# Patient Record
Sex: FEMALE | Race: WHITE | NOT HISPANIC OR LATINO | Employment: FULL TIME | URBAN - METROPOLITAN AREA
[De-identification: names, ages, dates, MRNs, and addresses within clinical notes are randomized per-mention and may not be internally consistent; named-entity substitution may affect disease eponyms.]

---

## 2018-12-23 ENCOUNTER — HOSPITAL ENCOUNTER (EMERGENCY)
Facility: HOSPITAL | Age: 28
Discharge: HOME | End: 2018-12-23
Attending: EMERGENCY MEDICINE | Admitting: EMERGENCY MEDICINE
Payer: COMMERCIAL

## 2018-12-23 VITALS
OXYGEN SATURATION: 97 % | SYSTOLIC BLOOD PRESSURE: 120 MMHG | HEART RATE: 88 BPM | DIASTOLIC BLOOD PRESSURE: 68 MMHG | RESPIRATION RATE: 14 BRPM

## 2018-12-23 DIAGNOSIS — R73.9 HYPERGLYCEMIA: Primary | ICD-10-CM

## 2018-12-23 DIAGNOSIS — E10.9 TYPE 1 DIABETES (HCC): ICD-10-CM

## 2018-12-23 DIAGNOSIS — R11.10 VOMITING: ICD-10-CM

## 2018-12-23 LAB
ALBUMIN SERPL BCP-MCNC: 4.5 G/DL (ref 3.5–5)
ALP SERPL-CCNC: 76 U/L (ref 46–116)
ALT SERPL W P-5'-P-CCNC: 18 U/L (ref 12–78)
ANION GAP SERPL CALCULATED.3IONS-SCNC: 15 MMOL/L (ref 4–13)
AST SERPL W P-5'-P-CCNC: 12 U/L (ref 5–45)
BACTERIA UR QL AUTO: ABNORMAL /HPF
BASOPHILS # BLD AUTO: 0.01 THOUSANDS/ΜL (ref 0–0.1)
BASOPHILS NFR BLD AUTO: 0 % (ref 0–1)
BILIRUB SERPL-MCNC: 1.22 MG/DL (ref 0.2–1)
BILIRUB UR QL STRIP: ABNORMAL
BUN SERPL-MCNC: 16 MG/DL (ref 5–25)
CALCIUM SERPL-MCNC: 9.3 MG/DL (ref 8.3–10.1)
CHLORIDE SERPL-SCNC: 97 MMOL/L (ref 100–108)
CLARITY UR: ABNORMAL
CO2 SERPL-SCNC: 22 MMOL/L (ref 21–32)
COLOR UR: YELLOW
CREAT SERPL-MCNC: 1.1 MG/DL (ref 0.6–1.3)
EOSINOPHIL # BLD AUTO: 0.01 THOUSAND/ΜL (ref 0–0.61)
EOSINOPHIL NFR BLD AUTO: 0 % (ref 0–6)
ERYTHROCYTE [DISTWIDTH] IN BLOOD BY AUTOMATED COUNT: 12.1 % (ref 11.6–15.1)
EXT PREG TEST URINE: NEGATIVE
GFR SERPL CREATININE-BSD FRML MDRD: 69 ML/MIN/1.73SQ M
GLUCOSE SERPL-MCNC: 153 MG/DL (ref 65–140)
GLUCOSE SERPL-MCNC: 199 MG/DL (ref 65–140)
GLUCOSE SERPL-MCNC: 223 MG/DL (ref 65–140)
GLUCOSE SERPL-MCNC: 230 MG/DL (ref 65–140)
GLUCOSE UR STRIP-MCNC: NEGATIVE MG/DL
HCT VFR BLD AUTO: 44.1 % (ref 34.8–46.1)
HGB BLD-MCNC: 15 G/DL (ref 11.5–15.4)
HGB UR QL STRIP.AUTO: NEGATIVE
IMM GRANULOCYTES # BLD AUTO: 0.03 THOUSAND/UL (ref 0–0.2)
IMM GRANULOCYTES NFR BLD AUTO: 0 % (ref 0–2)
KETONES UR STRIP-MCNC: ABNORMAL MG/DL
LEUKOCYTE ESTERASE UR QL STRIP: NEGATIVE
LYMPHOCYTES # BLD AUTO: 0.77 THOUSANDS/ΜL (ref 0.6–4.47)
LYMPHOCYTES NFR BLD AUTO: 9 % (ref 14–44)
MCH RBC QN AUTO: 31.3 PG (ref 26.8–34.3)
MCHC RBC AUTO-ENTMCNC: 34 G/DL (ref 31.4–37.4)
MCV RBC AUTO: 92 FL (ref 82–98)
MONOCYTES # BLD AUTO: 0.5 THOUSAND/ΜL (ref 0.17–1.22)
MONOCYTES NFR BLD AUTO: 6 % (ref 4–12)
MUCOUS THREADS UR QL AUTO: ABNORMAL
NEUTROPHILS # BLD AUTO: 6.89 THOUSANDS/ΜL (ref 1.85–7.62)
NEUTS SEG NFR BLD AUTO: 85 % (ref 43–75)
NITRITE UR QL STRIP: NEGATIVE
NON-SQ EPI CELLS URNS QL MICRO: ABNORMAL /HPF
NRBC BLD AUTO-RTO: 0 /100 WBCS
PH UR STRIP.AUTO: 5.5 [PH] (ref 4.5–8)
PLATELET # BLD AUTO: 173 THOUSANDS/UL (ref 149–390)
PMV BLD AUTO: 12.2 FL (ref 8.9–12.7)
POTASSIUM SERPL-SCNC: 3.5 MMOL/L (ref 3.5–5.3)
PROT SERPL-MCNC: 7.7 G/DL (ref 6.4–8.2)
PROT UR STRIP-MCNC: >=300 MG/DL
RBC # BLD AUTO: 4.79 MILLION/UL (ref 3.81–5.12)
RBC #/AREA URNS AUTO: ABNORMAL /HPF
SODIUM SERPL-SCNC: 134 MMOL/L (ref 136–145)
SP GR UR STRIP.AUTO: >=1.03 (ref 1–1.03)
UROBILINOGEN UR QL STRIP.AUTO: 0.2 E.U./DL
WBC # BLD AUTO: 8.21 THOUSAND/UL (ref 4.31–10.16)
WBC #/AREA URNS AUTO: ABNORMAL /HPF

## 2018-12-23 PROCEDURE — 85025 COMPLETE CBC W/AUTO DIFF WBC: CPT | Performed by: EMERGENCY MEDICINE

## 2018-12-23 PROCEDURE — 96374 THER/PROPH/DIAG INJ IV PUSH: CPT

## 2018-12-23 PROCEDURE — 36415 COLL VENOUS BLD VENIPUNCTURE: CPT

## 2018-12-23 PROCEDURE — 80053 COMPREHEN METABOLIC PANEL: CPT | Performed by: EMERGENCY MEDICINE

## 2018-12-23 PROCEDURE — 81001 URINALYSIS AUTO W/SCOPE: CPT

## 2018-12-23 PROCEDURE — 96361 HYDRATE IV INFUSION ADD-ON: CPT

## 2018-12-23 PROCEDURE — 81025 URINE PREGNANCY TEST: CPT | Performed by: EMERGENCY MEDICINE

## 2018-12-23 PROCEDURE — 82948 REAGENT STRIP/BLOOD GLUCOSE: CPT

## 2018-12-23 PROCEDURE — 99285 EMERGENCY DEPT VISIT HI MDM: CPT

## 2018-12-23 RX ORDER — ONDANSETRON 4 MG/1
4 TABLET, ORALLY DISINTEGRATING ORAL EVERY 6 HOURS PRN
Qty: 20 TABLET | Refills: 0 | Status: SHIPPED | OUTPATIENT
Start: 2018-12-23 | End: 2018-12-28

## 2018-12-23 RX ORDER — ONDANSETRON 2 MG/ML
4 INJECTION INTRAMUSCULAR; INTRAVENOUS ONCE
Status: COMPLETED | OUTPATIENT
Start: 2018-12-23 | End: 2018-12-23

## 2018-12-23 RX ADMIN — ONDANSETRON 4 MG: 2 INJECTION INTRAMUSCULAR; INTRAVENOUS at 13:35

## 2018-12-23 RX ADMIN — SODIUM CHLORIDE 2000 ML: 0.9 INJECTION, SOLUTION INTRAVENOUS at 15:25

## 2018-12-23 NOTE — DISCHARGE INSTRUCTIONS
Type 1 Diabetes in Adults   WHAT YOU NEED TO KNOW:   Type 1 diabetes is a disease that affects how your body makes insulin and uses glucose (sugar)  Normally, when the blood sugar level increases, the pancreas makes more insulin  Insulin helps move sugar out of the blood so it can be used for energy  Type 1 diabetes develops because the immune system destroys cells in the pancreas that make insulin  The pancreas cannot make enough insulin, so the blood sugar level continues to rise  A family history of type 1 diabetes may increase your risk for diabetes  DISCHARGE INSTRUCTIONS:   Call 911 if:   · You have chest pain or shortness of breath  Return to the emergency department if:   · You have a low blood sugar level and it does not improve with treatment  · Your blood sugar level is above 240 mg/dL and does not come down after you take a dose of insulin  · You have ketones  · You have a fever  · You have nausea or are vomiting and cannot keep any food or liquid down  · You have blurred or double vision  · Your breath has a fruity, sweet smell, or your breathing is shallow  · You have symptoms of a low blood sugar level, such as trouble thinking, sweating, or a pounding heartbeat  Contact your healthcare provider if:   · Your blood sugar levels are higher than your target goals  · You often have low blood sugar levels  · Your skin is red, dry, warm, or swollen  · You have a wound that does not heal      · You have trouble coping with your illness, or you feel anxious or depressed  · You have questions or concerns about your condition or care  Medicines:   · You will need insulin each day  Insulin can be injected or given through an insulin pump  Ask your healthcare provider which method is best for you  You or a family member will be taught how to give insulin injections if this is the best method for you  Your family member can give you the injections if you are not able  Take your insulin as directed  Too much insulin may cause your blood sugar level to go too low  You will be taught how to adjust each insulin dose you take with meals  Always check your blood sugar level before the meal  The dose will be based on your blood sugar level, carbohydrates in the meal, and activity after the meal      · Glucose tablets  may be given to increase your blood sugar if it has dropped too low from treatment  · Take your medicine as directed  Contact your healthcare provider if you think your medicine is not helping or if you have side effects  Tell him or her if you are allergic to any medicine  Keep a list of the medicines, vitamins, and herbs you take  Include the amounts, and when and why you take them  Bring the list or the pill bottles to follow-up visits  Carry your medicine list with you in case of an emergency  Check your blood sugar level as directed: You will be taught how to use a glucose monitor  You will need to check your blood sugar level at least 3 times each day  Ask your healthcare provider when and how often to check during the day  If you check your blood sugar level before a meal , it should be between 80 and 130 mg/dL  If you check your blood sugar level 1 to 2 hours after a meal , it should be less than 180 mg/dL  Ask your healthcare provider if these are good goals for you  You may need to check for ketones in your urine or blood if your level is higher than directed  Write down your results, and show them to your healthcare provider  Your provider may make changes to your medicine, food, or exercise schedules  If your blood sugar level is too low: Your blood sugar level is too low if it goes below 70 mg/dL  If the level is too low, eat or drink 15 grams of fast-acting carbohydrate  These are found naturally in fruits  Fast-acting carbohydrates will raise your blood sugar level quickly   Examples of 15 grams of fast-acting carbohydrate are 4 ounces (½ cup) of fruit juice or 4 ounces of regular soda  Other examples are 2 tablespoons of raisins or 3 to 4 glucose tablets  Check your blood sugar level 15 minutes later  If the level is still low (less than 100 mg/dL), eat another 15 grams of carbohydrate  When the level returns to 100 mg/dL, eat a snack or meal that contains carbohydrates  This will help prevent another drop in blood sugar  Always carefully follow your healthcare provider's instructions on how to treat low blood sugar levels  Wear medical alert identification:  Wear medical alert jewelry or carry a card that says you have diabetes  Ask your healthcare provider where to get these items  Check your feet each day for sores:  Wear shoes and socks that fit correctly  Ask your healthcare provider for more information about foot care  Maintain a healthy weight:  Ask your healthcare provider how much you should weigh  A healthy weight can help you control your diabetes  Ask your provider to help you create a weight loss plan if you are overweight  Together you can set manageable weight loss goals  Follow your meal plan:  A dietitian will help you make a meal plan to keep your blood sugar level steady  Do not skip meals  Your blood sugar level may drop too low if you have taken diabetes medicine and do not eat  · Keep track of carbohydrates (sugar and starchy foods)  Your blood sugar level can get too high if you eat too many carbohydrates  Your dietitian will help you plan meals and snacks that have the right amount of carbohydrates  · Eat low-fat foods , such as skinless chicken and low-fat milk  · Eat less sodium (salt)  Limit high-sodium foods, such as soy sauce, potato chips, and soup  Do not add salt to food you cook  Limit your use of table salt  · Eat high-fiber foods , such as vegetables, whole-grain breads, and beans  · Limit alcohol    Alcohol affects your blood sugar level and can make it harder to manage your diabetes  Limit alcohol to 1 drink a day if you are a woman  Limit alcohol to 2 drinks a day if you are a man  A drink of alcohol is 12 ounces of beer, 5 ounces of wine, or 1½ ounces of liquor  Exercise as directed:  Exercise can help keep your blood sugar level steady, decrease your risk of heart disease, and help you lose weight  Stretch before and after you exercise  Exercise for at least 150 minutes every week  Spread this amount of exercise over at least 3 days a week  Do not skip exercise more than 2 days in a row  Include muscle strengthening activities 2 to 3 days each week  Older adults should include balance training 2 to 3 times each week  Activities that help increase balance include yoga and rashard chi  Work with your healthcare provider to create an exercise plan  · Check your blood sugar level before and after exercise  Healthcare providers may tell you to change the amount of insulin you take or food you eat  If your blood sugar level is high, check your blood or urine for ketones before you exercise  Do not exercise if your blood sugar level is high and you have ketones  · If your blood sugar level is less than 100 mg/dL, have a carbohydrate snack before you exercise  Examples are 4 to 6 crackers, ½ banana, 8 ounces (1 cup) of milk, or 4 ounces (½ cup) of juice  Drink water or liquids that do not contain sugar before, during, and after exercise  Ask your dietitian or healthcare provider which liquids you should drink when you exercise  · Do not sit for longer than 30 minutes  If you cannot walk around, at least stand up  This will help you stay active and keep your blood circulating  Do not smoke:  Nicotine and other chemicals in cigarettes and cigars can cause lung damage and make it more difficult to manage your diabetes  Ask your healthcare provider for information if you currently smoke and need help to quit   Do not use e-cigarettes or smokeless tobacco in place of cigarettes or to help you quit  They still contain nicotine  Check your blood pressure as directed:  Ask your healthcare provider what your blood pressure should be  Most adults with diabetes and high blood pressure should have a systolic blood pressure (first number) less than 140  Your diastolic blood pressure (second number) should be less than 90  Ask about vaccines: You have a higher risk for serious illness if you get the flu, pneumonia, or hepatitis  Ask your healthcare provider if you should get a flu, pneumonia, or hepatitis B vaccine, and when to get the vaccine  Follow up with your healthcare provider as directed: You may need to return to have your A1c checked every 3 months  Your healthcare provider will tell you the A1c level that is right for you  Most people should have an A1c less than 7%  You will need to return at least 1 time each year to have your feet checked  You will need an eye exam 1 time each year to check for retinopathy  You will also need urine tests every year to check for kidney problems  Write down your questions so you remember to ask them during your visits  © 2017 [facility] Information is for End User's use only and may not be sold, redistributed or otherwise used for commercial purposes  All illustrations and images included in CareNotes® are the copyrighted property of [employer] M [employer]  or Surinder Sanders  The above information is an  only  It is not intended as medical advice for individual conditions or treatments  Talk to your doctor, nurse or pharmacist before following any medical regimen to see if it is safe and effective for you

## 2018-12-23 NOTE — ED PROVIDER NOTES
History  Chief Complaint   Patient presents with    Hyperglycemia - Symptomatic     states last night low blood sugar "came up as low" drank juice and BGL was 44, then started with vomiting, today  continues with vomiting  71-year-old type 1 diabetic female presents for evaluation of hyperglycemia  The patient states that she has had a GI bug for the past 2 days and last evening she noted that she had a blood sugar around 40 and drank some juice and then had some continued vomiting and diarrhea  The patient then had elevated blood sugars with the highest around 470 with associated vomiting  The patient was attempting keep fluids down but was unable to do so  The patient did not take any insulin because she was not able to keep things down  She did note that her blood sugar started spontaneously decreasing just prior to arrival with blood sugars in the low 200s while she was in the waiting room  The patient had grossly positive urine ketone sticks at home  The patient denies history of DKA and states that her diabetes is well controlled with a hemoglobin A1c in the low 6s            None       Past Medical History:   Diagnosis Date    DM (diabetes mellitus) ([facility] [address] )        Past Surgical History:   Procedure Laterality Date    NO PAST SURGERIES         History reviewed  No pertinent family history  I have reviewed and agree with the history as documented  Social History   Substance Use Topics    Smoking status: Never Smoker    Smokeless tobacco: Never Used    Alcohol use Yes      Comment: occ  Review of Systems   Constitutional: Negative for chills and fever  Gastrointestinal: Positive for nausea  Negative for diarrhea and vomiting  Endocrine: Positive for polydipsia and polyuria  Genitourinary: Negative for menstrual problem, vaginal bleeding and vaginal discharge  All other systems reviewed and are negative        Physical Exam  Physical Exam   Constitutional: She is oriented to person, place, and time  She appears well-developed and well-nourished  No distress  HENT:   Head: Normocephalic and atraumatic  Right Ear: External ear normal    Left Ear: External ear normal    Eyes: Pupils are equal, round, and reactive to light  Conjunctivae and EOM are normal  No scleral icterus  Neck: Normal range of motion  Cardiovascular: Normal rate, regular rhythm and normal heart sounds  Pulmonary/Chest: Effort normal and breath sounds normal  No respiratory distress  Abdominal: Soft  Bowel sounds are normal  There is no tenderness  There is no rebound and no guarding  Musculoskeletal: Normal range of motion  She exhibits no edema  Neurological: She is alert and oriented to person, place, and time  Skin: Skin is warm and dry  No rash noted  Psychiatric: She has a normal mood and affect  Nursing note and vitals reviewed        Vital Signs  ED Triage Vitals [12/23/18 1318]   Temp Pulse Respirations Blood Pressure SpO2   -- (!) 128 (!) 24 130/78 100 %      Temp src Heart Rate Source Patient Position - Orthostatic VS BP Location FiO2 (%)   -- -- Lying Right arm --      Pain Score       No Pain           Vitals:    12/23/18 1338 12/23/18 1442 12/23/18 1602 12/23/18 1734   BP:  121/70 103/62 120/68   Pulse: (!) 110 94 77 88   Patient Position - Orthostatic VS:  Sitting Sitting Lying       Visual Acuity      ED Medications  Medications   ondansetron (ZOFRAN) injection 4 mg (4 mg Intravenous Given 12/23/18 1335)   sodium chloride 0 9 % bolus 2,000 mL (0 mL Intravenous Stopped 12/23/18 1659)       Diagnostic Studies  Results Reviewed     Procedure Component Value Units Date/Time    Fingerstick Glucose (POCT) [[id number]]  (Abnormal) Collected:  12/23/18 1705    Lab Status:  Final result Updated:  12/23/18 1708     POC Glucose 153 (H) mg/dl     Fingerstick Glucose (POCT) [[id number]]  (Abnormal) Collected:  12/23/18 1607    Lab Status:  Final result Updated:  12/23/18 1608     POC Glucose 199 (H) mg/dl     Urine Microscopic [[id number]]  (Abnormal) Collected:  12/23/18 1350    Lab Status:  Final result Specimen:  Urine from Urine, Clean Catch Updated:  12/23/18 1409     RBC, UA 0-1 (A) /hpf      WBC, UA 0-1 (A) /hpf      Epithelial Cells Occasional /hpf      Bacteria, UA Occasional /hpf      MUCUS THREADS Occasional (A)    Narrative:       Less than 2 CC of Urine; results may be affected  Comprehensive metabolic panel [[id number]]  (Abnormal) Collected:  12/23/18 1327    Lab Status:  Final result Specimen:  Blood from Arm, Left Updated:  12/23/18 1351     Sodium 134 (L) mmol/L      Potassium 3 5 mmol/L      Chloride 97 (L) mmol/L      CO2 22 mmol/L      ANION GAP 15 (H) mmol/L      BUN 16 mg/dL      Creatinine 1 10 mg/dL      Glucose 230 (H) mg/dL      Calcium 9 3 mg/dL      AST 12 U/L      ALT 18 U/L      Alkaline Phosphatase 76 U/L      Total Protein 7 7 g/dL      Albumin 4 5 g/dL      Total Bilirubin 1 22 (H) mg/dL      eGFR 69 ml/min/1 73sq m     Narrative:         National Kidney Disease Education Program recommendations are as follows:  GFR calculation is accurate only with a steady state creatinine  Chronic Kidney disease less than 60 ml/min/1 73 sq  meters  Kidney failure less than 15 ml/min/1 73 sq  meters      POCT pregnancy, urine [[id number]]  (Normal) Resulted:  12/23/18 1339    Lab Status:  Final result Specimen:  Urine Updated:  12/23/18 1339     EXT PREG TEST UR (Ref: Negative) negative    POCT urinalysis dipstick [[id number]]  (Abnormal) Resulted:  12/23/18 1335    Lab Status:  Final result Specimen:  Urine from Urine, Other Updated:  12/23/18 1336    ED Urine Macroscopic [[id number]]  (Abnormal) Collected:  12/23/18 1350    Lab Status:  Final result Specimen:  Urine Updated:  12/23/18 1334     Color, UA Yellow     Clarity, UA Cloudy     pH, UA 5 5     Leukocytes, UA Negative     Nitrite, UA Negative     Protein, UA >=300 (A) mg/dl      Glucose, UA Negative mg/dl      Ketones, UA 40 (2+) (A) mg/dl      Urobilinogen, UA 0 2 E U /dl      Bilirubin, UA Interference- unable to analyze (A)     Blood, UA Negative     Specific Gravity, UA >=1 030    Narrative:       CLINITEK RESULT    CBC and differential [[id number]]  (Abnormal) Collected:  12/23/18 1327    Lab Status:  Final result Specimen:  Blood from Arm, Left Updated:  12/23/18 1333     WBC 8 21 Thousand/uL      RBC 4 79 Million/uL      Hemoglobin 15 0 g/dL      Hematocrit 44 1 %      MCV 92 fL      MCH 31 3 pg      MCHC 34 0 g/dL      RDW 12 1 %      MPV 12 2 fL      Platelets 550 Thousands/uL      nRBC 0 /100 WBCs      Neutrophils Relative 85 (H) %      Immat GRANS % 0 %      Lymphocytes Relative 9 (L) %      Monocytes Relative 6 %      Eosinophils Relative 0 %      Basophils Relative 0 %      Neutrophils Absolute 6 89 Thousands/µL      Immature Grans Absolute 0 03 Thousand/uL      Lymphocytes Absolute 0 77 Thousands/µL      Monocytes Absolute 0 50 Thousand/µL      Eosinophils Absolute 0 01 Thousand/µL      Basophils Absolute 0 01 Thousands/µL     Fingerstick Glucose (POCT) [[id number]]  (Abnormal) Collected:  12/23/18 1329    Lab Status:  Final result Updated:  12/23/18 1330     POC Glucose 223 (H) mg/dl                  No orders to display              Procedures  Procedures       Phone Contacts  ED Phone Contact    ED Course  ED Course as of Dec 23 2320   Sun Dec 23, 2018   1611 Patient is stable upon re-evaluation, 2nd L infusing  POC Glucose: (!) 199                               MDM  Number of Diagnoses or Management Options  Hyperglycemia: new and requires workup  Type 1 diabetes ([facility] [address] ): new and requires workup  Vomiting: new and requires workup  Diagnosis management comments: The plan is for IV hydration and laboratories to assess for complications of diabetes such as DKA  The patient's blood sugar abnormalities are likely secondary to a viral GI bug  The patient's labs and urine were reviewed    The plan is for 2 L of IV hydration and recheck of blood sugars  Patient clinically improved after 2 L of IV fluids  Her blood sugar is decreased  S strict return precautions were discussed and the patient verbalized understanding of the need to return to the emergency department for any concerning symptoms are rising blood sugars  Amount and/or Complexity of Data Reviewed  Clinical lab tests: ordered and reviewed  Review and summarize past medical records: yes      CritCare Time    Disposition  Final diagnoses:   Hyperglycemia   Type 1 diabetes ([facility] )   Vomiting     Time reflects when diagnosis was documented in both MDM as applicable and the Disposition within this note     Time User Action Codes Description Comment    12/23/2018  5:13 PM Melissa Brizuela Add [R73 9] Hyperglycemia     12/23/2018  5:13 PM [place] Mola [E11 9] Diabetes ([facility] [address] )     12/23/2018  5:13 PM Melissa Brizuela Remove [E11 9] [facility] ([facility] )     12/23/2018  5:13 PM Davied Leda B Add [E10 9] Type 1 diabetes ([facility] )     12/23/2018  5:17 PM Davied Leda B Add [R11 10] Vomiting       ED Disposition     ED Disposition Condition Comment    Discharge  [facility] discharge to home/self care      Condition at discharge: Stable        Follow-up Information     Follow up With Specialties Details Why Contact Info Additional Information    Michelle Richardson MD Internal Medicine Schedule an appointment as soon as possible for a visit For further evaluation [address] [phone]       [address] [place] [address] Emergency Department Emergency Medicine Go to For further evaluation, If symptoms worsen [address] [facility] ED, [address] [place] [address], [address]          Discharge Medication List as of 12/23/2018  5:17 PM      START taking these medications    Details   ondansetron (ZOFRAN-ODT) 4 mg disintegrating tablet Take 1 tablet (4 mg total) by mouth every 6 (six) hours as needed for nausea or vomiting, Starting Sun 12/23/2018, Normal           No discharge procedures on file      ED Provider  Electronically Signed by           Jillian Gray DO  12/23/18 1534

## 2018-12-26 RX ORDER — NORETHINDRONE ACETATE AND ETHINYL ESTRADIOL 1; .02 MG/1; MG/1
1 TABLET ORAL DAILY
COMMUNITY
Start: 2013-07-17 | End: 2018-12-28

## 2018-12-28 ENCOUNTER — ANNUAL EXAM (OUTPATIENT)
Dept: OBGYN CLINIC | Facility: CLINIC | Age: 28
End: 2018-12-28
Payer: COMMERCIAL

## 2018-12-28 VITALS
WEIGHT: 137 LBS | BODY MASS INDEX: 22.82 KG/M2 | DIASTOLIC BLOOD PRESSURE: 70 MMHG | HEIGHT: 65 IN | SYSTOLIC BLOOD PRESSURE: 140 MMHG

## 2018-12-28 DIAGNOSIS — Z12.4 ENCOUNTER FOR PAP SMEAR OF CERVIX WITH HPV DNA COTESTING: Primary | ICD-10-CM

## 2018-12-28 PROCEDURE — 99385 PREV VISIT NEW AGE 18-39: CPT | Performed by: OBSTETRICS & GYNECOLOGY

## 2018-12-28 PROCEDURE — G0145 SCR C/V CYTO,THINLAYER,RESCR: HCPCS | Performed by: OBSTETRICS & GYNECOLOGY

## 2018-12-28 NOTE — PATIENT INSTRUCTIONS
Pap Smear   GENERAL INFORMATION:   What is a Pap smear? A Pap smear, or Pap test, is a procedure to check your cervix for abnormal cells  The cervix is the narrow opening at the bottom of your uterus  The cervix meets the top part of the vagina  How do I prepare for a Pap smear? The best time to schedule the test is right after your period stops  Do not have a Pap smear during your monthly period  Do not have intercourse or put anything in your vagina for 24 hours before your test    What will happen during a Pap smear? · You will lie on your back and place your feet on footrests called stirrups  Your caregiver will gently insert a device called a speculum into your vagina  The speculum is used to spread the walls of your vagina so he can see your cervix  He will use a thin brush or cotton swab to collect cells from the inside of your cervix  · Your caregiver will also collect cells from the surface of your cervix with a plastic or wooden tool called a spatula  He may also gently scrape the upper part of your vagina for a sample  The samples are placed in a container with liquid or on a glass slide  They are sent to a lab and examined for abnormal cells  How often do I need a Pap smear? Pap smears are usually done every 1 to 3 years  You may need a Pap smear more often if you have any of the following:  · Positive test result for the human papillomavirus (HPV)    · Cervical intraepithelial neoplasm or cervical cancer    · HIV    · A weak immune system    · Exposure to diethylstilbestrol (EDUARDO) medicine when your mother was pregnant with you  CARE AGREEMENT:   You have the right to help plan your care  Learn about your health condition and how it may be treated  Discuss treatment options with your caregivers to decide what care you want to receive  You always have the right to refuse treatment  The above information is an  only   It is not intended as medical advice for individual conditions or treatments  Talk to your doctor, nurse or pharmacist before following any medical regimen to see if it is safe and effective for you  © 2014 [address] is for End User's use only and may not be sold, redistributed or otherwise used for commercial purposes  All illustrations and images included in CareNotes® are the copyrighted property of A D A M , Inc  or Surinder Sanders

## 2018-12-28 NOTE — PROGRESS NOTES
A/P    1  Annual exam    Last PAP - 2017   Next due - done today secondary to abnormal pap in the past and pt request   Scheduling of pap discussed in detail - if normal will not repeat x 3 years        28 y o ,Patient's last menstrual period was 12/04/2018 (within days)  C/O no complaints for annual exam and pap       Past medical / social / surgical / family history reviewed and updated   Medication and allergies discussed in detail and updated     ? ? Mother had ovarian cancer unsure of when or how was treated   She is a patient of ours will reach out to her to see if I can access her chart and determine if Adam Vincent needs screening for BRCA       Review of Systems - History obtained from chart review and the patient  General ROS: negative  Psychological ROS: negative  Ophthalmic ROS: negative  ENT ROS: negative  Allergy and Immunology ROS: negative  Hematological and Lymphatic ROS: negative  Endocrine ROS: negative  Breast ROS: negative for breast lumps  Respiratory ROS: no cough, shortness of breath, or wheezing  Cardiovascular ROS: no chest pain or dyspnea on exertion  Gastrointestinal ROS: no abdominal pain, change in bowel habits, or black or bloody stools  Genito-Urinary ROS: no dysuria, trouble voiding, or hematuria  Musculoskeletal ROS: negative  Neurological ROS: no TIA or stroke symptoms  Dermatological ROS: negative    Physical Exam   Constitutional: She is oriented to person, place, and time  She appears well-developed  Neck: Normal range of motion  No thyromegaly present  Cardiovascular: Normal rate  Pulmonary/Chest: Effort normal  No respiratory distress  Right breast exhibits no inverted nipple, no mass and no tenderness  Left breast exhibits no inverted nipple, no mass and no tenderness  Breasts are symmetrical    Abdominal: Soft  She exhibits no distension  There is no tenderness  Genitourinary: Vagina normal and uterus normal  Cervix exhibits no motion tenderness and no discharge   Right adnexum displays no mass, no tenderness and no fullness  Left adnexum displays no mass, no tenderness and no fullness  No vaginal discharge found  Lymphadenopathy:     She has no cervical adenopathy  Neurological: She is alert and oriented to person, place, and time  Psychiatric: She has a normal mood and affect  Her behavior is normal  Judgment and thought content normal    Vitals reviewed

## 2019-01-04 LAB
LAB AP GYN PRIMARY INTERPRETATION: NORMAL
Lab: NORMAL

## 2019-01-07 ENCOUNTER — TELEPHONE (OUTPATIENT)
Dept: OBGYN CLINIC | Facility: CLINIC | Age: 29
End: 2019-01-07

## 2019-02-05 ENCOUNTER — OFFICE VISIT (OUTPATIENT)
Dept: OBGYN CLINIC | Facility: CLINIC | Age: 29
End: 2019-02-05
Payer: COMMERCIAL

## 2019-02-05 VITALS — DIASTOLIC BLOOD PRESSURE: 86 MMHG | BODY MASS INDEX: 21.97 KG/M2 | SYSTOLIC BLOOD PRESSURE: 152 MMHG | WEIGHT: 132 LBS

## 2019-02-05 DIAGNOSIS — N91.2 AMENORRHEA: ICD-10-CM

## 2019-02-05 DIAGNOSIS — O24.019 TYPE 1 DIABETES MELLITUS IN PREGNANCY, UNSPECIFIED TRIMESTER: Primary | ICD-10-CM

## 2019-02-05 PROBLEM — R11.10 VOMITING: Status: RESOLVED | Noted: 2018-12-23 | Resolved: 2019-02-05

## 2019-02-05 PROBLEM — E10.9 TYPE 1 DIABETES MELLITUS WITHOUT COMPLICATION (HCC): Status: ACTIVE | Noted: 2019-02-05

## 2019-02-05 LAB — SL AMB POCT URINE HCG: POSITIVE

## 2019-02-05 PROCEDURE — 99213 OFFICE O/P EST LOW 20 MIN: CPT | Performed by: OBSTETRICS & GYNECOLOGY

## 2019-02-05 PROCEDURE — 81025 URINE PREGNANCY TEST: CPT | Performed by: OBSTETRICS & GYNECOLOGY

## 2019-02-05 NOTE — PATIENT INSTRUCTIONS
Amenorrhea   AMBULATORY CARE:   Amenorrhea  is the absence of menstruation (your monthly period)  Primary amenorrhea means your period did not start by age 12  This is usually because of a lack of reproductive organs, such as a uterus  Breasts and other signs of puberty that usually start to develop by age 15 do not develop  Secondary amenorrhea means you stopped having regular periods for at least 3 months or irregular periods for 6 months  Amenorrhea may be a sign of a serious medical problem that needs to be treated  Common symptoms include the following:   · Hair growth on your face or over your breastbone, or bald patches    · Acne    · Pelvic pain    · Headaches    · Vision changes    · Bruises or patches of dark skin  Contact your healthcare provider for any of the following:   · You notice changes in your menstrual cycle, such as periods that start and stop a few times  · Your female child is 15, has not started menstruating, and is not developing signs of puberty  · Your female child is 12 and has developed signs of puberty, but she has not started menstruating  · You have questions or concerns about your condition or care  Treatment for amenorrhea  may include birth control pills to restart and regulate your periods  You may need medicines to treat medical conditions such as a thyroid or pituitary disorder, or PCOS  Surgery may fix a problem that is preventing blood from flowing through your vagina, or to remove a tumor  Prevent or manage amenorrhea:   · Maintain a healthy weight  Low body weight, overweight, or obesity can all affect your period  Your healthcare provider can help you create a plan to reach and maintain a healthy weight  · Eat healthy foods  Healthy foods include fruits, vegetables, whole-grain breads, low-fat dairy products, beans, lean meats, and fish  You may need to have more calcium and vitamin D if your amenorrhea is caused by low estrogen levels   Low estrogen can affect bone strength  Calcium and vitamin D work together to help build bone  Your healthcare provider or a dietitian can help you create a meal plan that has the right number of calories and nutrients for you  · Exercise as directed  Exercise can help you build or maintain bone  Exercise can also help you lose weight if you are overweight  Ask your healthcare provider how much to exercise and which exercises are best for you  Do not exercise more than your healthcare provider recommends  Too much exercise can cause your period to stop  · Keep a record of your monthly periods  Record the dates your period started and stopped  Also record any pain or other problems during your period  · Manage stress  Try new ways to relax, such as deep breathing  Ask your healthcare provider for more information on stress management  Follow up with your healthcare provider as directed:  Write down your questions so you remember to ask them during your visits  © 2017 [employer] Information is for End User's use only and may not be sold, redistributed or otherwise used for commercial purposes  All illustrations and images included in CareNotes® are the copyrighted property of A D A M , Inc  or Surinder Sadners  The above information is an  only  It is not intended as medical advice for individual conditions or treatments  Talk to your doctor, nurse or pharmacist before following any medical regimen to see if it is safe and effective for you

## 2019-02-05 NOTE — PROGRESS NOTES
LMP 1/2/2019  EDC by LMP is 10/9/2019 4 w 6 days    Pregnancy complicated by   1  Pre - gestational DM - referral given to her    Advised  - the target sugar ranges   Advised some of the testing that will be needed  Need to determine her last opthalmology, podiatry apt and get that set up for her         Pregnancy Counseling     1  Testing   Advised of the labs that we drawl and why and when they should be done     Genetic testing    CF / SMA - info given for codes to call insurance company for coverage      Nuchal / blood - pt explained timing of the test and why we do it      US - dating and anatomy scan     2  Life style    Exercise    Cont with the plan that done now if nothing recommend exercise 3 x week for  20 min    Weight gain - 25-30 pounds total gain    No smoking or drinking and no drugs       3   Eating    Slip given for the restriction and recommendation of diet during pregnancy

## 2019-02-07 ENCOUNTER — TELEPHONE (OUTPATIENT)
Dept: PERINATAL CARE | Facility: CLINIC | Age: 29
End: 2019-02-07

## 2019-02-09 ENCOUNTER — APPOINTMENT (OUTPATIENT)
Dept: LAB | Facility: HOSPITAL | Age: 29
End: 2019-02-09
Attending: OBSTETRICS & GYNECOLOGY
Payer: COMMERCIAL

## 2019-02-09 DIAGNOSIS — O24.019 TYPE 1 DIABETES MELLITUS IN PREGNANCY, UNSPECIFIED TRIMESTER: ICD-10-CM

## 2019-02-09 DIAGNOSIS — N91.2 AMENORRHEA: ICD-10-CM

## 2019-02-09 LAB
ABO GROUP BLD: NORMAL
BASOPHILS # BLD AUTO: 0.03 THOUSANDS/ΜL (ref 0–0.1)
BASOPHILS NFR BLD AUTO: 1 % (ref 0–1)
BILIRUB UR QL STRIP: NEGATIVE
BLD GP AB SCN SERPL QL: NEGATIVE
CLARITY UR: CLEAR
COLOR UR: YELLOW
EOSINOPHIL # BLD AUTO: 0.04 THOUSAND/ΜL (ref 0–0.61)
EOSINOPHIL NFR BLD AUTO: 1 % (ref 0–6)
ERYTHROCYTE [DISTWIDTH] IN BLOOD BY AUTOMATED COUNT: 11.8 % (ref 11.6–15.1)
EST. AVERAGE GLUCOSE BLD GHB EST-MCNC: 128 MG/DL
GLUCOSE UR STRIP-MCNC: NEGATIVE MG/DL
HBA1C MFR BLD: 6.1 % (ref 4.2–6.3)
HBV SURFACE AG SER QL: NORMAL
HCT VFR BLD AUTO: 39.6 % (ref 34.8–46.1)
HGB BLD-MCNC: 13.7 G/DL (ref 11.5–15.4)
HGB UR QL STRIP.AUTO: NEGATIVE
IMM GRANULOCYTES # BLD AUTO: 0.02 THOUSAND/UL (ref 0–0.2)
IMM GRANULOCYTES NFR BLD AUTO: 0 % (ref 0–2)
KETONES UR STRIP-MCNC: ABNORMAL MG/DL
LEUKOCYTE ESTERASE UR QL STRIP: NEGATIVE
LYMPHOCYTES # BLD AUTO: 1.72 THOUSANDS/ΜL (ref 0.6–4.47)
LYMPHOCYTES NFR BLD AUTO: 27 % (ref 14–44)
MCH RBC QN AUTO: 32.2 PG (ref 26.8–34.3)
MCHC RBC AUTO-ENTMCNC: 34.6 G/DL (ref 31.4–37.4)
MCV RBC AUTO: 93 FL (ref 82–98)
MONOCYTES # BLD AUTO: 0.49 THOUSAND/ΜL (ref 0.17–1.22)
MONOCYTES NFR BLD AUTO: 8 % (ref 4–12)
NEUTROPHILS # BLD AUTO: 4.17 THOUSANDS/ΜL (ref 1.85–7.62)
NEUTS SEG NFR BLD AUTO: 63 % (ref 43–75)
NITRITE UR QL STRIP: NEGATIVE
NRBC BLD AUTO-RTO: 0 /100 WBCS
PH UR STRIP.AUTO: 6 [PH] (ref 4.5–8)
PLATELET # BLD AUTO: 160 THOUSANDS/UL (ref 149–390)
PMV BLD AUTO: 12.2 FL (ref 8.9–12.7)
PROT UR STRIP-MCNC: NEGATIVE MG/DL
RBC # BLD AUTO: 4.25 MILLION/UL (ref 3.81–5.12)
RH BLD: POSITIVE
RUBV IGG SERPL IA-ACNC: >175 IU/ML
SP GR UR STRIP.AUTO: 1.02 (ref 1–1.03)
SPECIMEN EXPIRATION DATE: NORMAL
UROBILINOGEN UR QL STRIP.AUTO: 0.2 E.U./DL
WBC # BLD AUTO: 6.47 THOUSAND/UL (ref 4.31–10.16)

## 2019-02-09 PROCEDURE — 81003 URINALYSIS AUTO W/O SCOPE: CPT

## 2019-02-09 PROCEDURE — 80081 OBSTETRIC PANEL INC HIV TSTG: CPT

## 2019-02-09 PROCEDURE — 87086 URINE CULTURE/COLONY COUNT: CPT

## 2019-02-09 PROCEDURE — 36415 COLL VENOUS BLD VENIPUNCTURE: CPT

## 2019-02-09 PROCEDURE — 83036 HEMOGLOBIN GLYCOSYLATED A1C: CPT

## 2019-02-10 LAB
BACTERIA UR CULT: NORMAL
RPR SER QL: NORMAL

## 2019-02-11 LAB — HIV 1+2 AB+HIV1 P24 AG SERPL QL IA: NORMAL

## 2019-02-11 NOTE — TELEPHONE ENCOUNTER
Thank you  I will put one in when I do her dating ultrasound so that she is scheduled for the appropriate time for NT scanning

## 2019-02-18 ENCOUNTER — ULTRASOUND (OUTPATIENT)
Dept: OBGYN CLINIC | Facility: CLINIC | Age: 29
End: 2019-02-18
Payer: COMMERCIAL

## 2019-02-18 DIAGNOSIS — Z36.9 ANTENATAL SCREENING ENCOUNTER: Primary | ICD-10-CM

## 2019-02-18 DIAGNOSIS — O24.011 TYPE 1 DIABETES MELLITUS IN PREGNANCY, FIRST TRIMESTER: ICD-10-CM

## 2019-02-18 PROCEDURE — 76817 TRANSVAGINAL US OBSTETRIC: CPT | Performed by: OBSTETRICS & GYNECOLOGY

## 2019-02-18 NOTE — PROGRESS NOTES
Ultrasound performed for dating  MFM packet given  Repeat ultrasound has been scheduled in a few weeks

## 2019-02-19 ENCOUNTER — OFFICE VISIT (OUTPATIENT)
Dept: PERINATAL CARE | Facility: CLINIC | Age: 29
End: 2019-02-19
Payer: COMMERCIAL

## 2019-02-19 VITALS
SYSTOLIC BLOOD PRESSURE: 122 MMHG | DIASTOLIC BLOOD PRESSURE: 79 MMHG | HEIGHT: 65 IN | WEIGHT: 133.6 LBS | BODY MASS INDEX: 22.26 KG/M2 | HEART RATE: 80 BPM

## 2019-02-19 VITALS
WEIGHT: 133 LBS | DIASTOLIC BLOOD PRESSURE: 79 MMHG | BODY MASS INDEX: 22.13 KG/M2 | SYSTOLIC BLOOD PRESSURE: 122 MMHG | HEART RATE: 80 BPM

## 2019-02-19 DIAGNOSIS — O24.011 PRE-EXISTING TYPE 1 DIABETES MELLITUS DURING PREGNANCY IN FIRST TRIMESTER: Primary | ICD-10-CM

## 2019-02-19 DIAGNOSIS — E11.649 HYPOGLYCEMIA ASSOCIATED WITH DIABETES (HCC): ICD-10-CM

## 2019-02-19 DIAGNOSIS — Z3A.01 LESS THAN 8 WEEKS GESTATION OF PREGNANCY: ICD-10-CM

## 2019-02-19 DIAGNOSIS — E55.9 VITAMIN D DEFICIENCY: ICD-10-CM

## 2019-02-19 PROBLEM — O24.311 PRE-EXISTING DIABETES MELLITUS AFFECTING PREGNANCY IN FIRST TRIMESTER, ANTEPARTUM: Status: ACTIVE | Noted: 2019-02-19

## 2019-02-19 PROBLEM — E10.9 TYPE 1 DIABETES MELLITUS WITHOUT COMPLICATION (HCC): Status: RESOLVED | Noted: 2019-02-05 | Resolved: 2019-02-19

## 2019-02-19 PROCEDURE — G0108 DIAB MANAGE TRN  PER INDIV: HCPCS | Performed by: DIETITIAN, REGISTERED

## 2019-02-19 PROCEDURE — 99215 OFFICE O/P EST HI 40 MIN: CPT | Performed by: NURSE PRACTITIONER

## 2019-02-19 RX ORDER — FLASH GLUCOSE SENSOR
1 KIT MISCELLANEOUS SEE ADMIN INSTRUCTIONS
Refills: 11 | COMMUNITY
Start: 2019-02-09 | End: 2019-03-04 | Stop reason: ALTCHOICE

## 2019-02-19 NOTE — PROGRESS NOTES
DATE: 19   RE: Liliana Lynch   : 1990  JOHN: Estimated Date of Delivery: 10/8/2019  EGA:  7 0/7 wks    Dear Dr Nicole Wakefield: Thank you for referring your patient to the Diabetes and Pregnancy Program at [address] [place] [address]  The patient was seen today for medical nutrition therapy for the treatment of Type 1 diabetes during pregnancy  The following was reviewed with the patient:      Weight gain during in pregnancy  Based on the patients height of   inches, pre-pregnancy weight of 130 pounds (BMI 21 6), we would recommend a total weight gain of 25-35 pounds for the pregnancy  o The patients current weight is 60 3 kg (133 lb) pounds, and her weight gain to date is 3 pounds   Basic review of macronutrients   Meal pattern should consist of three small meals and three snacks daily  Patient currently eats 3 meals & 1 snack  Agreed to eat 3 meals & 3 snacks now   Carbohydrate gram amounts per meal  Striving for 30-45 gm at breakfast & 45 gm at lunch & dinner   Instructions on how to read a food label   Appropriate serving size of foods   Incorporating protein at each meal and snack in the importance of protein in relationship to blood glucose control   Individualized meal plan: 1800 calorie for 1st trimester & 2100 calorie for 2nd/3rd trimesters gestational diabetes diet  Diet recall indicates patient has nausea only occasionally  Total intake now <1800 calories as not eating snacks   Use of food diary to maintain a meal plan   Report blood glucose levels to [address] weekly or as directed  o Phone: [phone]  If no response in 24 hours, call [phone]   o Fax: [phone]  o Email: [email or web address]   Follow up: 1 month    Thank you for the opportunity to participate in the care of this patient  I can be reached at [phone] should you have any questions    Time spent with patient 4:15-5:20 PM; time spent face to face counseling greater than 50% of the appointment      Sincerely,     Yimi Shipley  Diabetes Educator  Diabetes [facility]regnancy Program

## 2019-02-19 NOTE — PATIENT INSTRUCTIONS
1  Start GDM diet 3 meals and 3 snacks including recommended combination of carb, protein and fat per meal and snack  No more than 8 to 10 hours of fasting overnight  2  Self monitor blood glucose before meals, 1 or 2 hours after start of each meal, bedtime, at 3 am and with symptoms of hypoglycemia  3  Referral for Dexcom personal CGM secondary to reports of discrepancies with Abhishek CGM  4  Keep glucose log and report at minimum once a week or as recommended via blood  [email or web address] or thru [address]  5  Glucose goals fasting 90 or less, 1 hour post meal 135 or less and 2 hours post meal 120 or less, overnight  and pre-meal 110 or less  6  Levemir 11 units at 9 or 10 pm daily  7  Recommend Novolog 5 units before breakfast, lunch and dinner, try to inject 15 minutes before meals if possible  8  Always have glucose available for hypoglycemia, use 15 by 15 rule, refer to patient education sheet  Has glucagon at home and urine ketone strips  9  Patient education sheet provided on pre-eclampsia prevention and baby aspirin  Please discuss with your OB and MFM  10  Schedule an ultrasound with Maternal Fetal Medicine  11  Check TSH, free T4, urine protein/creatininine ration and CMP  12  A1c during pregnancy goal close to 6% with minimal episodes of hypoglycemia  A1c assessed every 4 to 8 weeks during pregnancy  13  After Level II ultrasound fetal growth ultrasounds every 4 weeks or as recommended by MFM or your OB  14  Between 22 to 24 weeks fetal echo will be recommended  15  Tight glucose control during pregnancy very important and risk factors reviewed as outlined in your Diabetes and Pregnancy booklet  16  Insulin requirements during pregnancy and basal/bolus concept reviewed  17  Follow-up in 1 to 2 weeks  25  Follow-up with dietitian as recommended  19  Please schedule eye exam     20  Due to Vitamin D deficiency, add OTC D3 2000 iu daily to prenatal vitamin

## 2019-02-19 NOTE — PROGRESS NOTES
Assessment/Plan:    No problem-specific Assessment & Plan notes found for this encounter  Diagnoses and all orders for this visit:    Pre-existing type 1 diabetes mellitus during pregnancy in first trimester  -     Ambulatory referral to Diabetic Education  -     TSH, 3rd generation; Future  -     T4, free; Future  -     Comprehensive metabolic panel; Future  -     Protein / creatinine ratio, urine    Less than 8 weeks gestation of pregnancy  -     TSH, 3rd generation; Future  -     T4, free; Future  -     Comprehensive metabolic panel; Future  -     Protein / creatinine ratio, urine    Hypoglycemia associated with diabetes (HCC)  -     Protein / creatinine ratio, urine    Vitamin D deficiency  -     Protein / creatinine ratio, urine    Other orders  -     insulin detemir (LEVEMIR FLEXTOUCH) 100 Units/mL injection pen; Inject 11 Units under the skin daily at bedtime  -     Continuous Blood Gluc Sensor (foc.usYLE BIA 14 DAY SENSOR) [facility]; Inject 1 Device under the skin see administration instructions      1  Start GDM diet 3 meals and 3 snacks including recommended combination of carb, protein and fat per meal and snack  No more than 8 to 10 hours of fasting overnight  2  Self monitor blood glucose before meals, 1 or 2 hours after start of each meal, bedtime, at 3 am and with symptoms of hypoglycemia  3  Referral for Dexcom personal CGM secondary to reports of discrepancies with [employer] CGM  4  Keep glucose log and report at minimum once a week or as recommended via blood  [email or web address] or thru [address]  5  Glucose goals fasting 90 or less, 1 hour post meal 135 or less and 2 hours post meal 120 or less, overnight  and pre-meal 110 or less  6  Levemir 11 units at 9 or 10 pm daily  7  Recommend Novolog 5 units before breakfast, lunch and dinner, try to inject 15 minutes before meals if possible     8  Always have glucose available for hypoglycemia, use 15 by 15 rule, refer to patient education sheet  Has glucagon at home and urine ketone strips  9  Patient education sheet provided on pre-eclampsia prevention and baby aspirin  Please discuss with your OB and MFM  10  Schedule an ultrasound with Maternal Fetal Medicine  11  Check TSH, free T4, urine protein/creatininine ration and CMP  12  A1c during pregnancy goal close to 6% with minimal episodes of hypoglycemia  A1c assessed every 4 to 8 weeks during pregnancy  13  After Level II ultrasound fetal growth ultrasounds every 4 weeks or as recommended by MFM or your OB  14  Between 22 to 24 weeks fetal echo will be recommended  15  Tight glucose control during pregnancy very important and risk factors reviewed as outlined in your Diabetes and Pregnancy booklet  16  Insulin requirements during pregnancy and basal/bolus concept reviewed  17  Follow-up in 1 to 2 weeks  25  Follow-up with dietitian as recommended  19  Please schedule eye exam    20  Due to Vitamin D deficiency, add OTC D3 2000 iu daily to prenatal vitamin  Time spent with patient 60 minutes  Subjective:      Patient ID: Jimena Browne is a 34 y o  female  , JOHN 10/9/19, currently 6 6/7 weeks gestation  Referred for diabetes management during pregnancy, pre-existing T1DM, diagnosed at age 18, currently on Levemir and Novolog   present during visit  Reports prior to pregnancy being on Levemir 17 units at 9 or 10 pm, recently decreasing to 0 units due to reports of glucose readings of 30 to 40, last night injected Levemir 8 units with am readings of 118  Current Novolog ranges from 7 to 16 units before meals and has reported as high as 20 units before meals with pizza  Erica Dolan has a Abhishek CGM in place but reports Shelle Kanner giving higher numbers or inconsistent readings since pregnancy  Reports A1c never higher than 6 5%  [facility]- reports cousin is a T1DM       The following portions of the patient's history were reviewed and updated as appropriate: allergies, current medications, past family history, past medical history, past social history, past surgical history and problem list     Review of Systems   Constitutional: Negative for fatigue and fever  Eyes: Negative for visual disturbance  Respiratory: Negative for cough and shortness of breath  Cardiovascular: Positive for palpitations  Negative for chest pain and leg swelling  Gastrointestinal: Positive for nausea  Negative for constipation and vomiting  Endocrine: Negative for polydipsia, polyphagia and polyuria  Genitourinary: Negative for difficulty urinating and vaginal bleeding  Skin: Negative for rash  Allergic/Immunologic: Negative for environmental allergies and food allergies  Neurological: Negative for dizziness and headaches  Psychiatric/Behavioral: Negative for sleep disturbance  Objective:      Component Value Date/Time    HGBA1C 6 1 02/09/2019 0930       /79 (BP Location: Right arm, Patient Position: Sitting, Cuff Size: Standard)   Pulse 80   Ht 5' 5" (1 651 m)   Wt 60 6 kg (133 lb 9 6 oz)   LMP 01/02/2019   BMI 22 23 kg/m²      Physical Exam   Constitutional: She is oriented to person, place, and time  Vital signs are normal  She appears well-developed and well-nourished  She is cooperative  HENT:   Head: Normocephalic  Eyes: Conjunctivae and lids are normal    Neck: Normal range of motion  No thyromegaly present  Cardiovascular: Normal rate, regular rhythm, S1 normal and S2 normal    Pulmonary/Chest: Effort normal and breath sounds normal    Neurological: She is oriented to person, place, and time  Skin: Skin is warm and dry  Psychiatric: She has a normal mood and affect   Her speech is normal  Judgment and thought content normal  Cognition and memory are normal

## 2019-02-23 ENCOUNTER — APPOINTMENT (OUTPATIENT)
Dept: LAB | Facility: HOSPITAL | Age: 29
End: 2019-02-23
Payer: COMMERCIAL

## 2019-02-23 DIAGNOSIS — Z3A.01 LESS THAN 8 WEEKS GESTATION OF PREGNANCY: ICD-10-CM

## 2019-02-23 DIAGNOSIS — O24.011 PRE-EXISTING TYPE 1 DIABETES MELLITUS DURING PREGNANCY IN FIRST TRIMESTER: ICD-10-CM

## 2019-02-23 LAB
ALBUMIN SERPL BCP-MCNC: 3.7 G/DL (ref 3.5–5)
ALP SERPL-CCNC: 44 U/L (ref 46–116)
ALT SERPL W P-5'-P-CCNC: 20 U/L (ref 12–78)
ANION GAP SERPL CALCULATED.3IONS-SCNC: 11 MMOL/L (ref 4–13)
AST SERPL W P-5'-P-CCNC: 13 U/L (ref 5–45)
BILIRUB SERPL-MCNC: 0.44 MG/DL (ref 0.2–1)
BUN SERPL-MCNC: 10 MG/DL (ref 5–25)
CALCIUM SERPL-MCNC: 8.9 MG/DL (ref 8.3–10.1)
CHLORIDE SERPL-SCNC: 104 MMOL/L (ref 100–108)
CO2 SERPL-SCNC: 24 MMOL/L (ref 21–32)
CREAT SERPL-MCNC: 0.69 MG/DL (ref 0.6–1.3)
CREAT UR-MCNC: 134 MG/DL
GFR SERPL CREATININE-BSD FRML MDRD: 121 ML/MIN/1.73SQ M
GLUCOSE P FAST SERPL-MCNC: 71 MG/DL (ref 65–99)
POTASSIUM SERPL-SCNC: 3.8 MMOL/L (ref 3.5–5.3)
PROT SERPL-MCNC: 7.2 G/DL (ref 6.4–8.2)
PROT UR-MCNC: 12 MG/DL
PROT/CREAT UR: 0.09 MG/G{CREAT} (ref 0–0.1)
SODIUM SERPL-SCNC: 139 MMOL/L (ref 136–145)
T4 FREE SERPL-MCNC: 1.01 NG/DL (ref 0.76–1.46)
TSH SERPL DL<=0.05 MIU/L-ACNC: 1.83 UIU/ML (ref 0.36–3.74)

## 2019-02-23 PROCEDURE — 84443 ASSAY THYROID STIM HORMONE: CPT

## 2019-02-23 PROCEDURE — 36415 COLL VENOUS BLD VENIPUNCTURE: CPT

## 2019-02-23 PROCEDURE — 82570 ASSAY OF URINE CREATININE: CPT | Performed by: NURSE PRACTITIONER

## 2019-02-23 PROCEDURE — 80053 COMPREHEN METABOLIC PANEL: CPT

## 2019-02-23 PROCEDURE — 84439 ASSAY OF FREE THYROXINE: CPT

## 2019-02-23 PROCEDURE — 84156 ASSAY OF PROTEIN URINE: CPT | Performed by: NURSE PRACTITIONER

## 2019-02-25 ENCOUNTER — TELEPHONE (OUTPATIENT)
Dept: OBGYN CLINIC | Facility: CLINIC | Age: 29
End: 2019-02-25

## 2019-02-25 ENCOUNTER — DOCUMENTATION (OUTPATIENT)
Dept: PERINATAL CARE | Facility: CLINIC | Age: 29
End: 2019-02-25

## 2019-02-25 NOTE — PROGRESS NOTES
DATE: 19   RE: Rashmi Holden   : 1990  JOHN: 10/8/2019  EGA:  7w6d  OB/GYN: [facility]       Blood glucose log from patient e-mail  Current regimen:  Levemir- 11 units at bedtime  Novolog- 5 units with meals, injecting 10-15 minutes prior to food intake  1800 calorie diabetes and pregnancy diet with 3 meals/snacks including protein increasing to 2100 calories as pregnancy progresses  Self monitoring blood glucose monitoring     Plan:  Continue current regimen  Continue diet eating carbohydrates servings closely, patient may be under eating carbs especially at dinner resulting in low BG  Also advised to use high protein boost shakes only and count as 2 carbohydrate and 2 protein servings for each bottle or have 1/2 bottle for snacks    19 HbA1c- 6 1% re-order labs week of 3-24-19  2-23-19 CMP, T4 and TSH all WNL during pregnancy    Date due to report next:  Thursday, 19    Molly Freeman, RN  Diabetes Educator   Diabetes [facility] Pregnancy Program

## 2019-03-04 ENCOUNTER — OFFICE VISIT (OUTPATIENT)
Dept: PERINATAL CARE | Facility: CLINIC | Age: 29
End: 2019-03-04
Payer: COMMERCIAL

## 2019-03-04 ENCOUNTER — ULTRASOUND (OUTPATIENT)
Dept: PERINATAL CARE | Facility: CLINIC | Age: 29
End: 2019-03-04
Payer: COMMERCIAL

## 2019-03-04 ENCOUNTER — DOCUMENTATION (OUTPATIENT)
Dept: PERINATAL CARE | Facility: CLINIC | Age: 29
End: 2019-03-04

## 2019-03-04 VITALS
HEIGHT: 65 IN | DIASTOLIC BLOOD PRESSURE: 73 MMHG | BODY MASS INDEX: 22.09 KG/M2 | HEART RATE: 82 BPM | SYSTOLIC BLOOD PRESSURE: 118 MMHG | WEIGHT: 132.6 LBS

## 2019-03-04 VITALS
BODY MASS INDEX: 21.99 KG/M2 | WEIGHT: 132 LBS | HEIGHT: 65 IN | HEART RATE: 82 BPM | DIASTOLIC BLOOD PRESSURE: 73 MMHG | SYSTOLIC BLOOD PRESSURE: 118 MMHG

## 2019-03-04 DIAGNOSIS — Z3A.08 8 WEEKS GESTATION OF PREGNANCY: ICD-10-CM

## 2019-03-04 DIAGNOSIS — E11.649 HYPOGLYCEMIA ASSOCIATED WITH DIABETES (HCC): ICD-10-CM

## 2019-03-04 DIAGNOSIS — O24.311 PRE-EXISTING DIABETES MELLITUS AFFECTING PREGNANCY IN FIRST TRIMESTER, ANTEPARTUM: Primary | ICD-10-CM

## 2019-03-04 PROBLEM — Z3A.09 9 WEEKS GESTATION OF PREGNANCY: Status: ACTIVE | Noted: 2019-02-19

## 2019-03-04 PROCEDURE — 76801 OB US < 14 WKS SINGLE FETUS: CPT | Performed by: OBSTETRICS & GYNECOLOGY

## 2019-03-04 PROCEDURE — 99243 OFF/OP CNSLTJ NEW/EST LOW 30: CPT | Performed by: NURSE PRACTITIONER

## 2019-03-04 NOTE — PROGRESS NOTES
DATE: 19   RE: Rashmi Holden   : 1990  JOHN: 10/8/2019  EGA:  8w6d  OB/GYN: [facility]     19:   Fastin  Breakfast: 80   2 hours after: 109  Lunch: 62  2 hours after: 79  Dinner: 92  2 hours after: 65    19:   Fastin  Breakfast: 74   2 hours after: 94  Lunch: 62  2 hours after: 50  Dinner: 72  2 hours after: 109    19:   Fastin  Breakfast: 94   2 hours after: 91  Lunch: 86  2 hours after: 70  Dinner: 83  2 hours after: 68    3/1/19:   Fastin  Breakfast: 84   2 hours after: 89      Blood glucose log from patient e-mail  Current regimen:  Levemir 13 units at 9 pm daily  Novolog- 5 units before breakfast, lunch and dinner, injecting 10-15 minutes prior to meals  1800 calorie diabetes and pregnancy diet with 3 meals/snacks including protein increasing to 2100 calories as pregnancy progresses  Uses high protein boost with 1 carb serving as a snack replacement  Self monitoring blood glucose monitoring fasting, before meals, 2 hours post meal and with hypoglycemia with Freestyle Lite meter  Plan:  Due to FBS<60, decrease Levemir from 13 to 12 units at 9 pm daily  Due to 2 hours PP in 60 to 70's range post lunch and dinner, Novolog continue 5 units before breakfast, decrease from 5 to 4 units before lunch and before dinner  Informed patient glucose readings below 60 is not advisable and can be detrimental  T1DM should not run on lower side all the time for safety reasons  Continue diet as recommended 3 meals and 3 snacks including combination of carbohydrate, protein and fat per meal/snack  Continue self monitoring blood glucose except switch from 2 hours post meal to 1 hour post meal testing per guidelines for T1DM  19 HbA1c- 6 1% re-order labs week of 3-24-19, 19 CMP, T4 and TSH all WNL during pregnancy  Encouraged to make a follow-up appointment  Date due to report next:  Monday, 3/4/19 or during appointment       STU Hernández  Diabetes Educator   Diabetes and Pregnancy Program

## 2019-03-04 NOTE — PATIENT INSTRUCTIONS
1  Continue 1800 calorie GDM diet with 3 meals and 3 snacks including recommended combination of carb, protein and fat per meal/snack  No more than 8 to 10 hours of fasting overnight  2  Starting second trimester increase to 2100 calorie diet per dietitian  3  Continue Levemir 12 units at 930 pm daily  4  Continue Novolog 5 units before breakfast, 4 units before lunch and 4 units before dinner  5  Always have glucose available to treat hypoglycemia, use 15 by 15 rule  Has glucagon at home  6  Continue prenatal vitamin, follow-up with your OB and MFM regarding when to start baby aspirin  7  Keep ultrasound as scheduled with  center then as recommended  8  After Level II ultrasound, fetal growth ultrasound every 4 weeks or per recommendations by your OB and MFM  9  Fetal echo will be recommended  10  Keep glucose log and report as recommended, at minimum once a week  11  Glucose goals fasting 90 or less, pre-meal 110 or less, 2 hours post start of meal 120 or less, overnight   12  Sign up for MyChart  13  Schedule follow-up with dietitian in 4 weeks  14  A1c 6 1%, aim for 6% or less with minimal hypoglycemia  Repeat A1c 3/22/19 then every 6 weeks during pregnancy  15  Follow-up in 1 month

## 2019-03-04 NOTE — PROGRESS NOTES
Assessment/Plan:    No problem-specific Assessment & Plan notes found for this encounter  Diagnoses and all orders for this visit:    Pre-existing diabetes mellitus affecting pregnancy in first trimester, antepartum    8 weeks gestation of pregnancy    Hypoglycemia associated with diabetes ([address] )      1  Continue 1800 calorie GDM diet with 3 meals and 3 snacks including recommended combination of carb, protein and fat per meal/snack  No more than 8 to 10 hours of fasting overnight  2  Starting second trimester increase to 2100 calorie diet per dietitian  3  Continue Levemir 12 units at 930 pm daily  4  Continue Novolog 5 units before breakfast, 4 units before lunch and 4 units before dinner  5  Always have glucose available to treat hypoglycemia, use 15 by 15 rule  Has glucagon at home  6  Continue prenatal vitamin, follow-up with your OB and MFM regarding when to start baby aspirin  7  Keep ultrasound as scheduled with  center then as recommended  8  After Level II ultrasound, fetal growth ultrasound every 4 weeks or per recommendations by your OB and MFM  9  Fetal echo will be recommended  10  Keep glucose log and report as recommended, at minimum once a week  11  Glucose goals fasting 90 or less, pre-meal 110 or less, 2 hours post start of meal 120 or less, overnight   12  Sign up for MyChart  13  Schedule follow-up with dietitian in 4 weeks  14  A1c 6 1%, aim for 6% or less with minimal hypoglycemia  Repeat A1c 3/22/19 then every 6 weeks during pregnancy  15  Follow-up in 1 month  Subjective:      Patient ID: Lopez Odonnell is a 34 y o  female  , JOHN 10/9/19, 8 5/7 weeks  Here for follow-up pre-existing T1DM, currently on Levemir 12 units at 930 pm daily and Humalog 5-4-4-0 before meals  Self monitoring blood glucose fasting, pre-meals and 1 or 2 hours post meals  Hypoglycemia has improved with decrease in insulin dose   Aisha Daniel is following diet 3 meals and 3 snacks including protein  Reports increase in urination, denies increase in thirst or hunger  Has upcoming OB appointment  The following portions of the patient's history were reviewed and updated as appropriate: allergies, current medications, past family history, past medical history, past social history, past surgical history and problem list     Review of Systems   Constitutional: Negative for fatigue and fever  HENT: Negative for congestion, sore throat and trouble swallowing  Eyes: Negative for visual disturbance  Respiratory: Negative for chest tightness and shortness of breath  Cardiovascular: Negative  Gastrointestinal: Negative for abdominal pain, constipation, nausea and vomiting  Endocrine: Positive for polyuria  Negative for cold intolerance, heat intolerance, polydipsia and polyphagia  Genitourinary: Negative for difficulty urinating  One episode around 2 weeks ago of vaginal bleeding, no clots or cramping  Skin: Negative for rash  Neurological: Negative for dizziness and headaches  Psychiatric/Behavioral: Negative for sleep disturbance  Objective:        Component Value Date/Time    HGBA1C 6 1 02/09/2019 0930       Date FBS Pre-  breakfast Post-  breakfast Pre-  lunch Post-  lunch Pre-  dinner Post-  dinner Comments   3/1      77 80     3/2 70 90 70 72 105 94 101    3/3 79  140 67 101 1H 85 129 1H Breakfast less protein   3/4 61 72 131 1H                                /73 (BP Location: Right arm, Patient Position: Sitting, Cuff Size: Standard)   Pulse 82   Ht 5' 5" (1 651 m)   Wt 60 1 kg (132 lb 9 6 oz)   LMP 01/02/2019   Breastfeeding? No   BMI 22 07 kg/m²      Physical Exam   Constitutional: She is oriented to person, place, and time  Vital signs are normal  She appears well-developed and well-nourished  She is cooperative  HENT:   Head: Normocephalic  Eyes: Conjunctivae and lids are normal    Neck: Normal range of motion  Cardiovascular: Normal rate, regular rhythm, S1 normal and S2 normal    Pulmonary/Chest: Effort normal and breath sounds normal    Neurological: She is alert and oriented to person, place, and time  Psychiatric: She has a normal mood and affect   Her speech is normal and behavior is normal  Judgment and thought content normal  Cognition and memory are normal

## 2019-03-11 ENCOUNTER — INITIAL PRENATAL (OUTPATIENT)
Dept: OBGYN CLINIC | Facility: CLINIC | Age: 29
End: 2019-03-11

## 2019-03-11 VITALS
DIASTOLIC BLOOD PRESSURE: 78 MMHG | HEIGHT: 65 IN | SYSTOLIC BLOOD PRESSURE: 118 MMHG | BODY MASS INDEX: 22.16 KG/M2 | WEIGHT: 133 LBS

## 2019-03-11 DIAGNOSIS — O24.311 PRE-EXISTING DIABETES MELLITUS AFFECTING PREGNANCY IN FIRST TRIMESTER, ANTEPARTUM: ICD-10-CM

## 2019-03-11 DIAGNOSIS — Z34.01 ENCOUNTER FOR SUPERVISION OF NORMAL FIRST PREGNANCY IN FIRST TRIMESTER: Primary | ICD-10-CM

## 2019-03-11 DIAGNOSIS — Z3A.09 9 WEEKS GESTATION OF PREGNANCY: Primary | ICD-10-CM

## 2019-03-11 DIAGNOSIS — Z3A.08 8 WEEKS GESTATION OF PREGNANCY: ICD-10-CM

## 2019-03-11 DIAGNOSIS — E11.649 HYPOGLYCEMIA ASSOCIATED WITH DIABETES (HCC): ICD-10-CM

## 2019-03-11 PROCEDURE — 87491 CHLMYD TRACH DNA AMP PROBE: CPT | Performed by: OBSTETRICS & GYNECOLOGY

## 2019-03-11 PROCEDURE — 87591 N.GONORRHOEAE DNA AMP PROB: CPT | Performed by: OBSTETRICS & GYNECOLOGY

## 2019-03-11 PROCEDURE — PNV: Performed by: OBSTETRICS & GYNECOLOGY

## 2019-03-11 PROCEDURE — OBC

## 2019-03-11 RX ORDER — BLOOD-GLUCOSE METER
KIT MISCELLANEOUS
Refills: 3 | COMMUNITY
Start: 2019-03-04 | End: 2019-05-02 | Stop reason: SDUPTHER

## 2019-03-11 NOTE — PATIENT INSTRUCTIONS
Pregnancy   WHAT YOU NEED TO KNOW:   What do I need to know about pregnancy? A normal pregnancy lasts about 40 weeks  The first trimester lasts from your last period through the 12th week of pregnancy  The second trimester lasts from the 13th week of your pregnancy through the 23rd week  The third trimester lasts from your 24th week of pregnancy until your baby is born  If you know the date of your last period, your healthcare provider can estimate your due date  You may give birth to your baby any time from 37 weeks to 2 weeks after your due date  What is prenatal care? Prenatal care is a series of visits with your healthcare provider throughout your pregnancy  Prenatal care can help prevent problems during pregnancy and childbirth  At each prenatal visit, your healthcare provider will weigh you and check your blood pressure  Your healthcare provider will also check your baby's heartbeat and growth  You may also need the following at some visits:  · A pelvic exam  allows your healthcare provider to see your cervix (the bottom part of your uterus)  Your healthcare provider uses a speculum to gently open your vagina  He will check the size and shape of your uterus  · Blood tests  may be done to check for gestational diabetes and anemia (low iron level)  You may need other blood tests, such as blood type, Rh factor, or tests to check for birth defects  · A fetal ultrasound  shows pictures of your baby inside your uterus  It shows your baby's development  The movement and position of your baby can also be seen  Your healthcare provider may be able to tell you what your baby's gender is during the ultrasound  What can I do to have a healthy pregnancy? · Eat a variety of healthy foods  Healthy foods include fruits, vegetables, whole-grain breads, low-fat dairy foods, beans, lean meats, and fish  Drink liquids as directed  Ask how much liquid to drink each day and which liquids are best for you   Limit caffeine to less than 200 milligrams each day  Limit your intake of fish to 2 servings each week  Choose fish low in mercury such as canned light tuna, shrimp, crab, salmon, cod, or tilapia  Do not  eat fish high in mercury such as swordfish, tilefish, anna mackerel, and shark  · Take prenatal vitamins as directed  Your need for certain vitamins and minerals, such as folic acid, increases during pregnancy  Prenatal vitamins provide some of the extra vitamins and minerals you need  Prenatal vitamins may also help to decrease the risk of certain birth defects  · Ask how much weight you should gain during your pregnancy  Too much or too little weight gain can be unhealthy for you and your baby  · Talk to your healthcare provider about exercise  Moderate exercise can help you stay fit  Your healthcare provider will help you plan an exercise program that is safe for you during pregnancy  · Do not smoke  If you smoke, it is never too late to quit  Smoking increases your risk of a miscarriage and other health problems during your pregnancy  Smoking can cause your baby to be born too early or weigh less at birth  Ask your healthcare provider for information if you need help quitting  · Do not drink alcohol  Alcohol passes from your body to your baby through the placenta  It can affect your baby's brain development and cause fetal alcohol syndrome (FAS)  FAS is a group of conditions that causes mental, behavior, and growth problems  · Talk to your healthcare provider before you take any medicines  Many medicines may harm your baby if you take them when you are pregnant  Do not take any medicines, vitamins, herbs, or supplements without first talking to your healthcare provider  Never use illegal or street drugs (such as marijuana or cocaine) while you are pregnant  What body changes may happen during my pregnancy?    · Breast changes  you will experience include tenderness and tingling during the early part of your pregnancy  Your breasts will become larger  You may need to use a support bra  You may see a thin, yellow fluid, called colostrum, leak from your nipples during the second trimester  Colostrum is a liquid that changes to milk about 3 days after you give birth  · Skin changes and stretch marks  may occur during your pregnancy  You may have red marks, called stretch marks, on your skin  Stretch marks will usually fade after pregnancy  Use lotion if your skin is dry and itchy  The skin on your face, around your nipples, and below your belly button may darken  Most of the time, your skin will return to its normal color after your baby is born  · Morning sickness  is nausea and vomiting that can happen at any time of day  Avoid fatty and spicy foods  Eat small meals throughout the day instead of large meals  [place] may help to decrease nausea  Ask your healthcare provider about other ways of decreasing nausea and vomiting  · Heartburn  may be caused by changes in your hormones during pregnancy  Your growing uterus may also push your stomach upward and force stomach acid to back up into your esophagus  Eat 4 or 5 small meals each day instead of large meals  Avoid spicy foods  Avoid eating right before bedtime  · Constipation  may develop during your pregnancy  To treat constipation, eat foods high in fiber such as fiber cereals, beans, fruits, vegetables, whole-grain breads, and prune juice  Get regular exercise and drink plenty of water  Your healthcare provider may also suggest a fiber supplement to soften your bowel movements  Talk to your healthcare provider before you use any medicines to decrease constipation  · Hemorrhoids  are enlarged veins in the rectal area  They may cause pain, itching, and bright red bleeding from your rectum  To decrease your risk of hemorrhoids, prevent constipation and do not strain to have a bowel movement   If you have hemorrhoids, soak in a tub of warm water to ease discomfort  Ask your healthcare provider how you can treat hemorrhoids  · Leg cramps and swelling  may be caused by low calcium levels or the added weight of pregnancy  Raise your legs above the level of your heart to decrease swelling  During a leg cramp, stretch or massage the muscle that has the cramp  Heat may help decrease pain and muscle spasms  Apply heat on your muscle for 20 to 30 minutes every 2 hours for as many days as directed  · Back pain  may occur as your baby grows  Do not stand for long periods of time or lift heavy items  Use good posture while you stand, squat, or bend  Wear low-heeled shoes with good support  Rest may also help to relieve back pain  Ask your healthcare provider about exercises you can do to strengthen your back muscles  What are some safety tips during pregnancy? · Avoid hot tubs and saunas  Do not use a hot tub or sauna while you are pregnant, especially during your first trimester  Hot tubs and saunas may raise your baby's temperature and increase the risk of birth defects  · Avoid toxoplasmosis  This is an infection caused by eating raw meat or being around infected cat feces  It can cause birth defects, miscarriages, and other problems  Wash your hands after you touch raw meat  Make sure any meat is well-cooked before you eat it  Avoid raw eggs and unpasteurized milk  Use gloves or ask someone else to clean your cat's litter box while you are pregnant  · Ask your healthcare provider about travel  The most comfortable time to travel is during the second trimester  Ask your healthcare provider if you can travel after 36 weeks  You may not be able to travel in an airplane after 36 weeks  He may also recommend that you avoid long road trips  When should I seek immediate care? · You develop a severe headache that does not go away  · You have new or increased vision changes, such as blurred or spotted vision      · You have new or increased swelling in your face or hands  · You have pain or cramping in your abdomen or low back  · You have vaginal bleeding  When should I contact my healthcare provider? · You have abdominal cramps, pressure, or tightening  · You have a change in vaginal discharge  · You cannot keep food or drinks down, and you are losing weight  · You have chills or a fever  · You have vaginal itching, burning, or pain  · You have yellow, green, white, or foul-smelling vaginal discharge  · You have pain or burning when you urinate, less urine than usual, or pink or bloody urine  · You have questions or concerns about your condition or care  CARE AGREEMENT:   You have the right to help plan your care  Learn about your health condition and how it may be treated  Discuss treatment options with your caregivers to decide what care you want to receive  You always have the right to refuse treatment  The above information is an  only  It is not intended as medical advice for individual conditions or treatments  Talk to your doctor, nurse or pharmacist before following any medical regimen to see if it is safe and effective for you  © 2017 [employer]  Information is for End User's use only and may not be sold, redistributed or otherwise used for commercial purposes  All illustrations and images included in CareNotes® are the copyrighted property of [employer]  or [employer]

## 2019-03-11 NOTE — PROGRESS NOTES
[facility] OB INTAKE INTERVIEW  Maria Luz Muniz  1990  * Pt presents for OB intake  * Accompanied by:    *  *Hx of  delivery prior to 36 weeks 6 days no  *Last Menstrual Period: Pt's LMP was Patient's last menstrual period was 2019  *Ultrasound date:3/4/19   8weeks 5days  *Estimated date of delivery: 10/9/19   * confirmed by US    *Signs/Symptoms of Pregnancy   *NONE   *constipation NO   *headaches NO   *cramping/spotting NO   *PICA cravings NO  *Diabetes- if you answer yes, please order 1 hour GTT, 50g   *hx of GDM NO   *BMI >35 NO   *first degree relative with type 2 diabetes NO   *hx of PCOS NO   *current metformin use NO   *prior hx of LGA/macrosomia NO   *AMA with other risk factors NO  *Hypertension- if you answer yes, please order preeclampsia labs including 24 hour urine protein   *Hx of chronic HTN NO   *hx of gestational HTN NO   *hx of preeclampsia, eclampsia, or HELLP syndrome NO  *Infection Screening-    *does the pt have a hx of MRSA? NO   *if yes- please follow MRSA protocol and obtain a nasal swab for MRSA culture   *history of herpes? NO  *Immunizations:   *influenza vaccine given today NO   *discussed Tdap vaccine    *Interview education   *Handouts given:    *Baby and Me phone gasper guide    *Birth Certificate Worksheets    *Authorization for Release for Photographers    *Birth Plan Guide    *Birthing Room Rules and Regulations    *Childbirth and Parenting Classes    *Pregnancy Warning Signs    *Baby and Me support center    *[facility]     *discussed genetic testing- pt interested YES     *appointment at [facility] made YES    *Prenatal lab work scripts     *Medications and Pregnancy    *Lab Locations    *[facility] Enrollment Information ALREADY HAS    *[facility] List    *I have these concerns about this prenatal patient: NONE  *Details that I feel the provider should be aware of: DM TYPE 1  PN1 visit scheduled   The patient was oriented to our practice and all questions were answered      Interviewed by:     Anette Loredo RN

## 2019-03-11 NOTE — PROGRESS NOTES
Patient is doing well  Has no gyn complaints  Occasionally feel some mild cramping but very mild  Had previous vaginal spotting and 3 4 weeks ago none now    Pre gestational diabetic on insulin  Managed by  Center  Current regiment Levemir 12 units at nighttime and NovoLog 5 units before breakfast 4 units at lunch and 4 units at dinner    Patient has appointment for a nuchal translucency  Patient is aware of additional testing that will be needed such is fetal echo    She is agreeable to check in with  Center office for diabetic management    GC chlamydia cultures done today    Follow-up in 4 weeks for care

## 2019-03-12 ENCOUNTER — DOCUMENTATION (OUTPATIENT)
Dept: PERINATAL CARE | Facility: CLINIC | Age: 29
End: 2019-03-12

## 2019-03-13 LAB
C TRACH DNA SPEC QL NAA+PROBE: NEGATIVE
N GONORRHOEA DNA SPEC QL NAA+PROBE: NEGATIVE

## 2019-03-28 ENCOUNTER — DOCUMENTATION (OUTPATIENT)
Dept: PERINATAL CARE | Facility: CLINIC | Age: 29
End: 2019-03-28

## 2019-03-28 NOTE — PROGRESS NOTES
DATE: 19   RE: Lopez Odonnell   : 1990  JOHN: 10/8/2019  EGA:  12w1d  OB/GYN: Sriram Lr  89  non-secure    3/12/19:  Breakfast: 89  2 hrs after: 107  Lunch: 77  2 hrs  after: [address]: 118  1 hr  after: 92    3/12/19:  Breakfast: 76  2 hrs after: 86  Lunch: 72  2 hrs  after: 77  Dinner: 92  2 hr  after: 100    3/14/19:  Fasting/Breakfast: 89  2 hrs after: 98  Lunch: 87  2 hrs  after: 80  Dinner: 82  2 hr  after: 70    3/15/19:  Breakfast: 62  2 hrs after: 113  Lunch: 88  2 hrs  after: [address]: 155  1 hr  after: 119  2 hr  after: 79    3/16/19:  Breakfast: 98  2 hrs after: 109  Lunch: 100  2 hrs  after: 64  Dinner: 155  2 hr  after: 61    3/17/19:  Fasting/Breakfast: 88  2 hrs after: 108  Lunch: 78  2 hrs  after:  [address]: 79  2 hr  after: 85    3/18/19: Stomach bug this day - blood sugars on the lower side because I couldn't keep much down  Fasting/Breakfast: 78  2 hrs after: 70  Lunch: 71  2 hrs  after: 58  Dinner: 61  2 hr  after: 101    3/19/19:  Breakfast: 97  2 hrs after: 74  Lunch: 58  2 hrs  after: 80  Dinner: 90  2 hr  after: 71    3/20/19:  Fasting/Breakfast: 80  2 hrs after: 115  Lunch: 39 - MISSED SNACK - was not low for long  2 hrs  after: 80  Dinner: 100  2 hr  after: 94    3/21/19:  Fasting/Breakfast: 67  2 hrs after: 84  Lunch: 61  2 hrs  after: 67  Dinner: 67  2 hr  after: 107    3/22/19:  Fasting/Breakfast: 88  2 hrs after: 84  Lunch: 98  2 hrs  after: [address]: 102  2 hr  after: 79    3/23/19:  Fasting/Breakfast: 71  2 hrs after: 91  Lunch: 59  2 hrs  after: 80  Dinner: 114  2 hr  after: 78    3/24/19:  Fasting/Breakfast: 69  2 hrs after: 87  Lunch: 114  2 hrs  after: 70  Dinner: 108  2 hr  after: 89    3/25/19:  Fasting/Breakfast: 89  2 hrs after: 75  Lunch: 76  2 hrs  after: 80  Dinner: 79  2 hr  after: 53 - got up to 95 within 15 minutes      Blood glucose log from patient e-mail      Current regimen:  Levemir- 12 units at bedtime  Novolog- 10 minutes before eating   Breakfast- 5 units   Lunch- 4 units  Dinner- 4 units   1800 calorie diabetes and pregnancy diet with 3 meals/snacks including protein increasing to 2100 calories as pregnancy progresses  Self monitoring blood glucose fasting and 2 hours after start of meals with [employer] L[employer] meter    Plan:  Continue Levemir 12 units at 9 or 10 PM daily  Due to post meal glucose readings <60 and current GA possible decrease in insulin requirements, decrease Novolog from 4 to 3 units before lunch and dinner  Increase calorie intake and continue GDM diet with 3 meals and 3 snacks including protein, carbohydrates and fat per meal/snack  2-9-19 HbA1c- 6 1% repeat A1c    2-23-19 CMP, T4 and TSH all WNL during pregnancy  3-4-19 U/S showed normal fetal growth, next ultrasound scheduled for 4/4/19  Date due to report next:  Monday, 4/1/19 or sooner       STU Weeks, CDE  Diabetes Educator   Diabetes and Pregnancy Program

## 2019-03-30 ENCOUNTER — APPOINTMENT (OUTPATIENT)
Dept: LAB | Facility: HOSPITAL | Age: 29
End: 2019-03-30
Payer: COMMERCIAL

## 2019-03-30 DIAGNOSIS — E11.649 HYPOGLYCEMIA ASSOCIATED WITH DIABETES (HCC): ICD-10-CM

## 2019-03-30 DIAGNOSIS — Z3A.08 8 WEEKS GESTATION OF PREGNANCY: ICD-10-CM

## 2019-03-30 DIAGNOSIS — O24.311 PRE-EXISTING DIABETES MELLITUS AFFECTING PREGNANCY IN FIRST TRIMESTER, ANTEPARTUM: ICD-10-CM

## 2019-03-30 LAB
ALBUMIN SERPL BCP-MCNC: 3.6 G/DL (ref 3.5–5)
ALP SERPL-CCNC: 45 U/L (ref 46–116)
ALT SERPL W P-5'-P-CCNC: 21 U/L (ref 12–78)
ANION GAP SERPL CALCULATED.3IONS-SCNC: 8 MMOL/L (ref 4–13)
AST SERPL W P-5'-P-CCNC: 14 U/L (ref 5–45)
BILIRUB SERPL-MCNC: 0.38 MG/DL (ref 0.2–1)
BUN SERPL-MCNC: 11 MG/DL (ref 5–25)
CALCIUM SERPL-MCNC: 9.2 MG/DL (ref 8.3–10.1)
CHLORIDE SERPL-SCNC: 104 MMOL/L (ref 100–108)
CO2 SERPL-SCNC: 27 MMOL/L (ref 21–32)
CREAT SERPL-MCNC: 0.72 MG/DL (ref 0.6–1.3)
EST. AVERAGE GLUCOSE BLD GHB EST-MCNC: 100 MG/DL
GFR SERPL CREATININE-BSD FRML MDRD: 116 ML/MIN/1.73SQ M
GLUCOSE P FAST SERPL-MCNC: 76 MG/DL (ref 65–99)
HBA1C MFR BLD: 5.1 % (ref 4.2–6.3)
POTASSIUM SERPL-SCNC: 3.8 MMOL/L (ref 3.5–5.3)
PROT SERPL-MCNC: 7.3 G/DL (ref 6.4–8.2)
SODIUM SERPL-SCNC: 139 MMOL/L (ref 136–145)

## 2019-03-30 PROCEDURE — 83036 HEMOGLOBIN GLYCOSYLATED A1C: CPT

## 2019-03-30 PROCEDURE — 36415 COLL VENOUS BLD VENIPUNCTURE: CPT

## 2019-03-30 PROCEDURE — 80053 COMPREHEN METABOLIC PANEL: CPT

## 2019-04-04 ENCOUNTER — ROUTINE PRENATAL (OUTPATIENT)
Dept: PERINATAL CARE | Facility: CLINIC | Age: 29
End: 2019-04-04
Payer: COMMERCIAL

## 2019-04-04 VITALS
BODY MASS INDEX: 22.82 KG/M2 | DIASTOLIC BLOOD PRESSURE: 79 MMHG | WEIGHT: 137 LBS | SYSTOLIC BLOOD PRESSURE: 116 MMHG | HEART RATE: 75 BPM | HEIGHT: 65 IN

## 2019-04-04 DIAGNOSIS — O24.011 TYPE 1 DIABETES MELLITUS DURING PREGNANCY IN FIRST TRIMESTER: Primary | ICD-10-CM

## 2019-04-04 DIAGNOSIS — Z36.82 ENCOUNTER FOR ANTENATAL SCREENING FOR NUCHAL TRANSLUCENCY: ICD-10-CM

## 2019-04-04 DIAGNOSIS — Z3A.13 13 WEEKS GESTATION OF PREGNANCY: ICD-10-CM

## 2019-04-04 PROCEDURE — 76813 OB US NUCHAL MEAS 1 GEST: CPT | Performed by: OBSTETRICS & GYNECOLOGY

## 2019-04-04 PROCEDURE — 99212 OFFICE O/P EST SF 10 MIN: CPT | Performed by: OBSTETRICS & GYNECOLOGY

## 2019-04-05 ENCOUNTER — DOCUMENTATION (OUTPATIENT)
Dept: PERINATAL CARE | Facility: CLINIC | Age: 29
End: 2019-04-05

## 2019-04-05 DIAGNOSIS — O24.311 PRE-EXISTING DIABETES MELLITUS AFFECTING PREGNANCY IN FIRST TRIMESTER, ANTEPARTUM: Primary | ICD-10-CM

## 2019-04-05 DIAGNOSIS — E11.649 HYPOGLYCEMIA ASSOCIATED WITH DIABETES (HCC): ICD-10-CM

## 2019-04-05 RX ORDER — BLOOD-GLUCOSE SENSOR
EACH MISCELLANEOUS
Qty: 1 EACH | Refills: 12 | Status: SHIPPED | OUTPATIENT
Start: 2019-04-05 | End: 2019-04-09

## 2019-04-05 RX ORDER — BLOOD-GLUCOSE,RECEIVER,CONT
EACH MISCELLANEOUS
Qty: 1 DEVICE | Refills: 0 | Status: SHIPPED | OUTPATIENT
Start: 2019-04-05 | End: 2019-04-09

## 2019-04-05 RX ORDER — BLOOD-GLUCOSE TRANSMITTER
EACH MISCELLANEOUS
Qty: 1 EACH | Refills: 3 | Status: SHIPPED | OUTPATIENT
Start: 2019-04-05 | End: 2019-04-09

## 2019-04-09 ENCOUNTER — ROUTINE PRENATAL (OUTPATIENT)
Dept: OBGYN CLINIC | Facility: CLINIC | Age: 29
End: 2019-04-09

## 2019-04-09 ENCOUNTER — TELEPHONE (OUTPATIENT)
Dept: PERINATAL CARE | Facility: CLINIC | Age: 29
End: 2019-04-09

## 2019-04-09 VITALS
SYSTOLIC BLOOD PRESSURE: 112 MMHG | HEART RATE: 75 BPM | DIASTOLIC BLOOD PRESSURE: 66 MMHG | WEIGHT: 136.6 LBS | BODY MASS INDEX: 22.73 KG/M2

## 2019-04-09 DIAGNOSIS — Z3A.13 13 WEEKS GESTATION OF PREGNANCY: Primary | ICD-10-CM

## 2019-04-09 PROCEDURE — PNV: Performed by: OBSTETRICS & GYNECOLOGY

## 2019-04-12 DIAGNOSIS — O24.112 PRE-EXISTING TYPE 2 DIABETES MELLITUS DURING PREGNANCY IN SECOND TRIMESTER: Primary | ICD-10-CM

## 2019-04-12 DIAGNOSIS — Z3A.14 14 WEEKS GESTATION OF PREGNANCY: ICD-10-CM

## 2019-04-12 RX ORDER — BLOOD-GLUCOSE SENSOR
EACH MISCELLANEOUS
Qty: 1 EACH | Refills: 12 | Status: SHIPPED | OUTPATIENT
Start: 2019-04-12 | End: 2019-05-02 | Stop reason: CLARIF

## 2019-04-12 RX ORDER — BLOOD-GLUCOSE,RECEIVER,CONT
EACH MISCELLANEOUS
Qty: 1 DEVICE | Refills: 0 | Status: SHIPPED | OUTPATIENT
Start: 2019-04-12 | End: 2019-05-02 | Stop reason: CLARIF

## 2019-04-12 RX ORDER — BLOOD-GLUCOSE TRANSMITTER
EACH MISCELLANEOUS
Qty: 1 EACH | Refills: 3 | Status: SHIPPED | OUTPATIENT
Start: 2019-04-12 | End: 2019-05-02 | Stop reason: CLARIF

## 2019-04-16 ENCOUNTER — TELEPHONE (OUTPATIENT)
Dept: OBGYN CLINIC | Facility: CLINIC | Age: 29
End: 2019-04-16

## 2019-04-16 ENCOUNTER — ROUTINE PRENATAL (OUTPATIENT)
Dept: OBGYN CLINIC | Facility: CLINIC | Age: 29
End: 2019-04-16

## 2019-04-16 ENCOUNTER — HOSPITAL ENCOUNTER (EMERGENCY)
Facility: HOSPITAL | Age: 29
Discharge: HOME | End: 2019-04-16
Attending: EMERGENCY MEDICINE | Admitting: EMERGENCY MEDICINE
Payer: COMMERCIAL

## 2019-04-16 VITALS
DIASTOLIC BLOOD PRESSURE: 72 MMHG | OXYGEN SATURATION: 98 % | SYSTOLIC BLOOD PRESSURE: 149 MMHG | HEIGHT: 65 IN | HEART RATE: 86 BPM | WEIGHT: 138.67 LBS | RESPIRATION RATE: 14 BRPM | BODY MASS INDEX: 23.1 KG/M2 | TEMPERATURE: 98.3 F

## 2019-04-16 VITALS
BODY MASS INDEX: 22.82 KG/M2 | SYSTOLIC BLOOD PRESSURE: 122 MMHG | HEART RATE: 96 BPM | HEIGHT: 65 IN | WEIGHT: 137 LBS | DIASTOLIC BLOOD PRESSURE: 82 MMHG
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
## 2019-04-16 DIAGNOSIS — Z3A.08 8 WEEKS GESTATION OF PREGNANCY: ICD-10-CM

## 2019-04-16 DIAGNOSIS — S30.1XXA CONTUSION OF ABDOMINAL WALL, INITIAL ENCOUNTER: ICD-10-CM

## 2019-04-16 DIAGNOSIS — E11.649 HYPOGLYCEMIA ASSOCIATED WITH DIABETES (HCC): ICD-10-CM

## 2019-04-16 DIAGNOSIS — O24.311 PRE-EXISTING DIABETES MELLITUS AFFECTING PREGNANCY IN FIRST TRIMESTER, ANTEPARTUM: ICD-10-CM

## 2019-04-16 PROCEDURE — 99282 EMERGENCY DEPT VISIT SF MDM: CPT | Performed by: EMERGENCY MEDICINE

## 2019-04-16 PROCEDURE — 99284 EMERGENCY DEPT VISIT MOD MDM: CPT

## 2019-04-16 PROCEDURE — PNV: Performed by: OBSTETRICS & GYNECOLOGY

## 2019-04-16 RX ORDER — ASPIRIN 81 MG/1
162 TABLET ORAL DAILY
COMMUNITY
End: 2019-10-17

## 2019-04-23 ENCOUNTER — DOCUMENTATION (OUTPATIENT)
Dept: PERINATAL CARE | Facility: CLINIC | Age: 29
End: 2019-04-23

## 2019-04-27 ENCOUNTER — TRANSCRIBE ORDERS (OUTPATIENT)
Dept: ADMINISTRATIVE | Facility: HOSPITAL | Age: 29
End: 2019-04-27

## 2019-04-27 ENCOUNTER — APPOINTMENT (OUTPATIENT)
Dept: LAB | Facility: HOSPITAL | Age: 29
End: 2019-04-27
Attending: OBSTETRICS & GYNECOLOGY
Payer: COMMERCIAL

## 2019-04-27 DIAGNOSIS — Z33.1 PREGNANT STATE, INCIDENTAL: ICD-10-CM

## 2019-04-27 DIAGNOSIS — Z36.9 UNSPECIFIED ANTENATAL SCREENING: ICD-10-CM

## 2019-04-27 DIAGNOSIS — Z33.1 PREGNANT STATE, INCIDENTAL: Primary | ICD-10-CM

## 2019-04-27 PROCEDURE — 36415 COLL VENOUS BLD VENIPUNCTURE: CPT

## 2019-04-28 LAB — SCAN RESULT: NORMAL

## 2019-05-02 ENCOUNTER — ULTRASOUND (OUTPATIENT)
Dept: PERINATAL CARE | Facility: CLINIC | Age: 29
End: 2019-05-02
Payer: COMMERCIAL

## 2019-05-02 VITALS
DIASTOLIC BLOOD PRESSURE: 71 MMHG | HEIGHT: 65 IN | SYSTOLIC BLOOD PRESSURE: 119 MMHG | BODY MASS INDEX: 23.16 KG/M2 | HEART RATE: 88 BPM | WEIGHT: 139 LBS

## 2019-05-02 DIAGNOSIS — O24.012 PRE-EXISTING TYPE 1 DIABETES MELLITUS DURING PREGNANCY IN SECOND TRIMESTER: Primary | ICD-10-CM

## 2019-05-02 DIAGNOSIS — Z3A.17 17 WEEKS GESTATION OF PREGNANCY: ICD-10-CM

## 2019-05-02 DIAGNOSIS — E11.649 HYPOGLYCEMIA ASSOCIATED WITH DIABETES (HCC): ICD-10-CM

## 2019-05-02 DIAGNOSIS — O24.012 PREGNANCY COMPLICATED BY PRE-EXISTING TYPE 1 DIABETES, SECOND TRIMESTER: Primary | ICD-10-CM

## 2019-05-02 PROCEDURE — 76805 OB US >/= 14 WKS SNGL FETUS: CPT | Performed by: OBSTETRICS & GYNECOLOGY

## 2019-05-02 PROCEDURE — 99212 OFFICE O/P EST SF 10 MIN: CPT | Performed by: OBSTETRICS & GYNECOLOGY

## 2019-05-02 RX ORDER — BLOOD-GLUCOSE METER
KIT MISCELLANEOUS
Qty: 200 EACH | Refills: 3 | Status: SHIPPED | OUTPATIENT
Start: 2019-05-02

## 2019-05-05 ENCOUNTER — DOCUMENTATION (OUTPATIENT)
Dept: PERINATAL CARE | Facility: CLINIC | Age: 29
End: 2019-05-05

## 2019-05-07 ENCOUNTER — ROUTINE PRENATAL (OUTPATIENT)
Dept: OBGYN CLINIC | Facility: CLINIC | Age: 29
End: 2019-05-07

## 2019-05-07 ENCOUNTER — TELEPHONE (OUTPATIENT)
Dept: PERINATAL CARE | Facility: CLINIC | Age: 29
End: 2019-05-07

## 2019-05-07 VITALS
HEART RATE: 78 BPM | DIASTOLIC BLOOD PRESSURE: 64 MMHG | BODY MASS INDEX: 23.5 KG/M2 | SYSTOLIC BLOOD PRESSURE: 100 MMHG | WEIGHT: 141.2 LBS

## 2019-05-07 DIAGNOSIS — O24.012 PREGNANCY COMPLICATED BY PRE-EXISTING TYPE 1 DIABETES IN SECOND TRIMESTER: ICD-10-CM

## 2019-05-07 DIAGNOSIS — Z3A.17 17 WEEKS GESTATION OF PREGNANCY: Primary | ICD-10-CM

## 2019-05-07 PROCEDURE — PNV: Performed by: OBSTETRICS & GYNECOLOGY

## 2019-05-07 RX ORDER — LANCETS 30 GAUGE
EACH MISCELLANEOUS
COMMUNITY

## 2019-05-16 ENCOUNTER — TELEPHONE (OUTPATIENT)
Dept: PERINATAL CARE | Facility: CLINIC | Age: 29
End: 2019-05-16

## 2019-05-23 ENCOUNTER — ROUTINE PRENATAL (OUTPATIENT)
Dept: PERINATAL CARE | Facility: CLINIC | Age: 29
End: 2019-05-23
Payer: COMMERCIAL

## 2019-05-23 VITALS
HEART RATE: 72 BPM | SYSTOLIC BLOOD PRESSURE: 111 MMHG | WEIGHT: 146 LBS | HEIGHT: 65 IN | DIASTOLIC BLOOD PRESSURE: 72 MMHG | BODY MASS INDEX: 24.32 KG/M2

## 2019-05-23 DIAGNOSIS — E11.649 HYPOGLYCEMIA ASSOCIATED WITH DIABETES (HCC): ICD-10-CM

## 2019-05-23 DIAGNOSIS — Z36.86 ENCOUNTER FOR ANTENATAL SCREENING FOR CERVICAL LENGTH: Primary | ICD-10-CM

## 2019-05-23 DIAGNOSIS — O24.012 PRE-EXISTING TYPE 1 DIABETES MELLITUS DURING PREGNANCY IN SECOND TRIMESTER: ICD-10-CM

## 2019-05-23 DIAGNOSIS — Z3A.20 20 WEEKS GESTATION OF PREGNANCY: ICD-10-CM

## 2019-05-23 PROCEDURE — 76811 OB US DETAILED SNGL FETUS: CPT | Performed by: OBSTETRICS & GYNECOLOGY

## 2019-05-23 PROCEDURE — 76817 TRANSVAGINAL US OBSTETRIC: CPT | Performed by: OBSTETRICS & GYNECOLOGY

## 2019-05-23 PROCEDURE — 99212 OFFICE O/P EST SF 10 MIN: CPT | Performed by: OBSTETRICS & GYNECOLOGY

## 2019-05-28 ENCOUNTER — DOCUMENTATION (OUTPATIENT)
Dept: PERINATAL CARE | Facility: CLINIC | Age: 29
End: 2019-05-28

## 2019-05-28 DIAGNOSIS — O24.012 PRE-EXISTING TYPE 1 DIABETES MELLITUS DURING PREGNANCY IN SECOND TRIMESTER: Primary | ICD-10-CM

## 2019-05-30 ENCOUNTER — TELEPHONE (OUTPATIENT)
Dept: PERINATAL CARE | Facility: CLINIC | Age: 29
End: 2019-05-30

## 2019-06-01 ENCOUNTER — APPOINTMENT (OUTPATIENT)
Dept: LAB | Facility: HOSPITAL | Age: 29
End: 2019-06-01
Payer: COMMERCIAL

## 2019-06-01 DIAGNOSIS — O24.012 PRE-EXISTING TYPE 1 DIABETES MELLITUS DURING PREGNANCY IN SECOND TRIMESTER: ICD-10-CM

## 2019-06-01 LAB
ALBUMIN SERPL BCP-MCNC: 3.1 G/DL (ref 3.5–5)
ALP SERPL-CCNC: 53 U/L (ref 46–116)
ALT SERPL W P-5'-P-CCNC: 22 U/L (ref 12–78)
ANION GAP SERPL CALCULATED.3IONS-SCNC: 9 MMOL/L (ref 4–13)
AST SERPL W P-5'-P-CCNC: 15 U/L (ref 5–45)
BILIRUB SERPL-MCNC: 0.27 MG/DL (ref 0.2–1)
BUN SERPL-MCNC: 12 MG/DL (ref 5–25)
CALCIUM SERPL-MCNC: 9.2 MG/DL (ref 8.3–10.1)
CHLORIDE SERPL-SCNC: 106 MMOL/L (ref 100–108)
CO2 SERPL-SCNC: 25 MMOL/L (ref 21–32)
CREAT SERPL-MCNC: 0.75 MG/DL (ref 0.6–1.3)
EST. AVERAGE GLUCOSE BLD GHB EST-MCNC: 85 MG/DL
GFR SERPL CREATININE-BSD FRML MDRD: 110 ML/MIN/1.73SQ M
GLUCOSE P FAST SERPL-MCNC: 78 MG/DL (ref 65–99)
HBA1C MFR BLD: 4.6 % (ref 4.2–6.3)
POTASSIUM SERPL-SCNC: 3.9 MMOL/L (ref 3.5–5.3)
PROT SERPL-MCNC: 7.1 G/DL (ref 6.4–8.2)
SODIUM SERPL-SCNC: 140 MMOL/L (ref 136–145)

## 2019-06-01 PROCEDURE — 83036 HEMOGLOBIN GLYCOSYLATED A1C: CPT

## 2019-06-01 PROCEDURE — 80053 COMPREHEN METABOLIC PANEL: CPT

## 2019-06-01 PROCEDURE — 36415 COLL VENOUS BLD VENIPUNCTURE: CPT

## 2019-06-04 ENCOUNTER — ROUTINE PRENATAL (OUTPATIENT)
Dept: OBGYN CLINIC | Facility: CLINIC | Age: 29
End: 2019-06-04

## 2019-06-04 VITALS
BODY MASS INDEX: 24.32 KG/M2 | HEIGHT: 65 IN | DIASTOLIC BLOOD PRESSURE: 70 MMHG | SYSTOLIC BLOOD PRESSURE: 110 MMHG | WEIGHT: 146 LBS

## 2019-06-04 DIAGNOSIS — Z3A.21 21 WEEKS GESTATION OF PREGNANCY: Primary | ICD-10-CM

## 2019-06-04 DIAGNOSIS — O24.012 TYPE 1 DIABETES MELLITUS AFFECTING PREGNANCY IN SECOND TRIMESTER, ANTEPARTUM: ICD-10-CM

## 2019-06-04 PROCEDURE — PNV: Performed by: OBSTETRICS & GYNECOLOGY

## 2019-06-05 ENCOUNTER — DOCUMENTATION (OUTPATIENT)
Dept: PERINATAL CARE | Facility: CLINIC | Age: 29
End: 2019-06-05

## 2019-06-17 ENCOUNTER — OFFICE VISIT (OUTPATIENT)
Dept: PERINATAL CARE | Facility: CLINIC | Age: 29
End: 2019-06-17
Payer: COMMERCIAL

## 2019-06-17 ENCOUNTER — ROUTINE PRENATAL (OUTPATIENT)
Dept: PERINATAL CARE | Facility: CLINIC | Age: 29
End: 2019-06-17
Payer: COMMERCIAL

## 2019-06-17 VITALS
WEIGHT: 150 LBS | BODY MASS INDEX: 24.99 KG/M2 | SYSTOLIC BLOOD PRESSURE: 106 MMHG | DIASTOLIC BLOOD PRESSURE: 72 MMHG | HEIGHT: 65 IN | HEART RATE: 88 BPM

## 2019-06-17 VITALS
HEIGHT: 65 IN | HEART RATE: 88 BPM | DIASTOLIC BLOOD PRESSURE: 72 MMHG | SYSTOLIC BLOOD PRESSURE: 106 MMHG | WEIGHT: 150 LBS | BODY MASS INDEX: 24.99 KG/M2

## 2019-06-17 DIAGNOSIS — O24.012 PRE-EXISTING TYPE 1 DIABETES MELLITUS DURING PREGNANCY IN SECOND TRIMESTER: ICD-10-CM

## 2019-06-17 DIAGNOSIS — E11.649 HYPOGLYCEMIA ASSOCIATED WITH DIABETES (HCC): ICD-10-CM

## 2019-06-17 DIAGNOSIS — Z3A.23 23 WEEKS GESTATION OF PREGNANCY: ICD-10-CM

## 2019-06-17 DIAGNOSIS — Z3A.20 20 WEEKS GESTATION OF PREGNANCY: ICD-10-CM

## 2019-06-17 DIAGNOSIS — O24.012 PRE-EXISTING TYPE 1 DIABETES MELLITUS DURING PREGNANCY IN SECOND TRIMESTER: Primary | ICD-10-CM

## 2019-06-17 PROCEDURE — 93325 DOPPLER ECHO COLOR FLOW MAPG: CPT | Performed by: OBSTETRICS & GYNECOLOGY

## 2019-06-17 PROCEDURE — G0108 DIAB MANAGE TRN  PER INDIV: HCPCS | Performed by: DIETITIAN, REGISTERED

## 2019-06-17 PROCEDURE — 76825 ECHO EXAM OF FETAL HEART: CPT | Performed by: OBSTETRICS & GYNECOLOGY

## 2019-06-17 PROCEDURE — 99213 OFFICE O/P EST LOW 20 MIN: CPT | Performed by: NURSE PRACTITIONER

## 2019-06-17 PROCEDURE — 76827 ECHO EXAM OF FETAL HEART: CPT | Performed by: OBSTETRICS & GYNECOLOGY

## 2019-06-27 ENCOUNTER — DOCUMENTATION (OUTPATIENT)
Dept: PERINATAL CARE | Facility: CLINIC | Age: 29
End: 2019-06-27

## 2019-07-01 ENCOUNTER — APPOINTMENT (OUTPATIENT)
Dept: LAB | Facility: HOSPITAL | Age: 29
End: 2019-07-01
Payer: COMMERCIAL

## 2019-07-01 ENCOUNTER — TRANSCRIBE ORDERS (OUTPATIENT)
Dept: ADMINISTRATIVE | Facility: HOSPITAL | Age: 29
End: 2019-07-01

## 2019-07-01 DIAGNOSIS — E10.9 TYPE 1 DIABETES MELLITUS WITHOUT COMPLICATION (HCC): Primary | ICD-10-CM

## 2019-07-01 DIAGNOSIS — E11.649 HYPOGLYCEMIA ASSOCIATED WITH DIABETES (HCC): ICD-10-CM

## 2019-07-01 DIAGNOSIS — Z3A.20 20 WEEKS GESTATION OF PREGNANCY: ICD-10-CM

## 2019-07-01 DIAGNOSIS — O24.012 PRE-EXISTING TYPE 1 DIABETES MELLITUS DURING PREGNANCY IN SECOND TRIMESTER: ICD-10-CM

## 2019-07-01 DIAGNOSIS — E10.9 TYPE 1 DIABETES MELLITUS WITHOUT COMPLICATION (HCC): ICD-10-CM

## 2019-07-01 LAB
CHOLEST SERPL-MCNC: 220 MG/DL (ref 50–200)
CREAT UR-MCNC: 181 MG/DL
EST. AVERAGE GLUCOSE BLD GHB EST-MCNC: 103 MG/DL
HBA1C MFR BLD: 5.2 % (ref 4.2–6.3)
HDLC SERPL-MCNC: 86 MG/DL (ref 40–60)
LDLC SERPL CALC-MCNC: 121 MG/DL (ref 0–100)
MICROALBUMIN UR-MCNC: 10 MG/L (ref 0–20)
MICROALBUMIN/CREAT 24H UR: 6 MG/G CREATININE (ref 0–30)
NONHDLC SERPL-MCNC: 134 MG/DL
TRIGL SERPL-MCNC: 64 MG/DL
TSH SERPL DL<=0.05 MIU/L-ACNC: 3.18 UIU/ML (ref 0.36–3.74)

## 2019-07-01 PROCEDURE — 82570 ASSAY OF URINE CREATININE: CPT | Performed by: INTERNAL MEDICINE

## 2019-07-01 PROCEDURE — 83036 HEMOGLOBIN GLYCOSYLATED A1C: CPT

## 2019-07-01 PROCEDURE — 80061 LIPID PANEL: CPT

## 2019-07-01 PROCEDURE — 82043 UR ALBUMIN QUANTITATIVE: CPT | Performed by: INTERNAL MEDICINE

## 2019-07-01 PROCEDURE — 36415 COLL VENOUS BLD VENIPUNCTURE: CPT

## 2019-07-01 PROCEDURE — 84443 ASSAY THYROID STIM HORMONE: CPT

## 2019-07-02 ENCOUNTER — ROUTINE PRENATAL (OUTPATIENT)
Dept: OBGYN CLINIC | Facility: CLINIC | Age: 29
End: 2019-07-02

## 2019-07-02 VITALS
DIASTOLIC BLOOD PRESSURE: 66 MMHG | BODY MASS INDEX: 25.49 KG/M2 | WEIGHT: 153 LBS | HEIGHT: 65 IN | SYSTOLIC BLOOD PRESSURE: 112 MMHG

## 2019-07-02 DIAGNOSIS — Z3A.25 25 WEEKS GESTATION OF PREGNANCY: ICD-10-CM

## 2019-07-02 DIAGNOSIS — E11.649 HYPOGLYCEMIA ASSOCIATED WITH DIABETES (HCC): ICD-10-CM

## 2019-07-02 DIAGNOSIS — O24.012 PRE-EXISTING TYPE 1 DIABETES MELLITUS DURING PREGNANCY IN SECOND TRIMESTER: ICD-10-CM

## 2019-07-02 PROCEDURE — PNV: Performed by: OBSTETRICS & GYNECOLOGY

## 2019-07-02 NOTE — PROGRESS NOTES
Patient is doing well  Good fetal movement noted  Fetal movement and  labor precautions were reviewed  1  25 6 weeks- patient doing well  Follow-up in 3 weeks time for prenatal with CBC and RPR blood drawn  Tdap at that time    2  Diabetes-patient states her diabetes control is better than it has ever been  She continues to follow-up with [facility] gestational diabetes program, fasting glucose less than 90 and 2 hour post prandials are less than 120  Recent fetal echocardiogram is normal   She does have follow-up [facility] ultrasound next week  She was counseled about twice weekly NST and weekly DOMENICA starting after 32 weeks    We also discussed delivery between 39-40 weeks

## 2019-07-02 NOTE — PATIENT INSTRUCTIONS
Pregnancy at 32 to 30 [address]:   What changes are happening in my body? You may notice new symptoms such as shortness of breath, heartburn, or swelling of your ankles and feet  You may also have trouble sleeping or contractions  How do I care for myself at this stage of my pregnancy? · Eat a variety of healthy foods  Healthy foods include fruits, vegetables, whole-grain breads, low-fat dairy foods, beans, lean meats, and fish  Drink liquids as directed  Ask how much liquid to drink each day and which liquids are best for you  Limit caffeine to less than 200 milligrams each day  Limit your intake of fish to 2 servings each week  Choose fish low in mercury such as canned light tuna, shrimp, salmon, cod, or tilapia  Do not  eat fish high in mercury such as swordfish, tilefish, anna mackerel, and shark  · Manage heartburn  by eating 4 or 5 small meals each day instead of large meals  Avoid spicy food  · Manage swelling  by lying down and putting your feet up  · Take prenatal vitamins as directed  Your need for certain vitamins and minerals, such as folic acid, increases during pregnancy  Prenatal vitamins provide some of the extra vitamins and minerals you need  Prenatal vitamins may also help to decrease the risk of certain birth defects  · Talk to your healthcare provider about exercise  Moderate exercise can help you stay fit  Your healthcare provider will help you plan an exercise program that is safe for you during pregnancy  · Do not smoke  If you smoke, it is never too late to quit  Smoking increases your risk of a miscarriage and other health problems during your pregnancy  Smoking can cause your baby to be born too early or weigh less at birth  Ask your healthcare provider for information if you need help quitting  · Do not drink alcohol  Alcohol passes from your body to your baby through the placenta   It can affect your baby's brain development and cause fetal alcohol syndrome (FAS)  FAS is a group of conditions that causes mental, behavior, and growth problems  · Talk to your healthcare provider before you take any medicines  Many medicines may harm your baby if you take them when you are pregnant  Do not take any medicines, vitamins, herbs, or supplements without first talking to your healthcare provider  Never use illegal or street drugs (such as marijuana or cocaine) while you are pregnant  What are some safety tips during pregnancy? · Avoid hot tubs and saunas  Do not use a hot tub or sauna while you are pregnant, especially during your first trimester  Hot tubs and saunas may raise your baby's temperature and increase the risk of birth defects  · Avoid toxoplasmosis  This is an infection caused by eating raw meat or being around infected cat feces  It can cause birth defects, miscarriages, and other problems  Wash your hands after you touch raw meat  Make sure any meat is well-cooked before you eat it  Avoid raw eggs and unpasteurized milk  Use gloves or ask someone else to clean your cat's litter box while you are pregnant  What changes are happening with my baby? By 30 weeks, your baby may weigh more than 3 pounds  Your baby may be about 11 inches long from the top of the head to the rump (baby's bottom)  Your baby's eyes open and close now  Your baby's kicks and movements are more forceful at this time  What do I need to know about prenatal care? Your healthcare provider will check your blood pressure and weight  You may also need the following:  · Blood tests  may be done to check for anemia or blood type  · A urine test  may also be done to check for sugar and protein  These can be signs of gestational diabetes or infection  Protein in your urine may also be a sign of preeclampsia  Preeclampsia is a condition that can develop during week 20 or later of your pregnancy   It causes high blood pressure, and it can cause problems with your kidneys and other organs  · A Tdap vaccine and flu vaccine  may be recommended by your healthcare provider  · A gestational diabetes screen  will be done using an oral glucose tolerance test (OGTT)  An OGTT starts with a blood sugar level check after you have not eaten for 8 hours  You are then given a glucose drink  Your blood sugar level is checked after 1 hour, 2 hours, and sometimes 3 hours  Healthcare providers look at how much your blood sugar level increases from the first check  · Fundal height  is a measurement of your uterus to check your baby's growth  This number is usually the same as the number of weeks that you have been pregnant  Your healthcare provider may also check your baby's position  · Your baby's heart rate  will be checked  When should I seek immediate care? · You develop a severe headache that does not go away  · You have new or increased vision changes, such as blurred or spotted vision  · You have new or increased swelling in your face or hands  · You have vaginal spotting or bleeding  · Your water broke or you feel warm water gushing or trickling from your vagina  When should I contact my healthcare provider? · You have more than 5 contractions in 1 hour  · You notice any changes in your baby's movements  · You have abdominal cramps, pressure, or tightening  · You have a change in vaginal discharge  · You have chills or a fever  · You have vaginal itching, burning, or pain  · You have yellow, green, white, or foul-smelling vaginal discharge  · You have pain or burning when you urinate, less urine than usual, or pink or bloody urine  · You have questions or concerns about your condition or care  CARE AGREEMENT:   You have the right to help plan your care  Learn about your health condition and how it may be treated  Discuss treatment options with your caregivers to decide what care you want to receive   You always have the right to refuse treatment  The above information is an  only  It is not intended as medical advice for individual conditions or treatments  Talk to your doctor, nurse or pharmacist before following any medical regimen to see if it is safe and effective for you  © 2017 [employer] Information is for End User's use only and may not be sold, redistributed or otherwise used for commercial purposes  All illustrations and images included in CareNotes® are the copyrighted property of A [employer]  or Surinder Sanders  Pregnancy at 23 to 26 [facility] [address]:   What changes are happening in my body? You are now close to or at the beginning of the third trimester  The third trimester starts at 24 weeks and ends with delivery  As your baby gets larger, you may develop certain symptoms  These may include pain in your back or down the sides of your abdomen  You may also have stretch marks on your abdomen, breasts, thighs, or buttocks  You may also have constipation  How do I care for myself at this stage of my pregnancy? · Eat a variety of healthy foods  Healthy foods include fruits, vegetables, whole-grain breads, low-fat dairy foods, beans, lean meats, and fish  Drink liquids as directed  Ask how much liquid to drink each day and which liquids are best for you  Limit caffeine to less than 200 milligrams each day  Limit your intake of fish to 2 servings each week  Choose fish low in mercury such as canned light tuna, shrimp, salmon, cod, or tilapia  Do not  eat fish high in mercury such as swordfish, tilefish, anna mackerel, and shark  · Manage back pain  Do not stand for long periods of time or lift heavy items  Use good posture while you stand, squat, or bend  Wear low-heeled shoes with good support  Rest may also help to relieve back pain  Ask your healthcare provider about exercises you can do to strengthen your back muscles  · Take prenatal vitamins as directed  Your need for certain vitamins and minerals, such as folic acid, increases during pregnancy  Prenatal vitamins provide some of the extra vitamins and minerals you need  Prenatal vitamins may also help to decrease the risk of certain birth defects  · Talk to your healthcare provider about exercise  Moderate exercise can help you stay fit  Your healthcare provider will help you plan an exercise program that is safe for you during pregnancy  · Do not smoke  If you smoke, it is never too late to quit  Smoking increases your risk of a miscarriage and other health problems during your pregnancy  Smoking can cause your baby to be born too early or weigh less at birth  Ask your healthcare provider for information if you need help quitting  · Do not drink alcohol  Alcohol passes from your body to your baby through the placenta  It can affect your baby's brain development and cause fetal alcohol syndrome (FAS)  FAS is a group of conditions that causes mental, behavior, and growth problems  · Talk to your healthcare provider before you take any medicines  Many medicines may harm your baby if you take them when you are pregnant  Do not take any medicines, vitamins, herbs, or supplements without first talking to your healthcare provider  Never use illegal or street drugs (such as marijuana or cocaine) while you are pregnant  What are some safety tips during pregnancy? · Avoid hot tubs and saunas  Do not use a hot tub or sauna while you are pregnant, especially during your first trimester  Hot tubs and saunas may raise your baby's temperature and increase the risk of birth defects  · Avoid toxoplasmosis  This is an infection caused by eating raw meat or being around infected cat feces  It can cause birth defects, miscarriages, and other problems  Wash your hands after you touch raw meat  Make sure any meat is well-cooked before you eat it  Avoid raw eggs and unpasteurized milk   Use gloves or ask someone else to clean your cat's litter box while you are pregnant  What changes are happening with my baby? By 26 weeks, your baby will weigh about 2 pounds  Your baby will be about 10 inches long from the top of the head to the rump (baby's bottom)  Your baby's movements are much stronger now  Your baby's eyes are almost completely formed and can partially open  Your baby also sleeps and wakes up  What do I need to know about prenatal care? Your healthcare provider will check your blood pressure and weight  You may also need the following:  · A urine test  may also be done to check for sugar and protein  These can be signs of gestational diabetes or infection  Protein in your urine may also be a sign of preeclampsia  Preeclampsia is a condition that can develop during week 20 or later of your pregnancy  It causes high blood pressure, and it can cause problems with your kidneys and other organs  · Fundal height  is a measurement of your uterus to check your baby's growth  This number is usually the same as the number of weeks that you have been pregnant  · Your baby's heart rate  will be checked  When should I seek immediate care? · You develop a severe headache that does not go away  · You have new or increased vision changes, such as blurred or spotted vision  · You have new or increased swelling in your face or hands  · You have vaginal spotting or bleeding  · Your water broke or you feel warm water gushing or trickling from your vagina  When should I contact my healthcare provider? · You have abdominal cramps, pressure, or tightening  · You have a change in vaginal discharge  · You have light bleeding  · You have chills or a fever  · You have vaginal itching, burning, or pain  · You have yellow, green, white, or foul-smelling vaginal discharge  · You have pain or burning when you urinate, less urine than usual, or pink or bloody urine      · You have questions or concerns about your condition or care  CARE AGREEMENT:   You have the right to help plan your care  Learn about your health condition and how it may be treated  Discuss treatment options with your caregivers to decide what care you want to receive  You always have the right to refuse treatment  The above information is an  only  It is not intended as medical advice for individual conditions or treatments  Talk to your doctor, nurse or pharmacist before following any medical regimen to see if it is safe and effective for you  © 2017 [employer] Information is for End User's use only and may not be sold, redistributed or otherwise used for commercial purposes  All illustrations and images included in CareNotes® are the copyrighted property of [employer]  or Surinder Sanders

## 2019-07-05 ENCOUNTER — DOCUMENTATION (OUTPATIENT)
Dept: PERINATAL CARE | Facility: CLINIC | Age: 29
End: 2019-07-05

## 2019-07-05 NOTE — PROGRESS NOTES
DATE: 19   RE: Yanick Lopez   : 1990  JOHN: 10/8/2019  EGA:  Erika Urias  [facility]: [facility]   Pre-existing T1DM                "Alisson Cornea   non-secure  1990    Above copied from patient e-mail    Current regimen:  Levemir- 18 units at 9 PM daily  Novolog- 10 to 12 units before meals and recently has been correcting or giving a bolus for snack  2100 calorie GDM diet with 3 meals/snacks including protein  Self monitoring blood glucose fasting, before meals and 2 hours after start of meals with Free Style Lite meter    Plan:  Continue Levemir 18 units at 9 PM daily  Continue Novolog 10 to 12 units before each meal  Please avoid correcting glucose after a meal  If possible switch to SMBG 1 hour post start of meal    Try to follow diet as recommended with consistency, eating every 2 to 3 5 hours during the day and no more than 8 to 10 hours of fasting overnight  Higher protein may require a higher bolus dose before meal, try to follow rule 15 grams of carbohydrates with half of grams of protein as a starting point for balancing CHO/PRO  Insulin requirements will increase week by week  Stay active if no restriction from your OB, walking up to 30 minutes a day  Always have available 15 grams rapid action carbohydrate to treat low BG    19 Hg A1C 5 2%, repeat after 19 U/S documents normal fetal growth  19 fetal echo completed  Keep next ultrasound as scheduled  Date due to report next:  Wednesday, 7/10/19 or sooner if needed       STU Kelly, CDE  Diabetes Educator   [facility]

## 2019-07-08 ENCOUNTER — ULTRASOUND (OUTPATIENT)
Dept: PERINATAL CARE | Facility: CLINIC | Age: 29
End: 2019-07-08
Payer: COMMERCIAL

## 2019-07-08 ENCOUNTER — OFFICE VISIT (OUTPATIENT)
Dept: PERINATAL CARE | Facility: CLINIC | Age: 29
End: 2019-07-08
Payer: COMMERCIAL

## 2019-07-08 VITALS
BODY MASS INDEX: 25.76 KG/M2 | HEIGHT: 65 IN | WEIGHT: 154.6 LBS | DIASTOLIC BLOOD PRESSURE: 65 MMHG | HEART RATE: 76 BPM | SYSTOLIC BLOOD PRESSURE: 97 MMHG

## 2019-07-08 VITALS
WEIGHT: 154.6 LBS | BODY MASS INDEX: 25.76 KG/M2 | SYSTOLIC BLOOD PRESSURE: 97 MMHG | HEART RATE: 76 BPM | HEIGHT: 65 IN | DIASTOLIC BLOOD PRESSURE: 65 MMHG

## 2019-07-08 DIAGNOSIS — O24.012 PRE-EXISTING TYPE 1 DIABETES MELLITUS DURING PREGNANCY IN SECOND TRIMESTER: Primary | ICD-10-CM

## 2019-07-08 DIAGNOSIS — E11.649 HYPOGLYCEMIA ASSOCIATED WITH DIABETES (HCC): ICD-10-CM

## 2019-07-08 DIAGNOSIS — Z3A.26 26 WEEKS GESTATION OF PREGNANCY: ICD-10-CM

## 2019-07-08 DIAGNOSIS — O24.012 PRE-EXISTING TYPE 1 DIABETES MELLITUS DURING PREGNANCY IN SECOND TRIMESTER: ICD-10-CM

## 2019-07-08 PROCEDURE — 76816 OB US FOLLOW-UP PER FETUS: CPT | Performed by: OBSTETRICS & GYNECOLOGY

## 2019-07-08 PROCEDURE — 99213 OFFICE O/P EST LOW 20 MIN: CPT | Performed by: NURSE PRACTITIONER

## 2019-07-08 NOTE — PATIENT INSTRUCTIONS
1  Increase Levemir from 18 to 20 units at 10 PM daily  2  Continue Novolog 10 to 12 units before meals  3  Continue GDM diet with 3 meals and 3 snacks including recommended combination of carb, protein and fat per meal/snack  4  No more than 8 to 10 hours fasting overnight  5  Always have glucose available for hypoglycemia, use 15 by 15 rule  6  Check glucagon at home is up to date and not   7  Stay active if no restriction from your OB, up to 30 minutes a day  8  Follow-up with your OBGYN and endocrinologist regarding TSH level  9  Continue prenatal vitamin and baby aspirin  10  Fetal growth ultrasound every 4 weeks  11  Starting at 32 weeks gestation, NST twice a week and DOMENICA weekly  12  Continue follow-up with MFM and OBGYN as recommended  13  Last A1c 5 2% better than goal, repeat A1c after 19    14  Continue self monitoring blood glucose before meals, 1 hour after start of meal and with hypoglycemia  Add 3 AM glucose check with increasing Levemir  15  Report at minimum once for insulin titration  16  Follow-up in 4 weeks

## 2019-07-08 NOTE — PROGRESS NOTES
Assessment/Plan:    No problem-specific Assessment & Plan notes found for this encounter  Diagnoses and all orders for this visit:    Pre-existing type 1 diabetes mellitus during pregnancy in second trimester    Hypoglycemia associated with diabetes ([address] )    26 weeks gestation of pregnancy      1  Increase Levemir from 18 to 20 units at 10 PM daily  2  Continue Novolog 10 to 12 units before meals  3  Continue GDM diet with 3 meals and 3 snacks including recommended combination of carb, protein and fat per meal/snack  4  No more than 8 to 10 hours fasting overnight  5  Always have glucose available for hypoglycemia, use 15 by 15 rule  6  Check glucagon at home is up to date and not   7  Stay active if no restriction from your OB, up to 30 minutes a day  8  Follow-up with your OBGYN and endocrinologist regarding TSH level  9  Continue prenatal vitamin and baby aspirin  10  Fetal growth ultrasound every 4 weeks  11  Starting at 32 weeks gestation, NST twice a week and DOMENICA weekly  12  Continue follow-up with MFM and OBGYN as recommended  13  Last A1c 5 2% better than goal, repeat A1c after 19    14  Continue self monitoring blood glucose before meals, 1 hour after start of meal and with hypoglycemia  Add 3 AM glucose check with increasing Levemir  15  Report at minimum once for insulin titration  16  Follow-up in 4 weeks  Reviewed insulin requirements and importance of reporting at minimum once a week  Subjective:      Patient ID: Mellisa Rosado is a 34 y o  female  , JOHN 10/9/19, currently 26 5/7 weeks gestation  Here for follow-up pre-existing T1DM, on Levemir 18 units at bedtime and Novolog 10 to 12 units before meals  Recent lowest glucose reading of 50  Glucose readings within normal except for occasional above goal  Reports testing 2 hours vs 1 hours because easier for her schedule   Eating 3 meals and 3 snacks as recommended mid-afternoon may be causing an issue with pre-dinner glucose, used only 10 but using 12 units will improve readings  Present with  and recently bought a house  Had fetal growth ultrasound today and reports normal        The following portions of the patient's history were reviewed and updated as appropriate: allergies, current medications, past family history, past medical history, past social history, past surgical history and problem list     No Known Allergies  Current Outpatient Medications on File Prior to Visit   Medication Sig Dispense Refill    aspirin (ECOTRIN LOW STRENGTH) 81 mg EC tablet Take 162 mg by mouth daily       Blood Glucose Monitoring Suppl (ACURA BLOOD GLUCOSE METER) w/Device KIT by Does not apply route      FREESTYLE LITE test strip Test up to 8 times a day and as instructed 200 each 3    glucagon (GLUCAGON EMERGENCY) 1 MG injection Inject as directed      insulin aspart (NOVOLOG FLEXPEN) 100 Units/mL injection pen Uses 5 units before breakfast, 4 units before lunch and 4 units before dinner  (Patient taking differently: Uses 10 to 12 units before meals  ) 15 mL 0    insulin detemir (LEVEMIR FLEXTOUCH) 100 Units/mL injection pen Inject SC 12 units at 9 or 10 PM daily  To be titrated  (Patient taking differently: Inject SC 18 units at 9 or 10 PM daily  To be titrated ) 15 mL 0    Insulin Pen Needle (BD PEN NEEDLE DANYELL U/F) 32G X 4 MM MISC Use 4 a day and as directed  100 each 3    Lancets MISC by Does not apply route      Prenatal Vit-Fe Fumarate-FA (PRENATAL PO) Take by mouth      acetone, urine, test strip use as needed for BG >250 or for fever, vomiting, or diarrheal illness       No current facility-administered medications on file prior to visit  Review of Systems   Constitutional: Negative for fatigue and fever  HENT: Negative for trouble swallowing  Eyes: Negative for visual disturbance  Recent eye exam    Respiratory: Negative for cough and shortness of breath      Cardiovascular: Negative  Gastrointestinal: Negative for constipation, nausea and vomiting  Endocrine: Negative for polydipsia, polyphagia and polyuria  Genitourinary: Negative for difficulty urinating and vaginal bleeding  Musculoskeletal: Positive for back pain  Skin: Negative for rash  Neurological: Negative for dizziness and headaches  Psychiatric/Behavioral: Negative for sleep disturbance  Objective:          Component Value Date/Time    HGBA1C 5 2 07/01/2019 0924    HGBA1C 4 6 06/01/2019 0844      BP 97/65 (BP Location: Right arm, Patient Position: Sitting, Cuff Size: Standard)   Pulse 76   Ht 5' 5" (1 651 m)   Wt 70 1 kg (154 lb 9 6 oz)   LMP 01/02/2019   BMI 25 73 kg/m²          Physical Exam   Constitutional: She is oriented to person, place, and time  She appears well-developed and well-nourished  HENT:   Head: Normocephalic  Eyes: Conjunctivae are normal    Neck: Normal range of motion  No thyromegaly present  Cardiovascular: Normal rate, regular rhythm, S1 normal and S2 normal    Pulmonary/Chest: Effort normal and breath sounds normal    Musculoskeletal: Normal range of motion  Neurological: She is alert and oriented to person, place, and time  Skin: Skin is warm and dry  Psychiatric: She has a normal mood and affect   Her speech is normal and behavior is normal  Thought content normal        Time in:11:20 AM   Time out:11:50 AM

## 2019-07-08 NOTE — PROGRESS NOTES
Ms Isabel Rosado is a 33 yo  with Type I DM who presents for evaluation of fetal growth  Is asymptomatic  1  Live fetus with size = dates  EFW= 1081 grams= 2lb 6 oz = 62%  2  No fetal anomalies observed  Plan:  Follow-up ultrasound in 4 weeks to monitor growth and development  Thank you for referring your patient to our offices  The limitations of ultrasound to detect all anomalies was reviewed and how it is not  a test to rule out aneuploidy  If you have any further questions do not hesitate to contact us as [phone]      Suhas Murray MD

## 2019-07-19 ENCOUNTER — DOCUMENTATION (OUTPATIENT)
Dept: PERINATAL CARE | Facility: OTHER | Age: 29
End: 2019-07-19

## 2019-07-19 NOTE — PROGRESS NOTES
DATE: 19   RE: Jerrica Early   : 1990  JOHN: 10/8/2019  EGA:  Joellen White  [facility]: [facility]   Pre-existing T1DM            "Juventino Vizcarra   non-secure  1990    Above copied from patient e-mail    Current regimen:  Levemir- 18 units at 9 PM daily  Novolog- 12 units before meals  Patient was recently advised to avoid correcting glucose after a meal   2100 calorie GDM diet with 3 meals/snacks including protein  Self monitoring blood glucose fasting, before meals and 1 hours after start of meals with Free Style Lite meter    Plan:  Continue Levemir 18 units at 9 PM daily  Continue Novolog 12 units before each meal  Please avoid correcting glucose after a meal  If possible switch to SMBG 1 hour post start of meal    Try to follow diet as recommended with consistency, eating every 2 to 3 5 hours during the day and no more than 8 to 10 hours of fasting overnight  Higher protein may require a higher bolus dose before meal, try to follow rule 15 grams of carbohydrates with half of grams of protein as a starting point for balancing CHO/PRO  Insulin requirements will increase week by week  Stay active if no restriction from your OB, walking up to 30 minutes a day  Always have available 15 grams rapid action carbohydrate to treat low BG    19 Hg A1C 5 2%, repeat after 19 US: AC 65%, EFW 62%, fetal growth and DOMENICA appeared normal      Date due to report next:  Tuesday, 19; requested patient report blood glucose values at least weekly        Gilbert Knutson RD,LDN,CDE  Diabetes Educator   [facility]abetes [facility] Pregnancy [facility]

## 2019-07-22 ENCOUNTER — ROUTINE PRENATAL (OUTPATIENT)
Dept: OBGYN CLINIC | Facility: CLINIC | Age: 29
End: 2019-07-22
Payer: COMMERCIAL

## 2019-07-22 VITALS
WEIGHT: 157.6 LBS | BODY MASS INDEX: 26.26 KG/M2 | SYSTOLIC BLOOD PRESSURE: 108 MMHG | HEIGHT: 65 IN | DIASTOLIC BLOOD PRESSURE: 78 MMHG | HEART RATE: 79 BPM

## 2019-07-22 DIAGNOSIS — E11.649 HYPOGLYCEMIA ASSOCIATED WITH DIABETES (HCC): ICD-10-CM

## 2019-07-22 DIAGNOSIS — Z3A.28 28 WEEKS GESTATION OF PREGNANCY: Primary | ICD-10-CM

## 2019-07-22 DIAGNOSIS — O24.012 PRE-EXISTING TYPE 1 DIABETES MELLITUS DURING PREGNANCY IN SECOND TRIMESTER: ICD-10-CM

## 2019-07-22 LAB
ERYTHROCYTE [DISTWIDTH] IN BLOOD BY AUTOMATED COUNT: 12.5 % (ref 11.6–15.1)
HCT VFR BLD AUTO: 35 % (ref 34.8–46.1)
HGB BLD-MCNC: 11.9 G/DL (ref 11.5–15.4)
MCH RBC QN AUTO: 32.7 PG (ref 26.8–34.3)
MCHC RBC AUTO-ENTMCNC: 34 G/DL (ref 31.4–37.4)
MCV RBC AUTO: 96 FL (ref 82–98)
PLATELET # BLD AUTO: 149 THOUSANDS/UL (ref 149–390)
PMV BLD AUTO: 12.9 FL (ref 8.9–12.7)
RBC # BLD AUTO: 3.64 MILLION/UL (ref 3.81–5.12)
TSH SERPL DL<=0.05 MIU/L-ACNC: 1.93 UIU/ML (ref 0.36–3.74)
WBC # BLD AUTO: 10.35 THOUSAND/UL (ref 4.31–10.16)

## 2019-07-22 PROCEDURE — PNV: Performed by: OBSTETRICS & GYNECOLOGY

## 2019-07-22 PROCEDURE — 86592 SYPHILIS TEST NON-TREP QUAL: CPT | Performed by: OBSTETRICS & GYNECOLOGY

## 2019-07-22 PROCEDURE — 84443 ASSAY THYROID STIM HORMONE: CPT | Performed by: OBSTETRICS & GYNECOLOGY

## 2019-07-22 PROCEDURE — 90715 TDAP VACCINE 7 YRS/> IM: CPT | Performed by: OBSTETRICS & GYNECOLOGY

## 2019-07-22 PROCEDURE — 90471 IMMUNIZATION ADMIN: CPT | Performed by: OBSTETRICS & GYNECOLOGY

## 2019-07-22 PROCEDURE — 36415 COLL VENOUS BLD VENIPUNCTURE: CPT | Performed by: OBSTETRICS & GYNECOLOGY

## 2019-07-22 PROCEDURE — 85027 COMPLETE CBC AUTOMATED: CPT | Performed by: OBSTETRICS & GYNECOLOGY

## 2019-07-22 NOTE — PROGRESS NOTES
Patient is doing well  Good fetal movement noted  Fetal movement and  labor precautions were reviewed  1  28 5 weeks-Tdap given today  CBC and RPR were drawn  She will follow-up in 2-3 weeks for prenatal visit or as needed  2  Diabetes-good control noted  She will continue to follow-up with [facility] gestational diabetes program   She continues with recommendations as per their last note  She continues with aspirin  Twice weekly testing with weekly DOMENICA recommended after 32 weeks  3  Thyroid-most recent TSH from  was slightly elevated for pregnancy at  3 18  Repeat TSH was done today  She will follow up with Endocrinology as recommended by them

## 2019-07-22 NOTE — PATIENT INSTRUCTIONS
Pregnancy at 32 to 30 [address]:   What changes are happening in my body? You may notice new symptoms such as shortness of breath, heartburn, or swelling of your ankles and feet  You may also have trouble sleeping or contractions  How do I care for myself at this stage of my pregnancy? · Eat a variety of healthy foods  Healthy foods include fruits, vegetables, whole-grain breads, low-fat dairy foods, beans, lean meats, and fish  Drink liquids as directed  Ask how much liquid to drink each day and which liquids are best for you  Limit caffeine to less than 200 milligrams each day  Limit your intake of fish to 2 servings each week  Choose fish low in mercury such as canned light tuna, shrimp, salmon, cod, or tilapia  Do not  eat fish high in mercury such as swordfish, tilefish, anna mackerel, and shark  · Manage heartburn  by eating 4 or 5 small meals each day instead of large meals  Avoid spicy food  · Manage swelling  by lying down and putting your feet up  · Take prenatal vitamins as directed  Your need for certain vitamins and minerals, such as folic acid, increases during pregnancy  Prenatal vitamins provide some of the extra vitamins and minerals you need  Prenatal vitamins may also help to decrease the risk of certain birth defects  · Talk to your healthcare provider about exercise  Moderate exercise can help you stay fit  Your healthcare provider will help you plan an exercise program that is safe for you during pregnancy  · Do not smoke  If you smoke, it is never too late to quit  Smoking increases your risk of a miscarriage and other health problems during your pregnancy  Smoking can cause your baby to be born too early or weigh less at birth  Ask your healthcare provider for information if you need help quitting  · Do not drink alcohol  Alcohol passes from your body to your baby through the placenta   It can affect your baby's brain development and cause fetal alcohol syndrome (FAS)  FAS is a group of conditions that causes mental, behavior, and growth problems  · Talk to your healthcare provider before you take any medicines  Many medicines may harm your baby if you take them when you are pregnant  Do not take any medicines, vitamins, herbs, or supplements without first talking to your healthcare provider  Never use illegal or street drugs (such as marijuana or cocaine) while you are pregnant  What are some safety tips during pregnancy? · Avoid hot tubs and saunas  Do not use a hot tub or sauna while you are pregnant, especially during your first trimester  Hot tubs and saunas may raise your baby's temperature and increase the risk of birth defects  · Avoid toxoplasmosis  This is an infection caused by eating raw meat or being around infected cat feces  It can cause birth defects, miscarriages, and other problems  Wash your hands after you touch raw meat  Make sure any meat is well-cooked before you eat it  Avoid raw eggs and unpasteurized milk  Use gloves or ask someone else to clean your cat's litter box while you are pregnant  What changes are happening with my baby? By 30 weeks, your baby may weigh more than 3 pounds  Your baby may be about 11 inches long from the top of the head to the rump (baby's bottom)  Your baby's eyes open and close now  Your baby's kicks and movements are more forceful at this time  What do I need to know about prenatal care? Your healthcare provider will check your blood pressure and weight  You may also need the following:  · Blood tests  may be done to check for anemia or blood type  · A urine test  may also be done to check for sugar and protein  These can be signs of gestational diabetes or infection  Protein in your urine may also be a sign of preeclampsia  Preeclampsia is a condition that can develop during week 20 or later of your pregnancy   It causes high blood pressure, and it can cause problems with your kidneys and other organs  · A Tdap vaccine and flu vaccine  may be recommended by your healthcare provider  · A gestational diabetes screen  will be done using an oral glucose tolerance test (OGTT)  An OGTT starts with a blood sugar level check after you have not eaten for 8 hours  You are then given a glucose drink  Your blood sugar level is checked after 1 hour, 2 hours, and sometimes 3 hours  Healthcare providers look at how much your blood sugar level increases from the first check  · Fundal height  is a measurement of your uterus to check your baby's growth  This number is usually the same as the number of weeks that you have been pregnant  Your healthcare provider may also check your baby's position  · Your baby's heart rate  will be checked  When should I seek immediate care? · You develop a severe headache that does not go away  · You have new or increased vision changes, such as blurred or spotted vision  · You have new or increased swelling in your face or hands  · You have vaginal spotting or bleeding  · Your water broke or you feel warm water gushing or trickling from your vagina  When should I contact my healthcare provider? · You have more than 5 contractions in 1 hour  · You notice any changes in your baby's movements  · You have abdominal cramps, pressure, or tightening  · You have a change in vaginal discharge  · You have chills or a fever  · You have vaginal itching, burning, or pain  · You have yellow, green, white, or foul-smelling vaginal discharge  · You have pain or burning when you urinate, less urine than usual, or pink or bloody urine  · You have questions or concerns about your condition or care  CARE AGREEMENT:   You have the right to help plan your care  Learn about your health condition and how it may be treated  Discuss treatment options with your caregivers to decide what care you want to receive   You always have the right to refuse treatment  The above information is an  only  It is not intended as medical advice for individual conditions or treatments  Talk to your doctor, nurse or pharmacist before following any medical regimen to see if it is safe and effective for you  © 2017 [employer] Information is for End User's use only and may not be sold, redistributed or otherwise used for commercial purposes  All illustrations and images included in CareNotes® are the copyrighted property of A D A M , Inc  or Surinder Sanders

## 2019-07-23 LAB — RPR SER QL: NORMAL

## 2019-08-05 PROBLEM — O24.013 PRE-EXISTING TYPE 1 DIABETES MELLITUS DURING PREGNANCY IN THIRD TRIMESTER: Status: ACTIVE | Noted: 2019-02-19

## 2019-08-05 NOTE — PATIENT INSTRUCTIONS
Thank you for choosing [id number] for your  care today  If you have any questions about your ultrasound or care, please do not hesitate to contact us or your primary obstetrician  Continue taking your aspirin 162mg daily for prevention of preeclampsia  If you have asthma and notice an increase in symptom frequency or severity, consider stopping this medication

## 2019-08-06 ENCOUNTER — OFFICE VISIT (OUTPATIENT)
Dept: PERINATAL CARE | Facility: CLINIC | Age: 29
End: 2019-08-06
Payer: COMMERCIAL

## 2019-08-06 ENCOUNTER — ULTRASOUND (OUTPATIENT)
Dept: PERINATAL CARE | Facility: CLINIC | Age: 29
End: 2019-08-06
Payer: COMMERCIAL

## 2019-08-06 ENCOUNTER — DOCUMENTATION (OUTPATIENT)
Dept: PERINATAL CARE | Facility: CLINIC | Age: 29
End: 2019-08-06

## 2019-08-06 VITALS
DIASTOLIC BLOOD PRESSURE: 76 MMHG | SYSTOLIC BLOOD PRESSURE: 113 MMHG | WEIGHT: 161.2 LBS | BODY MASS INDEX: 26.86 KG/M2 | HEIGHT: 65 IN | HEART RATE: 84 BPM

## 2019-08-06 DIAGNOSIS — O24.012 PRE-EXISTING TYPE 1 DIABETES MELLITUS DURING PREGNANCY IN SECOND TRIMESTER: ICD-10-CM

## 2019-08-06 DIAGNOSIS — Z3A.30 30 WEEKS GESTATION OF PREGNANCY: ICD-10-CM

## 2019-08-06 DIAGNOSIS — E11.649 HYPOGLYCEMIA ASSOCIATED WITH DIABETES (HCC): ICD-10-CM

## 2019-08-06 DIAGNOSIS — O24.013 PRE-EXISTING TYPE 1 DIABETES MELLITUS DURING PREGNANCY IN THIRD TRIMESTER: Primary | ICD-10-CM

## 2019-08-06 DIAGNOSIS — Z36.89 ENCOUNTER FOR ULTRASOUND TO CHECK FETAL GROWTH: ICD-10-CM

## 2019-08-06 PROCEDURE — 76816 OB US FOLLOW-UP PER FETUS: CPT | Performed by: OBSTETRICS & GYNECOLOGY

## 2019-08-06 PROCEDURE — 99212 OFFICE O/P EST SF 10 MIN: CPT | Performed by: OBSTETRICS & GYNECOLOGY

## 2019-08-06 PROCEDURE — G0108 DIAB MANAGE TRN  PER INDIV: HCPCS | Performed by: DIETITIAN, REGISTERED

## 2019-08-06 NOTE — PROGRESS NOTES
DATE: 19   RE: Doug Avitia   : 1990  JOHN: 10/8/2019  EGA:  30w6d  OB/GYN: [facility]   Pre-existing T1DM              No BG reported for 2 5 weeks  All BG after meals are 2 hours after eating  Current regimen:  Levemir- 20 units at 9 PM daily  Novolog- Continues to adjust insulin according to the number of CHO gram she is eating  At breakfast: if eats 30 gm CHO, takes 12 units               If eats 45 gm CHO, takes 14 units   At lunch & dinner has increased to 14-15 gm   Patient was previously advised to avoid correcting glucose after a meal   2100 calorie GDM diet with 3 meals/snacks including protein  Self monitoring blood glucose fasting, before meals and 2 hours after start of meals with Free Style Lite meter  Reports that it is easier for her to take her after meal BG at 2 hours rather than 1 hour  Has noticed that after 1 hour, her BG is very elevated  Plan:  Levemir--Increase from 20 to 22 units at 9 PM daily  Novolog:   Continue above breakfast doses depending on gm CHO eaten   At lunch-- increase from 15 to 16 units   At dinner--continue 15 units     Encouraged patient to begin taking more 1 hour post start of meal BG tests  Continue the diet; diet recall today indicated she is following the diet closely  Stay active if no restriction from your OB, walking up to 30 minutes a day  Always have available 15 grams rapid action carbohydrate to treat low BG    19 Hg A1C 5 2%, repeat after 19 US: fetal growth and DOMENICA appeared normal      Date due to report next:  Friday, 19; requested patient report blood glucose values at least weekly        Kya Senior RD,LDN,CDE  Diabetes Educator   [facility]

## 2019-08-06 NOTE — PROGRESS NOTES
I have reviewed the note by our Diabetic educator and agree with the assessment and plan        Meka Beckett MD 08/06/19

## 2019-08-06 NOTE — PROGRESS NOTES
[address] [facility]: Ms Dwight Alston was seen today at 30w6d for fetal growth assessment ultrasound  See ultrasound report under "OB Procedures" tab  Please don't hesitate to contact our office with any concerns or questions    James Robbins MD

## 2019-08-06 NOTE — PROGRESS NOTES
DATE: 19   RE: Whit Osuna   : 1990  JOHN: Estimated Date of Delivery: 10/9/19  EGA:  [id number]    Dear Dr Cipriano Selby: Thank you for referring your patient to the [facility] Program at [address]  The patient was seen today for medical nutrition therapy re-evaluation for the treatment of Type 1 diabetes during pregnancy  The following was reviewed with the patient:      Weight gain during in pregnancy  Based on the patients height of 65  inches, pre-pregnancy weight of 130 pounds (BMI 21 6), we would recommend a total weight gain of 25-35 pounds for the pregnancy  o The patients current weight is 161 1  pounds, and her weight gain to date is 30 1 pounds   Basic review of macronutrients   Meal pattern should consist of three small meals and three snacks daily which patient is doing   Carbohydrate gram amounts per meal  Eats 30 or 45 gm at breakfast & 60 gm at lunch & dinner   Appropriate serving size of foods  Limits meat to 3-4 oz at lunch & dinner  Limits pasta to 1 cup   Incorporating protein at each meal and snack in the importance of protein in relationship to blood glucose control   Individualized meal plan:  [facility] gestational diabetes diet  Diet recall indicates patient is following the meal plan closely in the proper portions   Report blood glucose levels to [address] weekly or as directed  o Phone: [phone]  If no response in 24 hours, call [phone]   o Fax: [phone]  o Email: [email or web address]   Follow up: As Needed    Thank you for the opportunity to participate in the care of this patient  I can be reached at [phone] should you have any questions  Time spent with patient 10:10-10:45 AM; time spent face to face counseling greater than 50% of the appointment      Sincerely,     Kirsten Sanders  Diabetes Educator  [facility]

## 2019-08-12 ENCOUNTER — ROUTINE PRENATAL (OUTPATIENT)
Dept: OBGYN CLINIC | Facility: CLINIC | Age: 29
End: 2019-08-12

## 2019-08-12 VITALS
BODY MASS INDEX: 27.09 KG/M2 | HEIGHT: 65 IN | DIASTOLIC BLOOD PRESSURE: 70 MMHG | SYSTOLIC BLOOD PRESSURE: 110 MMHG | WEIGHT: 162.6 LBS

## 2019-08-12 DIAGNOSIS — Z34.03 ENCOUNTER FOR SUPERVISION OF NORMAL FIRST PREGNANCY IN THIRD TRIMESTER: Primary | ICD-10-CM

## 2019-08-12 DIAGNOSIS — O24.013 PRE-EXISTING TYPE 1 DIABETES MELLITUS DURING PREGNANCY IN THIRD TRIMESTER: ICD-10-CM

## 2019-08-12 PROCEDURE — PNV: Performed by: OBSTETRICS & GYNECOLOGY

## 2019-08-13 ENCOUNTER — ULTRASOUND (OUTPATIENT)
Dept: PERINATAL CARE | Facility: CLINIC | Age: 29
End: 2019-08-13
Payer: COMMERCIAL

## 2019-08-13 VITALS
SYSTOLIC BLOOD PRESSURE: 106 MMHG | BODY MASS INDEX: 27.16 KG/M2 | HEART RATE: 81 BPM | WEIGHT: 163 LBS | DIASTOLIC BLOOD PRESSURE: 69 MMHG | HEIGHT: 65 IN

## 2019-08-13 DIAGNOSIS — O24.013 PRE-EXISTING TYPE 1 DIABETES MELLITUS DURING PREGNANCY IN THIRD TRIMESTER: ICD-10-CM

## 2019-08-13 DIAGNOSIS — E11.649 HYPOGLYCEMIA ASSOCIATED WITH DIABETES (HCC): ICD-10-CM

## 2019-08-13 DIAGNOSIS — Z3A.31 31 WEEKS GESTATION OF PREGNANCY: ICD-10-CM

## 2019-08-13 PROCEDURE — 76815 OB US LIMITED FETUS(S): CPT | Performed by: OBSTETRICS & GYNECOLOGY

## 2019-08-13 PROCEDURE — 59025 FETAL NON-STRESS TEST: CPT | Performed by: OBSTETRICS & GYNECOLOGY

## 2019-08-13 NOTE — LETTER
NST sleeve cover sheet    Patient name: Cynthia Lopez      D3O6  : 1990  MRN: [id number]    JOHN: Estimated Date of Delivery: 10/9/19    Obstetrician:                                                  Juan Jose Jordan ob           Reason(s) for testing:                                                                         A2 GDM                      Testing frequency:    __x_ 2x/wk  ___ 1x/wk  ___ Dopplers  ___ BPP?       Last growth scan: __________________________________________

## 2019-08-13 NOTE — PROGRESS NOTES
IUP  Insulin dependent DM  Starting NST next week   Will do one in labor and delivery for work restrictions    Today feeling well no complaints  Follow up in 1 week   Cont dm monitoring through the  center

## 2019-08-16 ENCOUNTER — ROUTINE PRENATAL (OUTPATIENT)
Dept: PERINATAL CARE | Facility: CLINIC | Age: 29
End: 2019-08-16
Payer: COMMERCIAL

## 2019-08-16 VITALS
WEIGHT: 163.6 LBS | SYSTOLIC BLOOD PRESSURE: 107 MMHG | HEIGHT: 65 IN | BODY MASS INDEX: 27.26 KG/M2 | DIASTOLIC BLOOD PRESSURE: 73 MMHG | HEART RATE: 86 BPM

## 2019-08-16 DIAGNOSIS — E11.649 HYPOGLYCEMIA ASSOCIATED WITH DIABETES (HCC): ICD-10-CM

## 2019-08-16 DIAGNOSIS — Z3A.32 32 WEEKS GESTATION OF PREGNANCY: ICD-10-CM

## 2019-08-16 DIAGNOSIS — O24.013 PRE-EXISTING TYPE 1 DIABETES MELLITUS DURING PREGNANCY IN THIRD TRIMESTER: ICD-10-CM

## 2019-08-16 PROCEDURE — 59025 FETAL NON-STRESS TEST: CPT | Performed by: OBSTETRICS & GYNECOLOGY

## 2019-08-16 NOTE — PATIENT INSTRUCTIONS

## 2019-08-19 ENCOUNTER — ULTRASOUND (OUTPATIENT)
Dept: PERINATAL CARE | Facility: CLINIC | Age: 29
End: 2019-08-19
Payer: COMMERCIAL

## 2019-08-19 VITALS
HEART RATE: 98 BPM | BODY MASS INDEX: 27.19 KG/M2 | SYSTOLIC BLOOD PRESSURE: 122 MMHG | WEIGHT: 163.4 LBS | DIASTOLIC BLOOD PRESSURE: 75 MMHG

## 2019-08-19 DIAGNOSIS — Z3A.32 32 WEEKS GESTATION OF PREGNANCY: ICD-10-CM

## 2019-08-19 DIAGNOSIS — O24.013 PRE-EXISTING TYPE 1 DIABETES MELLITUS DURING PREGNANCY IN THIRD TRIMESTER: Primary | ICD-10-CM

## 2019-08-19 PROCEDURE — 76818 FETAL BIOPHYS PROFILE W/NST: CPT | Performed by: OBSTETRICS & GYNECOLOGY

## 2019-08-19 NOTE — PATIENT INSTRUCTIONS

## 2019-08-19 NOTE — PROGRESS NOTES
[address]: Ms Jorge Luis Hayes was seen today at 32w5d for NST, DOMENICA and BPP  See ultrasound report under "OB Procedures" tab  Please don't hesitate to contact our office with any concerns or questions    Ayaka Louis MD

## 2019-08-20 ENCOUNTER — DOCUMENTATION (OUTPATIENT)
Dept: PERINATAL CARE | Facility: CLINIC | Age: 29
End: 2019-08-20

## 2019-08-20 DIAGNOSIS — O24.013 PRE-EXISTING TYPE 1 DIABETES MELLITUS DURING PREGNANCY IN THIRD TRIMESTER: Primary | ICD-10-CM

## 2019-08-20 NOTE — PROGRESS NOTES
DATE: 19   RE: Jaxon Tee   : 1990  JOHN: 10/8/2019  EGA:  32w6d  OB/GYN: [facility]   Pre-existing T1DM        Blood glucose log from patient e-mail, last report was 19  Current regimen:  Levemir- 22 units at 9 PM daily  Novolog- Continues to adjust insulin according to the number of CHO gram she is eating  At breakfast: if eats 30 gm CHO, takes 12 units               If eats 45 gm CHO, takes 14 units   At lunch- 16 units   At dinner- 15 units  Patient was previously advised to avoid correcting glucose after a meal   2100 calorie GDM diet with 3 meals/snacks including a balance of carbohydrates, protein and fat  Self monitoring blood glucose fasting, before meals and 2 hours after start of meals with Free Style Lite meter  Reports that it is easier for her to take her after meal BG at 2 hours rather than 1 hour  Has noticed that after 1 hour, her BG is very elevated  Plan:  Levemir--Increase to 26 units at 9 PM daily   Novolog:  Breakfast- increase to 13 units with 30 grams carbohydrate         increase to 15 units with 45 grams carbohydrate  Continue lunch and dinner doses  19 HbA1c A1C 5 2%, reordered labs today, emailed to patient with request to complete this week or next as able    19 US: fetal growth and DOMENICA appeared normal      Date due to report next:  Tuesday, 19; sooner if elevations persist, encouraged patient to report blood glucose values at least weekly     Nina Morocho RN BS CDE  Diabetes Educator   [facility]

## 2019-08-23 ENCOUNTER — ROUTINE PRENATAL (OUTPATIENT)
Dept: PERINATAL CARE | Facility: CLINIC | Age: 29
End: 2019-08-23
Payer: COMMERCIAL

## 2019-08-23 VITALS
DIASTOLIC BLOOD PRESSURE: 72 MMHG | WEIGHT: 164 LBS | HEART RATE: 99 BPM | BODY MASS INDEX: 27.32 KG/M2 | HEIGHT: 65 IN | SYSTOLIC BLOOD PRESSURE: 112 MMHG

## 2019-08-23 DIAGNOSIS — Z3A.33 33 WEEKS GESTATION OF PREGNANCY: ICD-10-CM

## 2019-08-23 DIAGNOSIS — O24.013 PRE-EXISTING TYPE 1 DIABETES MELLITUS DURING PREGNANCY IN THIRD TRIMESTER: Primary | ICD-10-CM

## 2019-08-23 DIAGNOSIS — E11.649 HYPOGLYCEMIA ASSOCIATED WITH DIABETES (HCC): ICD-10-CM

## 2019-08-23 PROCEDURE — 59025 FETAL NON-STRESS TEST: CPT | Performed by: OBSTETRICS & GYNECOLOGY

## 2019-08-24 ENCOUNTER — OFFICE VISIT (OUTPATIENT)
Dept: URGENT CARE | Age: 29
End: 2019-08-24
Payer: COMMERCIAL

## 2019-08-24 VITALS
TEMPERATURE: 98.5 F | SYSTOLIC BLOOD PRESSURE: 122 MMHG | RESPIRATION RATE: 18 BRPM | OXYGEN SATURATION: 99 % | DIASTOLIC BLOOD PRESSURE: 70 MMHG | HEART RATE: 77 BPM | BODY MASS INDEX: 27.12 KG/M2 | WEIGHT: 163 LBS

## 2019-08-24 DIAGNOSIS — H65.92 LEFT NON-SUPPURATIVE OTITIS MEDIA: Primary | ICD-10-CM

## 2019-08-24 PROCEDURE — 99213 OFFICE O/P EST LOW 20 MIN: CPT | Performed by: FAMILY MEDICINE

## 2019-08-24 RX ORDER — AZITHROMYCIN 250 MG/1
TABLET, FILM COATED ORAL
Qty: 6 TABLET | Refills: 0 | Status: SHIPPED | OUTPATIENT
Start: 2019-08-24 | End: 2019-08-29

## 2019-08-24 NOTE — PROGRESS NOTES
[facility]        NAME: Yandel Conrad is a 34 y o  female  : 1990    MRN: [id number]  DATE: 2019  TIME: 10:47 AM    Assessment and Plan   Left non-suppurative otitis media [H65 92]  1  Left non-suppurative otitis media  azithromycin (ZITHROMAX) 250 mg tablet         Patient Instructions       Follow up with PCP in 3-5 days  Proceed to  ER if symptoms worsen  Chief Complaint     Chief Complaint   Patient presents with    Earache     left ear 2 days; no fevers         History of Present Illness       Earache (left ear 2 days; no fevers)    Earache    There is pain in the left ear  This is a new problem  The current episode started in the past 7 days  The problem has been gradually worsening  There has been no fever  The pain is moderate  Associated symptoms include a sore throat  Review of Systems   Review of Systems   Constitutional: Positive for chills  HENT: Positive for ear pain and sore throat  Respiratory: Negative  Cardiovascular: Negative  Current Medications       Current Outpatient Medications:     aspirin (ECOTRIN LOW STRENGTH) 81 mg EC tablet, Take 162 mg by mouth daily , Disp: , Rfl:     insulin aspart (NOVOLOG FLEXPEN) 100 Units/mL injection pen, Uses 5 units before breakfast, 4 units before lunch and 4 units before dinner  (Patient taking differently: Uses 10 to 12 units before meals ), Disp: 15 mL, Rfl: 0    insulin detemir (LEVEMIR FLEXTOUCH) 100 Units/mL injection pen, Inject SC 12 units at 9 or 10 PM daily  To be titrated  (Patient taking differently: Inject SC 18 units at 9 or 10 PM daily   To be titrated ), Disp: 15 mL, Rfl: 0    acetone, urine, test strip, use as needed for BG >250 or for fever, vomiting, or diarrheal illness, Disp: , Rfl:     azithromycin (ZITHROMAX) 250 mg tablet, Take 2 tablets today then 1 tablet daily x 4 days, Disp: 6 tablet, Rfl: 0    Blood Glucose Monitoring Suppl (ACURA BLOOD GLUCOSE METER) w/Device KIT, by Does not apply route, Disp: , Rfl:     FREESTYLE LITE test strip, Test up to 8 times a day and as instructed, Disp: 200 each, Rfl: 3    glucagon (GLUCAGON EMERGENCY) 1 MG injection, Inject as directed, Disp: , Rfl:     Insulin Pen Needle (BD PEN NEEDLE DANYELL U/F) 32G X 4 MM MISC, Use 4 a day and as directed , Disp: 100 each, Rfl: 3    Lancets MISC, by Does not apply route, Disp: , Rfl:     Prenatal Vit-Fe Fumarate-FA (PRENATAL PO), Take by mouth, Disp: , Rfl:     Current Allergies     Allergies as of 08/24/2019    (No Known Allergies)            The following portions of the patient's history were reviewed and updated as appropriate: allergies, current medications, past family history, past medical history, past social history, past surgical history and problem list      Past Medical History:   Diagnosis Date    Abnormal Pap smear of cervix     DM (diabetes mellitus) ([facility] [address] )     type 1    HPV (human papilloma virus) infection     Varicella     vaccinated as child       Past Surgical History:   Procedure Laterality Date    INNER EAR SURGERY Left 2002       Family History   Problem Relation Age of Onset    No Known Problems Mother     No Known Problems Father          Medications have been verified  Objective   /70   Pulse 77   Temp 98 5 °F (36 9 °C)   Resp 18   Wt 73 9 kg (163 lb)   LMP 01/02/2019   SpO2 99%   BMI 27 12 kg/m²        Physical Exam     Physical Exam   Constitutional: She is oriented to person, place, and time  She appears well-developed and well-nourished  HENT:   Right Ear: External ear normal    Left Ear: External ear normal  Tympanic membrane is erythematous and bulging  A middle ear effusion is present  Nose: Nose normal    Mouth/Throat: Oropharynx is clear and moist  No oropharyngeal exudate  Eyes: Conjunctivae are normal    Neck: Normal range of motion  Neck supple  Cardiovascular: Normal rate, regular rhythm and normal heart sounds     No murmur heard   Pulmonary/Chest: Effort normal and breath sounds normal  No respiratory distress  She has no wheezes  She has no rales  She exhibits no tenderness  Abdominal: Soft  Musculoskeletal: Normal range of motion  Lymphadenopathy:     She has no cervical adenopathy  Neurological: She is alert and oriented to person, place, and time  Skin: Skin is warm  No rash noted  No erythema

## 2019-08-24 NOTE — PATIENT INSTRUCTIONS
Follow up with PCP in 3-5 days  Proceed to  ER if symptoms worsen  Otitis Media   AMBULATORY CARE:   Otitis media  is an ear infection  Common symptoms include the following:   · Fever or a headache    · Ear pain    · Trouble hearing    · Ear feels plugged or full or you have ringing or buzzing in your ear    · Dizziness or you lose your balance    · Nausea or vomiting  Seek immediate care for the following symptoms:   · Seizure    · Fever and a stiff neck  Treatment for otitis media  may include any of the following:  · NSAIDs , such as ibuprofen, help decrease swelling, pain, and fever  This medicine is available with or without a doctor's order  NSAIDs can cause stomach bleeding or kidney problems in certain people  If you take blood thinner medicine, always ask your healthcare provider if NSAIDs are safe for you  Always read the medicine label and follow directions  · Ear drops  to help treat your ear pain  · Antibiotics  to help kill the germs that caused your ear infection  Care for otitis media:   · Use heat  Place a warm, moist washcloth on your ear to decrease pain  Apply for 15 to 20 minutes, 3 to 4 times a day    · Use ice  Ice helps decrease swelling and pain  Use an ice pack or put crushed ice in a plastic bag  Cover the ice pack with a towel and place it on your ear for 15 to 20 minutes, 3 to 4 times a day for 2 days  Prevent otitis media:   · Wash your hands often  This will help prevent the spread of germs  Encourage everyone in your house to wash their hands with soap and water after they use the bathroom  Everyone should also wash their hands after they change a child's diaper and before they prepare or eat food  · Stay away from people who are ill  Germs are easily and quickly spread through contact  Follow up with your healthcare provider as directed:  Write down your questions so you remember to ask them during your visits     © 2017 [employer] Information is for End User's use only and may not be sold, redistributed or otherwise used for commercial purposes  All illustrations and images included in CareNotes® are the copyrighted property of A D A M , Inc  or Surinder Sanders  The above information is an  only  It is not intended as medical advice for individual conditions or treatments  Talk to your doctor, nurse or pharmacist before following any medical regimen to see if it is safe and effective for you

## 2019-08-26 ENCOUNTER — HOSPITAL ENCOUNTER (OUTPATIENT)
Dept: LABOR AND DELIVERY | Facility: HOSPITAL | Age: 29
Discharge: HOME | End: 2019-08-26
Attending: OBSTETRICS & GYNECOLOGY | Admitting: OBSTETRICS & GYNECOLOGY
Payer: COMMERCIAL

## 2019-08-26 VITALS — TEMPERATURE: 98.3 F | HEART RATE: 84 BPM | DIASTOLIC BLOOD PRESSURE: 68 MMHG | SYSTOLIC BLOOD PRESSURE: 115 MMHG

## 2019-08-26 PROCEDURE — NC001 PR NO CHARGE: Performed by: FAMILY MEDICINE

## 2019-08-26 PROCEDURE — G0463 HOSPITAL OUTPT CLINIC VISIT: HCPCS

## 2019-08-26 PROCEDURE — 99212 OFFICE O/P EST SF 10 MIN: CPT

## 2019-08-26 NOTE — PROGRESS NOTES
L&D Triage Note - OB/GYN  Doug Adore 34 y o  female MRN: [id number]  Unit/Bed#: L&D 328-01 Encounter: [id number]    Patient is seen by [facility]  _________________________________________________________  ASSESSMENT:  _________________________________________________________  Nathanbrandon Adore is a 34 y o   at 33w5d who here is today for a routine NST  Patient has a pre-existing diabetes type 1 on Novolog ( 5 units before breakfast, 4 units before lunch and 4 units before dinner) and Levemir  ( 18 units )  Feeling well with no symptoms  _________________________________________________________  PLAN:  _________________________________________________________  1) NST  -Continue routine prenatal care  -[facility]charge from [facility] triage with  labor precautions    - Reviewed rupture of membranes, false vs true labor, decreased fetal movement, and vaginal bleeding   - Pt to call provider with any concerns and follow up at her next scheduled prenatal appointment 2019     _________________________________________________________  SUBJECTIVE:  _________________________________________________________  Earla Adore 34 y o  Judith Dumont at 33w5d with an Estimated Date of Delivery: 10/9/19   She is here for her routine NST  Patient has a pre-existing diabetes type 1 on Novolog and Levemir  NSTs appointments are every Monday and Thursday  Patient reports adequate fetal movments, denies leakage of fluid or blood  Patient reports feeling well with no symptoms      Her current obstetrical history is significant for pre-existing diabetes type 1    Her past obstetrical history is significant for diabetes type 1    Contractions: No  Leakage of fluid: No  Vaginal Bleeding: No  Fetal movement: present  _________________________________________________________  OBJECTIVE:  _________________________________________________________  Vitals:    19 1814   BP: 115/68   Pulse: 84   Temp: 98 3 °F (36 8 °C)       ROS:  Constitutional: Negative  Respiratory: Negative  Cardiovascular: Negative    Gastrointestinal: Negative    General Physical Exam:  General: in no apparent distress and well developed and well nourished  Cardiovascular: RRR  Lungs: non-labored breathing  Abdomen: no tenderness  Lower extremeties: nontender      Fetal monitoring:  FHT:  120 bpm/ Moderate 6 - 25 bpm /  10 x 10 accelerations present, No decelerations  [place]: no contractions                  8/26/2019 6:41 PM

## 2019-08-28 NOTE — PROGRESS NOTES
The patient had a nonstress test in the office  I have reviewed the nonstress test and agree with the documentation  no

## 2019-08-30 ENCOUNTER — ROUTINE PRENATAL (OUTPATIENT)
Dept: OBGYN CLINIC | Facility: CLINIC | Age: 29
End: 2019-08-30
Payer: COMMERCIAL

## 2019-08-30 DIAGNOSIS — O24.013 PRE-EXISTING TYPE 1 DIABETES MELLITUS DURING PREGNANCY IN THIRD TRIMESTER: Primary | ICD-10-CM

## 2019-08-30 DIAGNOSIS — Z3A.32 32 WEEKS GESTATION OF PREGNANCY: ICD-10-CM

## 2019-08-30 DIAGNOSIS — E11.649 HYPOGLYCEMIA ASSOCIATED WITH DIABETES (HCC): ICD-10-CM

## 2019-08-30 PROCEDURE — PNV: Performed by: OBSTETRICS & GYNECOLOGY

## 2019-08-30 PROCEDURE — 59025 FETAL NON-STRESS TEST: CPT | Performed by: OBSTETRICS & GYNECOLOGY

## 2019-08-30 NOTE — PROGRESS NOTES
34 2/7 weeks   NST reactive no deceleration   There is 15*15 acceleration and some mild contraction pt not feeling them     Will come on Monday for repeat nst and Thursday to the  center      Precautions given and in 2 weeks for GBS

## 2019-08-31 ENCOUNTER — APPOINTMENT (OUTPATIENT)
Dept: LAB | Facility: HOSPITAL | Age: 29
End: 2019-08-31
Attending: OBSTETRICS & GYNECOLOGY
Payer: COMMERCIAL

## 2019-08-31 DIAGNOSIS — O24.013 PRE-EXISTING TYPE 1 DIABETES MELLITUS DURING PREGNANCY IN THIRD TRIMESTER: ICD-10-CM

## 2019-08-31 LAB
ALBUMIN SERPL BCP-MCNC: 2.6 G/DL (ref 3.5–5)
ALP SERPL-CCNC: 114 U/L (ref 46–116)
ALT SERPL W P-5'-P-CCNC: 28 U/L (ref 12–78)
ANION GAP SERPL CALCULATED.3IONS-SCNC: 10 MMOL/L (ref 4–13)
AST SERPL W P-5'-P-CCNC: 23 U/L (ref 5–45)
BILIRUB SERPL-MCNC: 0.15 MG/DL (ref 0.2–1)
BUN SERPL-MCNC: 8 MG/DL (ref 5–25)
CALCIUM SERPL-MCNC: 8.9 MG/DL (ref 8.3–10.1)
CHLORIDE SERPL-SCNC: 105 MMOL/L (ref 100–108)
CO2 SERPL-SCNC: 24 MMOL/L (ref 21–32)
CREAT SERPL-MCNC: 0.63 MG/DL (ref 0.6–1.3)
EST. AVERAGE GLUCOSE BLD GHB EST-MCNC: 97 MG/DL
GFR SERPL CREATININE-BSD FRML MDRD: 123 ML/MIN/1.73SQ M
GLUCOSE P FAST SERPL-MCNC: 84 MG/DL (ref 65–99)
HBA1C MFR BLD: 5 % (ref 4.2–6.3)
POTASSIUM SERPL-SCNC: 3.8 MMOL/L (ref 3.5–5.3)
PROT SERPL-MCNC: 6.3 G/DL (ref 6.4–8.2)
SODIUM SERPL-SCNC: 139 MMOL/L (ref 136–145)

## 2019-08-31 PROCEDURE — 80053 COMPREHEN METABOLIC PANEL: CPT

## 2019-08-31 PROCEDURE — 36415 COLL VENOUS BLD VENIPUNCTURE: CPT | Performed by: NURSE PRACTITIONER

## 2019-08-31 PROCEDURE — 83036 HEMOGLOBIN GLYCOSYLATED A1C: CPT | Performed by: NURSE PRACTITIONER

## 2019-09-02 ENCOUNTER — HOSPITAL ENCOUNTER (OUTPATIENT)
Facility: HOSPITAL | Age: 29
Discharge: HOME | End: 2019-09-02
Attending: OBSTETRICS & GYNECOLOGY | Admitting: OBSTETRICS & GYNECOLOGY
Payer: COMMERCIAL

## 2019-09-02 VITALS
DIASTOLIC BLOOD PRESSURE: 72 MMHG | SYSTOLIC BLOOD PRESSURE: 120 MMHG | RESPIRATION RATE: 16 BRPM | HEART RATE: 83 BPM | TEMPERATURE: 98.4 F

## 2019-09-02 PROCEDURE — G0463 HOSPITAL OUTPT CLINIC VISIT: HCPCS

## 2019-09-02 PROCEDURE — 99212 OFFICE O/P EST SF 10 MIN: CPT

## 2019-09-02 NOTE — PROGRESS NOTES
Adela Ly is a 28y/o  at 34w5d here for scheduled NST for pre-gestational diabetes  She denies contractions, vaginal bleeding and leakage of fluid  Fetus is moving appropriately      FHT: 130s bpm, reactive, moderate variability; two variable decelerations noted with quick return to baseline; accelerations present  [place]: irregular contractions on toco, patient denies feeling any contractions    SVE: 0/0/-4      Vitals:    19 1621   BP: 120/72   Pulse: 83   Resp: 16   Temp: 98 4 °F (36 9 °C)         Mitzie Essex, MD  OB/GYN PGY-2  2019  5:53 PM

## 2019-09-03 ENCOUNTER — TELEPHONE (OUTPATIENT)
Dept: OBGYN CLINIC | Facility: CLINIC | Age: 29
End: 2019-09-03

## 2019-09-03 ENCOUNTER — DOCUMENTATION (OUTPATIENT)
Dept: PERINATAL CARE | Facility: CLINIC | Age: 29
End: 2019-09-03

## 2019-09-03 NOTE — PROGRESS NOTES
DATE: 19   RE: Aniyah Wahl   : 1990  JOHN: 10/8/2019  EGA:  34w6d  OB/GYN: [facility]   Pre-existing T1DM          Blood glucose log from patient e-mail, last report was 19  Current regimen:  Levemir- 26 units at 9 PM daily  Novolog- Continues to adjust insulin according to the number of CHO gram she is eating   At breakfast: if eats 30 gm CHO, takes 13 units               If eats 45 gm CHO, takes 15 units   At lunch- 16 units   At dinner- 15 units  Patient was previously advised to avoid correcting glucose after a meal   2100 calorie GDM diet with 3 meals/snacks including a balance of carbohydrates, protein and fat  Self monitoring blood glucose fasting, before meals and 2 hours after start of meals with Free Style Lite meter  Reports that it is easier for her to take her after meal BG at 2 hours rather than 1 hour  Has noticed that after 1 hour, her BG is very elevated  Plan:  Continue current regimen     19 HbA1c A1C 5% and CMP WNL during pregnancy shared with patient   19 US: fetal growth and DOMENICA appeared normal      Date due to report next:  Monday, 18 again encouraged patient to report blood glucose values at least weekly     Nina Morocho RN BS CDE  Diabetes Educator   [facility]

## 2019-09-03 NOTE — TELEPHONE ENCOUNTER
Patient called, 35 weeks OB, went to [facility] yesterday for NST and pelvic exam   Now c/o occasional contx and mild cramping, also mucus discharge with slight blood tinge  FM WNL  Advised WNL after pelvic check  Advised rest, elevate legs, increase fluids, call back if ROM or regular contx

## 2019-09-04 NOTE — PATIENT INSTRUCTIONS
Thank you for choosing [id number] for your  care today  If you have any questions about your ultrasound or care, please do not hesitate to contact us or your primary obstetrician

## 2019-09-05 ENCOUNTER — ULTRASOUND (OUTPATIENT)
Dept: PERINATAL CARE | Facility: CLINIC | Age: 29
End: 2019-09-05
Payer: COMMERCIAL

## 2019-09-05 VITALS
HEART RATE: 93 BPM | HEIGHT: 65 IN | BODY MASS INDEX: 27.99 KG/M2 | WEIGHT: 168 LBS | SYSTOLIC BLOOD PRESSURE: 109 MMHG | DIASTOLIC BLOOD PRESSURE: 71 MMHG

## 2019-09-05 DIAGNOSIS — O24.013 PRE-EXISTING TYPE 1 DIABETES MELLITUS DURING PREGNANCY IN THIRD TRIMESTER: Primary | ICD-10-CM

## 2019-09-05 DIAGNOSIS — Z36.89 ENCOUNTER FOR ULTRASOUND TO CHECK FETAL GROWTH: ICD-10-CM

## 2019-09-05 PROCEDURE — 76816 OB US FOLLOW-UP PER FETUS: CPT | Performed by: OBSTETRICS & GYNECOLOGY

## 2019-09-05 PROCEDURE — 99212 OFFICE O/P EST SF 10 MIN: CPT | Performed by: OBSTETRICS & GYNECOLOGY

## 2019-09-05 NOTE — PROGRESS NOTES
[address]: Ms Isabel Rosado was seen today at 35w1d for fetal growth assessment ultrasound  See ultrasound report under "OB Procedures" tab  Please don't hesitate to contact our office with any concerns or questions    Chiquita Aldrich MD

## 2019-09-09 ENCOUNTER — HOSPITAL ENCOUNTER (OUTPATIENT)
Dept: LABOR AND DELIVERY | Facility: HOSPITAL | Age: 29
Discharge: HOME | End: 2019-09-09
Attending: OBSTETRICS & GYNECOLOGY | Admitting: OBSTETRICS & GYNECOLOGY
Payer: COMMERCIAL

## 2019-09-09 PROCEDURE — 99211 OFF/OP EST MAY X REQ PHY/QHP: CPT

## 2019-09-09 PROCEDURE — G0463 HOSPITAL OUTPT CLINIC VISIT: HCPCS

## 2019-09-09 NOTE — PROGRESS NOTES
Mike Garcia is a 29yo  at 35w5d here for her twice weekly NST for pre-gestational diabetes  She denies contractions, vaginal bleeding or LOF  Reports good fetal movement      FHT: 130bpm, moderate variability, +accelerations, no decelerations  [place]: irregular contractions      Pierre Boast, MD  OB/GYN PGY-2  9/10/2019  7:09 AM

## 2019-09-12 ENCOUNTER — ULTRASOUND (OUTPATIENT)
Dept: PERINATAL CARE | Facility: CLINIC | Age: 29
End: 2019-09-12
Payer: COMMERCIAL

## 2019-09-12 VITALS
DIASTOLIC BLOOD PRESSURE: 70 MMHG | WEIGHT: 171.4 LBS | SYSTOLIC BLOOD PRESSURE: 110 MMHG | HEIGHT: 65 IN | BODY MASS INDEX: 28.56 KG/M2 | HEART RATE: 89 BPM

## 2019-09-12 DIAGNOSIS — Z3A.32 32 WEEKS GESTATION OF PREGNANCY: ICD-10-CM

## 2019-09-12 DIAGNOSIS — E11.649 HYPOGLYCEMIA ASSOCIATED WITH DIABETES (HCC): ICD-10-CM

## 2019-09-12 DIAGNOSIS — O24.013 PRE-EXISTING TYPE 1 DIABETES MELLITUS DURING PREGNANCY IN THIRD TRIMESTER: ICD-10-CM

## 2019-09-12 PROCEDURE — 76815 OB US LIMITED FETUS(S): CPT | Performed by: OBSTETRICS & GYNECOLOGY

## 2019-09-12 PROCEDURE — 59025 FETAL NON-STRESS TEST: CPT | Performed by: OBSTETRICS & GYNECOLOGY

## 2019-09-13 NOTE — PROGRESS NOTES
[address] [facility]: Ms Dianne Painter was seen at 36w1d for NST (found under the pregnancy episode) which I reviewed the RN assessment and agree, and DOMENICA (see ultrasound report under OB procedures tab)  Please don't hesitate to contact our office with any concerns or questions    Dianelys Linn MD

## 2019-09-16 ENCOUNTER — HOSPITAL ENCOUNTER (OUTPATIENT)
Facility: HOSPITAL | Age: 29
Discharge: HOME | End: 2019-09-16
Attending: OBSTETRICS & GYNECOLOGY | Admitting: OBSTETRICS & GYNECOLOGY
Payer: COMMERCIAL

## 2019-09-16 ENCOUNTER — TELEPHONE (OUTPATIENT)
Dept: OBGYN CLINIC | Facility: CLINIC | Age: 29
End: 2019-09-16

## 2019-09-16 PROCEDURE — G0463 HOSPITAL OUTPT CLINIC VISIT: HCPCS

## 2019-09-16 PROCEDURE — 99211 OFF/OP EST MAY X REQ PHY/QHP: CPT

## 2019-09-16 NOTE — PROGRESS NOTES
27yo  @ 36w5d presenting for twice weekly NST for pre-gestational diabetes  She denies contractions, vaginal bleeding or LOF   ++fetal movement    FHT: 130 baseline, moderate variability, accelerations present, no decelerations  [place]: irritability      Addison Beck MD  OB/GYN PGY-2  2019  6:00 PM

## 2019-09-16 NOTE — TELEPHONE ENCOUNTER
LM for patient to schedule appointment for today if possible  She has her MFM appointments scheduled and it does look like she has been in and out of the hospital and seen last week on labor floor by Dr Laina Leslie, but she is due for weekly appointments as she is almost 37 wks and has not been seen in our office since 8/30

## 2019-09-17 ENCOUNTER — DOCUMENTATION (OUTPATIENT)
Dept: PERINATAL CARE | Facility: CLINIC | Age: 29
End: 2019-09-17

## 2019-09-17 NOTE — PROGRESS NOTES
DATE: 19   RE: Cynthia Lopez   : 1990  JOHN: 10/8/2019  EGA:  36w6d  OB[facility]GYN: [facility]   Pre-existing T1DM            Blood glucose log from patient e-mail for -19  Current regimen:  Levemir- 26 units at 9 PM daily  Novolog- Continues to adjust insulin according to the number of CHO gram she is eating   At breakfast: if eats 30 gm CHO, takes 13 units               If eats 45 gm CHO, takes 15 units   At lunch- 16 units   At dinner- 15 units  Patient was previously advised to avoid correcting glucose after a meal   2100 calorie GDM diet with 3 meals/snacks including a balance of carbohydrates, protein and fat  Self monitoring blood glucose fasting, before meals and 2 hours after start of meals with Free Style Lite meter  Reports that it is easier for her to take her after meal BG at 2 hours rather than 1 hour  Has noticed that after 1 hour, her BG is very elevated  Plan:  Continue current dose Levemir  Continue Novolog breakfast & lunch doses above  Increase Novolog at dinner from 15 to 16 units    831-19 HbA1c A1C 5%   19 US: fetal growth and DOMENICA appeared normal      Date due to report next:  Friday, 18, again encouraged patient to report blood glucose values at least weekly     Carlos ANDERS CDE  Diabetes Educator   [facility]regnancy [facility]

## 2019-09-19 ENCOUNTER — ULTRASOUND (OUTPATIENT)
Dept: PERINATAL CARE | Facility: CLINIC | Age: 29
End: 2019-09-19
Payer: COMMERCIAL

## 2019-09-19 VITALS
DIASTOLIC BLOOD PRESSURE: 71 MMHG | HEART RATE: 80 BPM | HEIGHT: 65 IN | WEIGHT: 171.6 LBS | BODY MASS INDEX: 28.59 KG/M2 | SYSTOLIC BLOOD PRESSURE: 108 MMHG

## 2019-09-19 DIAGNOSIS — O24.013 PRE-EXISTING TYPE 1 DIABETES MELLITUS DURING PREGNANCY IN THIRD TRIMESTER: ICD-10-CM

## 2019-09-19 DIAGNOSIS — E11.649 HYPOGLYCEMIA ASSOCIATED WITH DIABETES (HCC): ICD-10-CM

## 2019-09-19 DIAGNOSIS — Z3A.37 37 WEEKS GESTATION OF PREGNANCY: ICD-10-CM

## 2019-09-19 PROCEDURE — NC001 PR NO CHARGE: Performed by: OBSTETRICS & GYNECOLOGY

## 2019-09-19 PROCEDURE — 59025 FETAL NON-STRESS TEST: CPT | Performed by: OBSTETRICS & GYNECOLOGY

## 2019-09-19 PROCEDURE — 99211 OFF/OP EST MAY X REQ PHY/QHP: CPT | Performed by: OBSTETRICS & GYNECOLOGY

## 2019-09-19 PROCEDURE — 76815 OB US LIMITED FETUS(S): CPT | Performed by: OBSTETRICS & GYNECOLOGY

## 2019-09-19 NOTE — PROGRESS NOTES
[address] [facility] [address] [facility] [address]: Ms Ken Ahn was seen today at 42w4d for NST (found under the pregnancy episode) which I reviewed the RN assessment and agree, and DOMNEICA (see ultrasound report under OB procedures tab)  See ultrasound report under "OB Procedures" tab    Please don't hesitate to contact our office with any concerns or questions   -Siri Vargas MD

## 2019-09-19 NOTE — PATIENT INSTRUCTIONS

## 2019-09-23 ENCOUNTER — HOSPITAL ENCOUNTER (OUTPATIENT)
Facility: HOSPITAL | Age: 29
Discharge: HOME | End: 2019-09-23
Attending: OBSTETRICS & GYNECOLOGY | Admitting: OBSTETRICS & GYNECOLOGY
Payer: COMMERCIAL

## 2019-09-23 VITALS
DIASTOLIC BLOOD PRESSURE: 83 MMHG | HEART RATE: 82 BPM | SYSTOLIC BLOOD PRESSURE: 137 MMHG | RESPIRATION RATE: 16 BRPM | TEMPERATURE: 98.7 F

## 2019-09-23 PROBLEM — Z3A.37 37 WEEKS GESTATION OF PREGNANCY: Status: ACTIVE | Noted: 2019-02-19

## 2019-09-23 PROCEDURE — G0463 HOSPITAL OUTPT CLINIC VISIT: HCPCS

## 2019-09-23 PROCEDURE — 87184 SC STD DISK METHOD PER PLATE: CPT | Performed by: OBSTETRICS & GYNECOLOGY

## 2019-09-23 PROCEDURE — 87653 STREP B DNA AMP PROBE: CPT | Performed by: OBSTETRICS & GYNECOLOGY

## 2019-09-23 PROCEDURE — NC001 PR NO CHARGE: Performed by: OBSTETRICS & GYNECOLOGY

## 2019-09-23 PROCEDURE — 99213 OFFICE O/P EST LOW 20 MIN: CPT

## 2019-09-23 NOTE — DISCHARGE INSTRUCTIONS
Early Labor Signs   WHAT YOU SHOULD KNOW:   Early labor signs are changes in your body that allow your baby to pass through your birth canal   AFTER YOU LEAVE:   Signs and symptoms of early labor:   · Lightening  occurs when your baby drops inside your pelvis  You may feel increased pressure in your pelvis  This may happen a few weeks to a few hours before your labor begins  · Contractions  are cramps and tightening that occur in your uterus to help move the baby through your birth canal  Contractions occur regularly and more often each time  Each one lasts about 30 to 70 seconds, and gets stronger and more painful until you deliver your baby  Contractions do not go away with movement  They start in your lower back and move to the front in your abdomen  · Effacement  occurs when your cervix softens and thins, so it can easily open for the baby  Your primary healthcare provider [facility] or obstetrician will examine your cervix for effacement  · Dilation  is widening of your cervix, also for the baby's passage  Your PHP or obstetrician will examine your cervix for dilation  Your cervix will be fully opened and ready for delivery when it is dilated to 10 centimeters  · Increased discharge  from your vagina may occur  It may be pink, clear, or slightly bloody  This discharge may also be called bloody show  Bloody show is a mucus plug that forms and blocks your cervix during pregnancy  · Rupture of membranes  is a sudden release of clear fluid from your vagina  It is also known as when your water breaks  Your PHP or obstetrician may need to break your water if it does not break on its own  False labor: You may have false labor signs, which are also called Yimi Thurman contractions  False labor is common and may happen several weeks or days before your actual labor  The contractions are not regular, and do not get closer together  The pain is usually mild, does not worsen, and is felt only in front  Yimi Thurman contractions may happen later in the day, and stop after you change position, walk, or rest   Contact your PHP or obstetrician if:   · You have pain in your lower back or abdomen  · You have bloody mucus or show  · You have questions or concerns about your condition or care  Seek care immediately or call 911 if:   · You have regular, painful contractions that are less than 5 minutes apart, and last 30 to 70 seconds each  · You have heavy vaginal bleeding  · You have a constant trickle or sudden gush of clear fluid from your vagina  · You notice a sudden decrease in your baby's movement  © 2014 [address] is for End User's use only and may not be sold, redistributed or otherwise used for commercial purposes  All illustrations and images included in CareNotes® are the copyrighted property of [employer]  or Surinder Sanders  The above information is an  only  It is not intended as medical advice for individual conditions or treatments  Talk to your doctor, nurse or pharmacist before following any medical regimen to see if it is safe and effective for you

## 2019-09-23 NOTE — PROGRESS NOTES
L&D Triage Note - OB/GYN  Pippa Gu 34 y o  female MRN: [id number]  Unit/Bed#: L&D 329-02 Encounter: [id number]      Assessment:  34 y o   at 37w5d with reactive NST    Plan:  1   fetal testing  - AFS due to Type 1 DM  - Reactive NST  - Not feeling contractions    2  IUP at 37 weeks  - GBS collected  - Instructed to discuss 39 week induction with her outpatient office    Discussed with Dr Aureliano Guevara  Patient for disharge home      ______________________________________________________________________      Chief Compliant: none    TIME:   Subjective:  34 y o   at 37w5d who presents for NST for  fetal surveillance due to T1DM  She denies contractions, vaginal bleeding, or loss of fluid  She reports good fetal movement  She states that she would like to have her GBS status collected today  She would also like to schedule a 39 week induction       Objective:  Vitals:    19 1805   BP: 137/83   Pulse: 82   Resp: 16   Temp: 98 7 °F (37 1 °C)     FHT:  130 / Moderate 6 - 25 bpm / accelerations, no decelerations  [place]: q2 minutes    Serena Peralta MD 2019 7:24 PM

## 2019-09-25 ENCOUNTER — TELEPHONE (OUTPATIENT)
Dept: LABOR AND DELIVERY | Facility: HOSPITAL | Age: 29
End: 2019-09-25

## 2019-09-26 ENCOUNTER — ULTRASOUND (OUTPATIENT)
Dept: PERINATAL CARE | Facility: CLINIC | Age: 29
End: 2019-09-26
Payer: COMMERCIAL

## 2019-09-26 VITALS
BODY MASS INDEX: 29.09 KG/M2 | WEIGHT: 174.6 LBS | DIASTOLIC BLOOD PRESSURE: 75 MMHG | SYSTOLIC BLOOD PRESSURE: 124 MMHG | HEIGHT: 65 IN | HEART RATE: 76 BPM

## 2019-09-26 DIAGNOSIS — E11.649 HYPOGLYCEMIA ASSOCIATED WITH DIABETES (HCC): ICD-10-CM

## 2019-09-26 DIAGNOSIS — Z3A.38 38 WEEKS GESTATION OF PREGNANCY: ICD-10-CM

## 2019-09-26 DIAGNOSIS — O40.3XX0 POLYHYDRAMNIOS IN THIRD TRIMESTER COMPLICATION, SINGLE OR UNSPECIFIED FETUS: Primary | ICD-10-CM

## 2019-09-26 DIAGNOSIS — O24.013 PRE-EXISTING TYPE 1 DIABETES MELLITUS DURING PREGNANCY IN THIRD TRIMESTER: ICD-10-CM

## 2019-09-26 PROCEDURE — NC001 PR NO CHARGE: Performed by: OBSTETRICS & GYNECOLOGY

## 2019-09-26 PROCEDURE — 76818 FETAL BIOPHYS PROFILE W/NST: CPT | Performed by: OBSTETRICS & GYNECOLOGY

## 2019-09-26 NOTE — PROGRESS NOTES
Daily fetal kick count reviewed and emphasized  Patient verbalized understanding of all and was receptive      Campos Doss RN

## 2019-09-27 ENCOUNTER — ANESTHESIA (INPATIENT)
Dept: LABOR AND DELIVERY | Facility: HOSPITAL | Age: 29
End: 2019-09-27
Payer: COMMERCIAL

## 2019-09-27 ENCOUNTER — HOSPITAL ENCOUNTER (INPATIENT)
Facility: HOSPITAL | Age: 29
LOS: 3 days | Discharge: HOME | End: 2019-09-30
Attending: OBSTETRICS & GYNECOLOGY | Admitting: OBSTETRICS & GYNECOLOGY
Payer: COMMERCIAL

## 2019-09-27 ENCOUNTER — ANESTHESIA EVENT (INPATIENT)
Dept: LABOR AND DELIVERY | Facility: HOSPITAL | Age: 29
End: 2019-09-27
Payer: COMMERCIAL

## 2019-09-27 DIAGNOSIS — Z3A.38 38 WEEKS GESTATION OF PREGNANCY: Primary | ICD-10-CM

## 2019-09-27 DIAGNOSIS — O24.013 PRE-EXISTING TYPE 1 DIABETES MELLITUS DURING PREGNANCY IN THIRD TRIMESTER: ICD-10-CM

## 2019-09-27 LAB
ABO GROUP BLD: NORMAL
BLD GP AB SCN SERPL QL: NEGATIVE
ERYTHROCYTE [DISTWIDTH] IN BLOOD BY AUTOMATED COUNT: 13 % (ref 11.6–15.1)
GLUCOSE SERPL-MCNC: 61 MG/DL (ref 65–140)
GLUCOSE SERPL-MCNC: 62 MG/DL (ref 65–140)
GLUCOSE SERPL-MCNC: 67 MG/DL (ref 65–140)
GLUCOSE SERPL-MCNC: 71 MG/DL (ref 65–140)
GLUCOSE SERPL-MCNC: 71 MG/DL (ref 65–140)
GLUCOSE SERPL-MCNC: 75 MG/DL (ref 65–140)
GLUCOSE SERPL-MCNC: 79 MG/DL (ref 65–140)
GLUCOSE SERPL-MCNC: 89 MG/DL (ref 65–140)
GLUCOSE SERPL-MCNC: 89 MG/DL (ref 65–140)
HCT VFR BLD AUTO: 34.6 % (ref 34.8–46.1)
HGB BLD-MCNC: 11.9 G/DL (ref 11.5–15.4)
MCH RBC QN AUTO: 33.1 PG (ref 26.8–34.3)
MCHC RBC AUTO-ENTMCNC: 34.4 G/DL (ref 31.4–37.4)
MCV RBC AUTO: 96 FL (ref 82–98)
PLATELET # BLD AUTO: 173 THOUSANDS/UL (ref 149–390)
PMV BLD AUTO: 12.3 FL (ref 8.9–12.7)
RBC # BLD AUTO: 3.59 MILLION/UL (ref 3.81–5.12)
RH BLD: POSITIVE
SPECIMEN EXPIRATION DATE: NORMAL
WBC # BLD AUTO: 11.96 THOUSAND/UL (ref 4.31–10.16)

## 2019-09-27 PROCEDURE — 99212 OFFICE O/P EST SF 10 MIN: CPT

## 2019-09-27 PROCEDURE — 82948 REAGENT STRIP/BLOOD GLUCOSE: CPT

## 2019-09-27 PROCEDURE — 85027 COMPLETE CBC AUTOMATED: CPT | Performed by: OBSTETRICS & GYNECOLOGY

## 2019-09-27 PROCEDURE — G0463 HOSPITAL OUTPT CLINIC VISIT: HCPCS

## 2019-09-27 PROCEDURE — 86901 BLOOD TYPING SEROLOGIC RH(D): CPT | Performed by: OBSTETRICS & GYNECOLOGY

## 2019-09-27 PROCEDURE — 86592 SYPHILIS TEST NON-TREP QUAL: CPT | Performed by: OBSTETRICS & GYNECOLOGY

## 2019-09-27 PROCEDURE — 86850 RBC ANTIBODY SCREEN: CPT | Performed by: OBSTETRICS & GYNECOLOGY

## 2019-09-27 PROCEDURE — NC001 PR NO CHARGE: Performed by: OBSTETRICS & GYNECOLOGY

## 2019-09-27 PROCEDURE — 86900 BLOOD TYPING SEROLOGIC ABO: CPT | Performed by: OBSTETRICS & GYNECOLOGY

## 2019-09-27 RX ORDER — ROPIVACAINE HYDROCHLORIDE 2 MG/ML
INJECTION, SOLUTION EPIDURAL; INFILTRATION; PERINEURAL
Status: COMPLETED
Start: 2019-09-27 | End: 2019-09-27

## 2019-09-27 RX ORDER — ONDANSETRON 2 MG/ML
4 INJECTION INTRAMUSCULAR; INTRAVENOUS EVERY 6 HOURS PRN
Status: DISCONTINUED | OUTPATIENT
Start: 2019-09-27 | End: 2019-09-28

## 2019-09-27 RX ORDER — OXYTOCIN/RINGER'S LACTATE 30/500 ML
1-30 PLASTIC BAG, INJECTION (ML) INTRAVENOUS
Status: DISCONTINUED | OUTPATIENT
Start: 2019-09-27 | End: 2019-09-28

## 2019-09-27 RX ORDER — ROPIVACAINE HYDROCHLORIDE 2 MG/ML
INJECTION, SOLUTION EPIDURAL; INFILTRATION; PERINEURAL AS NEEDED
Status: DISCONTINUED | OUTPATIENT
Start: 2019-09-27 | End: 2019-09-28 | Stop reason: SURG

## 2019-09-27 RX ORDER — BUTORPHANOL TARTRATE 1 MG/ML
1 INJECTION, SOLUTION INTRAMUSCULAR; INTRAVENOUS
Status: DISCONTINUED | OUTPATIENT
Start: 2019-09-27 | End: 2019-09-30 | Stop reason: HOSPADM

## 2019-09-27 RX ORDER — SODIUM CHLORIDE 9 MG/ML
125 INJECTION, SOLUTION INTRAVENOUS CONTINUOUS
Status: DISCONTINUED | OUTPATIENT
Start: 2019-09-27 | End: 2019-09-28

## 2019-09-27 RX ORDER — LIDOCAINE HYDROCHLORIDE AND EPINEPHRINE 15; 5 MG/ML; UG/ML
INJECTION, SOLUTION EPIDURAL
Status: COMPLETED | OUTPATIENT
Start: 2019-09-27 | End: 2019-09-27

## 2019-09-27 RX ADMIN — INSULIN DETEMIR 26 UNITS: 100 INJECTION, SOLUTION SUBCUTANEOUS at 22:58

## 2019-09-27 RX ADMIN — BUTORPHANOL TARTRATE 1 MG: 1 INJECTION, SOLUTION INTRAMUSCULAR; INTRAVENOUS at 16:15

## 2019-09-27 RX ADMIN — LIDOCAINE HYDROCHLORIDE AND EPINEPHRINE 3 ML: 15; 5 INJECTION, SOLUTION EPIDURAL at 19:37

## 2019-09-27 RX ADMIN — SODIUM CHLORIDE 2.5 MILLION UNITS: 9 INJECTION, SOLUTION INTRAVENOUS at 16:26

## 2019-09-27 RX ADMIN — Medication 2 MILLI-UNITS/MIN: at 08:32

## 2019-09-27 RX ADMIN — ROPIVACAINE HYDROCHLORIDE 5 ML: 2 INJECTION, SOLUTION EPIDURAL; INFILTRATION at 19:40

## 2019-09-27 RX ADMIN — SODIUM CHLORIDE 2.5 MILLION UNITS: 9 INJECTION, SOLUTION INTRAVENOUS at 20:35

## 2019-09-27 RX ADMIN — ROPIVACAINE HYDROCHLORIDE 5 ML: 2 INJECTION, SOLUTION EPIDURAL; INFILTRATION at 19:37

## 2019-09-27 RX ADMIN — SODIUM CHLORIDE 125 ML/HR: 0.9 INJECTION, SOLUTION INTRAVENOUS at 08:34

## 2019-09-27 RX ADMIN — ROPIVACAINE HYDROCHLORIDE: 2 INJECTION, SOLUTION EPIDURAL; INFILTRATION at 19:45

## 2019-09-27 RX ADMIN — SODIUM CHLORIDE 2.5 MILLION UNITS: 9 INJECTION, SOLUTION INTRAVENOUS at 13:23

## 2019-09-27 RX ADMIN — SODIUM CHLORIDE 5 MILLION UNITS: 0.9 INJECTION, SOLUTION INTRAVENOUS at 08:33

## 2019-09-27 NOTE — ANESTHESIA PROCEDURE NOTES
Epidural Block    Patient location during procedure: OB  Start time: 9/27/2019 7:37 PM  Reason for block: at surgeon's request and primary anesthetic  Staffing  Anesthesiologist: Mimi Lyons DO  Performed: anesthesiologist   Preanesthetic Checklist  Completed: patient identified, surgical consent, pre-op evaluation, timeout performed, IV checked, risks and benefits discussed and monitors and equipment checked  Epidural  Patient position: sitting  Prep: Betadine  Patient monitoring: frequent blood pressure checks  Approach: midline  Injection technique: MANUEL saline  Needle  Needle type: Tuohy   Needle gauge: 18 G  Catheter type: side hole  Catheter size: 20 G  Catheter at skin depth: 9 cm  Test dose: negativelidocaine 1 5% with epinephrine 1:200,000 test dose, 3 mLnegative aspiration for CSF  patient tolerated the procedure well with no immediate complications

## 2019-09-27 NOTE — PLAN OF CARE
Problem: Knowledge Deficit  Goal: Verbalizes understanding of labor plan  Description  Assess patient/family/caregiver's baseline knowledge level and ability to understand information  Provide education via patient/family/caregiver's preferred learning method at appropriate level of understanding  1  Provide teaching at level of understanding  2  Provide teaching via preferred learning method(s)  Outcome: Progressing     Problem: Labor & Delivery  Goal: Manages discomfort  Description  Assess and monitor for signs and symptoms of discomfort  Assess patient's pain level regularly and per hospital policy  Administer medications as ordered  Support use of nonpharmacological methods to help control pain such as distraction, imagery, relaxation, and application of heat and cold  Collaborate with interdisciplinary team and patient to determine appropriate pain management plan  1  Include patient in decisions related to comfort  2  Offer non-pharmacological pain management interventions  3  Report ineffective pain management to physician  Outcome: Progressing  Goal: Patient vital signs are stable  Description  1  Assess vital signs - vaginal delivery    Outcome: Progressing     Problem: ANTEPARTUM  Goal: Maintain pregnancy as long as maternal and/or fetal condition is stable  Description  INTERVENTIONS:  - Maternal surveillance  - Fetal surveillance  - Monitor uterine activity  - Medications as ordered  - Bedrest  Outcome: Progressing     Problem: BIRTH - VAGINAL/ SECTION  Goal: Fetal and maternal status remain reassuring during the birth process  Description  INTERVENTIONS:  - Monitor vital signs  - Monitor fetal heart rate  - Monitor uterine activity  - Monitor labor progression (vaginal delivery)  - DVT prophylaxis  - Antibiotic prophylaxis  Outcome: Progressing  Goal: Emotionally satisfying birthing experience for mother/fetus  Description  Interventions:  - Assess, plan, implement and evaluate the nursing care given to the patient in labor  - Advocate the philosophy that each childbirth experience is a unique experience and support the family's chosen level of involvement and control during the labor process   - Actively participate in both the patient's and family's teaching of the birth process  - Consider cultural, Church and age-specific factors and plan care for the patient in labor  Outcome: Progressing

## 2019-09-27 NOTE — OB LABOR/OXYTOCIN SAFETY PROGRESS
Oxytocin Safety Progress Check Note - Jenae Mchugh 34 y o  female MRN: [id number]    Unit/Bed#: L&D 326-01 Encounter: [id number]    Dose (netta-units/min) Oxytocin: 10 netta-units/min  Contraction Frequency (minutes): 2-3  Contraction Quality: Moderate  Tachysystole: No   Dilation: 1        Effacement (%): 80  Station: -2  Baseline Rate: 130 bpm     FHR Category: Category I  Oxytocin Safety Progress Check: Safety check completed          Notes/comments: On cervical exam the patients internal os is able to be stretched but external os is still 1cm  She is now starting to feel her contractions  FHT not concerning for fetal acidemia        Allan Shultz MD 9/27/2019 12:55 PM

## 2019-09-27 NOTE — OB LABOR/OXYTOCIN SAFETY PROGRESS
Oxytocin Safety Progress Check Note - Palmira James 34 y o  female MRN: [id number]    Unit/Bed#: L&D 326-01 Encounter: [id number]    Dose (netta-units/min) Oxytocin: 16 netta-units/min  Contraction Frequency (minutes): 1 5-2  Contraction Quality: Moderate  Tachysystole: No   Dilation: 3        Effacement (%): 80  Station: -1  Baseline Rate: 125 bpm     FHR Category: Category I  Oxytocin Safety Progress Check: Safety check completed          Notes/comments:     Patient is doing well  She has made cervical changes  She remains comfortable  Cervical exam is now 3/80/-1  There was clear pooling after the exam  Will continue to titrate pitocin     FHT: Cat 1: 115/moderate/accelerations, no decelerations  [place]: q2 minutes  Plan: Continue with expectant management      Christelle Rascon MD 9/27/2019 4:27 PM

## 2019-09-27 NOTE — H&P
[address] 34 y o  female MRN: [id number]  Unit/Bed#: L&D 326-01 Encounter: [id number]    Assessment: 34 y o  Hayley John at 38w2d admitted for spontaneous rupture of membranes  T1DM, Polyhydraminos  SVE: /-2  FHT: 130bpm  Clinical EFW: 7 5lbs ; Vertex confirmed by U/s  GBS status: Positive       Plan:   · Admit  · CBC, RPR, Type & Screen  · Analgesia at maternal request  · Augmentation with Pitocin, possible calvillo balloon catheter placement  · Antibiotics (Penicillin) for GBS prophylaxis  · Glucose checks and Insulin drip ordered   · IV fluids (NS)  · Clear liquid diet    Dr Aileen Yadav aware      Chief Complaint: leaking fluid    HPI: Ben Pope is a 34 y o  Hayley John with an JOHN of 10/9/2019, by Last Menstrual Period at 38w2d who is being admitted for spontaneous rupture of membranes  She complains of uterine contractions, occurring every 5 minutes but doesn't feel them much, has moderate LOF, and reports no VB  She states she has felt good FM  Patient Active Problem List   Diagnosis    Pre-existing type 1 diabetes mellitus during pregnancy in third trimester    38 weeks gestation of pregnancy    Vitamin D deficiency    Hypoglycemia associated with diabetes ([facility] [address] )    Polyhydramnios in third trimester       Baby complications/comments: none    Review of Systems   Constitutional: Negative for chills and fever  Respiratory: Negative for cough, shortness of breath and wheezing  Cardiovascular: Negative for chest pain  Gastrointestinal: Negative for abdominal pain, nausea and vomiting  Genitourinary: Negative for dysuria, hematuria, urgency, vaginal bleeding and vaginal discharge  Neurological: Negative for dizziness, weakness, light-headedness and headaches         OB History    Para Term  AB Living   2 0     1     SAB TAB Ectopic Multiple Live Births     1            # Outcome Date GA Lbr Star/2nd Weight Sex Delivery Anes PTL Lv   2 Current            1 TAB Obstetric Comments   : about 1       Past Medical History:   Diagnosis Date    Abnormal Pap smear of cervix     DM (diabetes mellitus) ([facility] [address] )     type 1    HPV (human papilloma virus) infection     Varicella     vaccinated as child       Past Surgical History:   Procedure Laterality Date    INNER EAR SURGERY Left 2002       Social History     Tobacco Use    Smoking status: Never Smoker    Smokeless tobacco: Never Used   Substance Use Topics    Alcohol use: Not Currently     Comment: occ  No Known Allergies    Medications Prior to Admission   Medication    acetone, urine, test strip    aspirin (ECOTRIN LOW STRENGTH) 81 mg EC tablet    Blood Glucose Monitoring Suppl (ACURA BLOOD GLUCOSE METER) w/Device KIT    FREESTYLE LITE test strip    glucagon (GLUCAGON EMERGENCY) 1 MG injection    insulin aspart (NOVOLOG FLEXPEN) 100 Units/mL injection pen    insulin detemir (LEVEMIR FLEXTOUCH) 100 Units/mL injection pen    Insulin Pen Needle (BD PEN NEEDLE DANYELL U/F) 32G X 4 MM MISC    Lancets MISC    Prenatal Vit-Fe Fumarate-FA (PRENATAL PO)       Objective:  Temp:  [97 8 °F (36 6 °C)] 97 8 °F (36 6 °C)  HR:  [76-93] 93  Resp:  [18] 18  BP: (124-127)/(72-75) 127/72  Body mass index is 28 96 kg/m²  Physical Exam:  Physical Exam   Constitutional: She is oriented to person, place, and time  She appears well-developed and well-nourished  Cardiovascular: Normal rate, regular rhythm and normal heart sounds  Exam reveals no gallop and no friction rub  No murmur heard  Pulmonary/Chest: Effort normal  No respiratory distress  She has no wheezes  Abdominal: Soft  There is no tenderness  Musculoskeletal: She exhibits no tenderness  Neurological: She is alert and oriented to person, place, and time  Vitals reviewed         SVE:  Dilation: 1  Effacement (%): 60  Station: -2    FHT:  Baseline Rate: 135 bpm  FHR Category: Category I    TOCO:   Contraction Frequency (minutes): 4-5  Contraction Duration (seconds): 60  Contraction Quality: Mild    Lab Results   Component Value Date    WBC 11 96 (H) 09/27/2019    HGB 11 9 09/27/2019    HCT 34 6 (L) 09/27/2019     09/27/2019     Lab Results   Component Value Date    K 3 8 08/31/2019     08/31/2019    CO2 24 08/31/2019    BUN 8 08/31/2019    CREATININE 0 63 08/31/2019    AST 23 08/31/2019    ALT 28 08/31/2019     Prenatal Labs: Reviewed      Blood type: O Positive  Antibody: negative  GBS: positive  HIV:negative  Rubella: Immune  VDRL/RPR: Non reactive  HBsAg: Negative  Chlamydia: Negative  Gonorrhea: Negative  Diabetes 1 hour screen: T1DM  Platelets: 896  Hgb: 11 9  >2 Midnights  INPATIENT     Signature/Title:  Pearl Blackmon MD  Date: 9/27/2019  Time: 8:25 AM

## 2019-09-27 NOTE — ANESTHESIA PREPROCEDURE EVALUATION
Review of Systems/Medical History  Patient summary reviewed  Chart reviewed      Cardiovascular   Pulmonary  Negative pulmonary ROS        GI/Hepatic  Negative GI/hepatic ROS          Negative  ROS        Endo/Other  Diabetes type 1 ,      GYN  Currently pregnant ,          Hematology  Negative hematology ROS      Musculoskeletal  Negative musculoskeletal ROS        Neurology  Negative neurology ROS      Psychology   Negative psychology ROS              Physical Exam    Airway    Mallampati score: II  TM Distance: >3 FB  Neck ROM: full     Dental   No notable dental hx     Cardiovascular  Cardiovascular exam normal    Pulmonary  Pulmonary exam normal     Other Findings        Anesthesia Plan  ASA Score- 2     Anesthesia Type- epidural with ASA Monitors  Additional Monitors:   Airway Plan:         Plan Factors-    Induction-     Postoperative Plan-     Informed Consent- Anesthetic plan and risks discussed with patient

## 2019-09-27 NOTE — OB LABOR/OXYTOCIN SAFETY PROGRESS
Oxytocin Safety Progress Check Note - Marshal Scheuermann 34 y o  female MRN: [id number]    Unit/Bed#: L&D 326-01 Encounter: [id number]    Dose (netta-units/min) Oxytocin: 4 netta-units/min  Contraction Frequency (minutes): 2-3  Contraction Quality: Mild  Tachysystole: No   Dilation: 1        Effacement (%): 70  Station: -2  Baseline Rate: 130 bpm     FHR Category: Category I  Oxytocin Safety Progress Check: Safety check completed          Notes/comments: Patient is comfortable  The cervix is thinning  FHT not concerning for fetal acidemia  Will continue to monitor        Shane Whitlock MD 9/27/2019 10:29 AM

## 2019-09-28 LAB
BASE EXCESS BLDCOA CALC-SCNC: -4.2 MMOL/L (ref 3–11)
BASE EXCESS BLDCOV CALC-SCNC: -3 MMOL/L (ref 1–9)
GLUCOSE SERPL-MCNC: 101 MG/DL (ref 65–140)
GLUCOSE SERPL-MCNC: 125 MG/DL (ref 65–140)
GLUCOSE SERPL-MCNC: 129 MG/DL (ref 65–140)
GLUCOSE SERPL-MCNC: 132 MG/DL (ref 65–140)
GLUCOSE SERPL-MCNC: 145 MG/DL (ref 65–140)
GLUCOSE SERPL-MCNC: 44 MG/DL (ref 65–140)
GLUCOSE SERPL-MCNC: 51 MG/DL (ref 65–140)
GLUCOSE SERPL-MCNC: 66 MG/DL (ref 65–140)
GLUCOSE SERPL-MCNC: 70 MG/DL (ref 65–140)
GLUCOSE SERPL-MCNC: 75 MG/DL (ref 65–140)
GLUCOSE SERPL-MCNC: 87 MG/DL (ref 65–140)
HCO3 BLDCOA-SCNC: 22.8 MMOL/L (ref 17.3–27.3)
HCO3 BLDCOV-SCNC: 24.6 MMOL/L (ref 12.2–28.6)
O2 CT VFR BLDCOA CALC: 4.1 ML/DL
OXYHGB MFR BLDCOA: 20.7 %
OXYHGB MFR BLDCOV: 17.4 %
PCO2 BLDCOA: 49 MM[HG] (ref 30–60)
PCO2 BLDCOV: 54.7 MM HG (ref 27–43)
PH BLDCOA: 7.29 [PH] (ref 7.23–7.43)
PH BLDCOV: 7.27 [PH] (ref 7.19–7.49)
PO2 BLDCOA: 12.7 MM HG (ref 5–25)
PO2 BLDCOV: 12.2 MM HG (ref 15–45)
SAO2 % BLDCOV: 3.5 ML/DL

## 2019-09-28 PROCEDURE — NC001 PR NO CHARGE: Performed by: OBSTETRICS & GYNECOLOGY

## 2019-09-28 PROCEDURE — 59510 CESAREAN DELIVERY: CPT | Performed by: OBSTETRICS & GYNECOLOGY

## 2019-09-28 PROCEDURE — 99024 POSTOP FOLLOW-UP VISIT: CPT | Performed by: OBSTETRICS & GYNECOLOGY

## 2019-09-28 PROCEDURE — 82805 BLOOD GASES W/O2 SATURATION: CPT | Performed by: OBSTETRICS & GYNECOLOGY

## 2019-09-28 PROCEDURE — 82948 REAGENT STRIP/BLOOD GLUCOSE: CPT

## 2019-09-28 PROCEDURE — 85025 COMPLETE CBC W/AUTO DIFF WBC: CPT | Performed by: OBSTETRICS & GYNECOLOGY

## 2019-09-28 RX ORDER — FENTANYL CITRATE 50 UG/ML
INJECTION, SOLUTION INTRAMUSCULAR; INTRAVENOUS
Status: COMPLETED
Start: 2019-09-28 | End: 2019-09-28

## 2019-09-28 RX ORDER — DIPHENHYDRAMINE HYDROCHLORIDE 50 MG/ML
25 INJECTION INTRAMUSCULAR; INTRAVENOUS EVERY 6 HOURS PRN
Status: ACTIVE | OUTPATIENT
Start: 2019-09-28 | End: 2019-09-29

## 2019-09-28 RX ORDER — PHENYLEPHRINE HCL IN 0.9% NACL 1 MG/10 ML
SYRINGE (ML) INTRAVENOUS
Status: DISPENSED
Start: 2019-09-28 | End: 2019-09-28

## 2019-09-28 RX ORDER — FENTANYL CITRATE/PF 50 MCG/ML
50 SYRINGE (ML) INJECTION
Status: DISCONTINUED | OUTPATIENT
Start: 2019-09-28 | End: 2019-09-28

## 2019-09-28 RX ORDER — HYDROMORPHONE HCL/PF 1 MG/ML
0.5 SYRINGE (ML) INJECTION
Status: ACTIVE | OUTPATIENT
Start: 2019-09-28 | End: 2019-09-29

## 2019-09-28 RX ORDER — DIPHENHYDRAMINE HYDROCHLORIDE 50 MG/ML
25 INJECTION INTRAMUSCULAR; INTRAVENOUS EVERY 6 HOURS PRN
Status: DISCONTINUED | OUTPATIENT
Start: 2019-09-28 | End: 2019-09-28 | Stop reason: SDUPTHER

## 2019-09-28 RX ORDER — CEFAZOLIN SODIUM 2 G/50ML
2000 SOLUTION INTRAVENOUS ONCE
Status: COMPLETED | OUTPATIENT
Start: 2019-09-28 | End: 2019-09-28

## 2019-09-28 RX ORDER — OXYCODONE HYDROCHLORIDE AND ACETAMINOPHEN 5; 325 MG/1; MG/1
1 TABLET ORAL EVERY 4 HOURS PRN
Status: DISCONTINUED | OUTPATIENT
Start: 2019-09-29 | End: 2019-09-30 | Stop reason: HOSPADM

## 2019-09-28 RX ORDER — OXYTOCIN/RINGER'S LACTATE 30/500 ML
PLASTIC BAG, INJECTION (ML) INTRAVENOUS CONTINUOUS PRN
Status: DISCONTINUED | OUTPATIENT
Start: 2019-09-28 | End: 2019-09-28 | Stop reason: SURG

## 2019-09-28 RX ORDER — KETOROLAC TROMETHAMINE 30 MG/ML
30 INJECTION, SOLUTION INTRAMUSCULAR; INTRAVENOUS EVERY 6 HOURS PRN
Status: DISPENSED | OUTPATIENT
Start: 2019-09-28 | End: 2019-09-29

## 2019-09-28 RX ORDER — NALBUPHINE HCL 10 MG/ML
5 AMPUL (ML) INJECTION
Status: ACTIVE | OUTPATIENT
Start: 2019-09-28 | End: 2019-09-29

## 2019-09-28 RX ORDER — DEXAMETHASONE SODIUM PHOSPHATE 4 MG/ML
8 INJECTION, SOLUTION INTRA-ARTICULAR; INTRALESIONAL; INTRAMUSCULAR; INTRAVENOUS; SOFT TISSUE ONCE AS NEEDED
Status: ACTIVE | OUTPATIENT
Start: 2019-09-28 | End: 2019-09-29

## 2019-09-28 RX ORDER — MORPHINE SULFATE 0.5 MG/ML
INJECTION, SOLUTION EPIDURAL; INTRATHECAL; INTRAVENOUS
Status: COMPLETED
Start: 2019-09-28 | End: 2019-09-28

## 2019-09-28 RX ORDER — ONDANSETRON 2 MG/ML
INJECTION INTRAMUSCULAR; INTRAVENOUS AS NEEDED
Status: DISCONTINUED | OUTPATIENT
Start: 2019-09-28 | End: 2019-09-28 | Stop reason: SURG

## 2019-09-28 RX ORDER — DOCUSATE SODIUM 100 MG/1
100 CAPSULE, LIQUID FILLED ORAL 2 TIMES DAILY
Status: DISCONTINUED | OUTPATIENT
Start: 2019-09-28 | End: 2019-09-30 | Stop reason: HOSPADM

## 2019-09-28 RX ORDER — OXYCODONE HYDROCHLORIDE AND ACETAMINOPHEN 5; 325 MG/1; MG/1
1 TABLET ORAL EVERY 4 HOURS PRN
Status: ACTIVE | OUTPATIENT
Start: 2019-09-28 | End: 2019-09-29

## 2019-09-28 RX ORDER — LIDOCAINE HYDROCHLORIDE AND EPINEPHRINE 20; 5 MG/ML; UG/ML
INJECTION, SOLUTION EPIDURAL; INFILTRATION; INTRACAUDAL; PERINEURAL AS NEEDED
Status: DISCONTINUED | OUTPATIENT
Start: 2019-09-28 | End: 2019-09-28 | Stop reason: SURG

## 2019-09-28 RX ORDER — ACETAMINOPHEN 325 MG/1
650 TABLET ORAL EVERY 6 HOURS PRN
Status: DISCONTINUED | OUTPATIENT
Start: 2019-09-28 | End: 2019-09-30 | Stop reason: HOSPADM

## 2019-09-28 RX ORDER — OXYCODONE HYDROCHLORIDE AND ACETAMINOPHEN 5; 325 MG/1; MG/1
2 TABLET ORAL EVERY 4 HOURS PRN
Status: DISCONTINUED | OUTPATIENT
Start: 2019-09-29 | End: 2019-09-30 | Stop reason: HOSPADM

## 2019-09-28 RX ORDER — NALOXONE HYDROCHLORIDE 0.4 MG/ML
0.1 INJECTION, SOLUTION INTRAMUSCULAR; INTRAVENOUS; SUBCUTANEOUS
Status: ACTIVE | OUTPATIENT
Start: 2019-09-28 | End: 2019-09-29

## 2019-09-28 RX ORDER — OXYTOCIN/RINGER'S LACTATE 30/500 ML
62.5 PLASTIC BAG, INJECTION (ML) INTRAVENOUS ONCE
Status: COMPLETED | OUTPATIENT
Start: 2019-09-28 | End: 2019-09-28

## 2019-09-28 RX ORDER — IBUPROFEN 600 MG/1
600 TABLET ORAL EVERY 6 HOURS PRN
Status: DISCONTINUED | OUTPATIENT
Start: 2019-09-28 | End: 2019-09-30 | Stop reason: HOSPADM

## 2019-09-28 RX ORDER — SODIUM CHLORIDE 9 MG/ML
INJECTION, SOLUTION INTRAVENOUS CONTINUOUS PRN
Status: DISCONTINUED | OUTPATIENT
Start: 2019-09-28 | End: 2019-09-28 | Stop reason: SURG

## 2019-09-28 RX ORDER — ONDANSETRON 2 MG/ML
4 INJECTION INTRAMUSCULAR; INTRAVENOUS ONCE AS NEEDED
Status: DISCONTINUED | OUTPATIENT
Start: 2019-09-28 | End: 2019-09-28

## 2019-09-28 RX ORDER — METOCLOPRAMIDE HYDROCHLORIDE 5 MG/ML
5 INJECTION INTRAMUSCULAR; INTRAVENOUS EVERY 6 HOURS PRN
Status: ACTIVE | OUTPATIENT
Start: 2019-09-28 | End: 2019-09-29

## 2019-09-28 RX ORDER — MORPHINE SULFATE 0.5 MG/ML
INJECTION, SOLUTION EPIDURAL; INTRATHECAL; INTRAVENOUS AS NEEDED
Status: DISCONTINUED | OUTPATIENT
Start: 2019-09-28 | End: 2019-09-28 | Stop reason: SURG

## 2019-09-28 RX ORDER — SODIUM CHLORIDE 9 MG/ML
125 INJECTION, SOLUTION INTRAVENOUS CONTINUOUS
Status: DISCONTINUED | OUTPATIENT
Start: 2019-09-28 | End: 2019-09-30 | Stop reason: HOSPADM

## 2019-09-28 RX ORDER — FENTANYL CITRATE 50 UG/ML
INJECTION, SOLUTION INTRAMUSCULAR; INTRAVENOUS AS NEEDED
Status: DISCONTINUED | OUTPATIENT
Start: 2019-09-28 | End: 2019-09-28 | Stop reason: SURG

## 2019-09-28 RX ORDER — SIMETHICONE 80 MG
80 TABLET,CHEWABLE ORAL 4 TIMES DAILY PRN
Status: DISCONTINUED | OUTPATIENT
Start: 2019-09-28 | End: 2019-09-30 | Stop reason: HOSPADM

## 2019-09-28 RX ORDER — DEXTROSE AND SODIUM CHLORIDE 5; .45 G/100ML; G/100ML
INJECTION, SOLUTION INTRAVENOUS CONTINUOUS PRN
Status: DISCONTINUED | OUTPATIENT
Start: 2019-09-28 | End: 2019-09-28 | Stop reason: SURG

## 2019-09-28 RX ORDER — CALCIUM CARBONATE 200(500)MG
1000 TABLET,CHEWABLE ORAL DAILY PRN
Status: DISCONTINUED | OUTPATIENT
Start: 2019-09-28 | End: 2019-09-30 | Stop reason: HOSPADM

## 2019-09-28 RX ORDER — ONDANSETRON 2 MG/ML
4 INJECTION INTRAMUSCULAR; INTRAVENOUS EVERY 4 HOURS PRN
Status: ACTIVE | OUTPATIENT
Start: 2019-09-28 | End: 2019-09-29

## 2019-09-28 RX ORDER — ONDANSETRON 2 MG/ML
4 INJECTION INTRAMUSCULAR; INTRAVENOUS EVERY 8 HOURS PRN
Status: DISCONTINUED | OUTPATIENT
Start: 2019-09-28 | End: 2019-09-30 | Stop reason: HOSPADM

## 2019-09-28 RX ADMIN — LIDOCAINE HYDROCHLORIDE AND EPINEPHRINE 5 ML: 20; 5 INJECTION, SOLUTION EPIDURAL; INFILTRATION; INTRACAUDAL; PERINEURAL at 03:32

## 2019-09-28 RX ADMIN — FENTANYL CITRATE 100 MCG: 50 INJECTION, SOLUTION INTRAMUSCULAR; INTRAVENOUS at 04:00

## 2019-09-28 RX ADMIN — SODIUM CHLORIDE: 0.9 INJECTION, SOLUTION INTRAVENOUS at 03:36

## 2019-09-28 RX ADMIN — MORPHINE SULFATE 2 MG: 0.5 INJECTION, SOLUTION EPIDURAL; INTRATHECAL; INTRAVENOUS at 03:57

## 2019-09-28 RX ADMIN — LIDOCAINE HYDROCHLORIDE AND EPINEPHRINE 5 ML: 20; 5 INJECTION, SOLUTION EPIDURAL; INFILTRATION; INTRACAUDAL; PERINEURAL at 03:36

## 2019-09-28 RX ADMIN — Medication: at 04:25

## 2019-09-28 RX ADMIN — Medication 500 MILLI-UNITS/MIN: at 03:53

## 2019-09-28 RX ADMIN — PHENYLEPHRINE HYDROCHLORIDE 200 MCG: 10 INJECTION INTRAVENOUS at 04:05

## 2019-09-28 RX ADMIN — CEFAZOLIN SODIUM 2000 MG: 2 SOLUTION INTRAVENOUS at 03:40

## 2019-09-28 RX ADMIN — ONDANSETRON 4 MG: 2 INJECTION INTRAMUSCULAR; INTRAVENOUS at 03:39

## 2019-09-28 RX ADMIN — KETOROLAC TROMETHAMINE 30 MG: 30 INJECTION, SOLUTION INTRAMUSCULAR at 20:00

## 2019-09-28 RX ADMIN — MORPHINE SULFATE 3 MG: 0.5 INJECTION, SOLUTION EPIDURAL; INTRATHECAL; INTRAVENOUS at 03:56

## 2019-09-28 RX ADMIN — AZITHROMYCIN MONOHYDRATE 500 MG: 500 INJECTION, POWDER, LYOPHILIZED, FOR SOLUTION INTRAVENOUS at 03:00

## 2019-09-28 RX ADMIN — Medication 62.5 MILLI-UNITS/MIN: at 08:22

## 2019-09-28 RX ADMIN — INSULIN LISPRO 4 UNITS: 100 INJECTION, SOLUTION INTRAVENOUS; SUBCUTANEOUS at 12:45

## 2019-09-28 RX ADMIN — DEXTROSE AND SODIUM CHLORIDE: 5; .45 INJECTION, SOLUTION INTRAVENOUS at 03:36

## 2019-09-28 RX ADMIN — INSULIN LISPRO 4 UNITS: 100 INJECTION, SOLUTION INTRAVENOUS; SUBCUTANEOUS at 09:55

## 2019-09-28 RX ADMIN — LIDOCAINE HYDROCHLORIDE AND EPINEPHRINE 5 ML: 20; 5 INJECTION, SOLUTION EPIDURAL; INFILTRATION; INTRACAUDAL; PERINEURAL at 03:30

## 2019-09-28 RX ADMIN — DOCUSATE SODIUM 100 MG: 100 CAPSULE, LIQUID FILLED ORAL at 08:17

## 2019-09-28 RX ADMIN — KETOROLAC TROMETHAMINE 30 MG: 30 INJECTION, SOLUTION INTRAMUSCULAR at 06:29

## 2019-09-28 RX ADMIN — INSULIN DETEMIR 10 UNITS: 100 INJECTION, SOLUTION SUBCUTANEOUS at 22:05

## 2019-09-28 RX ADMIN — SODIUM CHLORIDE 2.5 MILLION UNITS: 9 INJECTION, SOLUTION INTRAVENOUS at 01:05

## 2019-09-28 RX ADMIN — LIDOCAINE HYDROCHLORIDE AND EPINEPHRINE 5 ML: 20; 5 INJECTION, SOLUTION EPIDURAL; INFILTRATION; INTRACAUDAL; PERINEURAL at 03:34

## 2019-09-28 RX ADMIN — KETOROLAC TROMETHAMINE 30 MG: 30 INJECTION, SOLUTION INTRAMUSCULAR at 13:34

## 2019-09-28 RX ADMIN — DOCUSATE SODIUM 100 MG: 100 CAPSULE, LIQUID FILLED ORAL at 17:16

## 2019-09-28 RX ADMIN — SODIUM CHLORIDE 125 ML/HR: 0.9 INJECTION, SOLUTION INTRAVENOUS at 01:52

## 2019-09-28 NOTE — OB LABOR/OXYTOCIN SAFETY PROGRESS
Oxytocin Safety Progress Check Note - Oumar Gonzalez 34 y o  female MRN: [id number]    Unit/Bed#: L&D 326-01 Encounter: [id number]    Dose (netta-units/min) Oxytocin: 4 netta-units/min  Contraction Frequency (minutes): 3-4  Contraction Quality: Moderate  Tachysystole: No   Dilation: 4-5        Effacement (%): 90  Station: -1  Baseline Rate: 130 bpm     FHR Category: Category I  Oxytocin Safety Progress Check: Safety check completed          Notes/comments:   Patient is comfortable with epidural, fetal heart tone 130 category 2 patient is having late decelerations after contractions  [place] 2- 3 minutes  Pitocin dose dropped 8-4  Patient has had spontaneous rupture of membrane 24 hours ago, she is GBS positive she is on penicillin  She is afebrile but on exam vagina was warmer than earlier checks    Discussed with Dr Kelli Lau MD 8/29/6337 1:96 AM

## 2019-09-28 NOTE — NURSING NOTE
Blood sugar tested on pt, noted to be 44, repeated and noted to be 51  Apple juice provided  And patient eating dinner now  Doctor notifed, meal time insulin not given  To recheck in 30 minutes and 2 hour post prandial blood sugar

## 2019-09-28 NOTE — PLAN OF CARE
Problem: POSTPARTUM  Goal: Experiences normal postpartum course  Description  INTERVENTIONS:  - Monitor maternal vital signs  - Assess uterine involution and lochia  Outcome: Progressing  Goal: Appropriate maternal -  bonding  Description  INTERVENTIONS:  - Identify family support  - Assess for appropriate maternal/infant bonding   -Encourage maternal/infant bonding opportunities  - Referral to  or  as needed  Outcome: Progressing  Goal: Establishment of infant feeding pattern  Description  INTERVENTIONS:  - Assess breast/bottle feeding  - Refer to lactation as needed  Outcome: Progressing  Goal: Incision(s), wounds(s) or drain site(s) healing without S/S of infection  Description  INTERVENTIONS  - Assess and document risk factors for skin impairment   - Assess and document dressing, incision, wound bed, drain sites and surrounding tissue  - Consider nutrition services referral as needed  - Oral mucous membranes remain intact  - Provide patient/ family education  Outcome: Progressing     Problem: Knowledge Deficit  Goal: Verbalizes understanding of labor plan  Description  Assess patient/family/caregiver's baseline knowledge level and ability to understand information  Provide education via patient/family/caregiver's preferred learning method at appropriate level of understanding  1  Provide teaching at level of understanding  2  Provide teaching via preferred learning method(s)  Outcome: Completed     Problem: Labor & Delivery  Goal: Manages discomfort  Description  Assess and monitor for signs and symptoms of discomfort  Assess patient's pain level regularly and per hospital policy  Administer medications as ordered  Support use of nonpharmacological methods to help control pain such as distraction, imagery, relaxation, and application of heat and cold  Collaborate with interdisciplinary team and patient to determine appropriate pain management plan      1  Include patient in decisions related to comfort  2  Offer non-pharmacological pain management interventions  3  Report ineffective pain management to physician  Outcome: Completed  Goal: Patient vital signs are stable  Description  1  Assess vital signs - vaginal delivery    Outcome: Completed     Problem: ANTEPARTUM  Goal: Maintain pregnancy as long as maternal and/or fetal condition is stable  Description  INTERVENTIONS:  - Maternal surveillance  - Fetal surveillance  - Monitor uterine activity  - Medications as ordered  - Bedrest  Outcome: Completed     Problem: BIRTH - VAGINAL/ SECTION  Goal: Fetal and maternal status remain reassuring during the birth process  Description  INTERVENTIONS:  - Monitor vital signs  - Monitor fetal heart rate  - Monitor uterine activity  - Monitor labor progression (vaginal delivery)  - DVT prophylaxis  - Antibiotic prophylaxis  Outcome: Completed  Goal: Emotionally satisfying birthing experience for mother/fetus  Description  Interventions:  - Assess, plan, implement and evaluate the nursing care given to the patient in labor  - Advocate the philosophy that each childbirth experience is a unique experience and support the family's chosen level of involvement and control during the labor process   - Actively participate in both the patient's and family's teaching of the birth process  - Consider cultural, Gnosticist and age-specific factors and plan care for the patient in labor  Outcome: Completed

## 2019-09-28 NOTE — OB LABOR/OXYTOCIN SAFETY PROGRESS
Oxytocin Safety Progress Check Note - Janel Ann 34 y o  female MRN: [id number]    Unit/Bed#: L&D 326-01 Encounter: [id number]    Dose (netta-units/min) Oxytocin: 16 netta-units/min  Contraction Frequency (minutes): 3-4  Contraction Quality: Moderate  Tachysystole: No   Dilation: 3        Effacement (%): 80  Station: -1  Baseline Rate: 130 bpm     FHR Category: Category I  Oxytocin Safety Progress Check: Safety check completed          Notes/comments:   Patient is comfortable after epidural   Patient had  late decelerations with a contraction, with fetal scalp stimulation acceleration appreciated    Fetal heart tone 130 category 2 toco 2-3 minutes  Will half the pitocin, and follow up    Sandra Lawson MD 4/89/8878 1:28 PM

## 2019-09-28 NOTE — PROGRESS NOTES
Pt is having cat 2 tracing every time the pitocin is on   She is not making much cervical change and can not get adequate contraction bc of the intolerance to pitocin   After discussion and plan the decision is for c section  Risk explained to patient and she is agreeable to the decision for c section

## 2019-09-29 LAB
BASOPHILS # BLD AUTO: 0.03 THOUSANDS/ΜL (ref 0–0.1)
BASOPHILS NFR BLD AUTO: 0 % (ref 0–1)
EOSINOPHIL # BLD AUTO: 0.07 THOUSAND/ΜL (ref 0–0.61)
EOSINOPHIL NFR BLD AUTO: 1 % (ref 0–6)
ERYTHROCYTE [DISTWIDTH] IN BLOOD BY AUTOMATED COUNT: 13.2 % (ref 11.6–15.1)
GLUCOSE SERPL-MCNC: 106 MG/DL (ref 65–140)
GLUCOSE SERPL-MCNC: 108 MG/DL (ref 65–140)
GLUCOSE SERPL-MCNC: 146 MG/DL (ref 65–140)
GLUCOSE SERPL-MCNC: 174 MG/DL (ref 65–140)
GLUCOSE SERPL-MCNC: 232 MG/DL (ref 65–140)
GLUCOSE SERPL-MCNC: 56 MG/DL (ref 65–140)
GLUCOSE SERPL-MCNC: 61 MG/DL (ref 65–140)
GLUCOSE SERPL-MCNC: 81 MG/DL (ref 65–140)
GP B STREP DNA SPEC QL NAA+PROBE: ABNORMAL
GP B STREP DNA SPEC QL NAA+PROBE: ABNORMAL
HCT VFR BLD AUTO: 25 % (ref 34.8–46.1)
HGB BLD-MCNC: 8.4 G/DL (ref 11.5–15.4)
IMM GRANULOCYTES # BLD AUTO: 0.13 THOUSAND/UL (ref 0–0.2)
IMM GRANULOCYTES NFR BLD AUTO: 1 % (ref 0–2)
LYMPHOCYTES # BLD AUTO: 1.78 THOUSANDS/ΜL (ref 0.6–4.47)
LYMPHOCYTES NFR BLD AUTO: 12 % (ref 14–44)
MCH RBC QN AUTO: 33.2 PG (ref 26.8–34.3)
MCHC RBC AUTO-ENTMCNC: 33.6 G/DL (ref 31.4–37.4)
MCV RBC AUTO: 99 FL (ref 82–98)
MONOCYTES # BLD AUTO: 1 THOUSAND/ΜL (ref 0.17–1.22)
MONOCYTES NFR BLD AUTO: 7 % (ref 4–12)
NEUTROPHILS # BLD AUTO: 12.16 THOUSANDS/ΜL (ref 1.85–7.62)
NEUTS SEG NFR BLD AUTO: 80 % (ref 43–75)
NRBC BLD AUTO-RTO: 0 /100 WBCS
PLATELET # BLD AUTO: 140 THOUSANDS/UL (ref 149–390)
RBC # BLD AUTO: 2.53 MILLION/UL (ref 3.81–5.12)
RPR SER QL: NORMAL
WBC # BLD AUTO: 15.17 THOUSAND/UL (ref 4.31–10.16)

## 2019-09-29 PROCEDURE — 99024 POSTOP FOLLOW-UP VISIT: CPT | Performed by: OBSTETRICS & GYNECOLOGY

## 2019-09-29 PROCEDURE — 82948 REAGENT STRIP/BLOOD GLUCOSE: CPT

## 2019-09-29 RX ADMIN — IBUPROFEN 600 MG: 600 TABLET, FILM COATED ORAL at 10:36

## 2019-09-29 RX ADMIN — DOCUSATE SODIUM 100 MG: 100 CAPSULE, LIQUID FILLED ORAL at 08:02

## 2019-09-29 RX ADMIN — INSULIN LISPRO 4 UNITS: 100 INJECTION, SOLUTION INTRAVENOUS; SUBCUTANEOUS at 14:25

## 2019-09-29 RX ADMIN — ACETAMINOPHEN 650 MG: 325 TABLET ORAL at 20:32

## 2019-09-29 RX ADMIN — IBUPROFEN 600 MG: 600 TABLET, FILM COATED ORAL at 23:01

## 2019-09-29 RX ADMIN — IBUPROFEN 600 MG: 600 TABLET, FILM COATED ORAL at 16:31

## 2019-09-29 RX ADMIN — KETOROLAC TROMETHAMINE 30 MG: 30 INJECTION, SOLUTION INTRAMUSCULAR at 02:20

## 2019-09-29 RX ADMIN — INSULIN LISPRO 1 UNITS: 100 INJECTION, SOLUTION INTRAVENOUS; SUBCUTANEOUS at 20:45

## 2019-09-29 RX ADMIN — ACETAMINOPHEN 650 MG: 325 TABLET ORAL at 14:25

## 2019-09-29 RX ADMIN — DOCUSATE SODIUM 100 MG: 100 CAPSULE, LIQUID FILLED ORAL at 18:09

## 2019-09-29 RX ADMIN — INSULIN LISPRO 2 UNITS: 100 INJECTION, SOLUTION INTRAVENOUS; SUBCUTANEOUS at 10:36

## 2019-09-29 RX ADMIN — INSULIN DETEMIR 10 UNITS: 100 INJECTION, SOLUTION SUBCUTANEOUS at 22:02

## 2019-09-29 RX ADMIN — OXYCODONE HYDROCHLORIDE AND ACETAMINOPHEN 1 TABLET: 5; 325 TABLET ORAL at 06:58

## 2019-09-29 NOTE — OP NOTE
OPERATIVE REPORT  PATIENT NAME: Cirilo Cordova    :  1990  MRN: [id number]  Pt Location: [facility] L&D OR ROOM 01    SURGERY DATE: 2019    Surgeon(s) and Role:     * Gigi Mei MD - Primary     * Alea Wood MD - Assisting    Preop Diagnosis:  * persistent category 2 tracing *  * fetal intolerance to labor *    Procedure(s) (LRB):   SECTION () (N/A)    Specimen(s):  * No specimens in log *    Estimated Blood Loss:   800ml    Drains:  [REMOVED] Urethral Catheter Double-lumen;Non-latex 16 Fr  (Removed)   Amt returned on insertion(mL) 200 mL 2019  8:01 PM   Site Assessment Clean;Skin intact 2019  3:00 PM   Collection Container Standard drainage bag 2019  3:00 PM   Securement Method Securing device (Describe) 2019  3:00 PM   Output (mL) 175 mL 2019  1:16 AM   Number of days: 1       Anesthesia Type:   * epidural anesthesia *    Operative Indications:  * persistent category 2 tracing *  Fetal intolerance to labor    Operative Findings:  Vulva, vagina within normal limits no lesion and mass appreciated  Uterus term size within normal limits, bilateral tubes and ovaries within normal limits no mass and lesion appreciated  Complications:   None    Procedure and Technique:  Operative Findings:  1  Viable male  at 36 with APGARs of 9 and 9 at 1 and 5 minutes  Fetus weighted 7lb 4oz  2  Normal intact placenta with centrally inserted 3VC expressed at 0357  3  Normal uterus, bilateral tubes and ovaries  4  Blood gases:   Arterial pH: 7 285   Arterial base excess: -4 2   Venous pH: 7 271   Venous base excess: -3 0    The patient was taken to the operating room  Epidural anesthesia was adequately established and cefazolin 2 g IV and azithromycin 500 mg IV were given for preoperative prophylaxis  The patient was then placed in the dorsal supine position with a left tilt of the hips   The patient was then prepped with betadine for vaginal prep and chloraprep for abdominal prep and draped in the usual sterile fashion for a Pfannenstiel skin incision  A time out was performed to confirm correct patient and correct procedure  An incision was made in the skin with a surgical scalpel and sharp dissection was carried out over subsequent layers of tissue including the fascia, followed by the Bovie electrocautery for hemostasis  The fascia was incised at the midline and the fascial incision was extended bilaterally using the Bovie electrocautery  The superior edge of the fascial incision was grasped with Kocher clamps, tented up and the underlying rectus muscles were dissected off bluntly and sharply using the Bovie electrocautery The rectus muscles were then divided at midline and the peritoneum was identified, tented up at its upper margin taking care to avoid the bladder, and then entered  The peritoneal incision was extended superiorly and inferiorly  The bladder blade was inserted and the vesicouterine peritoneum was identified, grasped with forceps and cut laterally to both sides using the Metzenbaum scissors  A bladder blade was reinserted and a transverse incision was made in the lower uterine segment using a new surgical blade  The uterine incision was extended cephalad and caudal using blunt dissection  The amniotic sac was entered and the amniotic fluid was noted to be clear   The surgeon's hand was placed into the uterine cavity  The fetal head was identified and elevated through the uterine incision with the assistance of fundal pressure  With gentle traction, the shoulder was delivered, followed by the rest of the fetal body  There was no nuchal cord noted  On delivery the cord was doubly clamped and cut after delayed cord clamping  The infant was then passed off the table to the awaiting  staff  The  was noted to cry spontaneously and moved all extremities   Venous and arterial blood gas, cord blood, and portion of cord was obtained for analysis and routine blood testing  The placenta delivery was then sent to storage  Placenta was noted to be intact with a centrally inserted three-vessel cord  Oxytocin was administered by IV infusion to enhance uterine contraction  The uterus was exteriorized and cleared of all clots and remaining products of conception  The uterine incision was re approximated using a 0 Vicryl in a running locked fashion  A second vertical imbricating stitch with 0 Vicryl was applied  The uterine incision was examined and noted to be hemostatic  The posterior cul-de-sac was cleared of all clots and products of conception  The uterus was replaced into the abdomen and the pericolic gutters were cleared of all clots  The uterine incision was once again reexamined and noted to be hemostatic  The fascia was re approximated using 0 Vicryl in a running nonlocked fashion  The subcutaneous tissue was irrigated and cleared of all clots and debris  Good hemostasis was noted with Bovie electrocautery  The skin incision was closed using undyed Monocryl with 4 0  Good hemostasis was noted  Patient tolerated the procedure well  All needle, sponge, and instrument counts were noted to be correct x 2 at the end of the procedure  Patient was transferred to the recovery room in stable condition  Dr Edmar Espitia was present for the procedure       I was present for the entire procedure    Patient Disposition:  PACU     SIGNATURE: Ayo Villanueva MD  DATE: September 29, 2019  TIME: 7:08 AM

## 2019-09-29 NOTE — DISCHARGE SUMMARY
Discharge Summary - OB/GYN   Gina Sandra 34 y o  female MRN: [id number]  Unit/Bed#: L&D 310-01 Encounter: [id number]      Admission Date: 2019     Discharge Date: 19    Admitting Diagnosis:   Patient Active Problem List   Diagnosis    Pre-existing type 1 diabetes mellitus during pregnancy in third trimester    38 weeks gestation of pregnancy    Vitamin D deficiency    Hypoglycemia associated with diabetes ([address] )    Polyhydramnios in third trimester       Discharge Diagnosis:   Same, delivered      Procedures: primary  section, low transverse incision    Attending: July Santa MD    Hospital Course:     Gina Sandra is a 34 y o  Norman Muscat at 38w4d wks who was initially admitted for spontaneous rupture of membranes and pregnancy complicated by type 1 diabetes mellitus and polyhydramnios  She delivered a viable male  on 2019 at [phone]  Weight 7lbs 4oz via primary  section, low transverse incision  Apgars were 9 (1 min) and 9 (5 min)   was transferred to  nursery  Patient tolerated the procedure well and was transferred to recovery in stable condition  Her post-operative course was uncomplicated  Preoperative hemaglobin was 11 8, postoperative was 8 7  Her postoperative pain was well controlled with oral analgesics  On day of discharge, she was ambulating and able to reasonably perform all ADLs  She was voiding and had appropriate bowel function  Pain was well controlled  She was discharged home on post-operative day #2 without complications  Patient was instructed to follow up with her OB as an outpatient and was given appropriate warnings to call provider if she develops signs of infection or uncontrolled pain  Complications: none apparent    Condition at discharge: good     Discharge instructions/Information to patient and family:   See after visit summary for information provided to patient and family        Provisions for Follow-Up Care:  See after visit summary for information related to follow-up care and any pertinent home health orders  Disposition: Home    Planned Readmission: No    Discharge Medications: For a complete list of the patient's medications, please refer to her med rec      Myke Ramirez MD  09/30/19

## 2019-09-29 NOTE — PROGRESS NOTES
Progress Note - OB/GYN   Germaine Momin 34 y o  female MRN: [id number]  Unit/Bed#: L&D 310-01 Encounter: [id number]    Assessment:  POD#1 s/p Primary low transverse  section, stable    Plan:  1  Postop  Hemoglobin 11 9->8 9  Past voiding trial    2  Type 1 diabetes  - Humolog , Levemir 10 units  - pre and postprandial 2 hour fingerstick glucose monitoring  - sliding scale insulin also ordered  - patient is strictly following up her glucose level, according to today's level she may need endocrine consult    3  Disposition  - Anticipate discharge tomorrow       Subjective/Objective   Chief Complaint:   POD#1 s/p Primary low transverse  section    Subjective:     Pain:  Incisional  Tolerating PO: yes  Voiding: yes  Flatus: yes  BM: no  Ambulating: yes  Breastfeeding: Breastfeeding  Chest pain: no  Shortness of breath: no  Leg pain: no  Lochia:  Minimal    Objective:     Vitals:  Vitals:    19 1551 19 1900 19 2341 19 0700   BP: 105/74 109/72 92/58 111/76   BP Location: Right arm Right arm Right arm    Pulse: 83 84 76 80   Resp: 18 18 18 18   Temp: 98 2 °F (36 8 °C) 97 9 °F (36 6 °C) 97 8 °F (36 6 °C) 97 8 °F (36 6 °C)   TempSrc: Oral Oral Oral    SpO2: 98% 97% 97%    Weight:       Height:           Physical Exam:     Physical Exam   Constitutional: She is oriented to person, place, and time  She appears well-developed and well-nourished  No distress  Cardiovascular: Normal rate and regular rhythm  Pulmonary/Chest: Effort normal    Abdominal: Soft  Musculoskeletal: Normal range of motion  Neurological: She is alert and oriented to person, place, and time  Skin: Skin is warm  She is not diaphoretic  Psychiatric: She has a normal mood and affect  Her behavior is normal      Uterine fundus firm and non-tender, -2 cm below the umbilicus       Lab, Imaging and other studies: I have personally reviewed pertinent reports        Lab Results   Component Value Date    WBC 15 17 (H) 09/28/2019    HGB 8 4 (L) 09/28/2019    HCT 25 0 (L) 09/28/2019    MCV 99 (H) 09/28/2019     (L) 09/28/2019               Clotilde Kennedy MD  26/44/28

## 2019-09-30 VITALS
HEIGHT: 65 IN | DIASTOLIC BLOOD PRESSURE: 73 MMHG | BODY MASS INDEX: 28.99 KG/M2 | TEMPERATURE: 98.8 F | OXYGEN SATURATION: 98 % | SYSTOLIC BLOOD PRESSURE: 120 MMHG | RESPIRATION RATE: 17 BRPM | WEIGHT: 174 LBS | HEART RATE: 72 BPM

## 2019-09-30 LAB
GLUCOSE SERPL-MCNC: 57 MG/DL (ref 65–140)
GLUCOSE SERPL-MCNC: 80 MG/DL (ref 65–140)

## 2019-09-30 PROCEDURE — 82948 REAGENT STRIP/BLOOD GLUCOSE: CPT

## 2019-09-30 PROCEDURE — 99024 POSTOP FOLLOW-UP VISIT: CPT | Performed by: OBSTETRICS & GYNECOLOGY

## 2019-09-30 RX ORDER — IBUPROFEN 200 MG
600 TABLET ORAL EVERY 6 HOURS PRN
Start: 2019-09-30 | End: 2019-10-17

## 2019-09-30 RX ORDER — ACETAMINOPHEN 325 MG/1
650 TABLET ORAL EVERY 6 HOURS PRN
Qty: 30 TABLET | Refills: 0
Start: 2019-09-30 | End: 2019-10-17

## 2019-09-30 RX ORDER — OXYCODONE HYDROCHLORIDE AND ACETAMINOPHEN 5; 325 MG/1; MG/1
1 TABLET ORAL EVERY 4 HOURS PRN
Qty: 12 TABLET | Refills: 0 | Status: SHIPPED | OUTPATIENT
Start: 2019-09-30 | End: 2019-10-10

## 2019-09-30 RX ADMIN — DOCUSATE SODIUM 100 MG: 100 CAPSULE, LIQUID FILLED ORAL at 08:37

## 2019-09-30 RX ADMIN — OXYCODONE HYDROCHLORIDE AND ACETAMINOPHEN 1 TABLET: 5; 325 TABLET ORAL at 09:19

## 2019-09-30 RX ADMIN — INSULIN LISPRO 4 UNITS: 100 INJECTION, SOLUTION INTRAVENOUS; SUBCUTANEOUS at 08:33

## 2019-09-30 RX ADMIN — IBUPROFEN 600 MG: 600 TABLET, FILM COATED ORAL at 04:32

## 2019-09-30 RX ADMIN — ACETAMINOPHEN 650 MG: 325 TABLET ORAL at 04:16

## 2019-09-30 NOTE — PROGRESS NOTES
Progress Note - OB/GYN   Lesly Higgins 34 y o  female MRN: [id number]  Unit/Bed#: L&D 310-01 Encounter: [id number]    Assessment:  Post partum Day #2 s/p 1LTCS, stable, baby in nursery    Plan:  1) T1DM   -Levemir 10units qhs   -Lispo 4units with B/L/D; SSI for 2hrs after meals   -Some elevated POCT checks, however patient manages her own insulin at home based on her carb intake with each meal, we are unable to do that here  2) Continue routine post partum care   Encourage ambulation   Encourage breastfeeding       Subjective/Objective   Chief Complaint:     Post delivery  Patient is doing well  Lochia WNL  Pain well controlled  Subjective:     Pain: yes, cramping, improved with meds  Tolerating PO: yes  Voiding: yes  Flatus: yes  BM: yes  Ambulating: yes  Breastfeeding:  yes  Chest pain: no  Shortness of breath: no  Leg pain: no  Lochia: minimal    Objective:     Vitals: /80 (BP Location: Right arm)   Pulse 85   Temp 98 3 °F (36 8 °C) (Oral)   Resp 18   Ht 5' 5" (1 651 m)   Wt 78 9 kg (174 lb)   LMP 01/02/2019   SpO2 98%   Breastfeeding? Yes   BMI 28 96 kg/m²     No intake or output data in the 24 hours ending 09/30/19 0612    Lab Results   Component Value Date    WBC 15 17 (H) 09/28/2019    HGB 8 4 (L) 09/28/2019    HCT 25 0 (L) 09/28/2019    MCV 99 (H) 09/28/2019     (L) 09/28/2019       Physical Exam:     Gen: AAOx3, NAD  CV: RRR  Lungs: CTA b/l  Abd: Soft, non-tender, non-distended, no rebound or guarding  Uterine fundus firm and non-tender, 1 cm below the umbilicus     Incision:C/D/I  Ext: Non tender    Bijan Case MD  9/30/2019  6:12 AM

## 2019-09-30 NOTE — PLAN OF CARE
Problem: POSTPARTUM  Goal: Experiences normal postpartum course  Description  INTERVENTIONS:  - Monitor maternal vital signs  - Assess uterine involution and lochia  Outcome: Adequate for Discharge  Goal: Appropriate maternal -  bonding  Description  INTERVENTIONS:  - Identify family support  - Assess for appropriate maternal/infant bonding   -Encourage maternal/infant bonding opportunities  - Referral to  or  as needed  Outcome: Adequate for Discharge  Goal: Establishment of infant feeding pattern  Description  INTERVENTIONS:  - Assess breast/bottle feeding  - Refer to lactation as needed  Outcome: Adequate for Discharge  Goal: Incision(s), wounds(s) or drain site(s) healing without S/S of infection  Description  INTERVENTIONS  - Assess and document risk factors for skin impairment   - Assess and document dressing, incision, wound bed, drain sites and surrounding tissue  - Consider nutrition services referral as needed  - Oral mucous membranes remain intact  - Provide patient/ family education  Outcome: Adequate for Discharge

## 2019-09-30 NOTE — LACTATION NOTE
This note was copied from a baby's chart  CONSULT - LACTATION  Baby Boy Mamadou Herman) Darcy Townsend 2 days male MRN: [id number]    [address] [facility] NURSERY Room / Bed: L&D 310(N)/L&D 310(N) Encounter: [id number]    Maternal Information     MOTHER:  Enrrique Luevano  Maternal Age: 34 y o    OB History: #: 1, Date: None, Sex: None, Weight: None, GA: None, Delivery: None, Apgar1: None, Apgar5: None, Living: None, Birth Comments: None    #: 2, Date: 19, Sex: Male, Weight: 3290 g (7 lb 4 1 oz), GA: 38w3d, Delivery: , Low Transverse, Apgar1: 9, Apgar5: 9, Living: Living, Birth Comments: None   Previouse breast reduction surgery? No    Lactation history:   Has patient previously breast fed: No   How long had patient previously breast fed:     Previous breast feeding complications:       Past Surgical History:   Procedure Laterality Date    INNER EAR SURGERY Left     [place]  DELIVERY ONLY N/A 2019    Procedure:  SECTION (); Surgeon: Jayashree Zamarripa MD;  Location: [facility];  Service: Obstetrics       Birth information:  YOB: 2017   Time of birth: 3:52 AM   Sex: male   Delivery type: , Low Transverse   Birth Weight: 3290 g (7 lb 4 1 oz)   Percent of Weight Change: -9%     Gestational Age: 36w4d   [unfilled]    Assessment     Breast and nipple assessment: mom denies need for assistance at this time    [place] Assessment: mom denies need for assistance at this time    Feeding assessment: feeding well as per mom  LATCH:  Latch: Grasps breast, tongue down, lips flanged, rhythmic sucking   Audible Swallowing: Spontaneous and intermittent (24 hours old)   Type of Nipple: Everted (After stimulation)   Comfort (Breast/Nipple): Soft/non-tender   Hold (Positioning): Full assist, staff holds infant at breast   LATCH Score: 8          Feeding recommendations:  breast feed on demand     Met with mother and father   Provided mother with Ready, Set, Baby booklet  Discussed Skin to Skin contact an benefits to mom and baby  Talked about the delay of the first bath until baby has adjusted  Spoke about the benefits of rooming in  Feeding on cue and what that means for recognizing infant's hunger  Avoidance of pacifiers for the first month discussed  Talked about exclusive breastfeeding for the first 6 months  Positioning and latch reviewed as well as showing images of other feeding positions  Discussed the properties of a good latch in any position  Reviewed hand/manual expression  Discussed s/s that baby is getting enough milk and some s/s that breastfeeding dyad may need further help  Gave information on common concerns, what to expect the first few weeks after delivery, preparing for other caregivers, and how partners can help  Resources for support also provided  Encouraged parents to call for assistance, questions, and concerns about breastfeeding  Extension provided    Benjamin Lemos RN 9/30/2019 8:06 AM

## 2019-10-01 ENCOUNTER — HOSPITAL ENCOUNTER (EMERGENCY)
Facility: HOSPITAL | Age: 29
Discharge: HOME | End: 2019-10-01
Attending: EMERGENCY MEDICINE
Payer: COMMERCIAL

## 2019-10-01 ENCOUNTER — APPOINTMENT (EMERGENCY)
Dept: CT IMAGING | Facility: HOSPITAL | Age: 29
End: 2019-10-01
Payer: COMMERCIAL

## 2019-10-01 ENCOUNTER — TELEPHONE (OUTPATIENT)
Dept: OBGYN CLINIC | Facility: CLINIC | Age: 29
End: 2019-10-01

## 2019-10-01 VITALS
SYSTOLIC BLOOD PRESSURE: 119 MMHG | HEART RATE: 74 BPM | RESPIRATION RATE: 18 BRPM | OXYGEN SATURATION: 99 % | DIASTOLIC BLOOD PRESSURE: 74 MMHG | TEMPERATURE: 98.6 F | BODY MASS INDEX: 27 KG/M2 | WEIGHT: 162.26 LBS

## 2019-10-01 DIAGNOSIS — R06.02 SOB (SHORTNESS OF BREATH): Primary | ICD-10-CM

## 2019-10-01 DIAGNOSIS — R06.00 DYSPNEA: Primary | ICD-10-CM

## 2019-10-01 DIAGNOSIS — I31.3 PERICARDIAL EFFUSION: ICD-10-CM

## 2019-10-01 DIAGNOSIS — J90 PLEURAL EFFUSION: ICD-10-CM

## 2019-10-01 LAB
ALBUMIN SERPL BCP-MCNC: 2.4 G/DL (ref 3.5–5)
ALP SERPL-CCNC: 105 U/L (ref 46–116)
ALT SERPL W P-5'-P-CCNC: 26 U/L (ref 12–78)
ANION GAP SERPL CALCULATED.3IONS-SCNC: 5 MMOL/L (ref 4–13)
AST SERPL W P-5'-P-CCNC: 26 U/L (ref 5–45)
BASOPHILS # BLD AUTO: 0.03 THOUSANDS/ΜL (ref 0–0.1)
BASOPHILS NFR BLD AUTO: 0 % (ref 0–1)
BILIRUB DIRECT SERPL-MCNC: 0.06 MG/DL (ref 0–0.2)
BILIRUB SERPL-MCNC: 0.13 MG/DL (ref 0.2–1)
BUN SERPL-MCNC: 15 MG/DL (ref 5–25)
CALCIUM SERPL-MCNC: 9 MG/DL (ref 8.3–10.1)
CHLORIDE SERPL-SCNC: 106 MMOL/L (ref 100–108)
CO2 SERPL-SCNC: 30 MMOL/L (ref 21–32)
CREAT SERPL-MCNC: 0.72 MG/DL (ref 0.6–1.3)
DEPRECATED D DIMER PPP: 2755 NG/ML (FEU)
EOSINOPHIL # BLD AUTO: 0.19 THOUSAND/ΜL (ref 0–0.61)
EOSINOPHIL NFR BLD AUTO: 3 % (ref 0–6)
ERYTHROCYTE [DISTWIDTH] IN BLOOD BY AUTOMATED COUNT: 12.8 % (ref 11.6–15.1)
GFR SERPL CREATININE-BSD FRML MDRD: 115 ML/MIN/1.73SQ M
GLUCOSE SERPL-MCNC: 78 MG/DL (ref 65–140)
HCT VFR BLD AUTO: 27.5 % (ref 34.8–46.1)
HGB BLD-MCNC: 9.1 G/DL (ref 11.5–15.4)
IMM GRANULOCYTES # BLD AUTO: 0.05 THOUSAND/UL (ref 0–0.2)
IMM GRANULOCYTES NFR BLD AUTO: 1 % (ref 0–2)
LYMPHOCYTES # BLD AUTO: 1.63 THOUSANDS/ΜL (ref 0.6–4.47)
LYMPHOCYTES NFR BLD AUTO: 24 % (ref 14–44)
MAGNESIUM SERPL-MCNC: 1.6 MG/DL (ref 1.6–2.6)
MCH RBC QN AUTO: 32.3 PG (ref 26.8–34.3)
MCHC RBC AUTO-ENTMCNC: 33.1 G/DL (ref 31.4–37.4)
MCV RBC AUTO: 98 FL (ref 82–98)
MONOCYTES # BLD AUTO: 0.45 THOUSAND/ΜL (ref 0.17–1.22)
MONOCYTES NFR BLD AUTO: 7 % (ref 4–12)
NEUTROPHILS # BLD AUTO: 4.6 THOUSANDS/ΜL (ref 1.85–7.62)
NEUTS SEG NFR BLD AUTO: 65 % (ref 43–75)
NRBC BLD AUTO-RTO: 0 /100 WBCS
NT-PROBNP SERPL-MCNC: 535 PG/ML
PLATELET # BLD AUTO: 207 THOUSANDS/UL (ref 149–390)
PMV BLD AUTO: 11.7 FL (ref 8.9–12.7)
POTASSIUM SERPL-SCNC: 3.6 MMOL/L (ref 3.5–5.3)
PROT SERPL-MCNC: 6.2 G/DL (ref 6.4–8.2)
RBC # BLD AUTO: 2.82 MILLION/UL (ref 3.81–5.12)
SODIUM SERPL-SCNC: 141 MMOL/L (ref 136–145)
TROPONIN I SERPL-MCNC: <0.02 NG/ML
TSH SERPL DL<=0.05 MIU/L-ACNC: 2.68 UIU/ML (ref 0.36–3.74)
WBC # BLD AUTO: 6.95 THOUSAND/UL (ref 4.31–10.16)

## 2019-10-01 PROCEDURE — 71275 CT ANGIOGRAPHY CHEST: CPT

## 2019-10-01 PROCEDURE — 99285 EMERGENCY DEPT VISIT HI MDM: CPT | Performed by: EMERGENCY MEDICINE

## 2019-10-01 PROCEDURE — 83735 ASSAY OF MAGNESIUM: CPT | Performed by: EMERGENCY MEDICINE

## 2019-10-01 PROCEDURE — 85025 COMPLETE CBC W/AUTO DIFF WBC: CPT | Performed by: EMERGENCY MEDICINE

## 2019-10-01 PROCEDURE — 36415 COLL VENOUS BLD VENIPUNCTURE: CPT | Performed by: EMERGENCY MEDICINE

## 2019-10-01 PROCEDURE — 99285 EMERGENCY DEPT VISIT HI MDM: CPT

## 2019-10-01 PROCEDURE — 80048 BASIC METABOLIC PNL TOTAL CA: CPT | Performed by: EMERGENCY MEDICINE

## 2019-10-01 PROCEDURE — 80076 HEPATIC FUNCTION PANEL: CPT | Performed by: EMERGENCY MEDICINE

## 2019-10-01 PROCEDURE — 84484 ASSAY OF TROPONIN QUANT: CPT | Performed by: EMERGENCY MEDICINE

## 2019-10-01 PROCEDURE — 85379 FIBRIN DEGRADATION QUANT: CPT | Performed by: EMERGENCY MEDICINE

## 2019-10-01 PROCEDURE — 84443 ASSAY THYROID STIM HORMONE: CPT | Performed by: EMERGENCY MEDICINE

## 2019-10-01 PROCEDURE — 83880 ASSAY OF NATRIURETIC PEPTIDE: CPT | Performed by: EMERGENCY MEDICINE

## 2019-10-01 RX ADMIN — IOHEXOL 85 ML: 350 INJECTION, SOLUTION INTRAVENOUS at 14:21

## 2019-10-01 NOTE — DISCHARGE INSTRUCTIONS
Use the incentive spirometer regularly to help with expansion of your lungs  Return to the ER if your shortness of breath continues to worsen or if you develop chest pain  The number for the cardiologist is included in these discharge instructions, call to be seen in the office for further evaluation and management

## 2019-10-01 NOTE — ED PROVIDER NOTES
History  Chief Complaint   Patient presents with    Shortness of Breath     Pt c/o sob since this morning "when I get up I feel short of breath" Had a  4 days ago, denies any complications during pregnancy     35 YO female, S/P , post-op day #3, presents with shortness of breath that began this morning  Pt states she woke with this sensation that she can't get a complete breath  States this is generally mild but she called her OB and they instructed her to come to the ED  States some mild tightness in the chest without carisa pain  She denies similar in the past  Pt has had some swelling in the B/L legs without discomfort  She has no Hx of DVT or PE  Pt denies CP/F/C/N/V/D/C, no dysuria, burning on urination or blood in urine  History provided by:  Patient   used: No    Shortness of Breath   Severity:  Mild  Onset quality:  Unable to specify  Timing:  Constant  Progression:  Unchanged  Chronicity:  New  Relieved by:  Nothing  Worsened by:  Nothing  Ineffective treatments:  None tried  Associated symptoms: no abdominal pain, no chest pain, no cough, no fever, no rash and no vomiting    Risk factors: recent surgery        Prior to Admission Medications   Prescriptions Last Dose Informant Patient Reported? Taking? Blood Glucose Monitoring Suppl (ACURA BLOOD GLUCOSE METER) w/Device KIT  Self Yes No   Sig: by Does not apply route   FREESTYLE LITE test strip  Self No No   Sig: Test up to 8 times a day and as instructed   Insulin Pen Needle (BD PEN NEEDLE DANYELL U/F) 32G X 4 MM MISC  Self No No   Sig: Use 4 a day and as directed     Lancets MISC  Self Yes No   Sig: by Does not apply route   Prenatal Vit-Fe Fumarate-FA (PRENATAL PO)  Self Yes Yes   Sig: Take by mouth   acetaminophen (TYLENOL) 325 mg tablet   No Yes   Sig: Take 2 tablets (650 mg total) by mouth every 6 (six) hours as needed for headaches   acetone, urine, test strip  Self Yes No   Sig: use as needed for BG >250 or for fever, vomiting, or diarrheal illness   aspirin (ECOTRIN LOW STRENGTH) 81 mg EC tablet  Self Yes Yes   Sig: Take 162 mg by mouth daily    glucagon (GLUCAGON EMERGENCY) 1 MG injection  Self Yes No   Sig: Inject as directed   ibuprofen (MOTRIN) 200 mg tablet   No Yes   Sig: Take 3 tablets (600 mg total) by mouth every 6 (six) hours as needed for mild pain   insulin aspart (NOVOLOG FLEXPEN) 100 Units/mL injection pen  Self No Yes   Sig: Uses 5 units before breakfast, 4 units before lunch and 4 units before dinner  Patient taking differently: Uses 10 to 12 units before meals  insulin detemir (LEVEMIR FLEXTOUCH) 100 Units/mL injection pen  Self No Yes   Sig: Inject SC 12 units at 9 or 10 PM daily  To be titrated  Patient taking differently: Inject SC 18 units at 9 or 10 PM daily  To be titrated  oxyCODONE-acetaminophen (PERCOCET) 5-325 mg per tablet 10/1/2019 at Unknown time  No Yes   Sig: Take 1 tablet by mouth every 4 (four) hours as needed for severe pain for up to 10 daysMax Daily Amount: 6 tablets      Facility-Administered Medications: None       Past Medical History:   Diagnosis Date    Abnormal Pap smear of cervix     DM (diabetes mellitus) ([facility] [address] )     type 1    HPV (human papilloma virus) infection     Varicella     vaccinated as child       Past Surgical History:   Procedure Laterality Date    INNER EAR SURGERY Left     CA  DELIVERY ONLY N/A 2019    Procedure:  SECTION (); Surgeon: Thania Plasencia MD;  Location: [facility];  Service: Obstetrics       Family History   Problem Relation Age of Onset    No Known Problems Mother     No Known Problems Father      I have reviewed and agree with the history as documented  Social History     Tobacco Use    Smoking status: Never Smoker    Smokeless tobacco: Never Used   Substance Use Topics    Alcohol use: Not Currently     Comment: occ      Drug use: No        Review of Systems   Constitutional: Negative for chills, fatigue and fever  HENT: Negative for dental problem  Eyes: Negative for visual disturbance  Respiratory: Positive for shortness of breath  Negative for cough  Cardiovascular: Negative for chest pain  Gastrointestinal: Negative for abdominal pain, diarrhea and vomiting  Genitourinary: Negative for dysuria and frequency  Musculoskeletal: Negative for arthralgias  Skin: Negative for rash  Neurological: Negative for dizziness, weakness and light-headedness  Psychiatric/Behavioral: Negative for agitation, behavioral problems and confusion  All other systems reviewed and are negative  Physical Exam  Physical Exam   Constitutional: She is oriented to person, place, and time  She appears well-developed and well-nourished  HENT:   Head: Normocephalic and atraumatic  Eyes: Pupils are equal, round, and reactive to light  EOM are normal    Neck: Normal range of motion  Cardiovascular: Normal rate, regular rhythm and normal heart sounds  Pulmonary/Chest: Effort normal and breath sounds normal  She has no wheezes  Abdominal: Soft  Musculoskeletal: Normal range of motion  Trace edema in the B/L LE's, non-tender  Neurological: She is alert and oriented to person, place, and time  Skin: Skin is warm and dry  Psychiatric: She has a normal mood and affect  Her behavior is normal  Thought content normal    Nursing note and vitals reviewed        Vital Signs  ED Triage Vitals [10/01/19 1223]   Temperature Pulse Respirations Blood Pressure SpO2   98 6 °F (37 °C) 80 18 132/77 100 %      Temp Source Heart Rate Source Patient Position - Orthostatic VS BP Location FiO2 (%)   Oral Monitor Sitting Right arm --      Pain Score       No Pain           Vitals:    10/01/19 1223 10/01/19 1425 10/01/19 1516   BP: 132/77 141/78 119/74   Pulse: 80 77 74   Patient Position - Orthostatic VS: Sitting Lying Lying         Visual Acuity      ED Medications  Medications   iohexol (OMNIPAQUE) 350 MG/ML injection (MULTI-DOSE) 85 mL (85 mL Intravenous Given 10/1/19 1421)       Diagnostic Studies  Results Reviewed     Procedure Component Value Units Date/Time    B-type natriuretic peptide [[id number]]  (Abnormal) Collected:  10/01/19 1245    Lab Status:  Final result Specimen:  Blood from Arm, Left Updated:  10/01/19 1342     NT-proBNP 535 pg/mL     D-Dimer [[id number]]  (Abnormal) Collected:  10/01/19 1245    Lab Status:  Final result Specimen:  Blood from Arm, Left Updated:  10/01/19 1327     D-Dimer, Quant 2,755 ng/ml (FEU)     Hepatic function panel [[id number]]  (Abnormal) Collected:  10/01/19 1245    Lab Status:  Final result Specimen:  Blood from Arm, Left Updated:  10/01/19 1321     Total Bilirubin 0 13 mg/dL      Bilirubin, Direct 0 06 mg/dL      Alkaline Phosphatase 105 U/L      AST 26 U/L      ALT 26 U/L      Total Protein 6 2 g/dL      Albumin 2 4 g/dL     TSH [[id number]]  (Normal) Collected:  10/01/19 1245    Lab Status:  Final result Specimen:  Blood from Arm, Left Updated:  10/01/19 1321     TSH 3RD GENERATON 2 677 uIU/mL     Narrative:       Patients undergoing fluorescein dye angiography may retain small amounts of fluorescein in the body for 48-72 hours post procedure  Samples containing fluorescein can produce falsely depressed TSH values  If the patient had this procedure,a specimen should be resubmitted post fluorescein clearance        Magnesium [[id number]]  (Normal) Collected:  10/01/19 1245    Lab Status:  Final result Specimen:  Blood from Arm, Left Updated:  10/01/19 1321     Magnesium 1 6 mg/dL     Basic metabolic panel [[id number]] Collected:  10/01/19 1245    Lab Status:  Final result Specimen:  Blood from Arm, Left Updated:  10/01/19 1317     Sodium 141 mmol/L      Potassium 3 6 mmol/L      Chloride 106 mmol/L      CO2 30 mmol/L      ANION GAP 5 mmol/L      BUN 15 mg/dL      Creatinine 0 72 mg/dL      Glucose 78 mg/dL      Calcium 9 0 mg/dL      eGFR 115 ml/min/1 73sq m     Narrative: National Kidney Disease Foundation guidelines for Chronic Kidney Disease (CKD):     Stage 1 with normal or high GFR (GFR > 90 mL/min/1 73 square meters)    Stage 2 Mild CKD (GFR = 60-89 mL/min/1 73 square meters)    Stage 3A Moderate CKD (GFR = 45-59 mL/min/1 73 square meters)    Stage 3B Moderate CKD (GFR = 30-44 mL/min/1 73 square meters)    Stage 4 Severe CKD (GFR = 15-29 mL/min/1 73 square meters)    Stage 5 End Stage CKD (GFR <15 mL/min/1 73 square meters)  Note: GFR calculation is accurate only with a steady state creatinine    Troponin I [[id number]]  (Normal) Collected:  10/01/19 1245    Lab Status:  Final result Specimen:  Blood from Arm, Left Updated:  10/01/19 1308     Troponin I <0 02 ng/mL     CBC and differential [[id number]]  (Abnormal) Collected:  10/01/19 1245    Lab Status:  Final result Specimen:  Blood from Arm, Left Updated:  10/01/19 1252     WBC 6 95 Thousand/uL      RBC 2 82 Million/uL      Hemoglobin 9 1 g/dL      Hematocrit 27 5 %      MCV 98 fL      MCH 32 3 pg      MCHC 33 1 g/dL      RDW 12 8 %      MPV 11 7 fL      Platelets 676 Thousands/uL      nRBC 0 /100 WBCs      Neutrophils Relative 65 %      Immat GRANS % 1 %      Lymphocytes Relative 24 %      Monocytes Relative 7 %      Eosinophils Relative 3 %      Basophils Relative 0 %      Neutrophils Absolute 4 60 Thousands/µL      Immature Grans Absolute 0 05 Thousand/uL      Lymphocytes Absolute 1 63 Thousands/µL      Monocytes Absolute 0 45 Thousand/µL      Eosinophils Absolute 0 19 Thousand/µL      Basophils Absolute 0 03 Thousands/µL                  CTA ED chest PE study   Final Result by Claudean Holes, MD (10/01 1444)      There are small free-flowing bilateral pleural effusions and minimal pericardial effusion        There is no evidence of pulmonary emboli or pulmonary infiltrates                  Workstation performed: [id number]                    Procedures  Procedures       ED Course  ED Course as of Oct 02 1356   Tue Oct 01, 2019   1252 8 4 three days prior   Hemoglobin(!): 9 1   1454 Spoke with Dr Anna Rae, Cardiology  Explained findings of mild pleural effusions and trace pericardial effusion  Dr Anna Rae has looked over the CT, noting this does not show sign of RV strain, no concern for tamponade  Given pt's mild shortness of breath, no chest pain and normal vitals, feels pt is appropriate for discharge with cardiology follow up  Jeri Sorensen Criteria for PE      Most Recent Value   Derek' Criteria for PE   Clinical signs and symptoms of DVT  0 Filed at: 10/01/2019 1254   PE is primary diagnosis or equally likely  3 Filed at: 10/01/2019 1254   HR >100  --   Immobilization at least 3 days or Surgery in the previous 4 weeks  1 5 Filed at: 10/01/2019 1254   Previous, objectively diagnosed PE or DVT  0 Filed at: 10/01/2019 1254   Hemoptysis  0 Filed at: 10/01/2019 1254   Malignancy with treatment within 6 months or palliative  0 Filed at: 10/01/2019 1254   Cameron Criteria Total  4 5 Filed at: 10/01/2019 1254            MDM  Number of Diagnoses or Management Options  Dyspnea: new and requires workup  Pericardial effusion: new and requires workup  Pleural effusion: new and requires workup  Diagnosis management comments: 1  Shortness of breath - Pt with recent pregnancy,  3 days prior  Pt has moderate risk for PE though her oxygen saturation is appropriate  Will check d-dimer to rule out PE, base imaging study on d-dimer result  Will check ECG, troponin, BNP to assess potential but unlikely postpartum cardiomyopathy, CBC for anemia, metabolic panel for electrolyte abnormalities          Amount and/or Complexity of Data Reviewed  Clinical lab tests: ordered and reviewed  Tests in the radiology section of CPT®: ordered and reviewed  Obtain history from someone other than the patient: yes  Review and summarize past medical records: yes  Independent visualization of images, tracings, or specimens: yes    Patient Progress  Patient progress: improved      Disposition  Final diagnoses:   Dyspnea   Pleural effusion   Pericardial effusion     Time reflects when diagnosis was documented in both MDM as applicable and the Disposition within this note     Time User Action Codes Description Comment    10/1/2019  3:05 PM [place] Danny E Add [R06 00] Dyspnea     10/1/2019  3:05 PM [place] Danny E Add [J90] Pleural effusion     10/1/2019  3:07 PM [place] Danny E Add [I31 3] Pericardial effusion       ED Disposition     ED Disposition Condition Date/Time Comment    Discharge Stable Tue Oct 1, 2019  3:06 PM Davidtu Rizzoberg discharge to home/self care              Follow-up Information     Follow up With Specialties Details Why Contact Info    Ry Guadalupe DO Cardiology Schedule an appointment as soon as possible for a visit   [address] [facility] [id number] [place] [address]            Discharge Medication List as of 10/1/2019  3:08 PM      CONTINUE these medications which have NOT CHANGED    Details   acetaminophen (TYLENOL) 325 mg tablet Take 2 tablets (650 mg total) by mouth every 6 (six) hours as needed for headaches, Starting Mon 9/30/2019, No Print      acetone, urine, test strip use as needed for BG >250 or for fever, vomiting, or diarrheal illness, Historical Med      aspirin (ECOTRIN LOW STRENGTH) 81 mg EC tablet Take 162 mg by mouth daily , Historical Med      Blood Glucose Monitoring Suppl (ACURA BLOOD GLUCOSE METER) w/Device KIT by Does not apply route, Historical Med      FREESTYLE LITE test strip Test up to 8 times a day and as instructed, Normal      glucagon (GLUCAGON EMERGENCY) 1 MG injection Inject as directed, Starting Tue 5/20/2014, Historical Med      ibuprofen (MOTRIN) 200 mg tablet Take 3 tablets (600 mg total) by mouth every 6 (six) hours as needed for mild pain, Starting Mon 9/30/2019, No Print      insulin aspart (NOVOLOG FLEXPEN) 100 Units/mL injection pen Uses 5 units before breakfast, 4 units before lunch and 4 units before dinner , Normal      insulin detemir (LEVEMIR FLEXTOUCH) 100 Units/mL injection pen Inject SC 12 units at 9 or 10 PM daily  To be titrated , Normal      Insulin Pen Needle (BD PEN NEEDLE DANYELL U/F) 32G X 4 MM MISC Use 4 a day and as directed , Normal      Lancets MISC by Does not apply route, Historical Med      oxyCODONE-acetaminophen (PERCOCET) 5-325 mg per tablet Take 1 tablet by mouth every 4 (four) hours as needed for severe pain for up to 10 daysMax Daily Amount: 6 tablets, Starting Mon 9/30/2019, Until Thu 10/10/2019, Normal      Prenatal Vit-Fe Fumarate-FA (PRENATAL PO) Take by mouth, Historical Med           No discharge procedures on file      ED Provider  Electronically Signed by           Nolvia Alcala MD  10/02/19 6203

## 2019-10-01 NOTE — TELEPHONE ENCOUNTER
Post partum patient called, c/sec on 9/28/19  Now c/o tightness in her chest, especially with inspiration or with movement, no actual chest pain  States does not feel anxious  States cannot easily get a deep breath    Sent to [facility] ED

## 2019-10-01 NOTE — ED NOTES
Assumed care of patient at this time  Patient resting comfortably in bed with  and  at bedside  No distress noted or complaints  Updated on plan of care  Side rails x1 up, ID band on, and call bell within reach  Will continue to monitor        Fred Park RN  10/01/19 4362

## 2019-10-02 ENCOUNTER — TELEPHONE (OUTPATIENT)
Dept: LABOR AND DELIVERY | Facility: HOSPITAL | Age: 29
End: 2019-10-02

## 2019-10-02 ENCOUNTER — TELEPHONE (OUTPATIENT)
Dept: OBGYN CLINIC | Facility: CLINIC | Age: 29
End: 2019-10-02

## 2019-10-02 NOTE — TELEPHONE ENCOUNTER
Called pt about concerns about your breast    On side greater then other     Redness away   No knots     CT Angiogram is negative     Today feels better

## 2019-10-06 LAB — PLACENTA IN STORAGE: NORMAL

## 2019-10-17 ENCOUNTER — OFFICE VISIT (OUTPATIENT)
Dept: OBGYN CLINIC | Facility: CLINIC | Age: 29
End: 2019-10-17

## 2019-10-17 VITALS
BODY MASS INDEX: 24.22 KG/M2 | WEIGHT: 145.4 LBS | DIASTOLIC BLOOD PRESSURE: 84 MMHG | HEIGHT: 65 IN | SYSTOLIC BLOOD PRESSURE: 102 MMHG

## 2019-10-17 DIAGNOSIS — Z98.890 POSTOPERATIVE STATE: Primary | ICD-10-CM

## 2019-10-17 PROCEDURE — 99024 POSTOP FOLLOW-UP VISIT: CPT | Performed by: OBSTETRICS & GYNECOLOGY

## 2019-10-17 NOTE — PROGRESS NOTES
Post op C section check    Pt is doing well  No complaints'    Finger stick have been in the 70-90 fasting  She is still on the 4 units as left hospital with     exclusively breast feeding and baby is doing well     Wound is clean and dry     Discussed starting to exercise and walk no ab excersize till about 6-8 weeks  Spoke about the [facility] center so that she can get socialization     Follow up in 8 week

## 2019-11-08 ENCOUNTER — TELEPHONE (OUTPATIENT)
Dept: OBGYN CLINIC | Facility: CLINIC | Age: 29
End: 2019-11-08

## 2019-11-08 NOTE — TELEPHONE ENCOUNTER
Patient called, breastfeeding x 6 weeks  Now c/o burning and redness of both breasts x 2 days  No fever  Per Dr Jessica De La Rosa advised patient warm compresses, continue to nurse and pump, lanolin on nipples to prevent soreness  Advised can also call [facility]

## 2019-11-18 ENCOUNTER — POSTPARTUM VISIT (OUTPATIENT)
Dept: OBGYN CLINIC | Facility: CLINIC | Age: 29
End: 2019-11-18
Payer: COMMERCIAL

## 2019-11-18 VITALS — HEIGHT: 65 IN | WEIGHT: 151 LBS | BODY MASS INDEX: 25.16 KG/M2

## 2019-11-18 LAB — SL AMB POCT HGB: 11.3

## 2019-11-18 PROCEDURE — 36415 COLL VENOUS BLD VENIPUNCTURE: CPT | Performed by: OBSTETRICS & GYNECOLOGY

## 2019-11-18 PROCEDURE — 99213 OFFICE O/P EST LOW 20 MIN: CPT | Performed by: OBSTETRICS & GYNECOLOGY

## 2019-11-18 PROCEDURE — 85018 HEMOGLOBIN: CPT | Performed by: OBSTETRICS & GYNECOLOGY

## 2019-11-18 NOTE — PROGRESS NOTES
Post partum Visit      1   C section: 9/30/2019 Cat 2 tracing remote from delivery     2  Breast feeding - exclusive pumping for storage also     Breast pain or mastitis - a bit of pain no mastitis    [facility] for lactation     3  Post partum depression screening    Risk - 3   Support at home [facility] center   Feeling much better    4  Sex - yes condoms does not want anything now    Discussed ovulation and happening before the first cycle     especially if decrease breast feeding     5  Contraception / future fertility - will use condones for now    6    Return to work - Monday

## 2020-01-20 ENCOUNTER — APPOINTMENT (OUTPATIENT)
Dept: LAB | Facility: HOSPITAL | Age: 30
End: 2020-01-20
Payer: COMMERCIAL

## 2020-01-20 ENCOUNTER — TRANSCRIBE ORDERS (OUTPATIENT)
Dept: ADMINISTRATIVE | Facility: HOSPITAL | Age: 30
End: 2020-01-20

## 2020-01-20 DIAGNOSIS — O24.019 TYPE 1 DIABETES MELLITUS COMPLICATING PREGNANCY, ANTEPARTUM: Primary | ICD-10-CM

## 2020-01-20 DIAGNOSIS — O24.019 TYPE 1 DIABETES MELLITUS COMPLICATING PREGNANCY, ANTEPARTUM: ICD-10-CM

## 2020-01-20 DIAGNOSIS — R79.89 ABNORMAL THYROID SCREEN (BLOOD): ICD-10-CM

## 2020-01-20 LAB
EST. AVERAGE GLUCOSE BLD GHB EST-MCNC: 134 MG/DL
HBA1C MFR BLD: 6.3 % (ref 4.2–6.3)
TSH SERPL DL<=0.05 MIU/L-ACNC: 1.46 UIU/ML (ref 0.36–3.74)

## 2020-01-20 PROCEDURE — 84443 ASSAY THYROID STIM HORMONE: CPT

## 2020-01-20 PROCEDURE — 36415 COLL VENOUS BLD VENIPUNCTURE: CPT

## 2020-01-20 PROCEDURE — 83036 HEMOGLOBIN GLYCOSYLATED A1C: CPT

## 2020-04-01 ENCOUNTER — HOSPITAL ENCOUNTER (EMERGENCY)
Facility: HOSPITAL | Age: 30
Discharge: HOME | End: 2020-04-01
Attending: EMERGENCY MEDICINE | Admitting: EMERGENCY MEDICINE
Payer: COMMERCIAL

## 2020-04-01 VITALS
RESPIRATION RATE: 16 BRPM | HEART RATE: 82 BPM | WEIGHT: 176.59 LBS | SYSTOLIC BLOOD PRESSURE: 100 MMHG | OXYGEN SATURATION: 97 % | HEIGHT: 65 IN | DIASTOLIC BLOOD PRESSURE: 50 MMHG | TEMPERATURE: 98.7 F | BODY MASS INDEX: 29.42 KG/M2

## 2020-04-01 DIAGNOSIS — R11.2 NAUSEA AND VOMITING: ICD-10-CM

## 2020-04-01 DIAGNOSIS — E10.649 HYPOGLYCEMIA DUE TO TYPE 1 DIABETES MELLITUS (HCC): Primary | ICD-10-CM

## 2020-04-01 LAB
ALBUMIN SERPL BCP-MCNC: 3.7 G/DL (ref 3.5–5)
ALP SERPL-CCNC: 97 U/L (ref 46–116)
ALT SERPL W P-5'-P-CCNC: 28 U/L (ref 12–78)
ANION GAP SERPL CALCULATED.3IONS-SCNC: 9 MMOL/L (ref 4–13)
AST SERPL W P-5'-P-CCNC: 29 U/L (ref 5–45)
BASOPHILS # BLD AUTO: 0.04 THOUSANDS/ΜL (ref 0–0.1)
BASOPHILS NFR BLD AUTO: 1 % (ref 0–1)
BILIRUB SERPL-MCNC: 0.34 MG/DL (ref 0.2–1)
BILIRUB UR QL STRIP: NEGATIVE
BUN SERPL-MCNC: 18 MG/DL (ref 5–25)
CALCIUM SERPL-MCNC: 8.8 MG/DL (ref 8.3–10.1)
CHLORIDE SERPL-SCNC: 103 MMOL/L (ref 100–108)
CLARITY UR: CLEAR
CO2 SERPL-SCNC: 25 MMOL/L (ref 21–32)
COLOR UR: YELLOW
COLOR, POC: YELLOW
CREAT SERPL-MCNC: 0.83 MG/DL (ref 0.6–1.3)
EOSINOPHIL # BLD AUTO: 0.16 THOUSAND/ΜL (ref 0–0.61)
EOSINOPHIL NFR BLD AUTO: 3 % (ref 0–6)
ERYTHROCYTE [DISTWIDTH] IN BLOOD BY AUTOMATED COUNT: 12 % (ref 11.6–15.1)
GFR SERPL CREATININE-BSD FRML MDRD: 97 ML/MIN/1.73SQ M
GLUCOSE SERPL-MCNC: 129 MG/DL (ref 65–140)
GLUCOSE SERPL-MCNC: 152 MG/DL (ref 65–140)
GLUCOSE SERPL-MCNC: 158 MG/DL (ref 65–140)
GLUCOSE UR STRIP-MCNC: NEGATIVE MG/DL
HCT VFR BLD AUTO: 38.6 % (ref 34.8–46.1)
HGB BLD-MCNC: 12.9 G/DL (ref 11.5–15.4)
HGB UR QL STRIP.AUTO: NEGATIVE
IMM GRANULOCYTES # BLD AUTO: 0.01 THOUSAND/UL (ref 0–0.2)
IMM GRANULOCYTES NFR BLD AUTO: 0 % (ref 0–2)
KETONES UR STRIP-MCNC: NEGATIVE MG/DL
LEUKOCYTE ESTERASE UR QL STRIP: NEGATIVE
LYMPHOCYTES # BLD AUTO: 2.13 THOUSANDS/ΜL (ref 0.6–4.47)
LYMPHOCYTES NFR BLD AUTO: 33 % (ref 14–44)
MCH RBC QN AUTO: 31.5 PG (ref 26.8–34.3)
MCHC RBC AUTO-ENTMCNC: 33.4 G/DL (ref 31.4–37.4)
MCV RBC AUTO: 94 FL (ref 82–98)
MONOCYTES # BLD AUTO: 0.65 THOUSAND/ΜL (ref 0.17–1.22)
MONOCYTES NFR BLD AUTO: 10 % (ref 4–12)
NEUTROPHILS # BLD AUTO: 3.49 THOUSANDS/ΜL (ref 1.85–7.62)
NEUTS SEG NFR BLD AUTO: 53 % (ref 43–75)
NITRITE UR QL STRIP: NEGATIVE
NRBC BLD AUTO-RTO: 0 /100 WBCS
PH UR STRIP.AUTO: 6 [PH] (ref 4.5–8)
PLATELET # BLD AUTO: 210 THOUSANDS/UL (ref 149–390)
PMV BLD AUTO: 12.6 FL (ref 8.9–12.7)
POTASSIUM SERPL-SCNC: 4.3 MMOL/L (ref 3.5–5.3)
PROT SERPL-MCNC: 7.2 G/DL (ref 6.4–8.2)
PROT UR STRIP-MCNC: NEGATIVE MG/DL
RBC # BLD AUTO: 4.09 MILLION/UL (ref 3.81–5.12)
SODIUM SERPL-SCNC: 137 MMOL/L (ref 136–145)
SP GR UR STRIP.AUTO: 1.01 (ref 1–1.03)
UROBILINOGEN UR QL STRIP.AUTO: 0.2 E.U./DL
WBC # BLD AUTO: 6.48 THOUSAND/UL (ref 4.31–10.16)

## 2020-04-01 PROCEDURE — 80053 COMPREHEN METABOLIC PANEL: CPT | Performed by: EMERGENCY MEDICINE

## 2020-04-01 PROCEDURE — 82948 REAGENT STRIP/BLOOD GLUCOSE: CPT

## 2020-04-01 PROCEDURE — 81003 URINALYSIS AUTO W/O SCOPE: CPT

## 2020-04-01 PROCEDURE — 85025 COMPLETE CBC W/AUTO DIFF WBC: CPT | Performed by: EMERGENCY MEDICINE

## 2020-04-01 PROCEDURE — 99285 EMERGENCY DEPT VISIT HI MDM: CPT

## 2020-04-01 PROCEDURE — 99284 EMERGENCY DEPT VISIT MOD MDM: CPT | Performed by: EMERGENCY MEDICINE

## 2020-04-01 PROCEDURE — 36415 COLL VENOUS BLD VENIPUNCTURE: CPT | Performed by: EMERGENCY MEDICINE

## 2020-04-01 PROCEDURE — 96374 THER/PROPH/DIAG INJ IV PUSH: CPT

## 2020-04-01 RX ORDER — ONDANSETRON 2 MG/ML
4 INJECTION INTRAMUSCULAR; INTRAVENOUS ONCE
Status: COMPLETED | OUTPATIENT
Start: 2020-04-01 | End: 2020-04-01

## 2020-04-01 RX ADMIN — ONDANSETRON 4 MG: 2 INJECTION INTRAMUSCULAR; INTRAVENOUS at 22:01

## 2020-07-02 ENCOUNTER — APPOINTMENT (OUTPATIENT)
Dept: LAB | Facility: HOSPITAL | Age: 30
End: 2020-07-02
Payer: COMMERCIAL

## 2020-07-02 ENCOUNTER — TRANSCRIBE ORDERS (OUTPATIENT)
Dept: ADMINISTRATIVE | Facility: HOSPITAL | Age: 30
End: 2020-07-02

## 2020-07-02 DIAGNOSIS — I95.9 HYPOTENSION, UNSPECIFIED HYPOTENSION TYPE: Primary | ICD-10-CM

## 2020-07-02 DIAGNOSIS — E10.8 CONTROLLED DIABETES MELLITUS TYPE 1 WITH COMPLICATIONS (HCC): ICD-10-CM

## 2020-07-02 DIAGNOSIS — I95.9 HYPOTENSION, UNSPECIFIED HYPOTENSION TYPE: ICD-10-CM

## 2020-07-02 LAB
ALBUMIN SERPL BCP-MCNC: 3.7 G/DL (ref 3.5–5)
ALP SERPL-CCNC: 90 U/L (ref 46–116)
ALT SERPL W P-5'-P-CCNC: 18 U/L (ref 12–78)
ANION GAP SERPL CALCULATED.3IONS-SCNC: 8 MMOL/L (ref 4–13)
AST SERPL W P-5'-P-CCNC: 9 U/L (ref 5–45)
BILIRUB SERPL-MCNC: 0.29 MG/DL (ref 0.2–1)
BUN SERPL-MCNC: 15 MG/DL (ref 5–25)
CALCIUM SERPL-MCNC: 8.7 MG/DL (ref 8.3–10.1)
CHLORIDE SERPL-SCNC: 104 MMOL/L (ref 100–108)
CO2 SERPL-SCNC: 26 MMOL/L (ref 21–32)
CORTIS SERPL-MCNC: 17.1 UG/DL
CREAT SERPL-MCNC: 0.68 MG/DL (ref 0.6–1.3)
CREAT UR-MCNC: 80.8 MG/DL
ERYTHROCYTE [DISTWIDTH] IN BLOOD BY AUTOMATED COUNT: 12.3 % (ref 11.6–15.1)
EST. AVERAGE GLUCOSE BLD GHB EST-MCNC: 140 MG/DL
GFR SERPL CREATININE-BSD FRML MDRD: 120 ML/MIN/1.73SQ M
GLUCOSE P FAST SERPL-MCNC: 140 MG/DL (ref 65–99)
HBA1C MFR BLD: 6.5 %
HCT VFR BLD AUTO: 40.2 % (ref 34.8–46.1)
HGB BLD-MCNC: 13 G/DL (ref 11.5–15.4)
MCH RBC QN AUTO: 31.6 PG (ref 26.8–34.3)
MCHC RBC AUTO-ENTMCNC: 32.3 G/DL (ref 31.4–37.4)
MCV RBC AUTO: 98 FL (ref 82–98)
MICROALBUMIN UR-MCNC: 11.4 MG/L (ref 0–20)
MICROALBUMIN/CREAT 24H UR: 14 MG/G CREATININE (ref 0–30)
PLATELET # BLD AUTO: 165 THOUSANDS/UL (ref 149–390)
PMV BLD AUTO: 12.5 FL (ref 8.9–12.7)
POTASSIUM SERPL-SCNC: 4 MMOL/L (ref 3.5–5.3)
PROT SERPL-MCNC: 6.8 G/DL (ref 6.4–8.2)
RBC # BLD AUTO: 4.12 MILLION/UL (ref 3.81–5.12)
SODIUM SERPL-SCNC: 138 MMOL/L (ref 136–145)
TSH SERPL DL<=0.05 MIU/L-ACNC: 1.07 UIU/ML (ref 0.36–3.74)
WBC # BLD AUTO: 5.56 THOUSAND/UL (ref 4.31–10.16)

## 2020-07-02 PROCEDURE — 3061F NEG MICROALBUMINURIA REV: CPT | Performed by: FAMILY MEDICINE

## 2020-07-02 PROCEDURE — 82533 TOTAL CORTISOL: CPT

## 2020-07-02 PROCEDURE — 3044F HG A1C LEVEL LT 7.0%: CPT | Performed by: FAMILY MEDICINE

## 2020-07-02 PROCEDURE — 36415 COLL VENOUS BLD VENIPUNCTURE: CPT

## 2020-07-02 PROCEDURE — 82570 ASSAY OF URINE CREATININE: CPT | Performed by: INTERNAL MEDICINE

## 2020-07-02 PROCEDURE — 84443 ASSAY THYROID STIM HORMONE: CPT

## 2020-07-02 PROCEDURE — 85027 COMPLETE CBC AUTOMATED: CPT

## 2020-07-02 PROCEDURE — 80053 COMPREHEN METABOLIC PANEL: CPT

## 2020-07-02 PROCEDURE — 83036 HEMOGLOBIN GLYCOSYLATED A1C: CPT

## 2020-07-02 PROCEDURE — 82043 UR ALBUMIN QUANTITATIVE: CPT | Performed by: INTERNAL MEDICINE

## 2020-07-20 ENCOUNTER — APPOINTMENT (OUTPATIENT)
Dept: RADIOLOGY | Age: 30
End: 2020-07-20
Payer: COMMERCIAL

## 2020-07-20 ENCOUNTER — OFFICE VISIT (OUTPATIENT)
Dept: URGENT CARE | Age: 30
End: 2020-07-20
Payer: COMMERCIAL

## 2020-07-20 VITALS
WEIGHT: 141.6 LBS | BODY MASS INDEX: 23.59 KG/M2 | SYSTOLIC BLOOD PRESSURE: 128 MMHG | HEIGHT: 65 IN | RESPIRATION RATE: 20 BRPM | HEART RATE: 70 BPM | OXYGEN SATURATION: 97 % | DIASTOLIC BLOOD PRESSURE: 85 MMHG | TEMPERATURE: 98.4 F

## 2020-07-20 DIAGNOSIS — M25.562 ACUTE PAIN OF LEFT KNEE: ICD-10-CM

## 2020-07-20 DIAGNOSIS — M79.605 LEFT LEG PAIN: Primary | ICD-10-CM

## 2020-07-20 PROCEDURE — 99213 OFFICE O/P EST LOW 20 MIN: CPT | Performed by: PHYSICIAN ASSISTANT

## 2020-07-20 PROCEDURE — 73562 X-RAY EXAM OF KNEE 3: CPT

## 2020-07-20 NOTE — PATIENT INSTRUCTIONS
Continue to monitor symptoms  If new or worsening symptoms develop, go immediately to Er  Drink plenty of fluids  Follow up with Family Doctor this week  Musculoskeletal Pain   WHAT YOU NEED TO KNOW:   Muscle pain can be dull, achy, or sharp  You may have pain and tenderness to the touch as well  It can occur anywhere on your body and is often brought on by exercise  Muscle pain may occur from an injury, such as a sprain, tendonitis, or bone fracture  Muscle pain can also be the result of medical conditions, such as polymyositis, fibromyalgia, and connective tissue disorders  DISCHARGE INSTRUCTIONS:   Self care:   · Rest  as directed and avoid activity that causes pain  You may be able to return to normal activity when you can move without pain  Follow directions for rest and activity  You are at risk for injury for 3 weeks after your symptoms go away  · Ice  your painful muscle area to decrease pain and swelling  Use an ice pack, or put ice in a plastic bag and cover it with a towel  Always  put a cloth between the ice and your skin  Apply the ice as often as directed for the first 24 to 48 hours  · Compression  with a splint, brace, or elastic bandage helps decrease pain and swelling  This may be needed for muscle pain in arms or legs  A splint, brace, or bandage will also help protect the painful area when you move around  · Elevate  a painful arm or leg to reduce swelling and pain  Elevate your limb while you are sitting or lying down  Prop a painful leg on pillows to keep it above the level of your heart  Medicines:   · NSAIDs  help decrease swelling and pain or fever  This medicine is available with or without a doctor's order  NSAIDs can cause stomach bleeding or kidney problems in certain people  If you take blood thinner medicine, always ask your healthcare provider if NSAIDs are safe for you  Always read the medicine label and follow directions      · Acetaminophen  is used to decrease pain  It is available without a doctor's order  Ask your healthcare provider how much to take and when to take it  Follow directions  Acetaminophen can cause liver damage if not taken correctly  Do not take more than one medicine that contains acetaminophen unless directed  · Muscle relaxers  help relax your muscles to decrease pain and muscle spasms  · Steroids  may be given to decrease redness, pain, and swelling  · Take your medicine as directed  Contact your healthcare provider if you think your medicine is not helping or if you have side effects  Tell him if you are allergic to any medicine  Keep a list of the medicines, vitamins, and herbs you take  Include the amounts, and when and why you take them  Bring the list or the pill bottles to follow-up visits  Carry your medicine list with you in case of an emergency  Follow up with your healthcare provider as directed: You may need more tests to help healthcare providers find the cause of your muscle pain  You may need physical therapy to learn muscle strengthening exercises  Write down your questions so you remember to ask them during your visits  Contact your healthcare provider if:   · You have a fever  · You have trouble sleeping because of your pain  · Your painful area becomes more tender, red, and warm to the touch  · You have decreased movement of the painful area  · You have questions or concerns about your condition or care  Return to the emergency department if:   · You have increased severe pain when you move the painful muscle area  · You lose feeling in your painful muscle area  · You have new or worse swelling in the painful area  Your skin may feel tight  · You have increased muscle pain or pain that does not improve with treatment  © 2017 [employer] Information is for End User's use only and may not be sold, redistributed or otherwise used for commercial purposes   All illustrations and images included in CareNotes® are the copyrighted property of A [employer]  or Surinder Sanders  The above information is an  only  It is not intended as medical advice for individual conditions or treatments  Talk to your doctor, nurse or pharmacist before following any medical regimen to see if it is safe and effective for you

## 2020-07-20 NOTE — PROGRESS NOTES
[address]        NAME: Kurt Tellez is a 34 y o  female  : 1990    MRN: [id number]  DATE: 2020  TIME: 8:57 AM    Assessment and Plan   Left leg pain [M79 605]  1  Left leg pain  XR knee 3 vw left non injury     Reviewed CMP from last week, appears normal   Patient has no signs of DVT  X-ray provider read, no acute findings  Physical exam largely unremarkable  Patient educated to follow up with her family doctor outpatient for ultrasound of leg, patient educated on indications to go to ER and signs and symptoms of worsening condition  Patient states she understands and agrees to this plan  At this time, complaint appears to be musculoskeletal in nature  Patient Instructions       Continue to monitor symptoms  If new or worsening symptoms develop, go immediately to Er  Drink plenty of fluids  Follow up with Family Doctor this week  Chief Complaint     Chief Complaint   Patient presents with    Knee Pain     Pt reports one week hx of worsening left posterior knee pain which radiates up and down leg  Applied ice, heat, taking Tylenol and Motrin  Describes pain as tingling and spasms  Denies fall/injury or extreme physical exertion  History of Present Illness       Leg Pain    Incident onset: Waxing and waning pain in L leg for the past week  Atraumatic  The incident occurred at home  There was no injury mechanism  Pain location: Behind L knee and into L hamstring  The quality of the pain is described as aching  The pain is mild  Pertinent negatives include no inability to bear weight, loss of sensation, muscle weakness, numbness or tingling  Exacerbated by: Pain is at its worst at the end of the day when she is trying to relax  Treatments tried: Ice, ibuprofen  The treatment provided moderate relief  No leg swelling  No numbness or tingling  No weakness  Normal gait  No calf pain  No PMH DVT  Not using OCP    No recent hospitalizations or surgeries  Review of Systems   Review of Systems   Constitutional: Negative  Negative for chills, fatigue and fever  HENT: Negative  Eyes: Negative  Respiratory: Negative  Negative for chest tightness, shortness of breath and wheezing  Cardiovascular: Negative  Negative for chest pain and palpitations  Gastrointestinal: Negative for abdominal pain, constipation, diarrhea, nausea and vomiting  Endocrine: Negative  Genitourinary: Negative for dysuria, flank pain, frequency, pelvic pain, vaginal discharge and vaginal pain  Musculoskeletal: Negative for back pain, gait problem, neck pain and neck stiffness  Skin: Negative  Negative for pallor and rash  Allergic/Immunologic: Negative  Neurological: Negative  Negative for tingling, weakness and numbness  Hematological: Negative  Psychiatric/Behavioral: Negative  Current Medications       Current Outpatient Medications:     acetone, urine, test strip, use as needed for BG >250 or for fever, vomiting, or diarrheal illness, Disp: , Rfl:     Blood Glucose Monitoring Suppl (ACURA BLOOD GLUCOSE METER) w/Device KIT, by Does not apply route, Disp: , Rfl:     FREESTYLE LITE test strip, Test up to 8 times a day and as instructed, Disp: 200 each, Rfl: 3    glucagon (GLUCAGON EMERGENCY) 1 MG injection, Inject as directed, Disp: , Rfl:     insulin aspart (NOVOLOG FLEXPEN) 100 Units/mL injection pen, Uses 5 units before breakfast, 4 units before lunch and 4 units before dinner  (Patient taking differently: Uses 10 to 12 units before meals ), Disp: 15 mL, Rfl: 0    insulin detemir (LEVEMIR FLEXTOUCH) 100 Units/mL injection pen, Inject SC 12 units at 9 or 10 PM daily  To be titrated  (Patient taking differently: Inject SC 18 units at 9 or 10 PM daily   To be titrated ), Disp: 15 mL, Rfl: 0    Insulin Pen Needle (BD PEN NEEDLE DANYELL U/F) 32G X 4 MM MISC, Use 4 a day and as directed , Disp: 100 each, Rfl: 3    Lancets MISC, by Does not apply route, Disp: , Rfl:     Prenatal Vit-Fe Fumarate-FA (PRENATAL PO), Take by mouth, Disp: , Rfl:     Current Allergies     Allergies as of 2020    (No Known Allergies)            The following portions of the patient's history were reviewed and updated as appropriate: allergies, current medications, past family history, past medical history, past social history, past surgical history and problem list      Past Medical History:   Diagnosis Date    Abnormal Pap smear of cervix     DM (diabetes mellitus) ([facility] [address] )     type 1    HPV (human papilloma virus) infection     Varicella     vaccinated as child       Past Surgical History:   Procedure Laterality Date    INNER EAR SURGERY Left     [place]  DELIVERY ONLY N/A 2019    Procedure:  SECTION (); Surgeon: Brennan Monique MD;  [facility];  Service: Obstetrics       Family History   Problem Relation Age of Onset    No Known Problems Mother     No Known Problems Father          Medications have been verified  Objective   /85   Pulse 70   Temp 98 4 °F (36 9 °C)   Resp 20   Ht 5' 5" (1 651 m)   Wt 64 2 kg (141 lb 9 6 oz)   SpO2 97%   BMI 23 56 kg/m²        Physical Exam     Physical Exam   Constitutional: She appears well-developed and well-nourished  No distress  HENT:   Head: Normocephalic and atraumatic  Cardiovascular: Normal rate, regular rhythm, normal heart sounds and intact distal pulses  Pulmonary/Chest: Effort normal and breath sounds normal  No respiratory distress  She has no wheezes  She has no rales  Musculoskeletal:        Left knee: She exhibits normal range of motion, no swelling, no effusion, no ecchymosis, no erythema, normal alignment, no LCL laxity, normal patellar mobility, normal meniscus and no MCL laxity  No tenderness found  Left upper leg: She exhibits no tenderness, no bony tenderness, no swelling and no deformity          Left lower leg: She exhibits no tenderness (-)Xavier sign, no swelling, no edema and no deformity  Left foot: There is normal range of motion, no tenderness, no swelling and no deformity  Skin: Skin is warm  Capillary refill takes less than 2 seconds  No rash noted  She is not diaphoretic  No pallor  Nursing note and vitals reviewed

## 2020-07-26 ENCOUNTER — HOSPITAL ENCOUNTER (EMERGENCY)
Facility: HOSPITAL | Age: 30
Discharge: HOME | End: 2020-07-26
Attending: EMERGENCY MEDICINE | Admitting: EMERGENCY MEDICINE
Payer: COMMERCIAL

## 2020-07-26 VITALS
HEART RATE: 93 BPM | DIASTOLIC BLOOD PRESSURE: 67 MMHG | RESPIRATION RATE: 15 BRPM | SYSTOLIC BLOOD PRESSURE: 121 MMHG | OXYGEN SATURATION: 98 % | TEMPERATURE: 97.9 F

## 2020-07-26 DIAGNOSIS — E10.649 HYPOGLYCEMIA DUE TO TYPE 1 DIABETES MELLITUS (HCC): Primary | ICD-10-CM

## 2020-07-26 LAB
ALBUMIN SERPL BCP-MCNC: 3.8 G/DL (ref 3.5–5)
ALP SERPL-CCNC: 92 U/L (ref 46–116)
ALT SERPL W P-5'-P-CCNC: 27 U/L (ref 12–78)
ANION GAP SERPL CALCULATED.3IONS-SCNC: 8 MMOL/L (ref 4–13)
AST SERPL W P-5'-P-CCNC: 16 U/L (ref 5–45)
BACTERIA UR QL AUTO: ABNORMAL /HPF
BASOPHILS # BLD AUTO: 0.06 THOUSANDS/ΜL (ref 0–0.1)
BASOPHILS NFR BLD AUTO: 1 % (ref 0–1)
BILIRUB SERPL-MCNC: 0.21 MG/DL (ref 0.2–1)
BILIRUB UR QL STRIP: NEGATIVE
BUN SERPL-MCNC: 12 MG/DL (ref 5–25)
CALCIUM SERPL-MCNC: 8 MG/DL (ref 8.3–10.1)
CHLORIDE SERPL-SCNC: 102 MMOL/L (ref 100–108)
CLARITY UR: CLEAR
CO2 SERPL-SCNC: 27 MMOL/L (ref 21–32)
COLOR UR: YELLOW
CREAT SERPL-MCNC: 0.88 MG/DL (ref 0.6–1.3)
EOSINOPHIL # BLD AUTO: 0.07 THOUSAND/ΜL (ref 0–0.61)
EOSINOPHIL NFR BLD AUTO: 1 % (ref 0–6)
ERYTHROCYTE [DISTWIDTH] IN BLOOD BY AUTOMATED COUNT: 11.9 % (ref 11.6–15.1)
GFR SERPL CREATININE-BSD FRML MDRD: 90 ML/MIN/1.73SQ M
GLUCOSE SERPL-MCNC: 210 MG/DL (ref 65–140)
GLUCOSE SERPL-MCNC: 236 MG/DL (ref 65–140)
GLUCOSE SERPL-MCNC: 239 MG/DL (ref 65–140)
GLUCOSE UR STRIP-MCNC: ABNORMAL MG/DL
HCT VFR BLD AUTO: 38.3 % (ref 34.8–46.1)
HGB BLD-MCNC: 13 G/DL (ref 11.5–15.4)
HGB UR QL STRIP.AUTO: NEGATIVE
IMM GRANULOCYTES # BLD AUTO: 0.02 THOUSAND/UL (ref 0–0.2)
IMM GRANULOCYTES NFR BLD AUTO: 0 % (ref 0–2)
KETONES UR STRIP-MCNC: NEGATIVE MG/DL
LEUKOCYTE ESTERASE UR QL STRIP: ABNORMAL
LYMPHOCYTES # BLD AUTO: 1.41 THOUSANDS/ΜL (ref 0.6–4.47)
LYMPHOCYTES NFR BLD AUTO: 18 % (ref 14–44)
MCH RBC QN AUTO: 32.1 PG (ref 26.8–34.3)
MCHC RBC AUTO-ENTMCNC: 33.9 G/DL (ref 31.4–37.4)
MCV RBC AUTO: 95 FL (ref 82–98)
MONOCYTES # BLD AUTO: 0.62 THOUSAND/ΜL (ref 0.17–1.22)
MONOCYTES NFR BLD AUTO: 8 % (ref 4–12)
NEUTROPHILS # BLD AUTO: 5.69 THOUSANDS/ΜL (ref 1.85–7.62)
NEUTS SEG NFR BLD AUTO: 72 % (ref 43–75)
NITRITE UR QL STRIP: NEGATIVE
NON-SQ EPI CELLS URNS QL MICRO: ABNORMAL /HPF
NRBC BLD AUTO-RTO: 0 /100 WBCS
PH UR STRIP.AUTO: 7 [PH] (ref 4.5–8)
PLATELET # BLD AUTO: 169 THOUSANDS/UL (ref 149–390)
PMV BLD AUTO: 12.5 FL (ref 8.9–12.7)
POTASSIUM SERPL-SCNC: 3.8 MMOL/L (ref 3.5–5.3)
PROT SERPL-MCNC: 6.9 G/DL (ref 6.4–8.2)
PROT UR STRIP-MCNC: NEGATIVE MG/DL
RBC # BLD AUTO: 4.05 MILLION/UL (ref 3.81–5.12)
RBC #/AREA URNS AUTO: ABNORMAL /HPF
SODIUM SERPL-SCNC: 137 MMOL/L (ref 136–145)
SP GR UR STRIP.AUTO: 1.01 (ref 1–1.03)
UROBILINOGEN UR QL STRIP.AUTO: 0.2 E.U./DL
WBC # BLD AUTO: 7.87 THOUSAND/UL (ref 4.31–10.16)
WBC #/AREA URNS AUTO: ABNORMAL /HPF

## 2020-07-26 PROCEDURE — 82948 REAGENT STRIP/BLOOD GLUCOSE: CPT

## 2020-07-26 PROCEDURE — 80053 COMPREHEN METABOLIC PANEL: CPT | Performed by: PHYSICIAN ASSISTANT

## 2020-07-26 PROCEDURE — 85025 COMPLETE CBC W/AUTO DIFF WBC: CPT | Performed by: PHYSICIAN ASSISTANT

## 2020-07-26 PROCEDURE — 81001 URINALYSIS AUTO W/SCOPE: CPT

## 2020-07-26 PROCEDURE — 99285 EMERGENCY DEPT VISIT HI MDM: CPT

## 2020-07-26 PROCEDURE — 99284 EMERGENCY DEPT VISIT MOD MDM: CPT | Performed by: EMERGENCY MEDICINE

## 2020-07-26 PROCEDURE — 36415 COLL VENOUS BLD VENIPUNCTURE: CPT | Performed by: PHYSICIAN ASSISTANT

## 2020-07-26 PROCEDURE — 96374 THER/PROPH/DIAG INJ IV PUSH: CPT

## 2020-07-26 RX ORDER — ONDANSETRON 2 MG/ML
4 INJECTION INTRAMUSCULAR; INTRAVENOUS ONCE
Status: COMPLETED | OUTPATIENT
Start: 2020-07-26 | End: 2020-07-26

## 2020-07-26 RX ADMIN — ONDANSETRON 4 MG: 2 INJECTION INTRAMUSCULAR; INTRAVENOUS at 11:08

## 2020-07-26 NOTE — ED PROVIDER NOTES
History  Chief Complaint   Patient presents with    Hypoglycemia - Symptomatic     pt reports taking morning dose of insulin after eating breakfast, approx 1 hour after taking insulin pt reports feeling shaky  pt reports BS went from 170's to 90's  EMS reports upon arrival BS was 86, pt received approx 100mL of D10  EMS reports BS spiked to 320's, pt received 250 boluse of NSS  last EMS BS was 260  Patient is a 57-year-old female with history of type 1 diabetes (diagnosed at age 18), presents emergency department for evaluation of hypoglycemia  Patient states this morning she was eating breakfast, uses carb scale, blood sugar was 190, gave herself 5 units of insulin  Patient states 1 hour after taking insulin she started to feel shaky and lightheaded  Patient took her blood sugar which was around 115  Patient states she then drink orange juice with improvement in blood sugar to the 170s  Patient states she he waited and took her blood sugar again and it was in the 90s  Patient called EMS  EMS reports blood sugar 86  Patient was given 100 mL of D10 and 250 mL bolus of normal saline with increase in blood sugar to 320  Blood sugar on arrival 236  Patient states her shakiness and lightheadedness resolved, but still has nausea  Patient states she does have an appointment with endocrinology on Tuesday 7/28  Patient reports similar episode occurring in April where her blood sugars were low  Patient was told follow-up with Endocrinology, states she spoke with her endocrinologist over the phone regarding the event and patient's endocrinologist was unsure of cause of hypoglycemia  Patient denies fever, chills, vision changes, headache, chest pain, syncope, vomiting, diarrhea, abdominal pain  Prior to Admission Medications   Prescriptions Last Dose Informant Patient Reported? Taking?    Blood Glucose Monitoring Suppl (ACURA BLOOD GLUCOSE METER) w/Device KIT  Self Yes No   Sig: by Does not apply route FREESTYLE LITE test strip  Self No No   Sig: Test up to 8 times a day and as instructed   Insulin Pen Needle (BD PEN NEEDLE DANYELL U/F) 32G X 4 MM MISC  Self No No   Sig: Use 4 a day and as directed  Lancets MISC  Self Yes No   Sig: by Does not apply route   Prenatal Vit-Fe Fumarate-FA (PRENATAL PO)  Self Yes No   Sig: Take by mouth   acetone, urine, test strip  Self Yes No   Sig: use as needed for BG >250 or for fever, vomiting, or diarrheal illness   glucagon (GLUCAGON EMERGENCY) 1 MG injection  Self Yes No   Sig: Inject as directed   insulin aspart (NOVOLOG FLEXPEN) 100 Units/mL injection pen  Self No No   Sig: Uses 5 units before breakfast, 4 units before lunch and 4 units before dinner  Patient taking differently: Uses 10 to 12 units before meals  insulin detemir (LEVEMIR FLEXTOUCH) 100 Units/mL injection pen  Self No No   Sig: Inject SC 12 units at 9 or 10 PM daily  To be titrated  Patient taking differently: Inject SC 18 units at 9 or 10 PM daily  To be titrated  Facility-Administered Medications: None       Past Medical History:   Diagnosis Date    Abnormal Pap smear of cervix     DM (diabetes mellitus) ([facility] [address] )     type 1    HPV (human papilloma virus) infection     Varicella     vaccinated as child       Past Surgical History:   Procedure Laterality Date    INNER EAR SURGERY Left     [place]  DELIVERY ONLY N/A 2019    Procedure:  SECTION (); Surgeon: Libertad Campa MD;  Location: [facility];  Service: Obstetrics       Family History   Problem Relation Age of Onset    No Known Problems Mother     No Known Problems Father      I have reviewed and agree with the history as documented  E-Cigarette/Vaping    E-Cigarette Use Never User      E-Cigarette/Vaping Substances     Social History     Tobacco Use    Smoking status: Never Smoker    Smokeless tobacco: Never Used   Substance Use Topics    Alcohol use: Not Currently     Comment: occ   Drug use:  No Review of Systems   Constitutional: Negative for chills and fever  HENT: Negative for ear pain and sore throat  Eyes: Negative for redness  Respiratory: Negative for chest tightness and shortness of breath  Cardiovascular: Negative for chest pain, palpitations and leg swelling  Gastrointestinal: Positive for nausea  Negative for abdominal pain, diarrhea and vomiting  Genitourinary: Negative for dysuria and hematuria  Musculoskeletal: Negative for back pain and neck pain  Skin: Negative for rash  Neurological: Positive for light-headedness  Negative for seizures, syncope, weakness and headaches  Shakiness   Psychiatric/Behavioral: Negative for confusion  Physical Exam  Physical Exam   Constitutional: She is oriented to person, place, and time  She appears well-developed and well-nourished  Non-toxic appearance  She does not have a sickly appearance  She does not appear ill  No distress  HENT:   Head: Normocephalic and atraumatic  Nose: Nose normal    Mouth/Throat: Mucous membranes are normal    Eyes: Conjunctivae are normal    Neck: Normal range of motion  Neck supple  No JVD present  Cardiovascular: Normal rate, regular rhythm and intact distal pulses  Exam reveals no gallop and no friction rub  No murmur heard  Pulmonary/Chest: Effort normal and breath sounds normal  No tachypnea  No respiratory distress  She has no decreased breath sounds  She has no wheezes  She has no rhonchi  She has no rales  Abdominal: Soft  Normal appearance  There is no tenderness  There is no rigidity, no rebound, no guarding and no CVA tenderness  Musculoskeletal:        Right lower leg: Normal  She exhibits no tenderness, no bony tenderness, no swelling and no edema  Left lower leg: Normal  She exhibits no tenderness, no bony tenderness, no swelling and no edema  Neurological: She is alert and oriented to person, place, and time  She has normal strength  GCS eye subscore is 4  GCS verbal subscore is 5  GCS motor subscore is 6  GCS 15  AAOx4  No focal neurological deficits  CN II-XII intact  PERRL  EOMI  No pronator drift   strength 5/5 bilaterally  B/L UE strength 5/5 throughout  Finger to nose normal  Cerebellar function normal  Ambulates without difficulty  B/L LE strength 5/5 throughout  Gross sensation to b/l upper and lower extremities intact      Skin: Skin is warm, dry and intact  Capillary refill takes less than 2 seconds  No rash noted  She is not diaphoretic  Psychiatric: She has a normal mood and affect   Her speech is normal and behavior is normal        Vital Signs  ED Triage Vitals   Temperature Pulse Respirations Blood Pressure SpO2   07/26/20 1101 07/26/20 1100 07/26/20 1100 07/26/20 1100 07/26/20 1100   97 9 °F (36 6 °C) 104 17 131/83 98 %      Temp Source Heart Rate Source Patient Position - Orthostatic VS BP Location FiO2 (%)   07/26/20 1101 07/26/20 1100 07/26/20 1100 07/26/20 1100 --   Temporal Monitor Sitting Right arm       Pain Score       --                  Vitals:    07/26/20 1100 07/26/20 1306   BP: 131/83 121/67   Pulse: 104 93   Patient Position - Orthostatic VS: Sitting Lying         Visual Acuity      ED Medications  Medications   ondansetron (ZOFRAN) injection 4 mg (4 mg Intravenous Given 7/26/20 1108)       Diagnostic Studies  Results Reviewed     Procedure Component Value Units Date/Time    Urine Microscopic [[id number]]  (Abnormal) Collected:  07/26/20 1212    Lab Status:  Final result Specimen:  Urine, Other Updated:  07/26/20 1326     RBC, UA 0-1 /hpf      WBC, UA None Seen /hpf      Epithelial Cells Occasional /hpf      Bacteria, UA Occasional /hpf     Fingerstick Glucose (POCT) [[id number]]  (Abnormal) Collected:  07/26/20 1251    Lab Status:  Final result Updated:  07/26/20 1253     POC Glucose 210 mg/dl     POCT urinalysis dipstick [[id number]]  (Abnormal) Resulted:  07/26/20 1233    Lab Status:  Final result Specimen:  Urine Updated: 07/26/20 1234    Urine Macroscopic, POC [[id number]]  (Abnormal) Collected:  07/26/20 1212    Lab Status:  Final result Specimen:  Urine Updated:  07/26/20 1214     Color, UA Yellow     Clarity, UA Clear     pH, UA 7 0     Leukocytes, UA Elevated glucose may cause decreased leukocyte values   See urine microscopic for [facility] result/     Nitrite, UA Negative     Protein, UA Negative mg/dl      Glucose, UA >=1000 (1%) mg/dl      Ketones, UA Negative mg/dl      Urobilinogen, UA 0 2 E U /dl      Bilirubin, UA Negative     Blood, UA Negative     Specific Gravity, UA 1 010    Narrative:       CLINITEK RESULT    Comprehensive metabolic panel [[id number]]  (Abnormal) Collected:  07/26/20 1106    Lab Status:  Final result Specimen:  Blood from Arm, Left Updated:  07/26/20 1132     Sodium 137 mmol/L      Potassium 3 8 mmol/L      Chloride 102 mmol/L      CO2 27 mmol/L      ANION GAP 8 mmol/L      BUN 12 mg/dL      Creatinine 0 88 mg/dL      Glucose 239 mg/dL      Calcium 8 0 mg/dL      AST 16 U/L      ALT 27 U/L      Alkaline Phosphatase 92 U/L      Total Protein 6 9 g/dL      Albumin 3 8 g/dL      Total Bilirubin 0 21 mg/dL      eGFR 90 ml/min/1 73sq m     Narrative:       [place] guidelines for Chronic Kidney Disease (CKD):     Stage 1 with normal or high GFR (GFR > 90 mL/min/1 73 square meters)    Stage 2 Mild CKD (GFR = 60-89 mL/min/1 73 square meters)    Stage 3A Moderate CKD (GFR = 45-59 mL/min/1 73 square meters)    Stage 3B Moderate CKD (GFR = 30-44 mL/min/1 73 square meters)    Stage 4 Severe CKD (GFR = 15-29 mL/min/1 73 square meters)    Stage 5 End Stage CKD (GFR <15 mL/min/1 73 square meters)  Note: GFR calculation is accurate only with a steady state creatinine    CBC and differential [[id number]] Collected:  07/26/20 1106    Lab Status:  Final result Specimen:  Blood from Arm, Left Updated:  07/26/20 1113     WBC 7 87 Thousand/uL      RBC 4 05 Million/uL      Hemoglobin 13 0 g/dL Hematocrit 38 3 %      MCV 95 fL      MCH 32 1 pg      MCHC 33 9 g/dL      RDW 11 9 %      MPV 12 5 fL      Platelets 522 Thousands/uL      nRBC 0 /100 WBCs      Neutrophils Relative 72 %      Immat GRANS % 0 %      Lymphocytes Relative 18 %      Monocytes Relative 8 %      Eosinophils Relative 1 %      Basophils Relative 1 %      Neutrophils Absolute 5 69 Thousands/µL      Immature Grans Absolute 0 02 Thousand/uL      Lymphocytes Absolute 1 41 Thousands/µL      Monocytes Absolute 0 62 Thousand/µL      Eosinophils Absolute 0 07 Thousand/µL      Basophils Absolute 0 06 Thousands/µL     POCT pregnancy, urine [[id number]]     Lab Status:  No result     Fingerstick Glucose (POCT) [[id number]]  (Abnormal) Collected:  07/26/20 1058    Lab Status:  Final result Updated:  07/26/20 1059     POC Glucose 236 mg/dl                  No orders to display              Procedures  Procedures         ED Course  ED Course as of Jul 26 1442   Sun Jul 26, 2020   1058 POC Glucose(!): 236   1207 Patient feeling improved      1219 Glucose, UA(!): >=1000 (1%)   1311 POC Glucose(!): 210                                             MDM  Number of Diagnoses or Management Options  [facility]ogly[facility] to type [facility] ): established and improving  Diagnosis management comments: Patient is a 73-year-old female with history of type 1 diabetes (diagnosed at age 18), presents emergency department for evaluation of hypoglycemia  Patient states this morning she was eating breakfast, uses carb scale, blood sugar was 190, gave herself 5 units of insulin  Patient states 1 hour after taking insulin she started to feel shaky and lightheaded  Patient took her blood sugar which was around 115  Patient states she then drink orange juice with improvement in blood sugar to the 170s  Patient states she he waited and took her blood sugar again and it was in the 90s  Patient called EMS  EMS reports blood sugar 86   Patient was given 100 mL of D10 and 250 mL bolus of normal saline with increase in blood sugar to 320  Blood sugar on arrival 236  Patient states her shakiness and lightheadedness resolved, but still has nausea  Patient states she does have an appointment with endocrinology on Tuesday 7/28  Patient reports similar episode occurring in April where her blood sugars were low  Patient was told follow-up with Endocrinology, states she spoke with her endocrinologist over the phone regarding the event and patient's endocrinologist was unsure of cause of hypoglycemia  POCT Blood sugar on arrival 236  Zofran given with improvement in nausea  Lab work unremarkable  Observed patient for >2 hours in the ED and repeat glucose was 210  Pt remained asymptomatic  Patient has appointment with Endocrinology on 7/28, advised patient to discuss these two events she has had of hypoglycemia (April 2020 and July 2020) as insulin adjustments may need to be made as well as possibly obtaining pump vs  Freestyle monitoring system  Strict return precautions discussed with patient  Patient verbalizes understanding and agrees with plan  The management plan was discussed in detail with the patient at bedside and all questions were answered  Prior to discharge, I provided both verbal and written instructions  I discussed with the patient the signs and symptoms for which to return to the emergency department  All questions were answered and patient was comfortable with the plan of care and discharged to home  The patient agrees to return to the Emergency Department for concerns and/or progression of illness              Disposition  Final diagnoses:   Hypoglycemia due to type 1 diabetes mellitus ([facility] [address] )     Time reflects when diagnosis was documented in both MDM as applicable and the Disposition within this note     Time User Action Codes Description Comment    7/26/2020  1:28 PM Yolis Pollock Add [E16 2] Hypoglycemia     7/26/2020  1:28 PM Yolis Pollock Add [E10 649] Hypoglycemia due to type 1 diabetes mellitus ([facility] [address] )     7/26/2020  1:28 PM Maria Luz Jolly Modify [E10 649] Hypoglycemia due to type 1 diabetes mellitus ([facility] [address] )     7/26/2020  1:28 PM Maria Luz Jolly Remove [E16 2] Hypoglycemia       ED Disposition     ED Disposition Condition Date/Time Comment    Discharge Stable Sun Jul 26, 2020  1:28 PM Reford Given discharge to home/self care  Follow-up Information     Follow up With Specialties Details Why Pauline Barksdale MD Internal Medicine Go to  appointment on Tuesday 7/28 [address]  [address]  [phone]            Discharge Medication List as of 7/26/2020  1:29 PM      CONTINUE these medications which have NOT CHANGED    Details   acetone, urine, test strip use as needed for BG >250 or for fever, vomiting, or diarrheal illness, Historical Med      Blood Glucose Monitoring Suppl (ACURA BLOOD GLUCOSE METER) w/Device KIT by Does not apply route, Historical Med      FREESTYLE LITE test strip Test up to 8 times a day and as instructed, Normal      glucagon (GLUCAGON EMERGENCY) 1 MG injection Inject as directed, Starting Tue 5/20/2014, Historical Med      insulin aspart (NOVOLOG FLEXPEN) 100 Units/mL injection pen Uses 5 units before breakfast, 4 units before lunch and 4 units before dinner , Normal      insulin detemir (LEVEMIR FLEXTOUCH) 100 Units/mL injection pen Inject SC 12 units at 9 or 10 PM daily  To be titrated , Normal      Insulin Pen Needle (BD PEN NEEDLE DANYELL U/F) 32G X 4 MM MISC Use 4 a day and as directed , Normal      Lancets MISC by Does not apply route, Historical Med      Prenatal Vit-Fe Fumarate-FA (PRENATAL PO) Take by mouth, Historical Med           No discharge procedures on file      PDMP Review     None          ED Provider  Electronically Signed by           Noe Orlando PA-C  07/26/20 3242

## 2020-08-07 ENCOUNTER — HOSPITAL ENCOUNTER (EMERGENCY)
Facility: HOSPITAL | Age: 30
Discharge: HOME | End: 2020-08-07
Attending: EMERGENCY MEDICINE | Admitting: EMERGENCY MEDICINE
Payer: COMMERCIAL

## 2020-08-07 ENCOUNTER — APPOINTMENT (EMERGENCY)
Dept: RADIOLOGY | Facility: HOSPITAL | Age: 30
End: 2020-08-07
Payer: COMMERCIAL

## 2020-08-07 VITALS
SYSTOLIC BLOOD PRESSURE: 128 MMHG | OXYGEN SATURATION: 100 % | DIASTOLIC BLOOD PRESSURE: 71 MMHG | BODY MASS INDEX: 23.52 KG/M2 | TEMPERATURE: 98.7 F | HEART RATE: 98 BPM | WEIGHT: 141.31 LBS | RESPIRATION RATE: 16 BRPM

## 2020-08-07 DIAGNOSIS — R07.9 ACUTE NONSPECIFIC CHEST PAIN WITH LOW RISK OF CORONARY ARTERY DISEASE: ICD-10-CM

## 2020-08-07 DIAGNOSIS — F41.0 ANXIETY ATTACK: Primary | ICD-10-CM

## 2020-08-07 DIAGNOSIS — Z91.89 ACUTE NONSPECIFIC CHEST PAIN WITH LOW RISK OF CORONARY ARTERY DISEASE: ICD-10-CM

## 2020-08-07 LAB
ANION GAP SERPL CALCULATED.3IONS-SCNC: 8 MMOL/L (ref 4–13)
ATRIAL RATE: 85 BPM
BASOPHILS # BLD AUTO: 0.05 THOUSANDS/ΜL (ref 0–0.1)
BASOPHILS NFR BLD AUTO: 1 % (ref 0–1)
BILIRUB UR QL STRIP: NEGATIVE
BUN SERPL-MCNC: 16 MG/DL (ref 5–25)
CALCIUM SERPL-MCNC: 8.7 MG/DL (ref 8.3–10.1)
CHLORIDE SERPL-SCNC: 103 MMOL/L (ref 100–108)
CLARITY UR: CLEAR
CO2 SERPL-SCNC: 28 MMOL/L (ref 21–32)
COLOR UR: YELLOW
COLOR, POC: YELLOW
CREAT SERPL-MCNC: 0.81 MG/DL (ref 0.6–1.3)
EOSINOPHIL # BLD AUTO: 0.09 THOUSAND/ΜL (ref 0–0.61)
EOSINOPHIL NFR BLD AUTO: 1 % (ref 0–6)
ERYTHROCYTE [DISTWIDTH] IN BLOOD BY AUTOMATED COUNT: 11.9 % (ref 11.6–15.1)
EXT PREG TEST URINE: NEGATIVE
EXT. CONTROL ED NAV: NORMAL
GFR SERPL CREATININE-BSD FRML MDRD: 99 ML/MIN/1.73SQ M
GLUCOSE SERPL-MCNC: 207 MG/DL (ref 65–140)
GLUCOSE SERPL-MCNC: 210 MG/DL (ref 65–140)
GLUCOSE UR STRIP-MCNC: ABNORMAL MG/DL
HCT VFR BLD AUTO: 40.3 % (ref 34.8–46.1)
HGB BLD-MCNC: 13.6 G/DL (ref 11.5–15.4)
HGB UR QL STRIP.AUTO: NEGATIVE
IMM GRANULOCYTES # BLD AUTO: 0.04 THOUSAND/UL (ref 0–0.2)
IMM GRANULOCYTES NFR BLD AUTO: 0 % (ref 0–2)
KETONES UR STRIP-MCNC: NEGATIVE MG/DL
LEUKOCYTE ESTERASE UR QL STRIP: NEGATIVE
LYMPHOCYTES # BLD AUTO: 1.04 THOUSANDS/ΜL (ref 0.6–4.47)
LYMPHOCYTES NFR BLD AUTO: 10 % (ref 14–44)
MCH RBC QN AUTO: 31.8 PG (ref 26.8–34.3)
MCHC RBC AUTO-ENTMCNC: 33.7 G/DL (ref 31.4–37.4)
MCV RBC AUTO: 94 FL (ref 82–98)
MONOCYTES # BLD AUTO: 0.58 THOUSAND/ΜL (ref 0.17–1.22)
MONOCYTES NFR BLD AUTO: 6 % (ref 4–12)
NEUTROPHILS # BLD AUTO: 8.33 THOUSANDS/ΜL (ref 1.85–7.62)
NEUTS SEG NFR BLD AUTO: 82 % (ref 43–75)
NITRITE UR QL STRIP: NEGATIVE
NRBC BLD AUTO-RTO: 0 /100 WBCS
P AXIS: 79 DEGREES
PH UR STRIP.AUTO: 7.5 [PH] (ref 4.5–8)
PLATELET # BLD AUTO: 163 THOUSANDS/UL (ref 149–390)
PMV BLD AUTO: 12.2 FL (ref 8.9–12.7)
POTASSIUM SERPL-SCNC: 4 MMOL/L (ref 3.5–5.3)
PR INTERVAL: 142 MS
PROT UR STRIP-MCNC: NEGATIVE MG/DL
QRS AXIS: 85 DEGREES
QRSD INTERVAL: 88 MS
QT INTERVAL: 342 MS
QTC INTERVAL: 406 MS
RBC # BLD AUTO: 4.28 MILLION/UL (ref 3.81–5.12)
SODIUM SERPL-SCNC: 139 MMOL/L (ref 136–145)
SP GR UR STRIP.AUTO: 1.01 (ref 1–1.03)
T WAVE AXIS: 46 DEGREES
TROPONIN I SERPL-MCNC: <0.02 NG/ML
TSH SERPL DL<=0.05 MIU/L-ACNC: 1.05 UIU/ML (ref 0.36–3.74)
UROBILINOGEN UR QL STRIP.AUTO: 0.2 E.U./DL
VENTRICULAR RATE: 85 BPM
WBC # BLD AUTO: 10.13 THOUSAND/UL (ref 4.31–10.16)

## 2020-08-07 PROCEDURE — 96374 THER/PROPH/DIAG INJ IV PUSH: CPT

## 2020-08-07 PROCEDURE — 99284 EMERGENCY DEPT VISIT MOD MDM: CPT

## 2020-08-07 PROCEDURE — 80048 BASIC METABOLIC PNL TOTAL CA: CPT | Performed by: PHYSICIAN ASSISTANT

## 2020-08-07 PROCEDURE — 85025 COMPLETE CBC W/AUTO DIFF WBC: CPT | Performed by: PHYSICIAN ASSISTANT

## 2020-08-07 PROCEDURE — 81003 URINALYSIS AUTO W/O SCOPE: CPT

## 2020-08-07 PROCEDURE — 82948 REAGENT STRIP/BLOOD GLUCOSE: CPT

## 2020-08-07 PROCEDURE — 36415 COLL VENOUS BLD VENIPUNCTURE: CPT | Performed by: PHYSICIAN ASSISTANT

## 2020-08-07 PROCEDURE — 71045 X-RAY EXAM CHEST 1 VIEW: CPT

## 2020-08-07 PROCEDURE — 99285 EMERGENCY DEPT VISIT HI MDM: CPT | Performed by: EMERGENCY MEDICINE

## 2020-08-07 PROCEDURE — 93010 ELECTROCARDIOGRAM REPORT: CPT | Performed by: INTERNAL MEDICINE

## 2020-08-07 PROCEDURE — 93005 ELECTROCARDIOGRAM TRACING: CPT

## 2020-08-07 PROCEDURE — 81025 URINE PREGNANCY TEST: CPT | Performed by: PHYSICIAN ASSISTANT

## 2020-08-07 PROCEDURE — 84443 ASSAY THYROID STIM HORMONE: CPT | Performed by: PHYSICIAN ASSISTANT

## 2020-08-07 PROCEDURE — 84484 ASSAY OF TROPONIN QUANT: CPT | Performed by: PHYSICIAN ASSISTANT

## 2020-08-07 RX ORDER — LIDOCAINE HYDROCHLORIDE 20 MG/ML
15 SOLUTION OROPHARYNGEAL ONCE
Status: COMPLETED | OUTPATIENT
Start: 2020-08-07 | End: 2020-08-07

## 2020-08-07 RX ORDER — MAGNESIUM HYDROXIDE/ALUMINUM HYDROXICE/SIMETHICONE 120; 1200; 1200 MG/30ML; MG/30ML; MG/30ML
30 SUSPENSION ORAL ONCE
Status: COMPLETED | OUTPATIENT
Start: 2020-08-07 | End: 2020-08-07

## 2020-08-07 RX ADMIN — ALUMINUM HYDROXIDE, MAGNESIUM HYDROXIDE, AND SIMETHICONE 30 ML: 200; 200; 20 SUSPENSION ORAL at 13:32

## 2020-08-07 RX ADMIN — LIDOCAINE HYDROCHLORIDE 15 ML: 20 SOLUTION ORAL; TOPICAL at 13:32

## 2020-08-07 RX ADMIN — FAMOTIDINE 20 MG: 10 INJECTION, SOLUTION INTRAVENOUS at 13:32

## 2020-08-07 NOTE — ED PROVIDER NOTES
History  Chief Complaint   Patient presents with    Panic Attack     pt states she was having a panic attack, reports hot burning sensation in throat and chest  also reports numbness in ears  did not take any medicine  31year old female with a history of type 1 DM (dx at age 18) presents to the emergency department for evaluation of "burning hot sensation in my chest and throat, numbness in my ears "  She reports this sensation started approximately an hour ago, but reports similar, but less severe symptoms a week ago  She denies any formal diagnosis of anxiety, cannot verbalize anything she is particularly worried about  She denies any history of post partum depression at home and denies any SI/HI  She also denies any abdominal pain, n/v/d, fever, dysuria  She has not taken anything for the pain  She reports her glucose has been well controlled          History provided by:  Medical records and patient   used: No    Panic Attack   Presenting symptoms: no depression, no disorganized speech, no suicidal thoughts, no suicidal threats and no suicide attempt    Onset quality:  Sudden  Duration:  1 hour  Timing:  Constant  Progression:  Unchanged  Chronicity:  Recurrent  Context: not alcohol use, not drug abuse and not medication    Treatment compliance:  Untreated  Relieved by:  Nothing  Worsened by:  Nothing  Ineffective treatments:  None tried  Associated symptoms: no abdominal pain, no chest pain and no headaches    Risk factors: no family hx of mental illness and no hx of mental illness    Chest Pain   Pain location:  Substernal area  Pain quality: burning    Pain radiates to:  Does not radiate  Pain radiates to the back: no    Pain severity:  Mild  Timing:  Constant  Progression:  Partially resolved  Chronicity:  New  Context: breathing, at rest and stress    Context: no movement and no trauma    Relieved by:  None tried  Worsened by:  Nothing tried  Ineffective treatments:  None tried  Associated symptoms: diaphoresis    Associated symptoms: no abdominal pain, no back pain, no cough, no dizziness, no fever, no headache, no lower extremity edema, no nausea, no shortness of breath and not vomiting    Risk factors: diabetes mellitus    Risk factors: not male, no prior DVT/PE and no surgery        Prior to Admission Medications   Prescriptions Last Dose Informant Patient Reported? Taking? Blood Glucose Monitoring Suppl (ACURA BLOOD GLUCOSE METER) w/Device KIT  Self Yes No   Sig: by Does not apply route   FREESTYLE LITE test strip  Self No No   Sig: Test up to 8 times a day and as instructed   Insulin Pen Needle (BD PEN NEEDLE DANYELL U/F) 32G X 4 MM MISC  Self No No   Sig: Use 4 a day and as directed  Lancets MISC  Self Yes No   Sig: by Does not apply route   Prenatal Vit-Fe Fumarate-FA (PRENATAL PO)  Self Yes No   Sig: Take by mouth   acetone, urine, test strip  Self Yes No   Sig: use as needed for BG >250 or for fever, vomiting, or diarrheal illness   glucagon (GLUCAGON EMERGENCY) 1 MG injection  Self Yes No   Sig: Inject as directed   insulin aspart (NOVOLOG FLEXPEN) 100 Units/mL injection pen  Self No No   Sig: Uses 5 units before breakfast, 4 units before lunch and 4 units before dinner  Patient taking differently: Uses 10 to 12 units before meals  insulin detemir (LEVEMIR FLEXTOUCH) 100 Units/mL injection pen  Self No No   Sig: Inject SC 12 units at 9 or 10 PM daily  To be titrated  Patient taking differently: Inject SC 18 units at 9 or 10 PM daily  To be titrated        Facility-Administered Medications: None       Past Medical History:   Diagnosis Date    Abnormal Pap smear of cervix     Diabetes mellitus ([facility] )     DM (diabetes mellitus) ([facility] )     type 1    HPV (human papilloma virus) infection     Varicella     vaccinated as child       Past Surgical History:   Procedure Laterality Date    INNER EAR SURGERY Left     CO  DELIVERY ONLY N/A 2019    Procedure:  SECTION (); Surgeon: Jennifer Joseph MD;  Location: [facility];  Service: Obstetrics       Family History   Problem Relation Age of Onset    No Known Problems Mother     No Known Problems Father      I have reviewed and agree with the history as documented  E-Cigarette/Vaping    E-Cigarette Use Never User      E-Cigarette/Vaping Substances     Social History     Tobacco Use    Smoking status: Never Smoker    Smokeless tobacco: Never Used   Substance Use Topics    Alcohol use: Not Currently     Comment: occ   Drug use: No       Review of Systems   Constitutional: Positive for diaphoresis  Negative for chills and fever  HENT: Negative for congestion and sore throat  Respiratory: Negative for cough and shortness of breath  Cardiovascular: Negative for chest pain  Gastrointestinal: Negative for abdominal pain, diarrhea, nausea and vomiting  Musculoskeletal: Negative for back pain  Skin: Negative for rash  Neurological: Negative for dizziness and headaches  Psychiatric/Behavioral: Negative for suicidal ideas  All other systems reviewed and are negative  Physical Exam  Physical Exam  Vitals signs and nursing note reviewed  Constitutional:       General: She is not in acute distress  Appearance: She is well-developed  She is not ill-appearing  Comments: Patient tearful   HENT:      Head: Normocephalic and atraumatic  Right Ear: Hearing and external ear normal       Left Ear: Hearing and external ear normal       Nose: Nose normal    Eyes:      Conjunctiva/sclera: Conjunctivae normal    Neck:      Musculoskeletal: Full passive range of motion without pain, normal range of motion and neck supple  Cardiovascular:      Rate and Rhythm: Normal rate and regular rhythm  Heart sounds: Normal heart sounds, S1 normal and S2 normal  No murmur  Pulmonary:      Effort: Pulmonary effort is normal  No tachypnea  Breath sounds: Normal breath sounds   No transmitted upper airway sounds  No decreased breath sounds, wheezing or rhonchi  Chest:      Chest wall: No tenderness or crepitus  Abdominal:      General: Bowel sounds are normal       Palpations: Abdomen is soft  Tenderness: There is no abdominal tenderness  There is no guarding or rebound  Musculoskeletal:      Comments: Normal range of motion; no edema, tenderness, or deformities  Gait coordinated and smooth   Skin:     General: Skin is warm and dry  Capillary Refill: Capillary refill takes less than 2 seconds  Findings: No rash  Neurological:      Mental Status: She is alert and oriented to person, place, and time  GCS: GCS eye subscore is 4  GCS verbal subscore is 5  GCS motor subscore is 6  Cranial Nerves: Cranial nerves are intact  Sensory: Sensation is intact  Motor: Motor function is intact  Psychiatric:         Attention and Perception: Attention normal          Mood and Affect: Mood is anxious  Speech: Speech normal          Behavior: Behavior normal          Thought Content: Thought content does not include homicidal or suicidal ideation  Thought content does not include homicidal or suicidal plan           Vital Signs  ED Triage Vitals   Temperature Pulse Respirations Blood Pressure SpO2   08/07/20 1158 08/07/20 1158 08/07/20 1158 08/07/20 1201 08/07/20 1158   98 7 °F (37 1 °C) 98 16 128/71 100 %      Temp Source Heart Rate Source Patient Position - Orthostatic VS BP Location FiO2 (%)   08/07/20 1158 08/07/20 1158 08/07/20 1158 08/07/20 1158 --   Temporal Monitor Sitting Right arm       Pain Score       --                  Vitals:    08/07/20 1158 08/07/20 1201   BP:  128/71   Pulse: 98    Patient Position - Orthostatic VS: Sitting          Visual Acuity      ED Medications  Medications   famotidine (PEPCID) injection 20 mg (20 mg Intravenous Given 8/7/20 1332)   aluminum-magnesium hydroxide-simethicone (MYLANTA) 200-200-20 mg/5 mL oral suspension 30 mL (30 mL Oral Given 8/7/20 1332)   Lidocaine Viscous HCl (XYLOCAINE) 2 % mucosal solution 15 mL (15 mL Swish & Spit Given 8/7/20 1332)       Diagnostic Studies  Results Reviewed     Procedure Component Value Units Date/Time    Basic metabolic panel [[id number]]  (Abnormal) Collected:  08/07/20 1301    Lab Status:  Final result Specimen:  Blood from Arm, Right Updated:  08/07/20 1331     Sodium 139 mmol/L      Potassium 4 0 mmol/L      Chloride 103 mmol/L      CO2 28 mmol/L      ANION GAP 8 mmol/L      BUN 16 mg/dL      Creatinine 0 81 mg/dL      Glucose 207 mg/dL      Calcium 8 7 mg/dL      eGFR 99 ml/min/1 73sq m     Narrative:       [place] guidelines for Chronic Kidney Disease (CKD):     Stage 1 with normal or high GFR (GFR > 90 mL/min/1 73 square meters)    Stage 2 Mild CKD (GFR = 60-89 mL/min/1 73 square meters)    Stage 3A Moderate CKD (GFR = 45-59 mL/min/1 73 square meters)    Stage 3B Moderate CKD (GFR = 30-44 mL/min/1 73 square meters)    Stage 4 Severe CKD (GFR = 15-29 mL/min/1 73 square meters)    Stage 5 End Stage CKD (GFR <15 mL/min/1 73 square meters)  Note: GFR calculation is accurate only with a steady state creatinine    TSH [[id number]]  (Normal) Collected:  08/07/20 1301    Lab Status:  Final result Specimen:  Blood from Arm, Right Updated:  08/07/20 1331     TSH 3RD GENERATON 1 050 uIU/mL     Narrative:       Patients undergoing fluorescein dye angiography may retain small amounts of fluorescein in the body for 48-72 hours post procedure  Samples containing fluorescein can produce falsely depressed TSH values  If the patient had this procedure,a specimen should be resubmitted post fluorescein clearance        Troponin I [[id number]]  (Normal) Collected:  08/07/20 1301    Lab Status:  Final result Specimen:  Blood from Arm, Right Updated:  08/07/20 1326     Troponin I <0 02 ng/mL     CBC and differential [[id number]]  (Abnormal) Collected:  08/07/20 1301    Lab Status:  Final result Specimen:  Blood from Arm, Right Updated:  08/07/20 1306     WBC 10 13 Thousand/uL      RBC 4 28 Million/uL      Hemoglobin 13 6 g/dL      Hematocrit 40 3 %      MCV 94 fL      MCH 31 8 pg      MCHC 33 7 g/dL      RDW 11 9 %      MPV 12 2 fL      Platelets 759 Thousands/uL      nRBC 0 /100 WBCs      Neutrophils Relative 82 %      Immat GRANS % 0 %      Lymphocytes Relative 10 %      Monocytes Relative 6 %      Eosinophils Relative 1 %      Basophils Relative 1 %      Neutrophils Absolute 8 33 Thousands/µL      Immature Grans Absolute 0 04 Thousand/uL      Lymphocytes Absolute 1 04 Thousands/µL      Monocytes Absolute 0 58 Thousand/µL      Eosinophils Absolute 0 09 Thousand/µL      Basophils Absolute 0 05 Thousands/µL     Fingerstick Glucose (POCT) [[id number]]  (Abnormal) Collected:  08/07/20 1247    Lab Status:  Final result Updated:  08/07/20 1303     POC Glucose 210 mg/dl     POCT urinalysis dipstick [[id number]]  (Normal) Resulted:  08/07/20 1254    Lab Status:  Final result Specimen:  Urine Updated:  08/07/20 1255     Color, UA Yellow    POCT pregnancy, urine [[id number]]  (Normal) Resulted:  08/07/20 1254    Lab Status:  Final result Updated:  08/07/20 1254     EXT PREG TEST UR (Ref: Negative) Negative     Control Valid    Urine Macroscopic, POC [[id number]]  (Abnormal) Collected:  08/07/20 1251    Lab Status:  Final result Specimen:  Urine Updated:  08/07/20 1252     Color, UA Yellow     Clarity, UA Clear     pH, UA 7 5     Leukocytes, UA Negative     Nitrite, UA Negative     Protein, UA Negative mg/dl      Glucose,  (1/10%) mg/dl      Ketones, UA Negative mg/dl      Urobilinogen, UA 0 2 E U /dl      Bilirubin, UA Negative     Blood, UA Negative     Specific Gravity, UA 1 015    Narrative:       CLINITEK RESULT                 XR chest 1 view portable   ED Interpretation by Fatmata Peters PA-C (08/07 1256)   Preliminary read: no acute cardiopulmonary process      Final Result by Tiffany Avalos MD (08/07 1257)      No acute cardiopulmonary disease  Workstation performed: [id number]                    Procedures  ECG 12 Lead Documentation Only    Date/Time: 8/7/2020 1:12 PM  Performed by: Shane Kelley PA-C  Authorized by: Shane Kelley PA-C     Indications / Diagnosis:  Chest pain  ECG reviewed by me, the ED Provider: yes (also Dr Arun Jones)    Patient location:  ED  Previous ECG:     Previous ECG:  Unavailable  Interpretation:     Interpretation: normal    Rate:     ECG rate:  85    ECG rate assessment: normal    Rhythm:     Rhythm: sinus rhythm    Ectopy:     Ectopy: none    QRS:     QRS axis:  Normal  Conduction:     Conduction: normal    ST segments:     ST segments:  Normal  T waves:     T waves: normal               ED Course  ED Course as of Aug 08 2218   Fri Aug 07, 2020   1223 Patient seen and examined; will check labs, urine, EKG, CXR      1253 Glucose, UA(!): 100 (1/10%)   1254 PREGNANCY TEST URINE: Negative   1257 NAD   XR chest 1 view portable   1310 POC Glucose(!): 210   1313 EKG NSR 85bpm; , ; no evidence of ST elevation/depression      1331 Troponin I: <0 02   1331 TSH 3RD GENERATON: 1 050   1331 Sodium: 139   1339 Discussed labs with patient; keen on discharge                HEART Risk Score      Most Recent Value   Heart Score Risk Calculator   History  0 Filed at: 08/07/2020 1341   ECG  0 Filed at: 08/07/2020 1341   Age  0 Filed at: 08/07/2020 1341   Risk Factors  1 Filed at: 08/07/2020 1341   Troponin  0 Filed at: 08/07/2020 1341   HEART Score  1 Filed at: 08/07/2020 1341                                      MDM  Number of Diagnoses or Management Options  Acute nonspecific chest pain with low risk of coronary artery disease: Anxiety attack:   Diagnosis management comments: 31year old female with diabetes presents for chest burning  Patient tearful during exam and is unable to specify factor making her anxious    Patient denies any SI/HI  Patient is breastfeeding, so unable to trial atarax  Counseled patient to follow up with PCP, suggested potentially starting low dose antidepressant  Based on the HEART score of 1, the patient is at low risk (1 7% or less) for major adverse cardiac events (MACE) in the next 6 weeks  The risks of MACE, the potential benefits of hospitalization (increased diagnostic accurary) as well as potential harms of hospitalization (iatrogenic injury, false positive test results, nococomial infection risk) were discussed  Based on current guidelines, I believe that the best course of action would be to discharge the patient and follow up as an outpatient  The patient said they understood that even with the low HEART score there was the small potential that their current symptoms were due to a cardiac event, but they were comfortable with the low risk and they decided that they wanted to be discharged from the ED and follow up as an outpatient  I do not believe this patient's complaints are from pulmonary embolism or aortic dissection and I believe they would most likely be harmed through false positive test results and other associated test complications by further pursuing the diagnosis of pulmonary embolism or dissection              Disposition  Final diagnoses:   Anxiety attack   Acute nonspecific chest pain with low risk of coronary artery disease     Time reflects when diagnosis was documented in both MDM as applicable and the Disposition within this note     Time User Action Codes Description Comment    8/7/2020  1:41 PM Nishant Kelly Add [F41 0] Anxiety attack     8/7/2020  1:41 PM Kaya Sam [R07 [id number]] Atypical chest pain     8/7/2020  1:41 PM Rico Walls 4, [address] [R07 89] Atypical chest pain     8/7/2020  1:42 PM Kaya Sam [R07 9,  Z91 89] Acute nonspecific chest pain with low risk of coronary artery disease       ED Disposition     ED Disposition Condition Date/Time Comment Discharge Stable Fri Aug 7, 2020  1:42 PM Nuhabrandon Ame discharge to home/self care  Follow-up Information     Follow up With Specialties Details Why Contact Info Additional [facility] Schedule an appointment as soon as possible for a visit  to establish care with PCP [place] [address] [facility] [id number]  [address] [place] [address],[address] [place],   [address], [place], [place], [address]          Discharge Medication List as of 8/7/2020  1:42 PM      CONTINUE these medications which have NOT CHANGED    Details   acetone, urine, test strip use as needed for BG >250 or for fever, vomiting, or diarrheal illness, Historical Med      Blood Glucose Monitoring Suppl (ACURA BLOOD GLUCOSE METER) w/Device KIT by Does not apply route, Historical Med      FREESTYLE LITE test strip Test up to 8 times a day and as instructed, Normal      glucagon (GLUCAGON EMERGENCY) 1 MG injection Inject as directed, Starting Tue 5/20/2014, Historical Med      insulin aspart (NOVOLOG FLEXPEN) 100 Units/mL injection pen Uses 5 units before breakfast, 4 units before lunch and 4 units before dinner , Normal      insulin detemir (LEVEMIR FLEXTOUCH) 100 Units/mL injection pen Inject SC 12 units at 9 or 10 PM daily  To be titrated , Normal      Insulin Pen Needle (BD PEN NEEDLE DANYELL U/F) 32G X 4 MM MISC Use 4 a day and as directed , Normal      Lancets MISC by Does not apply route, Historical Med      Prenatal Vit-Fe Fumarate-FA (PRENATAL PO) Take by mouth, Historical Med           No discharge procedures on file      PDMP Review     None          ED Provider  Electronically Signed by           Sho Angeles PA-C  08/08/20 [id number]

## 2020-08-14 ENCOUNTER — OFFICE VISIT (OUTPATIENT)
Dept: FAMILY MEDICINE CLINIC | Facility: CLINIC | Age: 30
End: 2020-08-14
Payer: COMMERCIAL

## 2020-08-14 VITALS
HEIGHT: 65 IN | DIASTOLIC BLOOD PRESSURE: 68 MMHG | TEMPERATURE: 97.6 F | WEIGHT: 137.4 LBS | BODY MASS INDEX: 22.89 KG/M2 | SYSTOLIC BLOOD PRESSURE: 118 MMHG

## 2020-08-14 DIAGNOSIS — F41.1 GAD (GENERALIZED ANXIETY DISORDER): Primary | ICD-10-CM

## 2020-08-14 PROCEDURE — 99203 OFFICE O/P NEW LOW 30 MIN: CPT | Performed by: FAMILY MEDICINE

## 2020-08-14 PROCEDURE — 3725F SCREEN DEPRESSION PERFORMED: CPT | Performed by: FAMILY MEDICINE

## 2020-08-14 PROCEDURE — 3044F HG A1C LEVEL LT 7.0%: CPT | Performed by: FAMILY MEDICINE

## 2020-08-14 PROCEDURE — 3008F BODY MASS INDEX DOCD: CPT | Performed by: FAMILY MEDICINE

## 2020-08-14 PROCEDURE — 1036F TOBACCO NON-USER: CPT | Performed by: FAMILY MEDICINE

## 2020-08-14 RX ORDER — BLOOD-GLUCOSE SENSOR
EACH MISCELLANEOUS
COMMUNITY
Start: 2020-07-29

## 2020-08-14 RX ORDER — HYDROXYZINE HYDROCHLORIDE 10 MG/1
10 TABLET, FILM COATED ORAL 2 TIMES DAILY PRN
Qty: 30 TABLET | Refills: 0 | Status: SHIPPED | OUTPATIENT
Start: 2020-08-14 | End: 2021-05-05 | Stop reason: ALTCHOICE

## 2020-08-14 RX ORDER — BLOOD-GLUCOSE,RECEIVER,CONT
EACH MISCELLANEOUS
COMMUNITY
Start: 2020-07-30

## 2020-08-14 RX ORDER — ESCITALOPRAM OXALATE 10 MG/1
10 TABLET ORAL DAILY
Qty: 30 TABLET | Refills: 0 | Status: SHIPPED | OUTPATIENT
Start: 2020-08-14 | End: 2020-09-16 | Stop reason: SDUPTHER

## 2020-08-14 RX ORDER — BLOOD-GLUCOSE TRANSMITTER
EACH MISCELLANEOUS
COMMUNITY
Start: 2020-07-29 | End: 2021-08-17 | Stop reason: SDUPTHER

## 2020-08-14 NOTE — PROGRESS NOTES
Assessment/Plan:    57-year-old woman with:  Generalized anxiety disorder type 1 diabetes encouraged follow-up with endocrine discussed healthy diet exercise ample sleep stress reduction strategies  Will refer for psychotherapy and begin trial of Lexapro with hydroxyzine as needed but advised patient not to take if she is the only when taking care of the baby not to be taken with driving etc   Follow-up in 1 month discussed supportive care return parameters otherwise  No problem-specific Assessment & Plan notes found for this encounter  Diagnoses and all orders for this visit:    CALLI (generalized anxiety disorder)  -     Ambulatory referral to behavioral health therapists; Future  -     escitalopram (LEXAPRO) 10 mg tablet; Take 1 tablet (10 mg total) by mouth daily  -     hydrOXYzine HCL (ATARAX) 10 mg tablet; Take 1 tablet (10 mg total) by mouth 2 (two) times a day as needed for anxiety    Other orders  -     Continuous Blood Gluc  (Dexcom G6 ) DON; USED TO MONITOR BLOOD SUGAR LEVELS DAILY  -     Continuous Blood Gluc Sensor (Dexcom G6 Sensor) MISC; CHANGE EVERY 10 DAYS  -     Continuous Blood Gluc Transmit (Dexcom G6 Transmitter) MISC; CHANGE TRANSMITTER EVERY 90 DAYS          Subjective:     Chief Complaint   Patient presents with   [address] [place] [address] patient to establish care   Anxiety     Last month and a half she has been having anxiety/panick attacks very often  Patient ID: Jimena Gomez is a 31 y o  female  Patient is a 57-year-old woman who presents to establish care in this practice she admits that she has had anxiety that is gradually getting worse no fevers chills nausea vomiting chest pain shortness of breath  Tolerating p o  Intake she is a type 1 diabetic and admits overall her sugars have been well controlled no other complaints at this time no suicidal homicidal ideation    Anxiety   Symptoms include nervous/anxious behavior             The following portions of the patient's history were reviewed and updated as appropriate: allergies, current medications, past family history, past medical history, past social history, past surgical history and problem list     Review of Systems   Constitutional: Negative  HENT: Negative  Eyes: Negative  Respiratory: Negative  Cardiovascular: Negative  Gastrointestinal: Negative  Endocrine: Negative  Genitourinary: Negative  Musculoskeletal: Negative  Allergic/Immunologic: Negative  Neurological: Negative  Hematological: Negative  Psychiatric/Behavioral: The patient is nervous/anxious  All other systems reviewed and are negative  Objective:      /68 (BP Location: Right arm, Patient Position: Sitting, Cuff Size: Standard)   Temp 97 6 °F (36 4 °C) (Tympanic)   Ht 5' 5" (1 651 m)   Wt 62 3 kg (137 lb 6 4 oz)   LMP 07/20/2020 (Approximate)   BMI 22 86 kg/m²          Physical Exam  Constitutional:       Appearance: She is well-developed  HENT:      Head: Atraumatic  Right Ear: External ear normal       Left Ear: External ear normal    Eyes:      Conjunctiva/sclera: Conjunctivae normal       Pupils: Pupils are equal, round, and reactive to light  Neck:      Musculoskeletal: Normal range of motion  Cardiovascular:      Rate and Rhythm: Normal rate and regular rhythm  Heart sounds: Normal heart sounds  Pulmonary:      Effort: Pulmonary effort is normal  No respiratory distress  Breath sounds: Normal breath sounds  Abdominal:      General: Bowel sounds are normal  There is no distension  Palpations: Abdomen is soft  Tenderness: There is no abdominal tenderness  There is no guarding or rebound  Musculoskeletal: Normal range of motion  Skin:     General: Skin is warm and dry  Neurological:      Mental Status: She is alert and oriented to person, place, and time  Cranial Nerves: No cranial nerve deficit     Psychiatric: Behavior: Behavior normal          Thought Content:  Thought content normal          Judgment: Judgment normal

## 2020-09-16 ENCOUNTER — OFFICE VISIT (OUTPATIENT)
Dept: FAMILY MEDICINE CLINIC | Facility: CLINIC | Age: 30
End: 2020-09-16
Payer: COMMERCIAL

## 2020-09-16 VITALS
DIASTOLIC BLOOD PRESSURE: 80 MMHG | HEIGHT: 65 IN | WEIGHT: 137 LBS | HEART RATE: 92 BPM | SYSTOLIC BLOOD PRESSURE: 118 MMHG | BODY MASS INDEX: 22.82 KG/M2

## 2020-09-16 DIAGNOSIS — Z3A.01 LESS THAN 8 WEEKS GESTATION OF PREGNANCY: ICD-10-CM

## 2020-09-16 DIAGNOSIS — F41.1 GAD (GENERALIZED ANXIETY DISORDER): ICD-10-CM

## 2020-09-16 DIAGNOSIS — O24.013 PRE-EXISTING TYPE 1 DIABETES MELLITUS DURING PREGNANCY IN THIRD TRIMESTER: ICD-10-CM

## 2020-09-16 DIAGNOSIS — N92.6 MISSED MENSES: Primary | ICD-10-CM

## 2020-09-16 LAB — SL AMB POCT URINE HCG: POSITIVE

## 2020-09-16 PROCEDURE — 81025 URINE PREGNANCY TEST: CPT | Performed by: FAMILY MEDICINE

## 2020-09-16 PROCEDURE — 99214 OFFICE O/P EST MOD 30 MIN: CPT | Performed by: FAMILY MEDICINE

## 2020-09-16 RX ORDER — ESCITALOPRAM OXALATE 10 MG/1
10 TABLET ORAL DAILY
Qty: 90 TABLET | Refills: 1 | Status: SHIPPED | OUTPATIENT
Start: 2020-09-16 | End: 2021-05-05 | Stop reason: ALTCHOICE

## 2020-09-17 PROBLEM — Z3A.01 LESS THAN 8 WEEKS GESTATION OF PREGNANCY: Status: ACTIVE | Noted: 2020-09-17

## 2020-09-17 NOTE — PROGRESS NOTES
Assessment/Plan:    82-year-old woman with:  Generalized anxiety disorder and type 1 diabetes currently pregnant  Discussed supportive care return parameters discussed workup and treatment options with risks and benefits current follow-up with gyn  Patient opts to wean her Lexapro at this time but advised that if she feels she needs the medication she can weigh the risks and benefits and consider as low tolerable dose discussed supportive care return parameters otherwise    No problem-specific Assessment & Plan notes found for this encounter  Diagnoses and all orders for this visit:    Missed menses  -     POCT urine HCG    CALLI (generalized anxiety disorder)  -     escitalopram (LEXAPRO) 10 mg tablet; Take 1 tablet (10 mg total) by mouth daily    Less than 8 weeks gestation of pregnancy    Pre-existing type 1 diabetes mellitus during pregnancy in third trimester          Subjective:     Chief Complaint   Patient presents with    Follow-up     1 mo fu for lexapro and pt states she is better,  Patient ID: Tim Traylor is a 31 y o  female  Patient is a 82-year-old woman who presents for follow-up on generalized anxiety disorder she admits that she is concerned that she is pregnant she has type 1 diabetes and follows with Endocrine admits her sugars have been well controlled no fevers chills nausea vomiting she is doing well regarding her medication      The following portions of the patient's history were reviewed and updated as appropriate: allergies, current medications, past family history, past medical history, past social history, past surgical history and problem list     Review of Systems   Constitutional: Negative  HENT: Negative  Eyes: Negative  Respiratory: Negative  Cardiovascular: Negative  Gastrointestinal: Negative  Endocrine: Negative  Genitourinary: Negative  Musculoskeletal: Negative  Allergic/Immunologic: Negative  Neurological: Negative  Hematological: Negative  Psychiatric/Behavioral: Negative  All other systems reviewed and are negative  Objective:      /80 (BP Location: Left arm, Patient Position: Sitting, Cuff Size: Standard)   Pulse 92   Ht 5' 5" (1 651 m)   Wt 62 1 kg (137 lb)   BMI 22 80 kg/m²          Physical Exam  Constitutional:       Appearance: She is well-developed  HENT:      Head: Atraumatic  Right Ear: External ear normal       Left Ear: External ear normal    Eyes:      Conjunctiva/sclera: Conjunctivae normal       Pupils: Pupils are equal, round, and reactive to light  Neck:      Musculoskeletal: Normal range of motion  Pulmonary:      Effort: Pulmonary effort is normal  No respiratory distress  Abdominal:      General: There is no distension  Musculoskeletal: Normal range of motion  Skin:     General: Skin is warm and dry  Neurological:      Mental Status: She is alert and oriented to person, place, and time  Cranial Nerves: No cranial nerve deficit  Psychiatric:         Behavior: Behavior normal          Thought Content:  Thought content normal          Judgment: Judgment normal

## 2020-09-29 ENCOUNTER — OFFICE VISIT (OUTPATIENT)
Dept: OBGYN CLINIC | Facility: CLINIC | Age: 30
End: 2020-09-29
Payer: COMMERCIAL

## 2020-09-29 VITALS
TEMPERATURE: 98.2 F | WEIGHT: 135 LBS | BODY MASS INDEX: 22.47 KG/M2 | SYSTOLIC BLOOD PRESSURE: 130 MMHG | DIASTOLIC BLOOD PRESSURE: 80 MMHG

## 2020-09-29 DIAGNOSIS — Z36.9 ANTENATAL SCREENING ENCOUNTER: ICD-10-CM

## 2020-09-29 DIAGNOSIS — E13.9 DIABETES 1.5, MANAGED AS TYPE 1 (HCC): ICD-10-CM

## 2020-09-29 DIAGNOSIS — N91.1 AMENORRHEA, SECONDARY: ICD-10-CM

## 2020-09-29 DIAGNOSIS — Z33.1 INCIDENTAL PREGNANCY CONFIRMED: Primary | ICD-10-CM

## 2020-09-29 DIAGNOSIS — Z98.891 HISTORY OF CESAREAN SECTION: ICD-10-CM

## 2020-09-29 PROCEDURE — 99213 OFFICE O/P EST LOW 20 MIN: CPT | Performed by: OBSTETRICS & GYNECOLOGY

## 2020-09-29 NOTE — PROGRESS NOTES
Assessment/Plan:    Diagnoses and all orders for this visit:    Incidental pregnancy confirmed  -     hCG, quantitative; Future  -     TSH, 3rd generation with Free T4 reflex; Future  -     US OB < 14 weeks single or first gestation level 1; Future  -     Progesterone; Future     screening encounter  -     Prenatal Panel; Future    History of  section    Diabetes 1 5, managed as type 1 ([facility] [address] )  -     Hemoglobin A1C; Future    Amenorrhea, secondary  -     hCG, quantitative; Future  -     TSH, 3rd generation with Free T4 reflex; Future  -     Progesterone; Future        Subjective:  Pregnancy confirmation     Patient ID: Rosalino Smith is a 31 y o  female  HPI   27-year-old female  2 para 1 with positive home pregnancy test   She has had 1  section due to protracted labor arrest of dilatation and descent and persistent category 2 tracing  She has insulin-dependent diabetes  Most recent A1c was 6 5  She feels well denies any complications during her 1st pregnancy of than needing  at the end  She is uncertain whether she would be interested in TOLAC at this time  Otherwise she feels well denies any nausea vomiting symptoms  Recommend screening laboratory studies as well as dating pelvic ultrasound within the next 2 weeks  Patient has been previously oriented to this practice routines and procedures  Review of Systems   Respiratory: Negative  Cardiovascular: Negative  Gastrointestinal: Negative  Genitourinary: Negative  Neurological: Negative  Psychiatric/Behavioral: Negative  All other systems reviewed and are negative  Objective:     Physical Exam  Neck:      Musculoskeletal: Normal range of motion  Pulmonary:      Effort: Pulmonary effort is normal    Genitourinary:     Comments: Pelvic exam deferred  Neurological:      Mental Status: She is oriented to person, place, and time     Psychiatric:         Behavior: Behavior normal

## 2020-09-30 ENCOUNTER — OFFICE VISIT (OUTPATIENT)
Dept: FAMILY MEDICINE CLINIC | Facility: CLINIC | Age: 30
End: 2020-09-30
Payer: COMMERCIAL

## 2020-09-30 VITALS
WEIGHT: 135 LBS | BODY MASS INDEX: 22.49 KG/M2 | DIASTOLIC BLOOD PRESSURE: 68 MMHG | HEIGHT: 65 IN | SYSTOLIC BLOOD PRESSURE: 110 MMHG

## 2020-09-30 DIAGNOSIS — M79.671 RIGHT FOOT PAIN: Primary | ICD-10-CM

## 2020-09-30 PROCEDURE — 99213 OFFICE O/P EST LOW 20 MIN: CPT | Performed by: FAMILY MEDICINE

## 2020-09-30 PROCEDURE — 3725F SCREEN DEPRESSION PERFORMED: CPT | Performed by: FAMILY MEDICINE

## 2020-09-30 NOTE — PROGRESS NOTES
Assessment/Plan:    82-year-old woman with:  Right foot pain discussed heat and cold along with stretching  Patient declines referral for therapy or to see a specialist at this time but will continue supportive care and call back if not improving worsening    No problem-specific Assessment & Plan notes found for this encounter  Diagnoses and all orders for this visit:    Right foot pain          Subjective:     Chief Complaint   Patient presents with    Pain     Pt c/o pain in calf and thigh with warm feeling in right foot that is increasing since last week  c/o lower back pain also        Patient ID: Lisa Bustamante is a 31 y o  female  Patient is a 82-year-old woman who presents for follow-up on right foot pain intermittently with some burning sensation no fevers chills nausea vomiting no weakness numbness or tingling  She does admit some back pain at times      The following portions of the patient's history were reviewed and updated as appropriate: allergies, current medications, past family history, past medical history, past social history, past surgical history and problem list     Review of Systems   Constitutional: Negative  HENT: Negative  Eyes: Negative  Respiratory: Negative  Cardiovascular: Negative  Gastrointestinal: Negative  Endocrine: Negative  Genitourinary: Negative  Musculoskeletal: Positive for arthralgias and myalgias  Allergic/Immunologic: Negative  Neurological: Negative  Hematological: Negative  Psychiatric/Behavioral: Negative  All other systems reviewed and are negative  Objective:      /68 (BP Location: Left arm, Patient Position: Sitting, Cuff Size: Standard)   Ht 5' 5" (1 651 m)   Wt 61 2 kg (135 lb)   BMI 22 47 kg/m²          Physical Exam  Constitutional:       Appearance: She is well-developed  HENT:      Head: Atraumatic        Right Ear: External ear normal       Left Ear: External ear normal    Eyes: Conjunctiva/sclera: Conjunctivae normal       Pupils: Pupils are equal, round, and reactive to light  Neck:      Musculoskeletal: Normal range of motion  Cardiovascular:      Rate and Rhythm: Normal rate and regular rhythm  Pulses: no weak pulses          Dorsalis pedis pulses are 2+ on the right side and 2+ on the left side  Posterior tibial pulses are 2+ on the right side and 2+ on the left side  Heart sounds: Normal heart sounds  Pulmonary:      Effort: Pulmonary effort is normal  No respiratory distress  Breath sounds: Normal breath sounds  Abdominal:      General: Bowel sounds are normal  There is no distension  Palpations: Abdomen is soft  Tenderness: There is no abdominal tenderness  There is no guarding or rebound  Musculoskeletal: Normal range of motion  Feet:      Right foot:      Skin integrity: No ulcer, skin breakdown, erythema, warmth, callus or dry skin  Left foot:      Skin integrity: No ulcer, skin breakdown, erythema, warmth, callus or dry skin  Skin:     General: Skin is warm and dry  Neurological:      Mental Status: She is alert and oriented to person, place, and time  Cranial Nerves: No cranial nerve deficit  Psychiatric:         Behavior: Behavior normal          Thought Content: Thought content normal          Judgment: Judgment normal          Patient's shoes and socks removed  Right Foot/Ankle   Right Foot Inspection  Skin Exam: skin normal and skin intact no dry skin, no warmth, no callus, no erythema, no maceration, no abnormal color, no pre-ulcer, no ulcer and no callus                          Toe Exam: ROM and strength within normal limits  Sensory       Monofilament testing: intact  Vascular    The right DP pulse is 2+  The right PT pulse is 2+       Left Foot/Ankle  Left Foot Inspection  Skin Exam: skin normal and skin intactno dry skin, no warmth, no erythema, no maceration, normal color, no pre-ulcer, no ulcer and no callus                         Toe Exam: ROM and strength within normal limits                   Sensory       Monofilament: intact  Vascular    The left DP pulse is 2+  The left PT pulse is 2+  Assign Risk Category:  No deformity present; No loss of protective sensation;  No weak pulses       Risk: 0

## 2020-10-03 ENCOUNTER — APPOINTMENT (OUTPATIENT)
Dept: LAB | Facility: HOSPITAL | Age: 30
End: 2020-10-03
Attending: OBSTETRICS & GYNECOLOGY
Payer: COMMERCIAL

## 2020-10-03 DIAGNOSIS — Z36.9 ANTENATAL SCREENING ENCOUNTER: ICD-10-CM

## 2020-10-03 DIAGNOSIS — Z33.1 INCIDENTAL PREGNANCY CONFIRMED: ICD-10-CM

## 2020-10-03 DIAGNOSIS — E13.9 DIABETES 1.5, MANAGED AS TYPE 1 (HCC): ICD-10-CM

## 2020-10-03 DIAGNOSIS — N91.1 AMENORRHEA, SECONDARY: ICD-10-CM

## 2020-10-03 LAB
ABO GROUP BLD: NORMAL
B-HCG SERPL-ACNC: 7051.5 MIU/ML
BASOPHILS # BLD AUTO: 0.03 THOUSANDS/ΜL (ref 0–0.1)
BASOPHILS NFR BLD AUTO: 1 % (ref 0–1)
BILIRUB UR QL STRIP: NEGATIVE
BLD GP AB SCN SERPL QL: NEGATIVE
CLARITY UR: CLEAR
COLOR UR: YELLOW
EOSINOPHIL # BLD AUTO: 0.1 THOUSAND/ΜL (ref 0–0.61)
EOSINOPHIL NFR BLD AUTO: 2 % (ref 0–6)
ERYTHROCYTE [DISTWIDTH] IN BLOOD BY AUTOMATED COUNT: 11.9 % (ref 11.6–15.1)
EST. AVERAGE GLUCOSE BLD GHB EST-MCNC: 131 MG/DL
GLUCOSE UR STRIP-MCNC: NEGATIVE MG/DL
HBA1C MFR BLD: 6.2 %
HBV SURFACE AG SER QL: NORMAL
HCT VFR BLD AUTO: 41.9 % (ref 34.8–46.1)
HGB BLD-MCNC: 13.6 G/DL (ref 11.5–15.4)
HGB UR QL STRIP.AUTO: NEGATIVE
IMM GRANULOCYTES # BLD AUTO: 0.02 THOUSAND/UL (ref 0–0.2)
IMM GRANULOCYTES NFR BLD AUTO: 0 % (ref 0–2)
KETONES UR STRIP-MCNC: NEGATIVE MG/DL
LEUKOCYTE ESTERASE UR QL STRIP: NEGATIVE
LYMPHOCYTES # BLD AUTO: 1.61 THOUSANDS/ΜL (ref 0.6–4.47)
LYMPHOCYTES NFR BLD AUTO: 26 % (ref 14–44)
MCH RBC QN AUTO: 31.1 PG (ref 26.8–34.3)
MCHC RBC AUTO-ENTMCNC: 32.5 G/DL (ref 31.4–37.4)
MCV RBC AUTO: 96 FL (ref 82–98)
MONOCYTES # BLD AUTO: 0.48 THOUSAND/ΜL (ref 0.17–1.22)
MONOCYTES NFR BLD AUTO: 8 % (ref 4–12)
NEUTROPHILS # BLD AUTO: 3.87 THOUSANDS/ΜL (ref 1.85–7.62)
NEUTS SEG NFR BLD AUTO: 63 % (ref 43–75)
NITRITE UR QL STRIP: NEGATIVE
NRBC BLD AUTO-RTO: 0 /100 WBCS
PH UR STRIP.AUTO: 6.5 [PH]
PLATELET # BLD AUTO: 177 THOUSANDS/UL (ref 149–390)
PMV BLD AUTO: 13.1 FL (ref 8.9–12.7)
PROGEST SERPL-MCNC: 18.8 NG/ML
PROT UR STRIP-MCNC: NEGATIVE MG/DL
RBC # BLD AUTO: 4.38 MILLION/UL (ref 3.81–5.12)
RH BLD: POSITIVE
RUBV IGG SERPL IA-ACNC: >175 IU/ML
SP GR UR STRIP.AUTO: 1.02 (ref 1–1.03)
SPECIMEN EXPIRATION DATE: NORMAL
TSH SERPL DL<=0.05 MIU/L-ACNC: 1.28 UIU/ML (ref 0.36–3.74)
UROBILINOGEN UR QL STRIP.AUTO: 0.2 E.U./DL
WBC # BLD AUTO: 6.11 THOUSAND/UL (ref 4.31–10.16)

## 2020-10-03 PROCEDURE — 83036 HEMOGLOBIN GLYCOSYLATED A1C: CPT

## 2020-10-03 PROCEDURE — 84702 CHORIONIC GONADOTROPIN TEST: CPT

## 2020-10-03 PROCEDURE — 81003 URINALYSIS AUTO W/O SCOPE: CPT

## 2020-10-03 PROCEDURE — 80081 OBSTETRIC PANEL INC HIV TSTG: CPT

## 2020-10-03 PROCEDURE — 36415 COLL VENOUS BLD VENIPUNCTURE: CPT

## 2020-10-03 PROCEDURE — 3044F HG A1C LEVEL LT 7.0%: CPT | Performed by: NURSE PRACTITIONER

## 2020-10-03 PROCEDURE — 87086 URINE CULTURE/COLONY COUNT: CPT

## 2020-10-03 PROCEDURE — 84443 ASSAY THYROID STIM HORMONE: CPT

## 2020-10-03 PROCEDURE — 84144 ASSAY OF PROGESTERONE: CPT

## 2020-10-05 LAB
BACTERIA UR CULT: NORMAL
HIV 1+2 AB+HIV1 P24 AG SERPL QL IA: NORMAL
RPR SER QL: NORMAL

## 2020-10-06 NOTE — PROGRESS NOTES
DATE: 19   RE: Rosemary Bucio   : 1990  JOHN: 10/8/2019  EGA:  9w6d  OB/GYN: [place]     3/4/19:  Fastin  Breakfast: 72  2 hrs after: 87  Lunch: 72  2 hrs  after: 80  Dinner: 139  1 hr  after: 111    3/5/19:  Fastin  Breakfast: 88  2 hrs after: 67  Lunch: 82  2 hrs  after: [address]: 81  2 hr  after: 101    3/6/19:  Fasting/Breakfast: 79  2 hrs after: 82  Lunch: 101  2 hrs  after: 71  Dinner: 68  2 hr  after: 103    3/7/19:  Fastin  Breakfast: 89  2 hrs after: 88  Lunch: 85  2 hrs  after: [id number]: 79  2 hr  after: 77    3/8/19:  Fastin  Breakfast: 72  2 hrs after: 79  Lunch: 91  2 hrs  after: 78  Dinner: 92  2 hr  after: 101    3/9/19:  Fasting/Breakfast: 77  2 hrs after: 102  Lunch: 112  2 hrs  after: [address]: 97  2 hr  after: 89    3/10/19:  Fasting/Breakfast: 93  2 hrs after: 112  Lunch: 101  2 hrs  after: 68  Dinner: 82  2 hr  after: 100    3/11/19:  Fastin  Breakfast: 110  2 hrs after: 85  Lunch: 101  2 hrs  after: 148 - went down quickly after though (I tested it half hour later and it was in range)  Dinner: 97  2 hr  after: 108  Blood glucose log from patient e-mail  Current regimen:  Levemir- 12 units at bedtime  Novolog- 10 minutes before eating   Breakfast- 5 units   Lunch- 4 units  Dinner- 4 units   1800 calorie diabetes and pregnancy diet with 3 meals/snacks including protein increasing to 2100 calories as pregnancy progresses  Self monitoring blood glucose fasting and 2 hours after start of meals with Free Style Lite meter    Plan:  Continue current regimen    19 HbA1c- 6 1% re-order labs week of 3-24-19  2-23-19 CMP, T4 and TSH all WNL during pregnancy  3-4-19 U/S showed normal fetal growth    Date due to report next:  Tuesday, 3-19-19    Phillip Corbin RN BS CDE  Diabetes Educator   [facility]
(4) excellent

## 2020-10-12 ENCOUNTER — INITIAL PRENATAL (OUTPATIENT)
Dept: OBGYN CLINIC | Facility: CLINIC | Age: 30
End: 2020-10-12
Payer: COMMERCIAL

## 2020-10-12 ENCOUNTER — ULTRASOUND (OUTPATIENT)
Dept: OBGYN CLINIC | Facility: CLINIC | Age: 30
End: 2020-10-12
Payer: COMMERCIAL

## 2020-10-12 VITALS
SYSTOLIC BLOOD PRESSURE: 116 MMHG | DIASTOLIC BLOOD PRESSURE: 72 MMHG | WEIGHT: 131.2 LBS | HEART RATE: 77 BPM | HEIGHT: 65 IN | BODY MASS INDEX: 21.86 KG/M2 | TEMPERATURE: 98 F

## 2020-10-12 DIAGNOSIS — Z23 NEED FOR INFLUENZA VACCINATION: Primary | ICD-10-CM

## 2020-10-12 DIAGNOSIS — Z36.9 ANTENATAL SCREENING ENCOUNTER: Primary | ICD-10-CM

## 2020-10-12 DIAGNOSIS — Z34.80 NORMAL PREGNANCY IN MULTIGRAVIDA: ICD-10-CM

## 2020-10-12 DIAGNOSIS — O24.019 TYPE 1 DIABETES MELLITUS AFFECTING PREGNANCY, ANTEPARTUM: ICD-10-CM

## 2020-10-12 PROCEDURE — OBC: Performed by: OBSTETRICS & GYNECOLOGY

## 2020-10-12 PROCEDURE — 90471 IMMUNIZATION ADMIN: CPT | Performed by: OBSTETRICS & GYNECOLOGY

## 2020-10-12 PROCEDURE — 90472 IMMUNIZATION ADMIN EACH ADD: CPT | Performed by: OBSTETRICS & GYNECOLOGY

## 2020-10-12 PROCEDURE — 76817 TRANSVAGINAL US OBSTETRIC: CPT | Performed by: OBSTETRICS & GYNECOLOGY

## 2020-10-12 PROCEDURE — 90686 IIV4 VACC NO PRSV 0.5 ML IM: CPT | Performed by: OBSTETRICS & GYNECOLOGY

## 2020-10-15 ENCOUNTER — TELEPHONE (OUTPATIENT)
Dept: PERINATAL CARE | Facility: CLINIC | Age: 30
End: 2020-10-15

## 2020-10-21 ENCOUNTER — TELEMEDICINE (OUTPATIENT)
Dept: PERINATAL CARE | Facility: CLINIC | Age: 30
End: 2020-10-21
Payer: COMMERCIAL

## 2020-10-21 VITALS — WEIGHT: 131 LBS | HEIGHT: 65 IN | BODY MASS INDEX: 21.83 KG/M2

## 2020-10-21 DIAGNOSIS — O24.019 TYPE 1 DIABETES MELLITUS AFFECTING PREGNANCY, ANTEPARTUM: ICD-10-CM

## 2020-10-21 DIAGNOSIS — Z3A.08 8 WEEKS GESTATION OF PREGNANCY: ICD-10-CM

## 2020-10-21 DIAGNOSIS — O24.011 PRE-EXISTING TYPE 1 DIABETES MELLITUS WITH HYPERGLYCEMIA DURING PREGNANCY IN FIRST TRIMESTER (HCC): Primary | ICD-10-CM

## 2020-10-21 DIAGNOSIS — E10.65 PRE-EXISTING TYPE 1 DIABETES MELLITUS WITH HYPERGLYCEMIA DURING PREGNANCY IN FIRST TRIMESTER (HCC): Primary | ICD-10-CM

## 2020-10-21 PROBLEM — Z3A.38 38 WEEKS GESTATION OF PREGNANCY: Status: RESOLVED | Noted: 2019-02-19 | Resolved: 2020-10-21

## 2020-10-21 PROBLEM — O40.3XX0 POLYHYDRAMNIOS IN THIRD TRIMESTER: Status: RESOLVED | Noted: 2019-09-26 | Resolved: 2020-10-21

## 2020-10-21 PROCEDURE — 99215 OFFICE O/P EST HI 40 MIN: CPT | Performed by: NURSE PRACTITIONER

## 2020-10-21 PROCEDURE — 1036F TOBACCO NON-USER: CPT | Performed by: NURSE PRACTITIONER

## 2020-10-21 RX ORDER — BLOOD-GLUCOSE METER
KIT MISCELLANEOUS
COMMUNITY

## 2020-10-26 ENCOUNTER — TELEPHONE (OUTPATIENT)
Dept: OBGYN CLINIC | Facility: CLINIC | Age: 30
End: 2020-10-26

## 2020-10-26 ENCOUNTER — TELEPHONE (OUTPATIENT)
Dept: PERINATAL CARE | Facility: CLINIC | Age: 30
End: 2020-10-26

## 2020-10-27 ENCOUNTER — ULTRASOUND (OUTPATIENT)
Dept: PERINATAL CARE | Facility: OTHER | Age: 30
End: 2020-10-27
Payer: COMMERCIAL

## 2020-10-27 VITALS — HEIGHT: 65 IN | WEIGHT: 134 LBS | BODY MASS INDEX: 22.33 KG/M2 | TEMPERATURE: 97 F

## 2020-10-27 DIAGNOSIS — O03.9 MISCARRIAGE: Primary | ICD-10-CM

## 2020-10-27 DIAGNOSIS — O03.4 INCOMPLETE SPONTANEOUS ABORTION: Primary | ICD-10-CM

## 2020-10-27 DIAGNOSIS — O24.019 TYPE 1 DIABETES MELLITUS AFFECTING PREGNANCY, ANTEPARTUM: ICD-10-CM

## 2020-10-27 DIAGNOSIS — Z36.87 ENCOUNTER FOR ANTENATAL SCREENING FOR UNCERTAIN DATES: ICD-10-CM

## 2020-10-27 PROCEDURE — 99213 OFFICE O/P EST LOW 20 MIN: CPT | Performed by: OBSTETRICS & GYNECOLOGY

## 2020-10-27 PROCEDURE — 3008F BODY MASS INDEX DOCD: CPT | Performed by: OBSTETRICS & GYNECOLOGY

## 2020-10-27 PROCEDURE — 76801 OB US < 14 WKS SINGLE FETUS: CPT | Performed by: OBSTETRICS & GYNECOLOGY

## 2020-10-28 ENCOUNTER — TELEPHONE (OUTPATIENT)
Dept: OBGYN CLINIC | Facility: CLINIC | Age: 30
End: 2020-10-28

## 2020-10-28 ENCOUNTER — TELEPHONE (OUTPATIENT)
Dept: PERINATAL CARE | Facility: CLINIC | Age: 30
End: 2020-10-28

## 2020-10-29 ENCOUNTER — TELEPHONE (OUTPATIENT)
Dept: PERINATAL CARE | Facility: OTHER | Age: 30
End: 2020-10-29

## 2020-10-29 ENCOUNTER — LAB (OUTPATIENT)
Dept: LAB | Facility: HOSPITAL | Age: 30
End: 2020-10-29
Payer: COMMERCIAL

## 2020-10-29 DIAGNOSIS — O03.4 INCOMPLETE SPONTANEOUS ABORTION: ICD-10-CM

## 2020-10-29 LAB
B-HCG SERPL-ACNC: ABNORMAL MIU/ML
CREAT UR-MCNC: 243 MG/DL
PROT UR-MCNC: 28 MG/DL
PROT/CREAT UR: 0.12 MG/G{CREAT} (ref 0–0.1)

## 2020-10-29 PROCEDURE — 84156 ASSAY OF PROTEIN URINE: CPT | Performed by: NURSE PRACTITIONER

## 2020-10-29 PROCEDURE — 3061F NEG MICROALBUMINURIA REV: CPT | Performed by: OBSTETRICS & GYNECOLOGY

## 2020-10-29 PROCEDURE — 82570 ASSAY OF URINE CREATININE: CPT | Performed by: NURSE PRACTITIONER

## 2020-10-29 PROCEDURE — 36415 COLL VENOUS BLD VENIPUNCTURE: CPT

## 2020-10-29 PROCEDURE — 84702 CHORIONIC GONADOTROPIN TEST: CPT

## 2020-11-02 ENCOUNTER — TELEPHONE (OUTPATIENT)
Dept: OBGYN CLINIC | Facility: CLINIC | Age: 30
End: 2020-11-02

## 2020-11-02 DIAGNOSIS — O03.4 INCOMPLETE SPONTANEOUS ABORTION: Primary | ICD-10-CM

## 2020-11-03 ENCOUNTER — APPOINTMENT (OUTPATIENT)
Dept: LAB | Facility: HOSPITAL | Age: 30
End: 2020-11-03
Attending: OBSTETRICS & GYNECOLOGY
Payer: COMMERCIAL

## 2020-11-03 DIAGNOSIS — O03.4 INCOMPLETE SPONTANEOUS ABORTION: ICD-10-CM

## 2020-11-03 DIAGNOSIS — O03.4 INCOMPLETE SPONTANEOUS ABORTION: Primary | ICD-10-CM

## 2020-11-03 LAB — B-HCG SERPL-ACNC: 2270.4 MIU/ML

## 2020-11-03 PROCEDURE — 36415 COLL VENOUS BLD VENIPUNCTURE: CPT

## 2020-11-03 PROCEDURE — 84702 CHORIONIC GONADOTROPIN TEST: CPT

## 2020-11-23 ENCOUNTER — TELEPHONE (OUTPATIENT)
Dept: FAMILY MEDICINE CLINIC | Facility: CLINIC | Age: 30
End: 2020-11-23

## 2020-11-23 DIAGNOSIS — Z03.818 ENCOUNTER FOR OBSERVATION FOR SUSPECTED EXPOSURE TO OTHER BIOLOGICAL AGENTS RULED OUT: Primary | ICD-10-CM

## 2020-11-23 DIAGNOSIS — Z03.818 ENCOUNTER FOR OBSERVATION FOR SUSPECTED EXPOSURE TO OTHER BIOLOGICAL AGENTS RULED OUT: ICD-10-CM

## 2020-11-23 PROCEDURE — U0003 INFECTIOUS AGENT DETECTION BY NUCLEIC ACID (DNA OR RNA); SEVERE ACUTE RESPIRATORY SYNDROME CORONAVIRUS 2 (SARS-COV-2) (CORONAVIRUS DISEASE [COVID-19]), AMPLIFIED PROBE TECHNIQUE, MAKING USE OF HIGH THROUGHPUT TECHNOLOGIES AS DESCRIBED BY CMS-2020-01-R: HCPCS | Performed by: FAMILY MEDICINE

## 2020-11-24 LAB — SARS-COV-2 RNA SPEC QL NAA+PROBE: NOT DETECTED

## 2020-12-28 ENCOUNTER — TRANSCRIBE ORDERS (OUTPATIENT)
Dept: ADMINISTRATIVE | Facility: HOSPITAL | Age: 30
End: 2020-12-28

## 2020-12-28 ENCOUNTER — ANNUAL EXAM (OUTPATIENT)
Dept: OBGYN CLINIC | Facility: CLINIC | Age: 30
End: 2020-12-28
Payer: COMMERCIAL

## 2020-12-28 ENCOUNTER — LAB (OUTPATIENT)
Dept: LAB | Facility: HOSPITAL | Age: 30
End: 2020-12-28
Payer: COMMERCIAL

## 2020-12-28 VITALS
WEIGHT: 134 LBS | DIASTOLIC BLOOD PRESSURE: 80 MMHG | SYSTOLIC BLOOD PRESSURE: 120 MMHG | BODY MASS INDEX: 22.33 KG/M2 | HEIGHT: 65 IN

## 2020-12-28 DIAGNOSIS — E10.9 TYPE 1 DIABETES MELLITUS WITHOUT COMPLICATION (HCC): ICD-10-CM

## 2020-12-28 DIAGNOSIS — O03.9 SPONTANEOUS ABORTION: ICD-10-CM

## 2020-12-28 DIAGNOSIS — E10.9 TYPE 1 DIABETES MELLITUS WITHOUT COMPLICATION (HCC): Primary | ICD-10-CM

## 2020-12-28 DIAGNOSIS — Z01.419 WOMEN'S ANNUAL ROUTINE GYNECOLOGICAL EXAMINATION: Primary | ICD-10-CM

## 2020-12-28 DIAGNOSIS — Z98.891 HISTORY OF CESAREAN SECTION: ICD-10-CM

## 2020-12-28 DIAGNOSIS — E13.9 DIABETES 1.5, MANAGED AS TYPE 1 (HCC): ICD-10-CM

## 2020-12-28 LAB
EST. AVERAGE GLUCOSE BLD GHB EST-MCNC: 128 MG/DL
HBA1C MFR BLD: 6.1 %

## 2020-12-28 PROCEDURE — S0612 ANNUAL GYNECOLOGICAL EXAMINA: HCPCS | Performed by: OBSTETRICS & GYNECOLOGY

## 2020-12-28 PROCEDURE — G0145 SCR C/V CYTO,THINLAYER,RESCR: HCPCS | Performed by: OBSTETRICS & GYNECOLOGY

## 2020-12-28 PROCEDURE — 83036 HEMOGLOBIN GLYCOSYLATED A1C: CPT

## 2020-12-28 PROCEDURE — 36415 COLL VENOUS BLD VENIPUNCTURE: CPT

## 2020-12-30 LAB
LAB AP GYN PRIMARY INTERPRETATION: NORMAL
Lab: NORMAL

## 2021-01-08 ENCOUNTER — LAB (OUTPATIENT)
Dept: LAB | Facility: HOSPITAL | Age: 31
End: 2021-01-08
Payer: COMMERCIAL

## 2021-01-08 DIAGNOSIS — O03.9 SPONTANEOUS ABORTION: ICD-10-CM

## 2021-01-08 LAB — B-HCG SERPL-ACNC: <2 MIU/ML

## 2021-01-08 PROCEDURE — 84702 CHORIONIC GONADOTROPIN TEST: CPT

## 2021-01-08 PROCEDURE — 36415 COLL VENOUS BLD VENIPUNCTURE: CPT

## 2021-03-18 ENCOUNTER — TELEPHONE (OUTPATIENT)
Dept: OBGYN CLINIC | Facility: CLINIC | Age: 31
End: 2021-03-18

## 2021-03-18 DIAGNOSIS — Z33.1 INCIDENTAL PREGNANCY: Primary | ICD-10-CM

## 2021-03-18 NOTE — TELEPHONE ENCOUNTER
Looks like Novolog and Levemir, just got new glucometer - assuming she does - d/t type 1  I will order labs and call her - how soon do you want to see her? 7-9 weeks and should I do an MFM consult once confirmation of pregnancy?

## 2021-03-18 NOTE — TELEPHONE ENCOUNTER
32 yo  with positive urine test at home  She is a type 1 diabetic last seen with endocrinology   Recent miscarriage in 2020 and was seen by Dr Beverley Bradshaw 2020  Pt called to schedule pregnancy confirmation on 21  Her LMP is 21 - which makes her currently 3 6 wks  She is curious of any immediate testing, referrals she might need to initiate due to DM1 and recent early loss

## 2021-03-18 NOTE — TELEPHONE ENCOUNTER
I would just get a quantitative beta hCG serum progesterone and  TSH with reflex to free T4     Is she currently on meds and she currently checking her blood sugars

## 2021-03-18 NOTE — TELEPHONE ENCOUNTER
Try to get her in between 7 and 8 weeks  We can send her to [facility] after confirmatory ultrasound has been completed  Tell her to continue monitoring her blood sugars as she normally does and contact her endocrinologist or her PCP if she needs any immediate adjustments to her medication  Otherwise call here if she is having any acute problems such as pain and or bleeding  Reassure her we need those early lab test before we move forward on anything further at this time

## 2021-03-20 ENCOUNTER — LAB (OUTPATIENT)
Dept: LAB | Facility: HOSPITAL | Age: 31
End: 2021-03-20
Attending: OBSTETRICS & GYNECOLOGY
Payer: COMMERCIAL

## 2021-03-20 DIAGNOSIS — Z33.1 INCIDENTAL PREGNANCY: ICD-10-CM

## 2021-03-20 LAB
B-HCG SERPL-ACNC: 468.2 MIU/ML
PROGEST SERPL-MCNC: 26 NG/ML
TSH SERPL DL<=0.05 MIU/L-ACNC: 2.08 UIU/ML (ref 0.36–3.74)

## 2021-03-20 PROCEDURE — 84702 CHORIONIC GONADOTROPIN TEST: CPT

## 2021-03-20 PROCEDURE — 36415 COLL VENOUS BLD VENIPUNCTURE: CPT

## 2021-03-20 PROCEDURE — 84443 ASSAY THYROID STIM HORMONE: CPT

## 2021-03-20 PROCEDURE — 84144 ASSAY OF PROGESTERONE: CPT

## 2021-03-24 NOTE — TELEPHONE ENCOUNTER
Pts  2 she is scheduled for 4/12 between 7-8 weeks  Pt with a recent loss in 10/2020 - do we want to follow HCG levels?

## 2021-04-12 ENCOUNTER — ULTRASOUND (OUTPATIENT)
Dept: OBGYN CLINIC | Facility: CLINIC | Age: 31
End: 2021-04-12
Payer: COMMERCIAL

## 2021-04-12 ENCOUNTER — OFFICE VISIT (OUTPATIENT)
Dept: OBGYN CLINIC | Facility: CLINIC | Age: 31
End: 2021-04-12
Payer: COMMERCIAL

## 2021-04-12 ENCOUNTER — TELEPHONE (OUTPATIENT)
Dept: OBGYN CLINIC | Facility: CLINIC | Age: 31
End: 2021-04-12

## 2021-04-12 ENCOUNTER — INITIAL PRENATAL (OUTPATIENT)
Dept: OBGYN CLINIC | Facility: CLINIC | Age: 31
End: 2021-04-12
Payer: COMMERCIAL

## 2021-04-12 VITALS — BODY MASS INDEX: 21.47 KG/M2 | SYSTOLIC BLOOD PRESSURE: 118 MMHG | DIASTOLIC BLOOD PRESSURE: 78 MMHG | WEIGHT: 129 LBS

## 2021-04-12 VITALS — WEIGHT: 128.6 LBS | BODY MASS INDEX: 21.4 KG/M2

## 2021-04-12 DIAGNOSIS — N91.1 SECONDARY AMENORRHEA: ICD-10-CM

## 2021-04-12 DIAGNOSIS — Z33.1 INCIDENTAL PREGNANCY: ICD-10-CM

## 2021-04-12 DIAGNOSIS — E10.9 DIABETES MELLITUS TYPE 1, CONTROLLED, WITHOUT COMPLICATIONS (HCC): ICD-10-CM

## 2021-04-12 DIAGNOSIS — Z34.81 PRENATAL CARE, SUBSEQUENT PREGNANCY, FIRST TRIMESTER: Primary | ICD-10-CM

## 2021-04-12 DIAGNOSIS — Z36.9 ANTENATAL SCREENING ENCOUNTER: Primary | ICD-10-CM

## 2021-04-12 DIAGNOSIS — E13.9 DIABETES 1.5, MANAGED AS TYPE 1 (HCC): ICD-10-CM

## 2021-04-12 PROCEDURE — OBC: Performed by: OBSTETRICS & GYNECOLOGY

## 2021-04-12 PROCEDURE — 1036F TOBACCO NON-USER: CPT | Performed by: OBSTETRICS & GYNECOLOGY

## 2021-04-12 PROCEDURE — 76817 TRANSVAGINAL US OBSTETRIC: CPT | Performed by: OBSTETRICS & GYNECOLOGY

## 2021-04-12 PROCEDURE — 99212 OFFICE O/P EST SF 10 MIN: CPT | Performed by: OBSTETRICS & GYNECOLOGY

## 2021-04-12 RX ORDER — INSULIN DETEMIR 100 [IU]/ML
18 INJECTION, SOLUTION SUBCUTANEOUS
COMMUNITY
Start: 2020-11-19 | End: 2021-09-16 | Stop reason: SDUPTHER

## 2021-04-12 NOTE — PROGRESS NOTES
Assessment/Plan:    Diagnoses and all orders for this visit:    Incidental pregnancy  -     Obstetric panel; Future  -     hCG, quantitative; Future  -     Progesterone; Future  -     US OB < 14 weeks single or first gestation level 1; Future    Diabetes 1 5, managed as type 1 ([address] )    Secondary amenorrhea  -     Obstetric panel; Future  -     hCG, quantitative; Future  -     Progesterone; Future  -     TSH, 3rd generation with Free T4 reflex; Future        Subjective: pregnancy confirmation     Patient ID: Coty Alamo is a 31 y o  female  HPI     82-year-old female  3 para 1 0 1 1 here for positive pregnancy test   Patient is a insulin-dependent diabetic  She recently had spontaneous  in 2020  LMP was  estimated gestational age was 9 weeks and 3 days [facility] 2021  Dating pelvic ultrasound done also at this visit reveals viable IUP  Measuring at approximately 8 weeks and 0 days, size larger than dates [facility] 2021 by today's ultrasound  Patient does have some mild nausea without vomiting  She does not require any medication at this time  Patient has been oriented to office practice and procedures  She will have her OB intake later today and follow-up in about 4 weeks time for initial OB physical exam     Screening labs ordered, dating ultrasound completed all questions answered  Review of Systems   Constitutional: Negative  Respiratory: Negative  Cardiovascular: Negative  Gastrointestinal: Negative  Genitourinary: Negative  Neurological: Negative  All other systems reviewed and are negative  Objective:  No acute distress  /78 (BP Location: Left arm, Patient Position: Sitting, Cuff Size: Standard)   Wt 58 5 kg (129 lb)   LMP 2021   BMI 21 47 kg/m²      Physical Exam  Vitals signs reviewed  Exam conducted with a chaperone present  Constitutional:       Appearance: Normal appearance  She is normal weight     Eyes: Extraocular Movements: Extraocular movements intact  Pupils: Pupils are equal, round, and reactive to light  Pulmonary:      Effort: Pulmonary effort is normal    Genitourinary:     Comments:  Pelvic exam deferred  Musculoskeletal: Normal range of motion  Skin:     General: Skin is warm and dry  Neurological:      Mental Status: She is alert and oriented to person, place, and time  Psychiatric:         Mood and Affect: Mood normal          Behavior: Behavior normal          Thought Content: Thought content normal          Judgment: Judgment normal           Please note    In addition to the time spent discussing the findings and results of today's visit and exam, I spent approximately 15 minutes of face-to-face time with the patient, greater than 50% of which was spent in counseling and coordination of care for this patient

## 2021-04-12 NOTE — PROGRESS NOTES
OB INTAKE INTERVIEW / Jon Quigley  * Pt presents for OB intake  * Accompanied by: Stacey MASTERS  *[id number]  *Pt's LMP was 21  *Ultrasound date:   8weeks 0days  *Estimated date of delivery: 21   * confirmed by LMP  *Patient's Occupation: TEACHER  *Signs/Symptoms of Pregnancy   *NO constipation    *NO headaches   *LITTLE BIT cramping/spotting    *NO PICA cravings   *Diabetes  If any of the following answers are  yes, please order 1 hour GTT, 50g   YES - TYPE 1 DM   *NO hx of GDM    *NO BMI >35    *NO first degree relative with type 2 diabetes    *NO hx of PCOS   *NO current metformin use    *NO prior hx of LGA/macrosomia    *NO AMA with other risk factors   *Hypertension- If any of the following answers are yes, please order preeclampsia labs including 24 hour urine protein   *NO Hx of chronic HTN    *NO Hx of gestational HTN   *NO hx of preeclampsia, eclampsia, or HELLP syndrome   *Infection Screening   *NO Does the pt have a hx of MRSA? *if yes- follow MRSA protocol and obtain a nasal swab for MRSA culture   *NO history of herpes?    *Immunizations:   *YES Discussed influenza vaccine      Given: [facility]   *YES Discussed Tdap vaccine  *Previous Delivery Complications:   *NO  delivery   *YES Previous   *Interview education   *Handouts given:    *Baby and Me support center    *[facility] sign up instructions    *Lab Locations    *[facility] List    *[address] Childbirth [facility] Parenting Classes    *Schedule for prenatal visits    *Schedule for ultrasounds    *Pregnancy Warning Signs reviewed    *Safe Medications During Pregnancy    *[facility]    *Advanced Pregnancy Guidelines    *Perineal Massage    *Breast Pump    *Cord blood and cord tissue collection information  *[facility]   *YES discussed genetic testing- pt interested    *PENDING appointment at [facility] made    Patient has been informed of basic prenatal advice such as avoiding alcohol, drugs, and smoking  She should remain hydrated and take daily prenatal vitamins  Patient should avoid caffeine, raw sprouts, high mercury fish, undercooked fish, raw eggs, organ meat, unwashed produce, and unpasteurized cheeses, milk, and fruit juice and undercooked meats    She has been informed about toxoplasmosis and to avoid cat feces  *I have these concerns about this prenatal patient: Pt is Type 1DM well controlled  *Details that I feel the provider should be aware of: dESIRES tolac, PREVIOUS c/S FOR FETAL INTOLERANCE, TYPE I DM - PT REPORTS WELL CONTROLLED BS  PN1 visit scheduled  The patient was oriented to our practice and all questions were answered  Interviewed by:  Ellen Taylor RN

## 2021-04-20 ENCOUNTER — APPOINTMENT (OUTPATIENT)
Dept: LAB | Facility: HOSPITAL | Age: 31
End: 2021-04-20
Payer: COMMERCIAL

## 2021-04-20 DIAGNOSIS — Z33.1 INCIDENTAL PREGNANCY: ICD-10-CM

## 2021-04-20 DIAGNOSIS — E10.9 DIABETES MELLITUS TYPE 1, CONTROLLED, WITHOUT COMPLICATIONS (HCC): ICD-10-CM

## 2021-04-20 DIAGNOSIS — Z36.9 ANTENATAL SCREENING ENCOUNTER: ICD-10-CM

## 2021-04-20 DIAGNOSIS — N91.1 SECONDARY AMENORRHEA: ICD-10-CM

## 2021-04-20 LAB
ABO GROUP BLD: NORMAL
B-HCG SERPL-ACNC: ABNORMAL MIU/ML
BASOPHILS # BLD AUTO: 0.03 THOUSANDS/ΜL (ref 0–0.1)
BASOPHILS NFR BLD AUTO: 0 % (ref 0–1)
BLD GP AB SCN SERPL QL: NEGATIVE
EOSINOPHIL # BLD AUTO: 0.06 THOUSAND/ΜL (ref 0–0.61)
EOSINOPHIL NFR BLD AUTO: 1 % (ref 0–6)
ERYTHROCYTE [DISTWIDTH] IN BLOOD BY AUTOMATED COUNT: 12.1 % (ref 11.6–15.1)
EST. AVERAGE GLUCOSE BLD GHB EST-MCNC: 126 MG/DL
HBA1C MFR BLD: 6 %
HBV SURFACE AG SER QL: NORMAL
HCT VFR BLD AUTO: 41.3 % (ref 34.8–46.1)
HGB BLD-MCNC: 14.4 G/DL (ref 11.5–15.4)
IMM GRANULOCYTES # BLD AUTO: 0.03 THOUSAND/UL (ref 0–0.2)
IMM GRANULOCYTES NFR BLD AUTO: 0 % (ref 0–2)
LYMPHOCYTES # BLD AUTO: 1.37 THOUSANDS/ΜL (ref 0.6–4.47)
LYMPHOCYTES NFR BLD AUTO: 16 % (ref 14–44)
MCH RBC QN AUTO: 31 PG (ref 26.8–34.3)
MCHC RBC AUTO-ENTMCNC: 34.9 G/DL (ref 31.4–37.4)
MCV RBC AUTO: 89 FL (ref 82–98)
MONOCYTES # BLD AUTO: 0.47 THOUSAND/ΜL (ref 0.17–1.22)
MONOCYTES NFR BLD AUTO: 6 % (ref 4–12)
NEUTROPHILS # BLD AUTO: 6.51 THOUSANDS/ΜL (ref 1.85–7.62)
NEUTS SEG NFR BLD AUTO: 77 % (ref 43–75)
NRBC BLD AUTO-RTO: 0 /100 WBCS
PLATELET # BLD AUTO: 182 THOUSANDS/UL (ref 149–390)
PMV BLD AUTO: 12.6 FL (ref 8.9–12.7)
PROGEST SERPL-MCNC: 26.9 NG/ML
RBC # BLD AUTO: 4.65 MILLION/UL (ref 3.81–5.12)
RH BLD: POSITIVE
RPR SER QL: NORMAL
RUBV IGG SERPL IA-ACNC: >175 IU/ML
SPECIMEN EXPIRATION DATE: NORMAL
TSH SERPL DL<=0.05 MIU/L-ACNC: 0.63 UIU/ML (ref 0.36–3.74)
WBC # BLD AUTO: 8.47 THOUSAND/UL (ref 4.31–10.16)

## 2021-04-20 PROCEDURE — 84144 ASSAY OF PROGESTERONE: CPT

## 2021-04-20 PROCEDURE — 36415 COLL VENOUS BLD VENIPUNCTURE: CPT

## 2021-04-20 PROCEDURE — 83036 HEMOGLOBIN GLYCOSYLATED A1C: CPT

## 2021-04-20 PROCEDURE — 87147 CULTURE TYPE IMMUNOLOGIC: CPT

## 2021-04-20 PROCEDURE — 3044F HG A1C LEVEL LT 7.0%: CPT | Performed by: OBSTETRICS & GYNECOLOGY

## 2021-04-20 PROCEDURE — 84702 CHORIONIC GONADOTROPIN TEST: CPT

## 2021-04-20 PROCEDURE — 80081 OBSTETRIC PANEL INC HIV TSTG: CPT

## 2021-04-20 PROCEDURE — 84443 ASSAY THYROID STIM HORMONE: CPT

## 2021-04-20 PROCEDURE — 87086 URINE CULTURE/COLONY COUNT: CPT

## 2021-04-21 DIAGNOSIS — N30.00 ACUTE CYSTITIS WITHOUT HEMATURIA: Primary | ICD-10-CM

## 2021-04-21 LAB
BACTERIA UR CULT: ABNORMAL
BACTERIA UR CULT: ABNORMAL
HIV 1+2 AB+HIV1 P24 AG SERPL QL IA: NORMAL

## 2021-04-21 RX ORDER — AMOXICILLIN 500 MG/1
500 CAPSULE ORAL EVERY 8 HOURS SCHEDULED
Qty: 15 CAPSULE | Refills: 0 | Status: SHIPPED | OUTPATIENT
Start: 2021-04-21 | End: 2021-04-26

## 2021-05-05 ENCOUNTER — TELEMEDICINE (OUTPATIENT)
Dept: PERINATAL CARE | Facility: CLINIC | Age: 31
End: 2021-05-05
Payer: COMMERCIAL

## 2021-05-05 VITALS — HEIGHT: 65 IN | WEIGHT: 128 LBS | BODY MASS INDEX: 21.33 KG/M2

## 2021-05-05 DIAGNOSIS — Z3A.11 11 WEEKS GESTATION OF PREGNANCY: ICD-10-CM

## 2021-05-05 DIAGNOSIS — O24.011 PRE-EXISTING TYPE 1 DIABETES MELLITUS WITH HYPERGLYCEMIA DURING PREGNANCY IN FIRST TRIMESTER (HCC): Primary | ICD-10-CM

## 2021-05-05 DIAGNOSIS — E10.65 PRE-EXISTING TYPE 1 DIABETES MELLITUS WITH HYPERGLYCEMIA DURING PREGNANCY IN FIRST TRIMESTER (HCC): Primary | ICD-10-CM

## 2021-05-05 DIAGNOSIS — E10.9 DIABETES MELLITUS TYPE 1, CONTROLLED, WITHOUT COMPLICATIONS (HCC): ICD-10-CM

## 2021-05-05 PROBLEM — Z3A.08 8 WEEKS GESTATION OF PREGNANCY: Status: RESOLVED | Noted: 2020-10-21 | Resolved: 2021-05-05

## 2021-05-05 PROBLEM — Z3A.01 LESS THAN 8 WEEKS GESTATION OF PREGNANCY: Status: RESOLVED | Noted: 2020-09-17 | Resolved: 2021-05-05

## 2021-05-05 PROCEDURE — 99214 OFFICE O/P EST MOD 30 MIN: CPT | Performed by: NURSE PRACTITIONER

## 2021-05-05 NOTE — PROGRESS NOTES
Virtual Regular Visit      Assessment/Plan:    Problem List Items Addressed This Visit        Endocrine    Diabetes mellitus type 1, controlled, without complications ([facility] ) - Primary    Relevant Orders    99 Fahrenheit glucose flowsheet    Comprehensive metabolic panel    Protein / creatinine ratio, urine    Pre-existing type 1 diabetes mellitus with hyperglycemia during pregnancy in first trimester ([facility] )     -A1c 6% almost at goal for pre-gestational   -TSH normal  Check baseline CMP and urine protein creatinine ratio  -Continue Levemir 18 units daily and Novolog 3 to 4 units before meals   -Follow GDM diet with 3 meals and 3 snacks including recommended combination of carb, protein and fat per meal/snack    -No more than 8 to 10 hours of fasting overnight  -SMBG fasting and 2 hours post start of each meal   -Provide Dexcom share code  -Report every Wednesday via glucose flowsheet   -Continue follow up with OB and MFM as recommended   -Stay in close contact with diabetes education team    -After Level II ultrasound fetal growth ultrasound every 4 weeks or as recommended   -Fetal echo needed  Lab Results   Component Value Date    HGBA1C 6 0 (H) 04/20/2021               Other    BMI 21 0-21 9, adult    11 weeks gestation of pregnancy    Relevant Orders    99 Fahrenheit glucose flowsheet    Comprehensive metabolic panel    Protein / creatinine ratio, urine               Reason for visit is   Chief Complaint   Patient presents with    Virtual Regular Visit    Diabetes Type 1        Encounter provider Marcio Landa [place]    Provider located at [address] [place] [id number] [phone]      Recent Visits  No visits were found meeting these conditions     Showing recent visits within past 7 days and meeting all other requirements     Today's Visits  Date Type Provider Dept   05/05/21 Telemedicine Marcio Landa, [address] [place] [address],[address]   Showing today's visits and meeting all other requirements     Future Appointments  No visits were found meeting these conditions  Showing future appointments within next 150 days and meeting all other requirements        The patient was identified by name and date of birth  Reford Given was informed that this is a telemedicine visit and that the visit is being conducted through [address] [employer] and patient was informed that this is a secure, HIPAA-compliant platform  She agrees to proceed     My office door was closed  No one else was in the room  She acknowledged consent and understanding of privacy and security of the video platform  The patient has agreed to participate and understands they can discontinue the visit at any time  Patient is aware this is a billable service  Subjective  Ericka Muse is a 31 y o  female  JOHN 21 T1DM on Levemir 18 units daily and Novolog 3 to 4 units before meals  On Dexcom G6 and SMBG with Freestyle glucose meter, reports fasting less than 90 and 2 hours post meal 130 or less  Sending Dexcom invite to share data and will report vis glucose flowsheet  Has ultrasound appointment next week  Has 20 month old son  Today without any complaints  Last A1c 6%  Past Medical History:   Diagnosis Date    38 weeks gestation of pregnancy 2019    Abnormal Pap smear of cervix     [facility] mellitus [facility] )     DM (diabetes mellitus) ([facility] )     type 1    HPV (human papilloma virus) infection     Polyhydramnios in third trimester 2019    Varicella     vaccinated as child       Past Surgical History:   Procedure Laterality Date     SECTION      INNER EAR SURGERY Left     AK  DELIVERY ONLY N/A 2019    Procedure:  SECTION (); Surgeon:  Dorothy Peters MD;  [facility];  Service: Obstetrics    WISDOM TOOTH EXTRACTION         Current Outpatient Medications   Medication Sig Dispense Refill    acetone, urine, test strip use as needed for BG >250 or for fever, vomiting, or diarrheal illness      Blood Glucose Monitoring Suppl (FreeStyle Lite) DON by Does not apply route      Continuous Blood Gluc  (Dexcom G6 ) DON USED TO MONITOR BLOOD SUGAR LEVELS DAILY      Continuous Blood Gluc Sensor (Dexcom G6 Sensor) MISC CHANGE EVERY 10 DAYS      Continuous Blood Gluc Transmit (Dexcom G6 Transmitter) MISC CHANGE TRANSMITTER EVERY 90 DAYS      FREESTYLE LITE test strip Test up to 8 times a day and as instructed 200 each 3    glucagon (GLUCAGON EMERGENCY) 1 MG injection Inject as directed      insulin aspart (NOVOLOG FLEXPEN) 100 Units/mL injection pen Uses 5 units before breakfast, 4 units before lunch and 4 units before dinner  (Patient taking differently: Uses 3 to 4 units before meals  ) 15 mL 0    insulin detemir (Levemir FlexTouch) 100 Units/mL injection pen Inject 18 Units under the skin daily at bedtime      Insulin Pen Needle (BD PEN NEEDLE DANYELL U/F) 32G X 4 MM MISC Use 4 a day and as directed  100 each 3    Lancets MISC by Does not apply route      Prenatal Vit-Fe Fumarate-FA (PRENATAL 1+1 PO) Take 1 tablet by mouth daily       No current facility-administered medications for this visit  No Known Allergies    Review of Systems   Constitutional: Negative for fatigue and fever  HENT: Negative for congestion, sore throat and trouble swallowing  Eyes: Negative for visual disturbance  Has eye exam scheduled for June 2021  Respiratory: Negative for cough and shortness of breath  Cardiovascular: Negative for chest pain and palpitations  Gastrointestinal: Negative for constipation, nausea and vomiting  Endocrine: Negative for polydipsia, polyphagia and polyuria  Genitourinary: Negative for difficulty urinating and vaginal bleeding  Neurological: Negative for dizziness, numbness and headaches  Psychiatric/Behavioral: Negative for sleep disturbance         Video Exam    Vitals: 05/05/21 1315   Weight: 58 1 kg (128 lb)   Height: 5' 5" (1 651 m)       Physical Exam   It was my intent to perform this visit via video technology but the patient was not able to do a video connection so the visit was completed via audio telephone only  I spent 40 minutes directly with the patient during this visit  VIRTUAL VISIT DISCLAIMER    Rainer Browne acknowledges that she has consented to an online visit or consultation  She understands that the online visit is based solely on information provided by her, and that, in the absence of a face-to-face physical evaluation by the physician, the diagnosis she receives is both limited and provisional in terms of accuracy and completeness  This is not intended to replace a full medical face-to-face evaluation by the physician  Rainer Browne understands and accepts these terms

## 2021-05-05 NOTE — ASSESSMENT & PLAN NOTE
-A1c 6% almost at goal for pre-gestational   -TSH normal  Check baseline CMP and urine protein creatinine ratio  -Continue Levemir 18 units daily and Novolog 3 to 4 units before meals   -Follow GDM diet with 3 meals and 3 snacks including recommended combination of carb, protein and fat per meal/snack    -No more than 8 to 10 hours of fasting overnight  -SMBG fasting and 2 hours post start of each meal   -Provide Dexcom share code  -Report every Wednesday via glucose flowsheet   -Continue follow up with OB and MFM as recommended   -Stay in close contact with diabetes education team    -After Level II ultrasound fetal growth ultrasound every 4 weeks or as recommended   -Fetal echo needed         Lab Results   Component Value Date    HGBA1C 6 0 (H) 04/20/2021

## 2021-05-05 NOTE — PATIENT INSTRUCTIONS
-A1c 6% almost at goal for pre-gestational   -TSH normal  Check baseline CMP and urine protein creatinine ratio  -Continue Levemir 18 units daily and Novolog 3 to 4 units before meals   -Follow GDM diet with 3 meals and 3 snacks including recommended combination of carb, protein and fat per meal/snack    -No more than 8 to 10 hours of fasting overnight  -SMBG fasting and 2 hours post start of each meal   -Provide Dexcom share code  -Report every Wednesday via glucose flowsheet   -Continue follow up with OB and MFM as recommended   -Stay in close contact with diabetes education team    -After Level II ultrasound fetal growth ultrasound every 4 weeks or as recommended   -Fetal echo needed

## 2021-05-06 ENCOUNTER — INITIAL PRENATAL (OUTPATIENT)
Dept: OBGYN CLINIC | Facility: CLINIC | Age: 31
End: 2021-05-06

## 2021-05-06 VITALS
SYSTOLIC BLOOD PRESSURE: 128 MMHG | BODY MASS INDEX: 21.8 KG/M2 | HEART RATE: 77 BPM | DIASTOLIC BLOOD PRESSURE: 72 MMHG | WEIGHT: 131 LBS

## 2021-05-06 DIAGNOSIS — O24.011 PRE-EXISTING TYPE 1 DIABETES MELLITUS WITH HYPERGLYCEMIA DURING PREGNANCY IN FIRST TRIMESTER (HCC): ICD-10-CM

## 2021-05-06 DIAGNOSIS — Z3A.11 11 WEEKS GESTATION OF PREGNANCY: ICD-10-CM

## 2021-05-06 DIAGNOSIS — Z34.91 FIRST TRIMESTER PREGNANCY: Primary | ICD-10-CM

## 2021-05-06 DIAGNOSIS — E10.65 PRE-EXISTING TYPE 1 DIABETES MELLITUS WITH HYPERGLYCEMIA DURING PREGNANCY IN FIRST TRIMESTER (HCC): ICD-10-CM

## 2021-05-06 LAB
SL AMB  POCT GLUCOSE, UA: ABNORMAL
SL AMB POCT URINE PROTEIN: ABNORMAL

## 2021-05-06 PROCEDURE — 87086 URINE CULTURE/COLONY COUNT: CPT | Performed by: OBSTETRICS & GYNECOLOGY

## 2021-05-06 PROCEDURE — 87491 CHLMYD TRACH DNA AMP PROBE: CPT | Performed by: OBSTETRICS & GYNECOLOGY

## 2021-05-06 PROCEDURE — 87591 N.GONORRHOEAE DNA AMP PROB: CPT | Performed by: OBSTETRICS & GYNECOLOGY

## 2021-05-06 PROCEDURE — 87147 CULTURE TYPE IMMUNOLOGIC: CPT | Performed by: OBSTETRICS & GYNECOLOGY

## 2021-05-06 PROCEDURE — PNV: Performed by: OBSTETRICS & GYNECOLOGY

## 2021-05-06 NOTE — PATIENT INSTRUCTIONS
Pregnancy at [address] to [address]:   What changes are happening in my body? You are now at the end of your first trimester and entering your second trimester  Morning sickness usually goes away by this time  You may have other symptoms such as fatigue, frequent urination, and headaches  You may have gained 2 to 4 pounds by now  How do I care for myself at this stage of my pregnancy? · Get plenty of rest   You may feel more tired than normal  You may need to take naps or go to bed earlier  · Manage nausea and vomiting  Avoid fatty and spicy foods  Eat small meals throughout the day instead of large meals  Nishi may help to decrease nausea  Ask your healthcare provider about other ways of decreasing nausea and vomiting  · Eat a variety of healthy foods  Healthy foods include fruits, vegetables, whole-grain breads, low-fat dairy foods, beans, lean meats, and fish  Drink liquids as directed  Ask how much liquid to drink each day and which liquids are best for you  Limit caffeine to less than 200 milligrams each day  Limit your intake of fish to 2 servings each week  Choose fish low in mercury such as canned light tuna, shrimp, salmon, cod, or tilapia  Do not  eat fish high in mercury such as swordfish, tilefish, anna mackerel, and shark  · Take prenatal vitamins as directed  Your need for certain vitamins and minerals, such as folic acid, increases during pregnancy  Prenatal vitamins provide some of the extra vitamins and minerals you need  Prenatal vitamins may also help to decrease the risk of certain birth defects  · Do not smoke  Smoking increases your risk of a miscarriage and other health problems during your pregnancy  Smoking can cause your baby to be born too early or weigh less at birth  Ask your healthcare provider for information if you need help quitting  · Do not drink alcohol  Alcohol passes from your body to your baby through the placenta   It can affect your baby's brain development and cause fetal alcohol syndrome (FAS)  FAS is a group of conditions that causes mental, behavior, and growth problems  · Talk to your healthcare provider before you take any medicines  Many medicines may harm your baby if you take them when you are pregnant  Do not take any medicines, vitamins, herbs, or supplements without first talking to your healthcare provider  Never use illegal or street drugs (such as marijuana or cocaine) while you are pregnant  What are some safety tips during pregnancy? · Avoid hot tubs and saunas  Do not use a hot tub or sauna while you are pregnant, especially during your first trimester  Hot tubs and saunas may raise your baby's temperature and increase the risk of birth defects  · Avoid toxoplasmosis  This is an infection caused by eating raw meat or being around infected cat feces  It can cause birth defects, miscarriages, and other problems  Wash your hands after you touch raw meat  Make sure any meat is well-cooked before you eat it  Avoid raw eggs and unpasteurized milk  Use gloves or ask someone else to clean your cat's litter box while you are pregnant  What changes are happening with my baby? Your baby has fully formed fingernails and toenails  Your baby's heartbeat can now be heard  Ask your healthcare provider if you can listen to your baby's heartbeat  By week 14, your baby is over 4 inches long from the top of the head to the rump (baby's bottom)  Your baby weighs over 3 ounces  What do I need to know about prenatal care? Prenatal care is a series of visits with your healthcare provider throughout your pregnancy  During the first 28 weeks of your pregnancy, you will see your healthcare provider 1 time each month  Prenatal care can help prevent problems during pregnancy and childbirth  Your healthcare provider will check your blood pressure and weight  Your baby's heart rate will also be checked   You may also need the following at some visits:  · A pelvic exam  allows your healthcare provider to see your cervix (the bottom part of your uterus)  Your healthcare provider will use a speculum to open your vagina  He or she will check the size and shape of your uterus  · Blood tests  may be done to check for any of the following:     ? Gestational diabetes or anemia (low iron level)    ? Blood type or Rh factor, or certain birth defects    ? Immunity to certain diseases, such as chickenpox or rubella    ? An infection, such as a sexually transmitted infection, HIV, or hepatitis B    · Hepatitis B  may need to be prevented or treated  Hepatitis B is inflammation of the liver caused by the hepatitis B virus (HBV)  HBV can spread from a mother to her baby during delivery  You will be checked for HBV as early as possible in the first trimester of each pregnancy  You need the test even if you received the hepatitis B vaccine or were tested before  You may need to have an HBV infection treated before you give birth  · Urine tests  may also be done to check for sugar and protein  These can be signs of gestational diabetes or preeclampsia  Urine tests may also be done to check for signs of infection  · A fetal ultrasound  shows pictures of your baby inside your uterus  The pictures are used to check your baby's development, movement, and position  · Genetic disorder screening tests  may be offered to you  These tests check your baby's risk for genetic disorders such as Down syndrome  A screening test includes a blood test and ultrasound  When should I seek immediate care? · You have pain or cramping in your abdomen or low back  · You have heavy vaginal bleeding or clotting  · You pass material that looks like tissue or large clots  Collect the material and bring it with you  When should I call my doctor or obstetrician? · You cannot keep food or drinks down, and you are losing weight      · You have light bleeding  · You have chills or a fever  · You have vaginal itching, burning, or pain  · You have yellow, green, white, or foul-smelling vaginal discharge  · You have pain or burning when you urinate, less urine than usual, or pink or bloody urine  · You have questions or concerns about your condition or care  CARE AGREEMENT:   You have the right to help plan your care  Learn about your health condition and how it may be treated  Discuss treatment options with your healthcare providers to decide what care you want to receive  You always have the right to refuse treatment  The above information is an  only  It is not intended as medical advice for individual conditions or treatments  Talk to your doctor, nurse or pharmacist before following any medical regimen to see if it is safe and effective for you  © Copyright [address] Information is for End User's use only and may not be sold, redistributed or otherwise used for commercial purposes   All illustrations and images included in CareNotes® are the copyrighted property of A D A M , Inc  or [address] [place] [address]

## 2021-05-06 NOTE — PROGRESS NOTES
Patient was seen today for 1st prenatal visit  1  11 3 weeks-good fetal heart tones noted  GC/chlamydia done today, Pap deferred  Strongly recommended COVID vaccine, and patient plans to get this after 1st trimester  Follow-up 4 weeks time prenatal visit  2  Type 1 diabetes-continues on her insulin management  Hemoglobin A1c was 6 0  She did have virtual visit with [facility] gestational diabetes program yesterday, with recommendations given  Recommend start aspirin after 12 weeks  Has follow-up [facility] ultrasound 21  3  Prior [id number] trial of labor  Patient counseled about risks and benefits of trial of labor versus repeat   4  Urine with group B strep-treated with antibiotics  Test of cure urine done today  Patient aware of need for treatment with antibiotics in labor

## 2021-05-07 ENCOUNTER — TELEPHONE (OUTPATIENT)
Dept: OBGYN CLINIC | Facility: CLINIC | Age: 31
End: 2021-05-07

## 2021-05-07 DIAGNOSIS — B95.1 GROUP B STREPTOCOCCAL INFECTION DURING PREGNANCY: Primary | ICD-10-CM

## 2021-05-07 DIAGNOSIS — O98.819 GROUP B STREPTOCOCCAL INFECTION DURING PREGNANCY: Primary | ICD-10-CM

## 2021-05-07 LAB
BACTERIA UR CULT: ABNORMAL
BACTERIA UR CULT: ABNORMAL
C TRACH DNA SPEC QL NAA+PROBE: NEGATIVE
N GONORRHOEA DNA SPEC QL NAA+PROBE: NEGATIVE

## 2021-05-07 RX ORDER — CEPHALEXIN 500 MG/1
500 CAPSULE ORAL EVERY 6 HOURS SCHEDULED
Qty: 28 CAPSULE | Refills: 0 | Status: SHIPPED | OUTPATIENT
Start: 2021-05-07 | End: 2021-05-14

## 2021-05-07 NOTE — TELEPHONE ENCOUNTER
OB patient called for explanation of her urine culture , why 3 antibiotics were given, and why a follow up culture was done  Answered all questions  Patient is away for the weekend  Will  Keflex on Sunday and start taking it then  States feels fine, no UTI sx

## 2021-05-13 ENCOUNTER — ROUTINE PRENATAL (OUTPATIENT)
Dept: PERINATAL CARE | Facility: OTHER | Age: 31
End: 2021-05-13
Payer: COMMERCIAL

## 2021-05-13 VITALS
HEART RATE: 89 BPM | WEIGHT: 132.2 LBS | SYSTOLIC BLOOD PRESSURE: 126 MMHG | DIASTOLIC BLOOD PRESSURE: 79 MMHG | HEIGHT: 65 IN | BODY MASS INDEX: 22.02 KG/M2

## 2021-05-13 DIAGNOSIS — Z36.82 ENCOUNTER FOR ANTENATAL SCREENING FOR NUCHAL TRANSLUCENCY: ICD-10-CM

## 2021-05-13 DIAGNOSIS — O34.219 HISTORY OF CESAREAN DELIVERY, ANTEPARTUM: ICD-10-CM

## 2021-05-13 DIAGNOSIS — O24.019 TYPE 1 DIABETES MELLITUS AFFECTING PREGNANCY, ANTEPARTUM: ICD-10-CM

## 2021-05-13 DIAGNOSIS — Z3A.11 11 WEEKS GESTATION OF PREGNANCY: Primary | ICD-10-CM

## 2021-05-13 PROBLEM — Z3A.12 12 WEEKS GESTATION OF PREGNANCY: Status: ACTIVE | Noted: 2021-05-05

## 2021-05-13 PROCEDURE — 76813 OB US NUCHAL MEAS 1 GEST: CPT | Performed by: OBSTETRICS & GYNECOLOGY

## 2021-05-13 PROCEDURE — 1036F TOBACCO NON-USER: CPT | Performed by: OBSTETRICS & GYNECOLOGY

## 2021-05-13 PROCEDURE — 76801 OB US < 14 WKS SINGLE FETUS: CPT | Performed by: OBSTETRICS & GYNECOLOGY

## 2021-05-13 PROCEDURE — 99214 OFFICE O/P EST MOD 30 MIN: CPT | Performed by: OBSTETRICS & GYNECOLOGY

## 2021-05-13 RX ORDER — ASPIRIN 81 MG/1
162 TABLET, CHEWABLE ORAL DAILY
Qty: 180 TABLET | Refills: 0 | Status: SHIPPED | OUTPATIENT
Start: 2021-05-13 | End: 2021-11-02 | Stop reason: SURG

## 2021-05-13 NOTE — PROGRESS NOTES
[address]: Ms Vazquez Wen was seen today at [address] [place] [address] for nuchal translucency ultrasound  See ultrasound report under "OB Procedures" tab  Please don't hesitate to contact our office with any concerns or questions    Robert Turk MD

## 2021-05-13 NOTE — PROGRESS NOTES
[id number] lab ordered  Instructed patient on process for checking her OOP cost via [facility]  Provided [id number] instruction card toll free # [phone]  Patient made aware if [id number]  unable to give an estimate she will need to contact M office prior to blood draw  Patient aware that  is provided by third party and is only an estimate of cost not a guarantee  Insurance may require prior authorization, if test drawn without prior authorization she will be responsible for full cost of test   For definitive OOP cost, lab deductible or if lab authorization is required patient encouraged to call her insurance provider  Explained customer service insurance phone # located on the back of her ID card  Maternal Fetal Medicine will have results in approximately 7-10 business days and will call patient or notify via [address]  Patient aware viewing lab result reveals gender  **[id number] lab kit with prepaid [employer]  given to patient  Patient verbalized understanding of all instructions and no questions at this time

## 2021-05-13 NOTE — PATIENT INSTRUCTIONS
Thank you for choosing us for your  care today  If you have any questions about your ultrasound or care, please do not hesitate to contact us or your primary obstetrician  Please begin taking aspirin 162mg daily (two of the 81mg tablets) for the prevention of preeclampsia  If you have asthma and notice an increase in symptom frequency or severity, consider stopping this medication  Some general instructions for your pregnancy are:     Protect against coronavirus: Continue to practice social distancing, wear a mask, and wash your hands often  Pregnant women are increased risk of severe COVID  Notify your primary care doctor if you have any symptoms including cough, shortness of breath or difficulty breathing, fever, chills, muscle pain, sore throat, or loss of taste or smell  Pregnant women can receive the coronavirus vaccine   Exercise: Aim for 22 minutes per day (150 minutes per week) of regular exercise  Walking is great!  Nutrition: aim for calcium-rich and iron-rich foods as well as healthy sources of protein   Protect against the flu: get yourself and your entire household vaccinated against influenza  This will protect your baby   Learn about Preeclampsia: preeclampsia is a common, serious high blood pressure complication in pregnancy  A blood pressure of 673UTEP (systolic or top number) or 91SYFF (diastolic or bottom number) is not normal and needs evaluation by your doctor   If you smoke, try to reduce how many cigarettes you smoke or try to quit completely  Do not vape   Other warning signs to watch out for in pregnancy or postpartum: chest pain, obstructed breathing or shortness of breath, seizures, thoughts of hurting yourself or your baby, bleeding, a painful or swollen leg, fever, or headache (see [employer]WHON[facility] POST-BIRTH Warning Signs campaign)  If these happen call 911    Itching is also not normal in pregnancy and if you experience this, especially over your hands and feet, potentially worse at night, notify your doctors   Lastly, if you are contacted regarding participation in a survey about your experience in our office, please know that we take any feedback you provide seriously and use it to improve how we deliver care through our center

## 2021-05-13 NOTE — LETTER
May 13, 2021     Eliberto Aase, [address]  [address] [place] [address]    Patient: Jacque Ross   YOB: 1990   Date of Visit: 5/13/2021       Dear Dr Cayetano Jane: Thank you for referring Kamryn Graham to me for evaluation  Below are my notes for this consultation  If you have questions, please do not hesitate to call me  I look forward to following your patient along with you  Sincerely,        Lianet Meeks MD        CC: No Recipients  Lianet Meeks MD  5/13/2021  8:32 AM  Sign when Signing Visit  [address]: Ms Vazquez Wen was seen today at [facility] for nuchal translucency ultrasound  See ultrasound report under "OB Procedures" tab  Please don't hesitate to contact our office with any concerns or questions    Lianet Meeks MD

## 2021-05-29 ENCOUNTER — LAB (OUTPATIENT)
Dept: LAB | Facility: HOSPITAL | Age: 31
End: 2021-05-29
Payer: COMMERCIAL

## 2021-05-29 ENCOUNTER — TRANSCRIBE ORDERS (OUTPATIENT)
Dept: ADMINISTRATIVE | Facility: HOSPITAL | Age: 31
End: 2021-05-29

## 2021-05-29 DIAGNOSIS — Z3A.11 11 WEEKS GESTATION OF PREGNANCY: ICD-10-CM

## 2021-05-29 DIAGNOSIS — Z31.9 UNSPECIFIED PROCREATIVE MANAGEMENT: ICD-10-CM

## 2021-05-29 DIAGNOSIS — Z33.1 PREGNANT STATE, INCIDENTAL: ICD-10-CM

## 2021-05-29 DIAGNOSIS — E10.9 DIABETES MELLITUS TYPE 1, CONTROLLED, WITHOUT COMPLICATIONS (HCC): ICD-10-CM

## 2021-05-29 DIAGNOSIS — Z33.1 PREGNANT STATE, INCIDENTAL: Primary | ICD-10-CM

## 2021-05-29 LAB
ALBUMIN SERPL BCP-MCNC: 3.1 G/DL (ref 3.5–5)
ALP SERPL-CCNC: 50 U/L (ref 46–116)
ALT SERPL W P-5'-P-CCNC: 17 U/L (ref 12–78)
ANION GAP SERPL CALCULATED.3IONS-SCNC: 7 MMOL/L (ref 4–13)
AST SERPL W P-5'-P-CCNC: 9 U/L (ref 5–45)
BILIRUB SERPL-MCNC: 0.28 MG/DL (ref 0.2–1)
BUN SERPL-MCNC: 10 MG/DL (ref 5–25)
CALCIUM ALBUM COR SERPL-MCNC: 9.4 MG/DL (ref 8.3–10.1)
CALCIUM SERPL-MCNC: 8.7 MG/DL (ref 8.3–10.1)
CHLORIDE SERPL-SCNC: 105 MMOL/L (ref 100–108)
CO2 SERPL-SCNC: 26 MMOL/L (ref 21–32)
CREAT SERPL-MCNC: 0.57 MG/DL (ref 0.6–1.3)
CREAT UR-MCNC: 155.9 MG/DL
GFR SERPL CREATININE-BSD FRML MDRD: 127 ML/MIN/1.73SQ M
GLUCOSE P FAST SERPL-MCNC: 128 MG/DL (ref 65–99)
MISCELLANEOUS LAB TEST RESULT: NORMAL
POTASSIUM SERPL-SCNC: 4.1 MMOL/L (ref 3.5–5.3)
PROT SERPL-MCNC: 6.8 G/DL (ref 6.4–8.2)
PROT UR-MCNC: 20 MG/DL
PROT/CREAT UR: 0.13 MG/G{CREAT} (ref 0–0.1)
SODIUM SERPL-SCNC: 138 MMOL/L (ref 136–145)

## 2021-05-29 PROCEDURE — 36415 COLL VENOUS BLD VENIPUNCTURE: CPT

## 2021-05-29 PROCEDURE — 80053 COMPREHEN METABOLIC PANEL: CPT

## 2021-05-29 PROCEDURE — 84156 ASSAY OF PROTEIN URINE: CPT | Performed by: NURSE PRACTITIONER

## 2021-05-29 PROCEDURE — 3061F NEG MICROALBUMINURIA REV: CPT | Performed by: OBSTETRICS & GYNECOLOGY

## 2021-05-29 PROCEDURE — 82570 ASSAY OF URINE CREATININE: CPT | Performed by: NURSE PRACTITIONER

## 2021-06-04 ENCOUNTER — ROUTINE PRENATAL (OUTPATIENT)
Dept: OBGYN CLINIC | Facility: CLINIC | Age: 31
End: 2021-06-04

## 2021-06-04 VITALS — DIASTOLIC BLOOD PRESSURE: 72 MMHG | SYSTOLIC BLOOD PRESSURE: 116 MMHG | WEIGHT: 136 LBS | BODY MASS INDEX: 22.63 KG/M2

## 2021-06-04 DIAGNOSIS — O34.219 HISTORY OF CESAREAN DELIVERY, ANTEPARTUM: ICD-10-CM

## 2021-06-04 DIAGNOSIS — Z3A.15 15 WEEKS GESTATION OF PREGNANCY: ICD-10-CM

## 2021-06-04 DIAGNOSIS — Z34.92 SECOND TRIMESTER PREGNANCY: Primary | ICD-10-CM

## 2021-06-04 DIAGNOSIS — O24.019 TYPE 1 DIABETES MELLITUS AFFECTING PREGNANCY, ANTEPARTUM: ICD-10-CM

## 2021-06-04 LAB
SL AMB  POCT GLUCOSE, UA: ABNORMAL
SL AMB POCT URINE PROTEIN: ABNORMAL

## 2021-06-04 PROCEDURE — 87086 URINE CULTURE/COLONY COUNT: CPT | Performed by: OBSTETRICS & GYNECOLOGY

## 2021-06-04 PROCEDURE — PNV: Performed by: OBSTETRICS & GYNECOLOGY

## 2021-06-04 NOTE — PROGRESS NOTES
Patient was seen today for prenatal visit  1  15 0 weeks-good fetal heart tones noted  Precautions were reviewed  Strongly recommended COVID vaccine given pregnancy and her diabetes  Follow-up 4 weeks prenatal visit  2  Genetics-had maternity 21 test done on 21, results still pending  Laboratory sheet given for AFP, to be done between 16-20 weeks  3   Type 1 diabetes-continues on her insulin management  Previous hemoglobin A1c was 6 0  Continues to follow-up with [facility] gestational diabetes program   Aspirin recommended  4  Prior -done for persistent category 2 tracing on 19  Would like trial of labor, was previously counseled  5  Urine with group B strep-treated initially with amoxicillin, follow-up test of cure still with group B strep  She was then treated with Keflex  Repeat urine culture was done today with disposition as per findings

## 2021-06-04 NOTE — PATIENT INSTRUCTIONS
Pregnancy at 15 to 18 Weeks   [address]:   What changes are happening in my body? Now that you are in your second trimester, you have more energy  You may also feel hungrier than usual  You may start to experience other symptoms, such as heartburn or dizziness  You may be gaining about ½ to 1 pound a week, and your pregnancy is beginning to show  You may need to start wearing maternity clothes  How do I care for myself at this stage of my pregnancy? · Manage heartburn  by eating 4 or 5 small meals each day instead of large meals  Avoid spicy foods  Avoid eating right before bedtime  · Manage nausea and vomiting  Avoid fatty and spicy foods  Eat small meals throughout the day instead of large meals  Nishi may help to decrease nausea  Ask your healthcare provider about other ways of decreasing nausea and vomiting  · Eat a variety of healthy foods  Healthy foods include fruits, vegetables, whole-grain breads, low-fat dairy foods, beans, lean meats, and fish  Drink liquids as directed  Ask how much liquid to drink each day and which liquids are best for you  Limit caffeine to less than 200 milligrams each day  Limit your intake of fish to 2 servings each week  Choose fish low in mercury such as canned light tuna, shrimp, salmon, cod, or tilapia  Do not  eat fish high in mercury such as swordfish, tilefish, anna mackerel, and shark  · Take prenatal vitamins as directed  Your need for certain vitamins and minerals, such as folic acid, increases during pregnancy  Prenatal vitamins provide some of the extra vitamins and minerals you need  Prenatal vitamins may also help to decrease the risk of certain birth defects  · Do not smoke  Smoking increases your risk of a miscarriage and other health problems during your pregnancy  Smoking can cause your baby to be born too early or weigh less at birth  Ask your healthcare provider for information if you need help quitting      · Do not drink alcohol  Alcohol passes from your body to your baby through the placenta  It can affect your baby's brain development and cause fetal alcohol syndrome (FAS)  FAS is a group of conditions that causes mental, behavior, and growth problems  · Talk to your healthcare provider before you take any medicines  Many medicines may harm your baby if you take them when you are pregnant  Do not take any medicines, vitamins, herbs, or supplements without first talking to your healthcare provider  Never use illegal or street drugs (such as marijuana or cocaine) while you are pregnant  What are some safety tips during pregnancy? · Avoid hot tubs and saunas  Do not use a hot tub or sauna while you are pregnant, especially during your first trimester  Hot tubs and saunas may raise your baby's temperature and increase the risk of birth defects  · Avoid toxoplasmosis  This is an infection caused by eating raw meat or being around infected cat feces  It can cause birth defects, miscarriages, and other problems  Wash your hands after you touch raw meat  Make sure any meat is well-cooked before you eat it  Avoid raw eggs and unpasteurized milk  Use gloves or ask someone else to clean your cat's litter box while you are pregnant  What changes are happening with my baby? By 18 weeks, your baby may be about 6 inches long from the top of the head to the rump (baby's bottom)  Your baby may weigh about 11 ounces  You may be able to feel your baby's movement at about 18 weeks or later  The first movements may not be that noticeable  They may feel like a fluttering sensation  Your baby also makes sucking movements and can hear certain sounds  What do I need to know about prenatal care? During the first 28 weeks of your pregnancy, you will see your healthcare provider once a month  Your healthcare provider will check your blood pressure and weight   You may also need any of the following:  · A urine test  may also be done to check for sugar and protein  These can be signs of gestational diabetes or infection  · A blood test  may be done to check for anemia (low iron level)  · Fundal height check  is a measurement of your uterus to check your baby's growth  This number is usually the same as the number of weeks that you have been pregnant  · An ultrasound  may be done to check your baby's development  Your healthcare provider may be able to tell you what your baby's gender is during the ultrasound  · Your baby's heart rate  will be checked  When should I seek immediate care? · You have pain or cramping in your abdomen or low back  · You have heavy vaginal bleeding or clotting  · You pass material that looks like tissue or large clots  Collect the material and bring it with you  When should I contact my healthcare provider? · You cannot keep food or drinks down, and you are losing weight  · You have light bleeding  · You have chills or a fever  · You have vaginal itching, burning, or pain  · You have yellow, green, white, or foul-smelling vaginal discharge  · You have pain or burning when you urinate, less urine than usual, or pink or bloody urine  · You have questions or concerns about your condition or care  CARE AGREEMENT:   You have the right to help plan your care  Learn about your health condition and how it may be treated  Discuss treatment options with your healthcare providers to decide what care you want to receive  You always have the right to refuse treatment  The above information is an  only  It is not intended as medical advice for individual conditions or treatments  Talk to your doctor, nurse or pharmacist before following any medical regimen to see if it is safe and effective for you  © Copyright [address] Information is for End User's use only and may not be sold, redistributed or otherwise used for commercial purposes   All illustrations and images included in CareNotes® are the copyrighted property of A D A M , Inc  or [employer]

## 2021-06-05 LAB — BACTERIA UR CULT: NORMAL

## 2021-06-07 ENCOUNTER — TELEPHONE (OUTPATIENT)
Dept: PERINATAL CARE | Facility: CLINIC | Age: 31
End: 2021-06-07

## 2021-06-07 NOTE — TELEPHONE ENCOUNTER
Spoke with pt and provided her with results of [id number], gender NOT provided  PT instructed on MSAFP and that order was placed in computer and timing of testing  PT receptive of all information and declines questions at this time

## 2021-06-07 NOTE — TELEPHONE ENCOUNTER
----- Message from Tod Clemente MD sent at 2021  2:38 PM EDT -----  Hi  RN staff, I've reviewed this NIPT result which shows low residual risk for the conditions tested (trisomies 13, 18, and 21, and sex chromosome abnormality), can you call her to inform her of this result? Her OB office is to order the MSAFP  and I sent her a [employer] message as an FYI  Thank you    Tod Clemente MD

## 2021-06-08 ENCOUNTER — DOCUMENTATION (OUTPATIENT)
Dept: PERINATAL CARE | Facility: CLINIC | Age: 31
End: 2021-06-08

## 2021-06-08 DIAGNOSIS — O24.019 TYPE 1 DIABETES MELLITUS AFFECTING PREGNANCY, ANTEPARTUM: Primary | ICD-10-CM

## 2021-06-08 NOTE — PROGRESS NOTES
Date: 06/08/21  Elizabeth Ireland  1990  Estimated Date of Delivery: 11/26/21  15w4d  OB/GYN: [facility]   Diagnosis: Diabetes mellitus type 1, controlled, without complications ([facility] [address] )          Assessment and Plan:   Continue current regimen  Levemir 18 units   Novolog 3-4 units before meals   Dexcom G6    GDM meal plan; 3 meals and 3 snacks  Checks blood sugar 4 times per day; fasting and 2 hrs pp with a Freestyle blood glucose meter    Labs:  4/20/2021 Hgb A1C = 6 0%  Next A1c due by 06/15/2021, encouraged to complete       Ultrasounds:  05/13/2021 NT - normal growth   Next US appointment scheduled for 06/18/2021 and 07/07/2021    Testing:  Starting at 32 weeks, NST / DOMENICA twice a week  Follow MFM recommendations     Opal Torres RN

## 2021-06-16 PROBLEM — O24.012 PRE-EXISTING TYPE 1 DIABETES MELLITUS WITH HYPERGLYCEMIA DURING PREGNANCY IN SECOND TRIMESTER (HCC): Status: ACTIVE | Noted: 2020-10-21

## 2021-06-16 NOTE — PROGRESS NOTES
The patient was seen today for an ultrasound  Please see ultrasound report (located under Ob Procedures) for additional details  Thank you very much for allowing us to participate in the care of this very nice patient  Should you have any questions, please do not hesitate to contact me  Sonu Funk MD [address] [facility]  Attending Physician, Abad

## 2021-06-18 ENCOUNTER — OFFICE VISIT (OUTPATIENT)
Dept: PERINATAL CARE | Facility: CLINIC | Age: 31
End: 2021-06-18
Payer: COMMERCIAL

## 2021-06-18 VITALS
DIASTOLIC BLOOD PRESSURE: 60 MMHG | BODY MASS INDEX: 23.22 KG/M2 | HEART RATE: 73 BPM | HEIGHT: 65 IN | SYSTOLIC BLOOD PRESSURE: 120 MMHG | WEIGHT: 139.4 LBS

## 2021-06-18 DIAGNOSIS — O24.012 PRE-EXISTING TYPE 1 DIABETES MELLITUS WITH HYPERGLYCEMIA DURING PREGNANCY IN SECOND TRIMESTER (HCC): Primary | ICD-10-CM

## 2021-06-18 DIAGNOSIS — Z3A.17 17 WEEKS GESTATION OF PREGNANCY: ICD-10-CM

## 2021-06-18 DIAGNOSIS — E10.65 PRE-EXISTING TYPE 1 DIABETES MELLITUS WITH HYPERGLYCEMIA DURING PREGNANCY IN SECOND TRIMESTER (HCC): Primary | ICD-10-CM

## 2021-06-18 PROCEDURE — 76805 OB US >/= 14 WKS SNGL FETUS: CPT | Performed by: OBSTETRICS & GYNECOLOGY

## 2021-06-18 PROCEDURE — 99213 OFFICE O/P EST LOW 20 MIN: CPT | Performed by: OBSTETRICS & GYNECOLOGY

## 2021-06-18 PROCEDURE — 3008F BODY MASS INDEX DOCD: CPT | Performed by: OBSTETRICS & GYNECOLOGY

## 2021-06-18 NOTE — PATIENT INSTRUCTIONS
For a COVID-19 Vaccine in Pregnancy Decision Aid, go to https://foamcast [email or web address]/    The ABM (Academy of Breastfeeding Medicine) Statement on Considerations for COVID-19 Vaccination in Lactation is available at: TravelLesson tn  Finally, the CDC statement on Vaccination Consideration for People who are Pregnant or Breastfeeding is available at:  Derrick castro      Here are the images (12/28/20) for the decision aid:

## 2021-06-23 ENCOUNTER — DOCUMENTATION (OUTPATIENT)
Dept: PERINATAL CARE | Facility: CLINIC | Age: 31
End: 2021-06-23

## 2021-06-23 NOTE — PROGRESS NOTES
Date: 06/23/21  Opal Aguirre  1990  Estimated Date of Delivery: 11/26/21  17w5d  OB[facility]GYN: [facility]   Diagnosis: Diabetes mellitus type 1, controlled, without complications ([facility] [address] )          Assessment and Plan:   Continue current regimen  Levemir 18 units   Novolog 3-4 units before meals   Dexcom G6 (attached)    GDM meal plan; 3 meals and 3 snacks  Checks blood sugar 4 times per day; fasting and 2 hrs pp with a Freestyle blood glucose meter    Labs:  4/20/2021 Hgb A1C = 6 0%  Next A1c due by 06/15/2021, needs to complete       Ultrasounds:  05/13/2021 NT - normal growth   06/18/2021 Normal growth and DOMENICA   Next US appointment scheduled for 07/07/2021    Testing:  Starting at 32 weeks, NST / DOMENICA twice a week    Leslye Parra RN

## 2021-06-25 ENCOUNTER — APPOINTMENT (OUTPATIENT)
Dept: LAB | Facility: HOSPITAL | Age: 31
End: 2021-06-25
Attending: OBSTETRICS & GYNECOLOGY
Payer: COMMERCIAL

## 2021-06-25 DIAGNOSIS — O24.019 TYPE 1 DIABETES MELLITUS AFFECTING PREGNANCY, ANTEPARTUM: ICD-10-CM

## 2021-06-25 DIAGNOSIS — Z34.92 SECOND TRIMESTER PREGNANCY: ICD-10-CM

## 2021-06-25 LAB
EST. AVERAGE GLUCOSE BLD GHB EST-MCNC: 120 MG/DL
HBA1C MFR BLD: 5.8 %

## 2021-06-25 PROCEDURE — 3044F HG A1C LEVEL LT 7.0%: CPT | Performed by: OBSTETRICS & GYNECOLOGY

## 2021-06-25 PROCEDURE — 82105 ALPHA-FETOPROTEIN SERUM: CPT

## 2021-06-25 PROCEDURE — 83036 HEMOGLOBIN GLYCOSYLATED A1C: CPT

## 2021-06-25 PROCEDURE — 36415 COLL VENOUS BLD VENIPUNCTURE: CPT

## 2021-06-27 LAB
2ND TRIMESTER 4 SCREEN SERPL-IMP: NORMAL
AFP ADJ MOM SERPL: 0.83
AFP INTERP AMN-IMP: NORMAL
AFP INTERP SERPL-IMP: NORMAL
AFP INTERP SERPL-IMP: NORMAL
AFP SERPL-MCNC: 39.8 NG/ML
AGE AT DELIVERY: 29.3 YR
GA METHOD: NORMAL
GA: 18 WEEKS
IDDM PATIENT QL: NO
MULTIPLE PREGNANCY: NO
NEURAL TUBE DEFECT RISK FETUS: NORMAL %

## 2021-07-01 ENCOUNTER — ROUTINE PRENATAL (OUTPATIENT)
Dept: OBGYN CLINIC | Facility: CLINIC | Age: 31
End: 2021-07-01

## 2021-07-01 VITALS
WEIGHT: 138.6 LBS | HEART RATE: 75 BPM | SYSTOLIC BLOOD PRESSURE: 112 MMHG | BODY MASS INDEX: 23.06 KG/M2 | DIASTOLIC BLOOD PRESSURE: 74 MMHG

## 2021-07-01 DIAGNOSIS — Z34.92 SECOND TRIMESTER PREGNANCY: Primary | ICD-10-CM

## 2021-07-01 DIAGNOSIS — Z3A.18 18 WEEKS GESTATION OF PREGNANCY: ICD-10-CM

## 2021-07-01 LAB
SL AMB  POCT GLUCOSE, UA: NORMAL
SL AMB POCT URINE PROTEIN: NORMAL

## 2021-07-01 PROCEDURE — PNV: Performed by: OBSTETRICS & GYNECOLOGY

## 2021-07-01 NOTE — PATIENT INSTRUCTIONS
Pregnancy at 23 to 22 Weeks   AMBULATORY CARE:   What changes are happening to your body:  Now that you are in your second trimester, you have more energy  You may also be feeling hungrier than usual  You may be gaining about ½ to 1 pound a week, and your pregnancy is beginning to show  You may need to start wearing maternity clothes  As your baby gets larger, you may have other symptoms  These may include body aches or stretch marks on your abdomen, breasts, thighs, or buttocks  Seek care immediately if:   · You develop a severe headache that does not go away  · You have new or increased vision changes, such as blurred or spotted vision  · You have new or increased swelling in your face or hands  · You have vaginal spotting or bleeding  · Your water broke or you feel warm water gushing or trickling from your vagina  Contact your healthcare provider if:   · You have abdominal cramps, pressure, or tightening  · You have a change in vaginal discharge  · You cannot keep food or drinks down, and you are losing weight  · You have chills or a fever  · You have vaginal itching, burning, or pain  · You have yellow, green, white, or foul-smelling vaginal discharge  · You have pain or burning when you urinate, less urine than usual, or pink or bloody urine  · You have questions or concerns about your condition or care  How to care for yourself at this stage of your pregnancy:   · Eat a variety of healthy foods  Healthy foods include fruits, vegetables, whole-grain breads, low-fat dairy foods, beans, lean meats, and fish  Drink liquids as directed  Ask how much liquid to drink each day and which liquids are best for you  Limit caffeine to less than 200 milligrams each day  Limit your intake of fish to 2 servings each week  Choose fish low in mercury such as canned light tuna, shrimp, salmon, cod, or tilapia   Do not  eat fish high in mercury such as swordfish, tilefish, anna mackerel, and shark  · Take prenatal vitamins as directed  Your need for certain vitamins and minerals, such as folic acid, increases during pregnancy  Prenatal vitamins provide some of the extra vitamins and minerals you need  Prenatal vitamins may also help to decrease the risk of certain birth defects  · Talk to your healthcare provider about exercise  Moderate exercise can help you stay fit  Your healthcare provider will help you plan an exercise program that is safe for you during pregnancy  · Do not smoke  Smoking increases your risk of a miscarriage and other health problems during your pregnancy  Smoking can cause your baby to be born too early or weigh less at birth  Ask your healthcare provider for information if you need help quitting  · Do not drink alcohol  Alcohol passes from your body to your baby through the placenta  It can affect your baby's brain development and cause fetal alcohol syndrome (FAS)  FAS is a group of conditions that causes mental, behavior, and growth problems  · Talk to your healthcare provider before you take any medicines  Many medicines may harm your baby if you take them when you are pregnant  Do not take any medicines, vitamins, herbs, or supplements without first talking to your healthcare provider  Never use illegal or street drugs (such as marijuana or cocaine) while you are pregnant  Safety tips during pregnancy:   · Avoid hot tubs and saunas  Do not use a hot tub or sauna while you are pregnant, especially during your first trimester  Hot tubs and saunas may raise your baby's temperature and increase the risk of birth defects  · Avoid toxoplasmosis  This is an infection caused by eating raw meat or being around infected cat feces  It can cause birth defects, miscarriages, and other problems  Wash your hands after you touch raw meat  Make sure any meat is well-cooked before you eat it  Avoid raw eggs and unpasteurized milk   Use gloves or ask someone else to clean your cat's litter box while you are pregnant  Changes that are happening with your baby:  By 22 weeks, your baby is about 8 inches long from the top of the head to the rump (baby's bottom)  Your baby also weighs about 1 pound  Your baby is becoming much more active  You may be able to feel the baby move inside you now  The first movements may not be that noticeable  They may feel like a fluttering sensation  As time goes on, your baby's movements will become stronger and more noticeable  What you need to know about prenatal care:  During the first 28 weeks of your pregnancy, you will see your healthcare provider once a month  Your healthcare provider will check your blood pressure and weight  You may also need the following:  · A urine test  may also be done to check for sugar and protein  These can be signs of gestational diabetes or infection  Protein in your urine may also be a sign of preeclampsia  Preeclampsia is a condition that can develop during week 20 or later of your pregnancy  It causes high blood pressure, and it can cause problems with your kidneys and other organs  · Fundal height  is a measurement of your uterus to check your baby's growth  This number is usually the same as the number of weeks that you have been pregnant  · A fetal ultrasound  shows pictures of your baby inside your uterus  It shows your baby's development  The movement and position of your baby can also be seen  Your healthcare provider may be able to tell you what your baby's gender is during the ultrasound  · Your baby's heart rate  will be checked  © Copyright [address] Information is for End User's use only and may not be sold, redistributed or otherwise used for commercial purposes  All illustrations and images included in CareNotes® are the copyrighted property of [employer]  or [facility] [place] [facility] [place]   The above information is an  only   It is not intended as medical advice for individual conditions or treatments  Talk to your doctor, nurse or pharmacist before following any medical regimen to see if it is safe and effective for you

## 2021-07-01 NOTE — PROGRESS NOTES
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

## 2021-07-07 ENCOUNTER — ROUTINE PRENATAL (OUTPATIENT)
Dept: PERINATAL CARE | Facility: CLINIC | Age: 31
End: 2021-07-07
Payer: COMMERCIAL

## 2021-07-07 VITALS
BODY MASS INDEX: 23.39 KG/M2 | DIASTOLIC BLOOD PRESSURE: 65 MMHG | WEIGHT: 140.4 LBS | SYSTOLIC BLOOD PRESSURE: 109 MMHG | HEIGHT: 65 IN | HEART RATE: 84 BPM

## 2021-07-07 DIAGNOSIS — O34.219 HISTORY OF CESAREAN DELIVERY, ANTEPARTUM: ICD-10-CM

## 2021-07-07 DIAGNOSIS — Z36.86 ENCOUNTER FOR ANTENATAL SCREENING FOR CERVICAL LENGTH: ICD-10-CM

## 2021-07-07 DIAGNOSIS — Z3A.19 19 WEEKS GESTATION OF PREGNANCY: ICD-10-CM

## 2021-07-07 DIAGNOSIS — O24.012 PRE-EXISTING TYPE 1 DIABETES MELLITUS DURING PREGNANCY IN SECOND TRIMESTER: Primary | ICD-10-CM

## 2021-07-07 DIAGNOSIS — O35.8XX0 ECHOGENIC FOCUS OF HEART OF FETUS AFFECTING ANTEPARTUM CARE OF MOTHER, SINGLE OR UNSPECIFIED FETUS: ICD-10-CM

## 2021-07-07 PROCEDURE — 76811 OB US DETAILED SNGL FETUS: CPT | Performed by: OBSTETRICS & GYNECOLOGY

## 2021-07-07 PROCEDURE — 1036F TOBACCO NON-USER: CPT | Performed by: OBSTETRICS & GYNECOLOGY

## 2021-07-07 PROCEDURE — 99213 OFFICE O/P EST LOW 20 MIN: CPT | Performed by: OBSTETRICS & GYNECOLOGY

## 2021-07-07 PROCEDURE — 3008F BODY MASS INDEX DOCD: CPT | Performed by: OBSTETRICS & GYNECOLOGY

## 2021-07-07 PROCEDURE — 76817 TRANSVAGINAL US OBSTETRIC: CPT | Performed by: OBSTETRICS & GYNECOLOGY

## 2021-07-07 NOTE — LETTER
2021     Nabila Morgan MD  [address] [facility]    Patient: Delvin Schaumann   YOB: 1990   Date of Visit: 2021       Dear Dr Elle Ortega: Thank you for referring Clayton Avalos to me for evaluation  Below are my notes for this consultation  If you have questions, please do not hesitate to call me  I look forward to following your patient along with you  Sincerely,        Jesenia Berger MD        CC: No Recipients  Jesenia Berger MD  2021 12:41 AM  Addendum  Delvin Schaumann  has no complaints today at 19w5d  She reports fetal movements and does not report any vaginal bleeding or signs of labor  Her recently completed fetal testing revealed a  Normal NIPT and normal MSAFP  Problem list:  1  Type 1 diabetes currently well managed and under good control on insulin  Her latest hemoglobin A1c was 5 8 on 21  She has a glucose sensor  She is currently on Levemir 18 units q h s  and NovoLog 3-4 units before meals  2  History of prior  for nonreassuring fetal testing for I 7 pound 4 ounce baby and she would like to     Ultrasound findings: The ultrasound today shows normal interval fetal growth and fluid and a normal cervical length  A left sided  echogenic foci is seen  No other malformations are seen  We were unable to complete all of the fetal anatomy today secondary to fetal position  Her placenta is not near her prior  scar  Pregnancy ultrasound has limitations and is unable to detect all forms of fetal congenital abnormalities  Counselin  An echogenic foci can be associated with an increased risk for Downs syndrome in pregnancy  It can also be found in 4 to 5/100 normal pregnancies  She had a normal NIPT result and no other malformations are seen suggesting that there is a very low risk her baby has Down syndrome   This echogenic foci does not affect the function of the heart and does not require follow up  Follow up recommended:   1  Recommend a fetal echo due to her history of type I diabetes and will review the missed anatomy in 4 weeks and recommend a growth scan in 8 weeks  Pre visit time reviewing her records   5 minutes  Face to face time 5 minutes  Post visit time on documentation of note, updating her problem list, adding orders and prescriptions 5 minutes  Procedures that were completed today were charged separately  The level of decision making was straight forward      Vanessa Mayen MD

## 2021-07-07 NOTE — LETTER
2021     Marcelo KenyettaAlexis Irving    Patient: Denice Jones   YOB: 1990   Date of Visit: 2021       Dear Dr Cristobal Valverde: Thank you for referring Chelo Rodriguez to me for evaluation  Below are my notes for this consultation  If you have questions, please do not hesitate to call me  I look forward to following your patient along with you  Sincerely,        Julieta Moseley MD        CC: No Recipients  Julieta Moseley MD  2021 10:43 AM  Sign when Signing Visit  Denice Jones  has no complaints today at 19w5d  She reports fetal movements and does not report any vaginal bleeding or signs of labor  Her recently completed fetal testing revealed a  Normal NIPT and normal MSAFP  Problem list:  1  Type 1 diabetes currently well managed in under good control on insulin  Her latest hemoglobin A1c was 5 8 on 21  She has a glucose sensor  She is currently on Levemir 18 units q h s  and NovoLog 3-4 units before meals  2  History of prior  for nonreassuring fetal testing for I 7 pound 4 ounce baby and she would like to     Ultrasound findings: The ultrasound today shows normal interval fetal growth and fluid, normal cervical length, and no malformations were detected  We were unable to complete all of the fetal anatomy today secondary to fetal position  Her placenta is not near her prior  scar  Pregnancy ultrasound has limitations and is unable to detect all forms of fetal congenital abnormalities  Follow up recommended:   1  Recommend a fetal echo in review of the missed anatomy in 4 weeks and a growth scan in 8 weeks  Pre visit time reviewing her records   5 minutes  Face to face time 5 minutes  Post visit time on documentation of note, updating her problem list, adding orders and prescriptions 5 minutes  Procedures that were completed today were charged separately     The level of decision making was straight forward      Julieta Moseley MD

## 2021-07-07 NOTE — PROGRESS NOTES
Eber Nance  has no complaints today at 19w5d  She reports fetal movements and does not report any vaginal bleeding or signs of labor  Her recently completed fetal testing revealed a  Normal NIPT and normal MSAFP  Problem list:  1  Type 1 diabetes currently well managed and under good control on insulin  Her latest hemoglobin A1c was 5 8 on 21  She has a glucose sensor  She is currently on Levemir 18 units q h s  and NovoLog 3-4 units before meals  2  History of prior  for nonreassuring fetal testing for I 7 pound 4 ounce baby and she would like to     Ultrasound findings: The ultrasound today shows normal interval fetal growth and fluid and a normal cervical length  A left sided  echogenic foci is seen  No other malformations are seen  We were unable to complete all of the fetal anatomy today secondary to fetal position  Her placenta is not near her prior  scar  Pregnancy ultrasound has limitations and is unable to detect all forms of fetal congenital abnormalities  Counselin  An echogenic foci can be associated with an increased risk for Downs syndrome in pregnancy  It can also be found in 4 to 5/100 normal pregnancies  She had a normal NIPT result and no other malformations are seen suggesting that there is a very low risk her baby has Down syndrome  This echogenic foci does not affect the function of the heart and does not require follow up  Follow up recommended:   1  Recommend a fetal echo due to her history of type I diabetes and will review the missed anatomy in 4 weeks and recommend a growth scan in 8 weeks  Pre visit time reviewing her records   5 minutes  Face to face time 5 minutes  Post visit time on documentation of note, updating her problem list, adding orders and prescriptions 5 minutes  Procedures that were completed today were charged separately  The level of decision making was straight forward      Maria M Del Toro MD

## 2021-07-08 PROBLEM — O35.8XX0 ECHOGENIC FOCUS OF HEART OF FETUS AFFECTING ANTEPARTUM CARE OF MOTHER: Status: ACTIVE | Noted: 2021-07-08

## 2021-07-08 PROBLEM — O35.BXX0 ECHOGENIC FOCUS OF HEART OF FETUS AFFECTING ANTEPARTUM CARE OF MOTHER: Status: ACTIVE | Noted: 2021-07-08

## 2021-07-09 ENCOUNTER — DOCUMENTATION (OUTPATIENT)
Dept: PERINATAL CARE | Facility: CLINIC | Age: 31
End: 2021-07-09

## 2021-07-09 DIAGNOSIS — O24.012 PRE-EXISTING TYPE 1 DIABETES MELLITUS DURING PREGNANCY IN SECOND TRIMESTER: Primary | ICD-10-CM

## 2021-07-09 NOTE — PROGRESS NOTES
Date: 07/09/21  Governor Love  1990  Estimated Date of Delivery: 11/26/21  20w0d  OB/GYN: [facility]   Diagnosis: Diabetes mellitus type 1, controlled, without complications ([facility] [address] )          Assessment and Plan:   Continue current regimen  Levemir 18 units   Novolog 3-4 units before meals   Dexcom G6 (attached)   06/30/2021 : After breakfast, lunch and dinner alerted "HIGH" on CGM, no blood sugars documented on flowsheet   07/06/2021 dinner alarmed "HIGH"  : not documented on flow sheet   07/07/2021 lunch and dinner "HIGH" : not documented on flow sheet   Make an appointment with [facility] and [address] [place] [address] [place] sent [address] 06/23/2021 @ 1630   -Patient read message 06/28/2021 @ 07:21 am  GDM meal plan; 3 meals and 3 snacks  Checks blood sugar 4 times per day; fasting and 2 hrs pp with a Freestyle blood glucose meter    Labs:  4/20/2021 Hgb A1C = 6 0%  06/25/2021 A1c = 5 8%  Next A1c due by 06/15/2021, needs to complete       Ultrasounds:  05/13/2021 NT - normal growth   06/18/2021 Normal growth and DOMENICA   07/07/2021 Normal growth and DOMENICA   ECHO scheduled for 08/06/2021   Next US  Scheduled for 09/03/2021    Testing:  Starting at 32 weeks, NST / DOMENICA twice a week    Lalo Jordan RN

## 2021-08-03 ENCOUNTER — ROUTINE PRENATAL (OUTPATIENT)
Dept: OBGYN CLINIC | Facility: CLINIC | Age: 31
End: 2021-08-03
Payer: COMMERCIAL

## 2021-08-03 VITALS
SYSTOLIC BLOOD PRESSURE: 126 MMHG | BODY MASS INDEX: 24.63 KG/M2 | WEIGHT: 148 LBS | HEART RATE: 78 BPM | DIASTOLIC BLOOD PRESSURE: 78 MMHG

## 2021-08-03 DIAGNOSIS — O34.219 HISTORY OF CESAREAN DELIVERY, ANTEPARTUM: ICD-10-CM

## 2021-08-03 DIAGNOSIS — Z3A.23 23 WEEKS GESTATION OF PREGNANCY: ICD-10-CM

## 2021-08-03 DIAGNOSIS — B37.3 VAGINAL CANDIDIASIS: ICD-10-CM

## 2021-08-03 DIAGNOSIS — Z34.92 SECOND TRIMESTER PREGNANCY: Primary | ICD-10-CM

## 2021-08-03 DIAGNOSIS — E10.9 DIABETES MELLITUS TYPE 1, CONTROLLED, WITHOUT COMPLICATIONS (HCC): ICD-10-CM

## 2021-08-03 DIAGNOSIS — O24.012 PRE-EXISTING TYPE 1 DIABETES MELLITUS DURING PREGNANCY IN SECOND TRIMESTER: Primary | ICD-10-CM

## 2021-08-03 LAB
BV WHIFF TEST VAG QL: NEGATIVE
CLUE CELLS SPEC QL WET PREP: NEGATIVE
PH SMN: 3.5 [PH]
SL AMB  POCT GLUCOSE, UA: NORMAL
SL AMB POCT URINE PROTEIN: NORMAL
SL AMB POCT WET MOUNT: YES
T VAGINALIS VAG QL WET PREP: NEGATIVE
YEAST VAG QL WET PREP: POSITIVE

## 2021-08-03 PROCEDURE — 87210 SMEAR WET MOUNT SALINE/INK: CPT | Performed by: OBSTETRICS & GYNECOLOGY

## 2021-08-03 PROCEDURE — PNV: Performed by: OBSTETRICS & GYNECOLOGY

## 2021-08-03 RX ORDER — PEN NEEDLE, DIABETIC 32GX 5/32"
NEEDLE, DISPOSABLE MISCELLANEOUS
Qty: 100 EACH | Refills: 1 | Status: SHIPPED | OUTPATIENT
Start: 2021-08-03 | End: 2022-03-22

## 2021-08-03 RX ORDER — INSULIN ASPART 100 [IU]/ML
10 INJECTION, SOLUTION INTRAVENOUS; SUBCUTANEOUS
Qty: 15 ML | Refills: 0 | Status: SHIPPED | OUTPATIENT
Start: 2021-08-03 | End: 2021-11-04 | Stop reason: SDUPTHER

## 2021-08-03 NOTE — PROGRESS NOTES
Emi Marquez, 23 4/7 weeks gestation, T1DM on Levemir and Novolog with poorly controlled glucose readings  InPen recommended to use for correction and pre-meal bolus doses  Pending response from pharmacy and insurance company  Have recommended insulin pump in the past but she is not agreeable  Does have Dexcom G6 but currently not in place

## 2021-08-03 NOTE — PATIENT INSTRUCTIONS
Pregnancy at 23 to 26 [address]:   What changes are happening in my body? You are now close to or at the beginning of the third trimester  The third trimester starts at 24 weeks and ends with delivery  As your baby gets larger, you may develop certain symptoms  These may include pain in your back or down the sides of your abdomen  You may also have stretch marks on your abdomen, breasts, thighs, or buttocks  You may also have constipation  How do I care for myself at this stage of my pregnancy? · Eat a variety of healthy foods  Healthy foods include fruits, vegetables, whole-grain breads, low-fat dairy foods, beans, lean meats, and fish  Drink liquids as directed  Ask how much liquid to drink each day and which liquids are best for you  Limit caffeine to less than 200 milligrams each day  Limit your intake of fish to 2 servings each week  Choose fish low in mercury such as canned light tuna, shrimp, salmon, cod, or tilapia  Do not  eat fish high in mercury such as swordfish, tilefish, anna mackerel, and shark  · Manage back pain  Do not stand for long periods of time or lift heavy items  Use good posture while you stand, squat, or bend  Wear low-heeled shoes with good support  Rest may also help to relieve back pain  Ask your healthcare provider about exercises you can do to strengthen your back muscles  · Take prenatal vitamins as directed  Your need for certain vitamins and minerals, such as folic acid, increases during pregnancy  Prenatal vitamins provide some of the extra vitamins and minerals you need  Prenatal vitamins may also help to decrease the risk of certain birth defects  · Talk to your healthcare provider about exercise  Moderate exercise can help you stay fit  Your healthcare provider will help you plan an exercise program that is safe for you during pregnancy  · Do not smoke    Smoking increases your risk of a miscarriage and other health problems during your pregnancy  Smoking can cause your baby to be born too early or weigh less at birth  Ask your healthcare provider for information if you need help quitting  · Do not drink alcohol  Alcohol passes from your body to your baby through the placenta  It can affect your baby's brain development and cause fetal alcohol syndrome (FAS)  FAS is a group of conditions that causes mental, behavior, and growth problems  · Talk to your healthcare provider before you take any medicines  Many medicines may harm your baby if you take them when you are pregnant  Do not take any medicines, vitamins, herbs, or supplements without first talking to your healthcare provider  Never use illegal or street drugs (such as marijuana or cocaine) while you are pregnant  What are some safety tips during pregnancy? · Avoid hot tubs and saunas  Do not use a hot tub or sauna while you are pregnant, especially during your first trimester  Hot tubs and saunas may raise your baby's temperature and increase the risk of birth defects  · Avoid toxoplasmosis  This is an infection caused by eating raw meat or being around infected cat feces  It can cause birth defects, miscarriages, and other problems  Wash your hands after you touch raw meat  Make sure any meat is well-cooked before you eat it  Avoid raw eggs and unpasteurized milk  Use gloves or ask someone else to clean your cat's litter box while you are pregnant  What changes are happening with my baby? By 26 weeks, your baby will weigh about 2 pounds  Your baby will be about 10 inches long from the top of the head to the rump (baby's bottom)  Your baby's movements are much stronger now  Your baby's eyes are almost completely formed and can partially open  Your baby also sleeps and wakes up  What do I need to know about prenatal care? Your healthcare provider will check your blood pressure and weight   You may also need the following:  · A urine test  may also be done to check for sugar and protein  These can be signs of gestational diabetes or infection  Protein in your urine may also be a sign of preeclampsia  Preeclampsia is a condition that can develop during week 20 or later of your pregnancy  It causes high blood pressure, and it can cause problems with your kidneys and other organs  · Fundal height  is a measurement of your uterus to check your baby's growth  This number is usually the same as the number of weeks that you have been pregnant  · Your baby's heart rate  will be checked  When should I seek immediate care? · You develop a severe headache that does not go away  · You have new or increased vision changes, such as blurred or spotted vision  · You have new or increased swelling in your face or hands  · You have vaginal spotting or bleeding  · Your water broke or you feel warm water gushing or trickling from your vagina  When should I contact my healthcare provider? · You have abdominal cramps, pressure, or tightening  · You have a change in vaginal discharge  · You have light bleeding  · You have chills or a fever  · You have vaginal itching, burning, or pain  · You have yellow, green, white, or foul-smelling vaginal discharge  · You have pain or burning when you urinate, less urine than usual, or pink or bloody urine  · You have questions or concerns about your condition or care  CARE AGREEMENT:   You have the right to help plan your care  Learn about your health condition and how it may be treated  Discuss treatment options with your healthcare providers to decide what care you want to receive  You always have the right to refuse treatment  The above information is an  only  It is not intended as medical advice for individual conditions or treatments  Talk to your doctor, nurse or pharmacist before following any medical regimen to see if it is safe and effective for you    © Copyright [employer] 2021 Information is for [employer] use only and may not be sold, redistributed or otherwise used for commercial purposes   All illustrations and images included in CareNotes® are the copyrighted property of A D A M , Inc  or [place] [facility]agen

## 2021-08-03 NOTE — PROGRESS NOTES
1  23 4 weeks-good fetal movement noted  Fetal movement and  labor precautions were reviewed  Patient status post part 1 of COVID vaccination, to get part 2 on 21  My support was given  Follow-up 4 weeks time prenatal visit with possible Tdap  Recommend flu shot when available  2  Yeast infection-noted on exam and wet prep today  Patient counseled about the findings  She will take miconazole 7 nightly per vagina for 7 nights  3  Genetics-normal maternity 21 and AFP noted  4  Type 1 diabetes-diagnosed age 18, continues on insulin pump  Most recent hemoglobin A1c was 5 8 on 21  Continues with low-dose aspirin  Has fetal echocardiogram scheduled 21  HAs follow-up anatomy ultrasound 1-2 weeks after that to follow anatomy  Isolated echogenic focus noted in the fetal heart without any other issues  5  Prior -done for persistent category 2 tracing on 19  She wishes trial of labor, risks and benefits were reviewed  6  Urine with group B strep x2-treated with antibiotics now with negative test of cure  Recommend treatment in labor

## 2021-08-06 ENCOUNTER — ROUTINE PRENATAL (OUTPATIENT)
Dept: PERINATAL CARE | Facility: CLINIC | Age: 31
End: 2021-08-06
Payer: COMMERCIAL

## 2021-08-06 VITALS
DIASTOLIC BLOOD PRESSURE: 60 MMHG | WEIGHT: 148 LBS | HEART RATE: 74 BPM | BODY MASS INDEX: 24.66 KG/M2 | SYSTOLIC BLOOD PRESSURE: 108 MMHG | HEIGHT: 65 IN

## 2021-08-06 DIAGNOSIS — O24.012 PRE-EXISTING TYPE 1 DIABETES MELLITUS DURING PREGNANCY IN SECOND TRIMESTER: Primary | ICD-10-CM

## 2021-08-06 DIAGNOSIS — Z3A.24 24 WEEKS GESTATION OF PREGNANCY: ICD-10-CM

## 2021-08-06 PROCEDURE — 76827 ECHO EXAM OF FETAL HEART: CPT | Performed by: OBSTETRICS & GYNECOLOGY

## 2021-08-06 PROCEDURE — 76825 ECHO EXAM OF FETAL HEART: CPT | Performed by: OBSTETRICS & GYNECOLOGY

## 2021-08-06 PROCEDURE — 93325 DOPPLER ECHO COLOR FLOW MAPG: CPT | Performed by: OBSTETRICS & GYNECOLOGY

## 2021-08-06 PROCEDURE — 99213 OFFICE O/P EST LOW 20 MIN: CPT | Performed by: OBSTETRICS & GYNECOLOGY

## 2021-08-06 PROCEDURE — 3008F BODY MASS INDEX DOCD: CPT | Performed by: OBSTETRICS & GYNECOLOGY

## 2021-08-06 NOTE — PROGRESS NOTES
The patient was seen today for an ultrasound  Please see ultrasound report (located under Ob Procedures) for additional details  Thank you very much for allowing us to participate in the care of this very nice patient  Should you have any questions, please do not hesitate to contact me  Sonu Pisano MD [phone]  Attending Physician, [place]

## 2021-08-11 ENCOUNTER — TELEPHONE (OUTPATIENT)
Dept: ADMINISTRATIVE | Facility: OTHER | Age: 31
End: 2021-08-11

## 2021-08-17 ENCOUNTER — OFFICE VISIT (OUTPATIENT)
Dept: PERINATAL CARE | Facility: CLINIC | Age: 31
End: 2021-08-17
Payer: COMMERCIAL

## 2021-08-17 VITALS
SYSTOLIC BLOOD PRESSURE: 128 MMHG | WEIGHT: 153.6 LBS | BODY MASS INDEX: 25.59 KG/M2 | HEART RATE: 77 BPM | HEIGHT: 65 IN | DIASTOLIC BLOOD PRESSURE: 71 MMHG

## 2021-08-17 DIAGNOSIS — E55.9 VITAMIN D DEFICIENCY: ICD-10-CM

## 2021-08-17 DIAGNOSIS — Z3A.25 25 WEEKS GESTATION OF PREGNANCY: ICD-10-CM

## 2021-08-17 DIAGNOSIS — O24.012 PRE-EXISTING TYPE 1 DIABETES MELLITUS DURING PREGNANCY IN SECOND TRIMESTER: Primary | ICD-10-CM

## 2021-08-17 PROBLEM — E10.29 MICROALBUMINURIA DUE TO TYPE 1 DIABETES MELLITUS (HCC): Status: ACTIVE | Noted: 2021-06-01

## 2021-08-17 PROBLEM — R80.9 MICROALBUMINURIA DUE TO TYPE 1 DIABETES MELLITUS (HCC): Status: ACTIVE | Noted: 2021-06-01

## 2021-08-17 PROCEDURE — 3008F BODY MASS INDEX DOCD: CPT | Performed by: NURSE PRACTITIONER

## 2021-08-17 PROCEDURE — 99215 OFFICE O/P EST HI 40 MIN: CPT | Performed by: NURSE PRACTITIONER

## 2021-08-17 PROCEDURE — 1036F TOBACCO NON-USER: CPT | Performed by: NURSE PRACTITIONER

## 2021-08-17 RX ORDER — BLOOD-GLUCOSE TRANSMITTER
EACH MISCELLANEOUS
Qty: 1 EACH | Refills: 3 | Status: SHIPPED | OUTPATIENT
Start: 2021-08-17

## 2021-08-17 RX ORDER — CHOLECALCIFEROL (VITAMIN D3) 25 MCG
1 CAPSULE ORAL
COMMUNITY

## 2021-08-17 NOTE — ASSESSMENT & PLAN NOTE
-A1c 5 8% at goal   -Repeat A1c, CMP and spot urine for protein creatinine ratio  -Due to hyperglycemia, increase Levemir from 20 to 22 units at 9 PM daily    -Add 3 AM glucose check   -Use InPen Novolog before meals 1-2-3 using carb ratio 8; correction factor 30; BG target 90; active insulin time 3 hours 30 minutes  Currently maximum 12 units per bolus    -Eat 3 meals and 3 snacks including combination of carbohydrates, protein and fat per meal/snack   -Always have bedtime snack with 15 grams of carbohydrates with 2 to 3 ounces of protein  -SMBG before meals and 2 hours post start of meals   -Report Thursday, 8/19/21 for further recommendations    -Glucose goals 60-90; before meals/overnight ; 2 hours post meal 120 or less   -Always have glucose available to treat hypoglycemia, use 15 by 14 rule  -Fetal growth ultrasound every 4 weeks    -Starting at 32 weeks NST twice a week and DOMENICA weekly   -Continue follow up with OB and MFM as recommended   -Stay in close contact with diabetes team    Lab Results   Component Value Date    HGBA1C 5 8 (H) 06/25/2021

## 2021-08-17 NOTE — ASSESSMENT & PLAN NOTE
-Starting with program weight 128 lbs  -Current weight 153 lbs  -Current BMI 25 56   -Recommended weight gain 25 to 35 lbs   -Continue GDM diet

## 2021-08-17 NOTE — PROGRESS NOTES
Assessment/Plan:    Pre-existing type 1 diabetes mellitus during pregnancy in second trimester  -A1c 5 8% at goal   -Repeat A1c, CMP and spot urine for protein creatinine ratio  -Due to hyperglycemia, increase Levemir from 20 to 22 units at 9 PM daily    -Add 3 AM glucose check   -Use InPen Novolog before meals 1-2-3 using carb ratio 8; correction factor 30; BG target 90; active insulin time 3 hours 30 minutes  Currently maximum 12 units per bolus    -Eat 3 meals and 3 snacks including combination of carbohydrates, protein and fat per meal/snack   -Always have bedtime snack with 15 grams of carbohydrates with 2 to 3 ounces of protein  -SMBG before meals and 2 hours post start of meals   -Report Thursday, 8/19/21 for further recommendations    -Glucose goals 60-90; before meals/overnight ; 2 hours post meal 120 or less   -Always have glucose available to treat hypoglycemia, use 15 by 14 rule  -Fetal growth ultrasound every 4 weeks  -Starting at 32 weeks NST twice a week and DOMENICA weekly   -Continue follow up with OB and MFM as recommended   -Stay in close contact with diabetes team    Lab Results   Component Value Date    HGBA1C 5 8 (H) 06/25/2021       Vitamin D deficiency  -On Vitamin D3 1000 iu daily capsule and prenatal vitamin  BMI 25 0-25 9,adult  -Starting with program weight 128 lbs  -Current weight 153 lbs  -Current BMI 25 56   -Recommended weight gain 25 to 35 lbs   -Continue GDM diet  Diagnoses and all orders for this visit:    Pre-existing type 1 diabetes mellitus during pregnancy in second trimester  -     Hemoglobin A1C; Standing  -     Comprehensive metabolic panel; Future  -     Protein / creatinine ratio, urine  -     Hemoglobin A1C  -     Continuous Blood Gluc Transmit (Dexcom G6 Transmitter) MISC; Transmitter change every 90 days  Vitamin D deficiency  -     Hemoglobin A1C; Standing  -     Comprehensive metabolic panel;  Future  -     Protein / creatinine ratio, urine  - Hemoglobin A1C  -     Continuous Blood Gluc Transmit (Dexcom G6 Transmitter) MISC; Transmitter change every 90 days  25 weeks gestation of pregnancy  -     Hemoglobin A1C; Standing  -     Comprehensive metabolic panel; Future  -     Protein / creatinine ratio, urine  -     Hemoglobin A1C  -     Continuous Blood Gluc Transmit (Dexcom G6 Transmitter) MISC; Transmitter change every 90 days  BMI 25 0-25 9,adult  -     Hemoglobin A1C; Standing  -     Comprehensive metabolic panel; Future  -     Protein / creatinine ratio, urine  -     Hemoglobin A1C  -     Continuous Blood Gluc Transmit (Dexcom G6 Transmitter) MISC; Transmitter change every 90 days  Other orders  -     Cholecalciferol (Vitamin D-3) 25 MCG (1000 UT) CAPS; Take 1 capsule by mouth          Subjective:      Patient ID: Nasim Delgado is a 36 y o  female  25 4/7 weeks T1DM on Levemir 20 units at 9 PM daily and Novolog 7-7-8-0 before meals 1-2-3  Testing before meals and 2 hours post meal, fasting glucose readings range from 80's to 144, inconsistent glucose pattern  Reports eating 3 meals and 3 snacks a day  Denies hypoglycemia  Dexcom currently not in place but plans to restart  Positive fetal movement  Without complaints today  InPen education provided by [employer] CDE educator       The following portions of the patient's history were reviewed and updated as appropriate: allergies, current medications, past family history, past medical history, past social history, past surgical history and problem list     No Known Allergies  Current Outpatient Medications on File Prior to Visit   Medication Sig Dispense Refill    acetone, urine, test strip use as needed for BG >250 or for fever, vomiting, or diarrheal illness      Blood Glucose Monitoring Suppl (FreeStyle Lite) DON by Does not apply route      Cholecalciferol (Vitamin D-3) 25 MCG (1000 UT) CAPS Take 1 capsule by mouth      Continuous Blood Gluc  (Dexcom G6 ) Gabby Parmar USED TO MONITOR BLOOD SUGAR LEVELS DAILY      Continuous Blood Gluc Sensor (Dexcom G6 Sensor) MISC CHANGE EVERY 10 DAYS      FREESTYLE LITE test strip Test up to 8 times a day and as instructed 200 each 3    glucagon (GLUCAGON EMERGENCY) 1 MG injection Inject as directed      Injection Device for Insulin (InPen 100-Grey-Juan) DON Use up to 3 times a day with Novolog cartridge  2 each 0    insulin aspart (NOVOLOG FLEXPEN) 100 Units/mL injection pen Uses 5 units before breakfast, 4 units before lunch and 4 units before dinner  (Patient taking differently: Uses 3 to 4 units before meals  ) 15 mL 0    Insulin Aspart (NovoLOG PenFill) 100 UNITS/ML cartridge for injection Inject 10 Units under the skin 3 (three) times a day with meals To be titrated up to 50 units a day  To replace Novolog flexpen  15 mL 0    insulin detemir (Levemir FlexTouch) 100 Units/mL injection pen Inject 18 Units under the skin daily at bedtime      Insulin Pen Needle (BD PEN NEEDLE DANYELL U/F) 32G X 4 MM MISC Use 4 a day and as directed  100 each 3    Insulin Pen Needle (UltiCare Micro Pen Needles) 32G X 4 MM MISC Inject under the skin 3 (three) times a day before meals 100 each 1    Lancets MISC by Does not apply route      Prenatal Vit-Fe Fumarate-FA (PRENATAL 1+1 PO) Take 1 tablet by mouth daily      [DISCONTINUED] Continuous Blood Gluc Transmit (Dexcom G6 Transmitter) MISC CHANGE TRANSMITTER EVERY 90 DAYS      aspirin 81 mg chewable tablet Chew 2 tablets (162 mg total) daily 180 tablet 0     No current facility-administered medications on file prior to visit  Review of Systems   Constitutional: Negative for fatigue and fever  Eyes: Negative for visual disturbance  Respiratory: Negative for cough and shortness of breath  Cardiovascular: Negative for chest pain and palpitations  Gastrointestinal: Negative for constipation, nausea and vomiting  Endocrine: Negative for polydipsia, polyphagia and polyuria  Genitourinary: Negative for difficulty urinating and vaginal bleeding  Neurological: Negative for headaches  Objective:  Refer to glucose flowsheet  Component Value Date/Time    HGBA1C 5 8 (H) 06/25/2021 0752    HGBA1C 6 0 (H) 04/20/2021 0805      /71 (BP Location: Right arm, Patient Position: Sitting, Cuff Size: Standard)   Pulse 77   Ht 5' 5" (1 651 m)   Wt 69 7 kg (153 lb 9 6 oz)   LMP 02/19/2021 (Exact Date)   BMI 25 56 kg/m²          Physical Exam  HENT:      Head: Normocephalic  Eyes:      Conjunctiva/sclera: Conjunctivae normal    Cardiovascular:      Heart sounds: Normal heart sounds  Pulmonary:      Effort: Pulmonary effort is normal       Breath sounds: Normal breath sounds  Musculoskeletal:         General: Normal range of motion  Neurological:      Mental Status: She is alert and oriented to person, place, and time  Psychiatric:         Mood and Affect: Mood normal          Behavior: Behavior normal          Thought Content:  Thought content normal          Judgment: Judgment normal              Time in:10:10 AM  Time out:11:15 AM

## 2021-08-17 NOTE — PATIENT INSTRUCTIONS
-A1c 5 8% at goal   -Repeat A1c, CMP and spot urine for protein creatinine ratio  -Due to hyperglycemia, increase Levemir from 20 to 22 units at 9 PM daily    -Add 3 AM glucose check   -Use InPen Novolog before meals 1-2-3 using carb ratio 8; correction factor 30; BG target 90; active insulin time 3 hours 30 minutes  Currently maximum 12 units per bolus    -Eat 3 meals and 3 snacks including combination of carbohydrates, protein and fat per meal/snack   -Always have bedtime snack with 15 grams of carbohydrates with 2 to 3 ounces of protein  -SMBG before meals and 2 hours post start of meals   -Report Thursday, 8/19/21 for further recommendations    -Glucose goals 60-90; before meals/overnight ; 2 hours post meal 120 or less   -Always have glucose available to treat hypoglycemia, use 15 by 14 rule  -Fetal growth ultrasound every 4 weeks    -Starting at 32 weeks NST twice a week and DOMENICA weekly   -Continue follow up with OB and MFM as recommended   -Stay in close contact with diabetes team

## 2021-08-18 ENCOUNTER — TELEPHONE (OUTPATIENT)
Dept: PERINATAL CARE | Facility: OTHER | Age: 31
End: 2021-08-18

## 2021-08-19 ENCOUNTER — PATIENT MESSAGE (OUTPATIENT)
Dept: PERINATAL CARE | Facility: CLINIC | Age: 31
End: 2021-08-19

## 2021-08-24 ENCOUNTER — APPOINTMENT (OUTPATIENT)
Dept: LAB | Facility: HOSPITAL | Age: 31
End: 2021-08-24
Payer: COMMERCIAL

## 2021-08-24 DIAGNOSIS — E55.9 VITAMIN D DEFICIENCY: ICD-10-CM

## 2021-08-24 DIAGNOSIS — Z3A.25 25 WEEKS GESTATION OF PREGNANCY: ICD-10-CM

## 2021-08-24 DIAGNOSIS — O24.012 PRE-EXISTING TYPE 1 DIABETES MELLITUS DURING PREGNANCY IN SECOND TRIMESTER: ICD-10-CM

## 2021-08-24 LAB
ALBUMIN SERPL BCP-MCNC: 3 G/DL (ref 3.5–5)
ALP SERPL-CCNC: 71 U/L (ref 46–116)
ALT SERPL W P-5'-P-CCNC: 27 U/L (ref 12–78)
ANION GAP SERPL CALCULATED.3IONS-SCNC: 10 MMOL/L (ref 4–13)
AST SERPL W P-5'-P-CCNC: 19 U/L (ref 5–45)
BILIRUB SERPL-MCNC: 0.23 MG/DL (ref 0.2–1)
BUN SERPL-MCNC: 10 MG/DL (ref 5–25)
CALCIUM ALBUM COR SERPL-MCNC: 9.2 MG/DL (ref 8.3–10.1)
CALCIUM SERPL-MCNC: 8.4 MG/DL (ref 8.3–10.1)
CHLORIDE SERPL-SCNC: 103 MMOL/L (ref 100–108)
CO2 SERPL-SCNC: 25 MMOL/L (ref 21–32)
CREAT SERPL-MCNC: 0.59 MG/DL (ref 0.6–1.3)
CREAT UR-MCNC: 119 MG/DL
EST. AVERAGE GLUCOSE BLD GHB EST-MCNC: 123 MG/DL
GFR SERPL CREATININE-BSD FRML MDRD: 124 ML/MIN/1.73SQ M
GLUCOSE P FAST SERPL-MCNC: 87 MG/DL (ref 65–99)
HBA1C MFR BLD: 5.9 %
POTASSIUM SERPL-SCNC: 3.5 MMOL/L (ref 3.5–5.3)
PROT SERPL-MCNC: 6.9 G/DL (ref 6.4–8.2)
PROT UR-MCNC: 18 MG/DL
PROT/CREAT UR: 0.15 MG/G{CREAT} (ref 0–0.1)
SODIUM SERPL-SCNC: 138 MMOL/L (ref 136–145)

## 2021-08-24 PROCEDURE — 83036 HEMOGLOBIN GLYCOSYLATED A1C: CPT | Performed by: NURSE PRACTITIONER

## 2021-08-24 PROCEDURE — 84156 ASSAY OF PROTEIN URINE: CPT | Performed by: NURSE PRACTITIONER

## 2021-08-24 PROCEDURE — 36415 COLL VENOUS BLD VENIPUNCTURE: CPT | Performed by: NURSE PRACTITIONER

## 2021-08-24 PROCEDURE — 80053 COMPREHEN METABOLIC PANEL: CPT

## 2021-08-24 PROCEDURE — 82570 ASSAY OF URINE CREATININE: CPT | Performed by: NURSE PRACTITIONER

## 2021-08-24 PROCEDURE — 3061F NEG MICROALBUMINURIA REV: CPT | Performed by: NURSE PRACTITIONER

## 2021-08-24 PROCEDURE — 3044F HG A1C LEVEL LT 7.0%: CPT | Performed by: NURSE PRACTITIONER

## 2021-08-25 ENCOUNTER — TELEPHONE (OUTPATIENT)
Dept: PERINATAL CARE | Facility: CLINIC | Age: 31
End: 2021-08-25

## 2021-08-27 ENCOUNTER — TELEPHONE (OUTPATIENT)
Dept: PERINATAL CARE | Facility: CLINIC | Age: 31
End: 2021-08-27

## 2021-08-27 NOTE — TELEPHONE ENCOUNTER
Left voicemail as a friendly reminder to report her blood sugars, last reported : 08/19/2021  Continue to report blood sugars weekly via  ? Phone: [phone]    ? MyChart with an attachment    ? MyChart Flow sheet     Angela from [employer] had explained to us that the patient must save her report as a pdf file  Inside the InPen application go to "reports" and send her report (pdf file) as fax  Advised patient to reach my chart message for more detailed instructions on how to send us her report

## 2021-08-31 ENCOUNTER — ROUTINE PRENATAL (OUTPATIENT)
Dept: OBGYN CLINIC | Facility: CLINIC | Age: 31
End: 2021-08-31

## 2021-08-31 VITALS
WEIGHT: 154 LBS | BODY MASS INDEX: 25.63 KG/M2 | SYSTOLIC BLOOD PRESSURE: 116 MMHG | HEART RATE: 81 BPM | DIASTOLIC BLOOD PRESSURE: 61 MMHG

## 2021-08-31 DIAGNOSIS — Z98.891 HISTORY OF CESAREAN SECTION: ICD-10-CM

## 2021-08-31 DIAGNOSIS — Z3A.27 27 WEEKS GESTATION OF PREGNANCY: ICD-10-CM

## 2021-08-31 DIAGNOSIS — O24.012 PRE-EXISTING TYPE 1 DIABETES MELLITUS DURING PREGNANCY IN SECOND TRIMESTER: ICD-10-CM

## 2021-08-31 DIAGNOSIS — Z34.92 PREGNANT AND NOT YET DELIVERED IN SECOND TRIMESTER: ICD-10-CM

## 2021-08-31 LAB
SL AMB  POCT GLUCOSE, UA: ABNORMAL
SL AMB POCT URINE PROTEIN: ABNORMAL

## 2021-08-31 PROCEDURE — PNV: Performed by: OBSTETRICS & GYNECOLOGY

## 2021-08-31 NOTE — PROGRESS NOTES
IUP at 27 weeks and 4 days patient with type 1 insulin-dependent diabetes  Recent fetal echocardiogram within normal limits follow-up growth ultrasound scheduled for later this week  Patient reports positive fetal movements denies contractions leakage of fluid or bleeding  Her blood pressure is stable her weight gain is appropriate urine dip today was trace and negative  She is reporting her blood sugars to Diabetes and pregnancy program   She did have  for her 1st pregnancy she is interested in [facility] if conditions are favorable  Reviewed signs of  labor and kick counts follow-up here in 2 weeks  She is taking 2 baby aspirin daily and is aware that she needs to stop at 36 weeks

## 2021-09-01 ENCOUNTER — DOCUMENTATION (OUTPATIENT)
Dept: PERINATAL CARE | Facility: CLINIC | Age: 31
End: 2021-09-01

## 2021-09-01 NOTE — PROGRESS NOTES
Date: 09/01/21  Soto Solo  1990  Estimated Date of Delivery: 11/26/21  27w5d  OB/GYN: [facility]   Diagnosis: Diabetes mellitus type 1, controlled, without complications ([facility] [address] )        Plan:   Levemir  22 units   Novolog: Per CRNP on 08/17/2021   -Use InPen Novolog before meals 1-2-3 using    -Carb ratio 8   -Correction factor 30; BG target 90;    -Active insulin time 3 hours 30 minutes  -Currently maximum 12 units per bolus  Diet: Gestational diabetes meal plan; 3 meals and 3 snacks  SMBG: Checks blood sugar 4 times per day; fasting and 2 hour start of each meal    Meter: Freestyle Lite glucose meter  Activity: Walks 30 minutes daily, follow OB recommendations     Support System: Significant Other  Patient Goal:     Labs  4/20/2021 Hgb A1C = 6 0%  06/25/2021 A1c = 5 8%  08/24/2021 CMP - WNL  08/24/2021 A1c = 5 9%    Ultrasounds  05/13/2021 NT - normal growth   06/18/2021 Normal growth and DOMENICA   07/07/2021 Normal growth and DOMENICA   ECHO scheduled for 08/06/2021   Next US  Scheduled for 09/03/2021    Further fetal surveillance  Beginning at 32 weeks, NST / DOMENICA twice a week     Albert Fitch RN

## 2021-09-02 ENCOUNTER — TELEPHONE (OUTPATIENT)
Dept: OBGYN CLINIC | Facility: CLINIC | Age: 31
End: 2021-09-02

## 2021-09-02 NOTE — TELEPHONE ENCOUNTER
Pt called in inquiring about NST appts  Pt needs late evening appointments for NST which we cannot accommodate at this time  Her physician appts have been changed successfully  She will discuss tomorrow with MFM their ability to accommodate and will let us know

## 2021-09-03 PROBLEM — O24.013 PRE-EXISTING TYPE 1 DIABETES MELLITUS DURING PREGNANCY IN THIRD TRIMESTER: Status: ACTIVE | Noted: 2019-02-19

## 2021-09-06 ENCOUNTER — OFFICE VISIT (OUTPATIENT)
Dept: URGENT CARE | Age: 31
End: 2021-09-06
Payer: COMMERCIAL

## 2021-09-06 VITALS
WEIGHT: 156.4 LBS | OXYGEN SATURATION: 98 % | BODY MASS INDEX: 26.06 KG/M2 | HEART RATE: 97 BPM | TEMPERATURE: 96.5 F | HEIGHT: 65 IN

## 2021-09-06 DIAGNOSIS — J02.9 SORE THROAT: ICD-10-CM

## 2021-09-06 DIAGNOSIS — Z20.822 CONTACT WITH AND (SUSPECTED) EXPOSURE TO COVID-19: Primary | ICD-10-CM

## 2021-09-06 LAB — S PYO AG THROAT QL: NEGATIVE

## 2021-09-06 PROCEDURE — 99213 OFFICE O/P EST LOW 20 MIN: CPT | Performed by: NURSE PRACTITIONER

## 2021-09-06 PROCEDURE — U0005 INFEC AGEN DETEC AMPLI PROBE: HCPCS | Performed by: NURSE PRACTITIONER

## 2021-09-06 PROCEDURE — 87070 CULTURE OTHR SPECIMN AEROBIC: CPT | Performed by: NURSE PRACTITIONER

## 2021-09-06 PROCEDURE — 87147 CULTURE TYPE IMMUNOLOGIC: CPT | Performed by: NURSE PRACTITIONER

## 2021-09-06 PROCEDURE — U0003 INFECTIOUS AGENT DETECTION BY NUCLEIC ACID (DNA OR RNA); SEVERE ACUTE RESPIRATORY SYNDROME CORONAVIRUS 2 (SARS-COV-2) (CORONAVIRUS DISEASE [COVID-19]), AMPLIFIED PROBE TECHNIQUE, MAKING USE OF HIGH THROUGHPUT TECHNOLOGIES AS DESCRIBED BY CMS-2020-01-R: HCPCS | Performed by: NURSE PRACTITIONER

## 2021-09-06 NOTE — PROGRESS NOTES
[address]        NAME: Kira Bhatia is a 36 y o  female  : 1990    MRN: [id number]  DATE: 2021  TIME: 2:44 PM    Assessment and Plan   Contact with and (suspected) exposure to covid-19 [Z20 822]  1  Contact with and (suspected) exposure to covid-19  Novel Coronavirus (Covid-19),PCR [facility] - Office Collection   2  Sore throat  POCT rapid strepA    Throat culture         Patient Instructions       Follow up with PCP in 3-5 days  Proceed to  ER if symptoms worsen  Chief Complaint     Chief Complaint   Patient presents with    Cold Like Symptoms     Pt reports sore throat, loss of smell and runny nose x 2-3 days  Pt's adriana tested positive for Covid-19 yesterday  No OTC meds taken  Pt is fully vaccinated  History of Present Illness       HPI  Reports cold like symptoms  Lives with adriana, who was diagnosed with covid yesterday  She is fully vaccinated  Requesting testing for covid  Review of Systems   Review of Systems   Constitutional: Negative for fatigue and fever  Appetite change: loss of smell  HENT: Positive for rhinorrhea and sore throat  Respiratory: Negative for cough, shortness of breath and wheezing  Gastrointestinal: Positive for vomiting  Negative for abdominal pain and diarrhea  Neurological: Negative for light-headedness           Current Medications       Current Outpatient Medications:     acetone, urine, test strip, use as needed for BG >250 or for fever, vomiting, or diarrheal illness, Disp: , Rfl:     Blood Glucose Monitoring Suppl (FreeStyle Lite) DON, by Does not apply route, Disp: , Rfl:     Cholecalciferol (Vitamin D-3) 25 MCG (1000 UT) CAPS, Take 1 capsule by mouth, Disp: , Rfl:     Continuous Blood Gluc  (Dexcom G6 ) DON, USED TO MONITOR BLOOD SUGAR LEVELS DAILY, Disp: , Rfl:     Continuous Blood Gluc Sensor (Dexcom G6 Sensor) MISC, CHANGE EVERY 10 DAYS, Disp: , Rfl:     Continuous Blood Gluc Transmit (Dexcom G6 Transmitter) MISC, Transmitter change every 90 days  , Disp: 1 each, Rfl: 3    FREESTYLE LITE test strip, Test up to 8 times a day and as instructed, Disp: 200 each, Rfl: 3    glucagon (GLUCAGON EMERGENCY) 1 MG injection, Inject as directed, Disp: , Rfl:     Injection Device for Insulin (InPen 100-Grey-Juan) DON, Use up to 3 times a day with Novolog cartridge , Disp: 2 each, Rfl: 0    insulin aspart (NOVOLOG FLEXPEN) 100 Units/mL injection pen, Uses 5 units before breakfast, 4 units before lunch and 4 units before dinner  (Patient taking differently: Uses 3 to 4 units before meals ), Disp: 15 mL, Rfl: 0    Insulin Aspart (NovoLOG PenFill) 100 UNITS/ML cartridge for injection, Inject 10 Units under the skin 3 (three) times a day with meals To be titrated up to 50 units a day   To replace Novolog flexpen , Disp: 15 mL, Rfl: 0    insulin detemir (Levemir FlexTouch) 100 Units/mL injection pen, Inject 18 Units under the skin daily at bedtime, Disp: , Rfl:     Insulin Pen Needle (BD PEN NEEDLE DANYELL U/F) 32G X 4 MM MISC, Use 4 a day and as directed , Disp: 100 each, Rfl: 3    Insulin Pen Needle (UltiCare Micro Pen Needles) 32G X 4 MM MISC, Inject under the skin 3 (three) times a day before meals, Disp: 100 each, Rfl: 1    Lancets MISC, by Does not apply route, Disp: , Rfl:     Prenatal Vit-Fe Fumarate-FA (PRENATAL 1+1 PO), Take 1 tablet by mouth daily, Disp: , Rfl:     aspirin 81 mg chewable tablet, Chew 2 tablets (162 mg total) daily, Disp: 180 tablet, Rfl: 0    Current Allergies     Allergies as of 09/06/2021    (No Known Allergies)            The following portions of the patient's history were reviewed and updated as appropriate: allergies, current medications, past family history, past medical history, past social history, past surgical history and problem list      Past Medical History:   Diagnosis Date    Abnormal Pap smear of cervix     HPV (human papilloma virus) infection     Microalbuminuria due to type 1 diabetes mellitus ([facility] [place] [address] ) 2021    Varicella     vaccinated as child       Past Surgical History:   Procedure Laterality Date     SECTION      INNER EAR SURGERY Left     FL  DELIVERY ONLY N/A 2019    Procedure:  SECTION (); Surgeon: Erlene Phalen, MD;  [facility];  Service: Obstetrics    WISDOM TOOTH EXTRACTION         Family History   Problem Relation Age of Onset    No Known Problems Mother     No Known Problems Father     Lung cancer Maternal Grandfather          Medications have been verified  Objective   Pulse 97   Temp (!) 96 5 °F (35 8 °C)   Ht 5' 5" (1 651 m)   Wt 70 9 kg (156 lb 6 4 oz)   LMP 2021 (Exact Date)   SpO2 98%   BMI 26 03 kg/m²   Patient's last menstrual period was 2021 (exact date)  Physical Exam     Physical Exam  Constitutional:       Appearance: She is not ill-appearing or diaphoretic  HENT:      Right Ear: Tympanic membrane and ear canal normal       Left Ear: Tympanic membrane and ear canal normal       Nose: Rhinorrhea present  Mouth/Throat:      Mouth: Mucous membranes are moist       Pharynx: No posterior oropharyngeal erythema  Cardiovascular:      Rate and Rhythm: Regular rhythm  Heart sounds: Normal heart sounds  Pulmonary:      Effort: Pulmonary effort is normal       Breath sounds: Normal breath sounds  No wheezing  DISPLAY PLAN FREE TEXT

## 2021-09-06 NOTE — PATIENT INSTRUCTIONS
Sore Throat, Ambulatory Care   GENERAL INFORMATION:   A sore throat  is often caused by a cold or flu virus  A sore throat may also be caused by bacteria such as strep  Other causes include smoking, a runny nose, allergies, or acid reflux  Seek immediate care for the following symptoms:   · Trouble breathing or swallowing because your throat is swollen or sore    · Drooling because it hurts too much to swallow    · A painful lump in your throat that does not go away after 5 days    · A fever higher than 102? F (39?C) or lasts longer than 3 days    · Confusion    · Blood in your throat or ear  Treatment for a sore throat  will depend on the cause how severe it is  A sore throat cause by a virus will go away on its own without treatment  You will need antibiotics if your sore throat is caused by bacteria  Your sore throat should start to feel better within 3 to 5 days for both viral and bacterial infections  Care for your sore throat:   · Gargle with salt water  Mix ¼ teaspoon salt in a glass of warm water and gargle  This may help reduce swelling in your throat  · Take ibuprofen or acetaminophen:  These medicines decrease pain and fever  They are available without a doctor's order  Ask your healthcare provider which medicine is best for you  Ask how much to take and how often to take it  · Drink more liquids  Cold or warm drinks may help soothe your sore throat  Drinking liquids can also help prevent dehydration  · Use a cool-steam humidifier  to help moisten the air in your room and reduce your throat pain  · Use lozenges, ice, soft foods, or popsicles  to soothe your throat  · Rest your throat as much as possible  Try not to use your voice  This may irritate your throat and worsen your symptoms  Follow up with your healthcare provider as directed:  Write down your questions so you remember to ask them during your visits  CARE AGREEMENT:   You have the right to help plan your care   Learn about your health condition and how it may be treated  Discuss treatment options with your caregivers to decide what care you want to receive  You always have the right to refuse treatment  The above information is an  only  It is not intended as medical advice for individual conditions or treatments  Talk to your doctor, nurse or pharmacist before following any medical regimen to see if it is safe and effective for you  © 2014 [address] is for End User's use only and may not be sold, redistributed or otherwise used for commercial purposes  All illustrations and images included in CareNotes® are the copyrighted property of A D A M , Inc  or [employer]

## 2021-09-07 LAB — SARS-COV-2 RNA RESP QL NAA+PROBE: NEGATIVE

## 2021-09-09 LAB — BACTERIA THROAT CULT: ABNORMAL

## 2021-09-14 ENCOUNTER — ROUTINE PRENATAL (OUTPATIENT)
Dept: OBGYN CLINIC | Facility: CLINIC | Age: 31
End: 2021-09-14
Payer: COMMERCIAL

## 2021-09-14 ENCOUNTER — TELEPHONE (OUTPATIENT)
Dept: PERINATAL CARE | Facility: CLINIC | Age: 31
End: 2021-09-14

## 2021-09-14 VITALS
BODY MASS INDEX: 26.13 KG/M2 | SYSTOLIC BLOOD PRESSURE: 110 MMHG | HEART RATE: 85 BPM | DIASTOLIC BLOOD PRESSURE: 70 MMHG | WEIGHT: 157 LBS

## 2021-09-14 DIAGNOSIS — Z34.93 THIRD TRIMESTER PREGNANCY: Primary | ICD-10-CM

## 2021-09-14 DIAGNOSIS — E10.9 DIABETES MELLITUS TYPE 1, CONTROLLED, WITHOUT COMPLICATIONS (HCC): ICD-10-CM

## 2021-09-14 DIAGNOSIS — Z3A.29 29 WEEKS GESTATION OF PREGNANCY: ICD-10-CM

## 2021-09-14 PROBLEM — O34.219 HISTORY OF CESAREAN DELIVERY, ANTEPARTUM: Status: RESOLVED | Noted: 2021-05-13 | Resolved: 2021-09-14

## 2021-09-14 LAB
SL AMB  POCT GLUCOSE, UA: ABNORMAL
SL AMB POCT URINE PROTEIN: ABNORMAL

## 2021-09-14 PROCEDURE — 90686 IIV4 VACC NO PRSV 0.5 ML IM: CPT | Performed by: OBSTETRICS & GYNECOLOGY

## 2021-09-14 PROCEDURE — 90471 IMMUNIZATION ADMIN: CPT | Performed by: OBSTETRICS & GYNECOLOGY

## 2021-09-14 PROCEDURE — PNV: Performed by: OBSTETRICS & GYNECOLOGY

## 2021-09-14 NOTE — PROGRESS NOTES
Patient was seen today for prenatal visit  1  29 4 weeks-good fetal movement noted  Fetal movement and  labor precautions were reviewed  Flu shot was given today  Tdap to be done at next visit  Patient is status post COVID vaccination previously  Follow-up 2 weeks time prenatal visit or as needed  Has [facility] appointment next week  2  Type 1 diabetes-diagnosed age 18, continues on insulin pump  Most recent hemoglobin A1c was 5 9  Continues low-dose aspirin  Fetal echocardiogram was okay  Has M appointment next week to follow-up isolated echogenic focus in the heart  Testing recommended after 32 weeks  3  Prior -done for persistent category 2 tracing on 19  She was trial of labor  Risks and benefits were previous reviewed  4  Previous yeast infection-none noted today  5  Genetics-normal maternity 21 and AFP noted  6  Prior urine with group B strep x2 to-treated with antibiotics each time with negative test of cure after 2nd treatment

## 2021-09-14 NOTE — PATIENT INSTRUCTIONS
Pregnancy at 32 to 30 [address]:   What changes are happening in my body? You may notice new symptoms such as shortness of breath, heartburn, or swelling of your ankles and feet  You may also have trouble sleeping or contractions  How do I care for myself at this stage of my pregnancy? · Eat a variety of healthy foods  Healthy foods include fruits, vegetables, whole-grain breads, low-fat dairy foods, beans, lean meats, and fish  Drink liquids as directed  Ask how much liquid to drink each day and which liquids are best for you  Limit caffeine to less than 200 milligrams each day  Limit your intake of fish to 2 servings each week  Choose fish low in mercury such as canned light tuna, shrimp, salmon, cod, or tilapia  Do not  eat fish high in mercury such as swordfish, tilefish, anna mackerel, and shark  · Manage heartburn  by eating 4 or 5 small meals each day instead of large meals  Avoid spicy food  · Manage swelling  by lying down and putting your feet up  · Take prenatal vitamins as directed  Your need for certain vitamins and minerals, such as folic acid, increases during pregnancy  Prenatal vitamins provide some of the extra vitamins and minerals you need  Prenatal vitamins may also help to decrease the risk of certain birth defects  · Talk to your healthcare provider about exercise  Moderate exercise can help you stay fit  Your healthcare provider will help you plan an exercise program that is safe for you during pregnancy  · Do not smoke  Smoking increases your risk of a miscarriage and other health problems during your pregnancy  Smoking can cause your baby to be born too early or weigh less at birth  Ask your healthcare provider for information if you need help quitting  · Do not drink alcohol  Alcohol passes from your body to your baby through the placenta  It can affect your baby's brain development and cause fetal alcohol syndrome (FAS)   FAS is a group of conditions that causes mental, behavior, and growth problems  · Talk to your healthcare provider before you take any medicines  Many medicines may harm your baby if you take them when you are pregnant  Do not take any medicines, vitamins, herbs, or supplements without first talking to your healthcare provider  Never use illegal or street drugs (such as marijuana or cocaine) while you are pregnant  What are some safety tips during pregnancy? · Avoid hot tubs and saunas  Do not use a hot tub or sauna while you are pregnant, especially during your first trimester  Hot tubs and saunas may raise your baby's temperature and increase the risk of birth defects  · Avoid toxoplasmosis  This is an infection caused by eating raw meat or being around infected cat feces  It can cause birth defects, miscarriages, and other problems  Wash your hands after you touch raw meat  Make sure any meat is well-cooked before you eat it  Avoid raw eggs and unpasteurized milk  Use gloves or ask someone else to clean your cat's litter box while you are pregnant  What changes are happening with my baby? By 30 weeks, your baby may weigh more than 3 pounds  Your baby may be about 11 inches long from the top of the head to the rump (baby's bottom)  Your baby's eyes open and close now  Your baby's kicks and movements are more forceful at this time  What do I need to know about prenatal care? Your healthcare provider will check your blood pressure and weight  You may also need the following:  · Blood tests  may be done to check for anemia or blood type  · A urine test  may also be done to check for sugar and protein  These can be signs of gestational diabetes or infection  Protein in your urine may also be a sign of preeclampsia  Preeclampsia is a condition that can develop during week 20 or later of your pregnancy  It causes high blood pressure, and it can cause problems with your kidneys and other organs  · A Tdap vaccine and flu vaccine  may be recommended by your healthcare provider  · A gestational diabetes screen  will be done using an oral glucose tolerance test (OGTT)  An OGTT starts with a blood sugar level check after you have not eaten for 8 hours  You are then given a glucose drink  Your blood sugar level is checked after 1 hour, 2 hours, and sometimes 3 hours  Healthcare providers look at how much your blood sugar level increases from the first check  · Fundal height  is a measurement of your uterus to check your baby's growth  This number is usually the same as the number of weeks that you have been pregnant  Your healthcare provider may also check your baby's position  · Your baby's heart rate  will be checked  When should I seek immediate care? · You develop a severe headache that does not go away  · You have new or increased vision changes, such as blurred or spotted vision  · You have new or increased swelling in your face or hands  · You have vaginal spotting or bleeding  · Your water broke or you feel warm water gushing or trickling from your vagina  When should I contact my healthcare provider? · You have more than 5 contractions in 1 hour  · You notice any changes in your baby's movements  · You have abdominal cramps, pressure, or tightening  · You have a change in vaginal discharge  · You have chills or a fever  · You have vaginal itching, burning, or pain  · You have yellow, green, white, or foul-smelling vaginal discharge  · You have pain or burning when you urinate, less urine than usual, or pink or bloody urine  · You have questions or concerns about your condition or care  CARE AGREEMENT:   You have the right to help plan your care  Learn about your health condition and how it may be treated  Discuss treatment options with your healthcare providers to decide what care you want to receive   You always have the right to refuse treatment  The above information is an  only  It is not intended as medical advice for individual conditions or treatments  Talk to your doctor, nurse or pharmacist before following any medical regimen to see if it is safe and effective for you  © Copyright [employer] 2021 Information is for End User's use only and may not be sold, redistributed or otherwise used for commercial purposes   All illustrations and images included in CareNotes® are the copyrighted property of A D A M , Inc  or [address] [place] [address]

## 2021-09-16 ENCOUNTER — TELEMEDICINE (OUTPATIENT)
Dept: PERINATAL CARE | Facility: CLINIC | Age: 31
End: 2021-09-16
Payer: COMMERCIAL

## 2021-09-16 VITALS — HEIGHT: 65 IN | BODY MASS INDEX: 26.16 KG/M2 | WEIGHT: 157 LBS

## 2021-09-16 DIAGNOSIS — Z3A.29 29 WEEKS GESTATION OF PREGNANCY: ICD-10-CM

## 2021-09-16 DIAGNOSIS — O24.013 PRE-EXISTING TYPE 1 DIABETES MELLITUS DURING PREGNANCY IN THIRD TRIMESTER: Primary | ICD-10-CM

## 2021-09-16 DIAGNOSIS — E55.9 VITAMIN D DEFICIENCY: ICD-10-CM

## 2021-09-16 PROCEDURE — 99443 PR PHYS/QHP TELEPHONE EVALUATION 21-30 MIN: CPT | Performed by: NURSE PRACTITIONER

## 2021-09-16 RX ORDER — INSULIN DETEMIR 100 [IU]/ML
26 INJECTION, SOLUTION SUBCUTANEOUS
Qty: 15 ML | Refills: 1 | Status: ON HOLD | OUTPATIENT
Start: 2021-09-16 | End: 2021-11-17 | Stop reason: SDUPTHER

## 2021-09-16 NOTE — ASSESSMENT & PLAN NOTE
-Starting with program weight 128 lbs  -Current weight 157 lbs  -Current BMI 26 13   -Recommended weight gain 25 to 35 lbs   -Contine GDM diet

## 2021-09-16 NOTE — PATIENT INSTRUCTIONS
-A1c 5 9% at goal for T1DM  -Due to hyperglycemia, increase Levemir from 22 to 26 units daily    -Add 3 AM glucose check for next 3 days; goal is     -If issue with glucose readings less than 60; okay to decrease Levemir to 24 units daily   -Due to 2 hours post dinner above 120; decrease carb ratio from 6 to 5 for dinner; on Monday, 9/20/21 if 2 hours PP>120, decrease carb ratio to 4    -Increase bolus maximum from 12 to 18 units   -Continue with correction factor 30; carb ratio 6 for breakfast and lunch; BG target 90; active insulin time 3 hours and 30 minutes  -Continue GDM diet and SMBG before meals and 2 hours post meals    -Continue reporting via glucose flowsheet    -Always have glucose to treat hypoglycemia  -Keep fetal growth ultrasound as scheduled then every 4 to 6 weeks as recommended   -Starting at 32 weeks gestation NST twice a week and DOMENICA weekly    -Continue follow up with OB and MFM  -Stay in close contact with diabetes education team   -Repeat A1c, CMP and urine protein creatinine ratio in 1 month   -Follow up in 4 to 6 weeks

## 2021-09-16 NOTE — ASSESSMENT & PLAN NOTE
-A1c 5 9% at goal for T1DM  -Due to hyperglycemia, increase Levemir from 22 to 26 units daily    -Add 3 AM glucose check for next 3 days; goal is     -If issue with glucose readings less than 60; okay to decrease Levemir to 24 units daily   -Due to 2 hours post dinner above 120; decrease carb ratio from 6 to 5 for dinner; on Monday, 9/20/21 if 2 hours PP>120, decrease carb ratio to 4    -Increase bolus maximum from 12 to 18 units   -Continue with correction factor 30; carb ratio 6 for breakfast and lunch; BG target 90; active insulin time 3 hours and 30 minutes  -Continue GDM diet and SMBG before meals and 2 hours post meals    -Continue reporting via glucose flowsheet    -Always have glucose to treat hypoglycemia  -Keep fetal growth ultrasound as scheduled then every 4 to 6 weeks as recommended   -Starting at 32 weeks gestation NST twice a week and DOMENICA weekly    -Continue follow up with OB and MFM  -Stay in close contact with diabetes education team   -Repeat A1c, CMP and urine protein creatinine ratio in 1 month   -Follow up in 4 to 6 weeks      Lab Results   Component Value Date    HGBA1C 5 9 (H) 08/24/2021

## 2021-09-16 NOTE — PROGRESS NOTES
Virtual Brief Visit    Verification of patient location:[place]    Patient is located in the following state in which I hold an active license [place]      Assessment/Plan:    Problem List Items Addressed This Visit        Endocrine    Pre-existing type 1 diabetes mellitus during pregnancy in third trimester - Primary     -A1c 5 9% at goal for T1DM  -Due to hyperglycemia, increase Levemir from 22 to 26 units daily    -Add 3 AM glucose check for next 3 days; goal is     -If issue with glucose readings less than 60; okay to decrease Levemir to 24 units daily   -Due to 2 hours post dinner above 120; decrease carb ratio from 6 to 5 for dinner; on Monday, 9/20/21 if 2 hours PP>120, decrease carb ratio to 4    -Increase bolus maximum from 12 to 18 units   -Continue with correction factor 30; carb ratio 6 for breakfast and lunch; BG target 90; active insulin time 3 hours and 30 minutes  -Continue GDM diet and SMBG before meals and 2 hours post meals    -Continue reporting via glucose flowsheet    -Always have glucose to treat hypoglycemia  -Keep fetal growth ultrasound as scheduled then every 4 to 6 weeks as recommended   -Starting at 32 weeks gestation NST twice a week and DOMENICA weekly    -Continue follow up with OB and MFM  -Stay in close contact with diabetes education team   -Repeat A1c, CMP and urine protein creatinine ratio in 1 month   -Follow up in 4 to 6 weeks  Lab Results   Component Value Date    HGBA1C 5 9 (H) 08/24/2021            Relevant Medications    insulin detemir (Levemir FlexTouch) 100 Units/mL injection pen       Other    Vitamin D deficiency     -On Vitamin D3 supplements and prenatal vitamin  Relevant Medications    insulin detemir (Levemir FlexTouch) 100 Units/mL injection pen    29 weeks gestation of pregnancy    Relevant Medications    insulin detemir (Levemir FlexTouch) 100 Units/mL injection pen    BMI 26 0-26 9,adult     -Starting with program weight 128 lbs    -Current weight 157 lbs   -Current BMI 26 13   -Recommended weight gain 25 to 35 lbs   -Contine GDM diet  Relevant Medications    insulin detemir (Levemir FlexTouch) 100 Units/mL injection pen                Reason for visit is   Chief Complaint   Patient presents with    Virtual Brief Visit    Diabetes Type 1        Encounter provider Jaden Blake, [facility]    Provider located at [facility]  [address] [place] [address] [phone]    Recent Visits  Date Type Provider Dept   21 Telephone Bladeedmar Tiwari, [facility] recent visits within past 7 days and meeting all other requirements  Today's Visits  Date Type Provider Dept   21 Telemedicine Jaden Blake, [facility] today's visits and meeting all other requirements  Future Appointments  No visits were found meeting these conditions  Showing future appointments within next 150 days and meeting all other requirements       After connecting through telephone, the patient was identified by name and date of birth  Fabricio Peralta was informed that this is a telemedicine visit and that the visit is being conducted through telephone  My office door was closed  No one else was in the room  She acknowledged consent and understanding of privacy and security of the platform  The patient has agreed to participate and understands she can discontinue the visit at any time  Patient is aware this is a billable service  Subjective    Erickalala Tijerina is a 36 y o  female  29 6/7 weeks gestation T1DM on Levemir 22 units daily and Novolog via InPen before meals 1-2-3  Eating 3 meals and 3 snacks a day  Testing glucose before meals and 2 hours post meal  Increase in fasting glucose readings noted 115 and increase in post meal readings  Denies glucose readings less than 60  Reports current carb ratio is 6 vs 8  Insulin adjustments made  Positive fetal movement  Currently not using Dexcom due to causing discomfort  No complaints  Past Medical History:   Diagnosis Date    Abnormal Pap smear of cervix     History of  delivery, antepartum 2021    HPV (human papilloma virus) infection     Microalbuminuria due to type 1 diabetes mellitus ([facility] [address] ) 2021    Varicella     vaccinated as child       Past Surgical History:   Procedure Laterality Date     SECTION      INNER EAR SURGERY Left     HI  DELIVERY ONLY N/A 2019    Procedure:  SECTION (); Surgeon: Fatoumata Recinos MD;  [facility]: [facility];  Service: Obstetrics    WISDOM TOOTH EXTRACTION         Current Outpatient Medications   Medication Sig Dispense Refill    acetone, urine, test strip use as needed for BG >250 or for fever, vomiting, or diarrheal illness      aspirin 81 mg chewable tablet Chew 2 tablets (162 mg total) daily 180 tablet 0    Blood Glucose Monitoring Suppl (FreeStyle Lite) DON by Does not apply route      Cholecalciferol (Vitamin D-3) 25 MCG (1000 UT) CAPS Take 1 capsule by mouth      Continuous Blood Gluc  (Dexcom G6 ) DON USED TO MONITOR BLOOD SUGAR LEVELS DAILY      Continuous Blood Gluc Sensor (Dexcom G6 Sensor) MISC CHANGE EVERY 10 DAYS      Continuous Blood Gluc Transmit (Dexcom G6 Transmitter) MISC Transmitter change every 90 days  1 each 3    FREESTYLE LITE test strip Test up to 8 times a day and as instructed 200 each 3    glucagon (GLUCAGON EMERGENCY) 1 MG injection Inject as directed      Injection Device for Insulin (InPen 100-Grey-Juan) DON Use up to 3 times a day with Novolog cartridge  2 each 0    insulin aspart (NOVOLOG FLEXPEN) 100 Units/mL injection pen Uses 5 units before breakfast, 4 units before lunch and 4 units before dinner  (Patient taking differently: Uses 3 to 4 units before meals  ) 15 mL 0    Insulin Aspart (NovoLOG PenFill) 100 UNITS/ML cartridge for injection Inject 10 Units under the skin 3 (three) times a day with meals To be titrated up to 50 units a day  To replace Novolog flexpen  15 mL 0    insulin detemir (Levemir FlexTouch) 100 Units/mL injection pen Inject 26 Units under the skin daily at bedtime 15 mL 1    Insulin Pen Needle (BD PEN NEEDLE DANYELL U/F) 32G X 4 MM MISC Use 4 a day and as directed  100 each 3    Insulin Pen Needle (UltiCare Micro Pen Needles) 32G X 4 MM MISC Inject under the skin 3 (three) times a day before meals 100 each 1    Lancets MISC by Does not apply route      Prenatal Vit-Fe Fumarate-FA (PRENATAL 1+1 PO) Take 1 tablet by mouth daily       No current facility-administered medications for this visit  No Known Allergies    Review of Systems   Constitutional: Negative for fatigue and fever  Eyes: Negative for visual disturbance  Respiratory: Negative for cough and shortness of breath  Cardiovascular: Negative for chest pain and palpitations  Gastrointestinal: Negative for constipation, nausea and vomiting  Endocrine: Negative for polydipsia, polyphagia and polyuria  Genitourinary: Negative for difficulty urinating and vaginal bleeding  Neurological: Negative for headaches  Psychiatric/Behavioral: Negative for sleep disturbance  Vitals:    09/16/21 1812   Weight: 71 2 kg (157 lb)   Height: 5' 5" (1 651 m)         I spent 30 minutes directly with the patient during this visit  VIRTUAL VISIT DISCLAIMER      Mars Rothman verbally agrees to participate in [employer]  Pt is aware that Virtual Care Services could be limited without vital signs or the ability to perform a full hands-on physical exam  Ericka Joseph understands she or the provider may request at any time to terminate the video visit and request the patient to seek care or treatment in person

## 2021-09-17 ENCOUNTER — TELEPHONE (OUTPATIENT)
Dept: PERINATAL CARE | Facility: CLINIC | Age: 31
End: 2021-09-17

## 2021-09-20 ENCOUNTER — ULTRASOUND (OUTPATIENT)
Dept: PERINATAL CARE | Facility: OTHER | Age: 31
End: 2021-09-20
Payer: COMMERCIAL

## 2021-09-20 VITALS
SYSTOLIC BLOOD PRESSURE: 117 MMHG | DIASTOLIC BLOOD PRESSURE: 74 MMHG | HEIGHT: 65 IN | WEIGHT: 159 LBS | HEART RATE: 85 BPM | BODY MASS INDEX: 26.49 KG/M2

## 2021-09-20 DIAGNOSIS — O24.013 PRE-EXISTING TYPE 1 DIABETES MELLITUS DURING PREGNANCY IN THIRD TRIMESTER: ICD-10-CM

## 2021-09-20 DIAGNOSIS — Z3A.30 30 WEEKS GESTATION OF PREGNANCY: Primary | ICD-10-CM

## 2021-09-20 PROCEDURE — 76816 OB US FOLLOW-UP PER FETUS: CPT | Performed by: OBSTETRICS & GYNECOLOGY

## 2021-09-20 PROCEDURE — 1036F TOBACCO NON-USER: CPT | Performed by: OBSTETRICS & GYNECOLOGY

## 2021-09-20 PROCEDURE — 99213 OFFICE O/P EST LOW 20 MIN: CPT | Performed by: OBSTETRICS & GYNECOLOGY

## 2021-09-20 NOTE — LETTER
September 20, 2021     Getachew Moses DO  [address] [place] [address]    Patient: Loraine Caba   YOB: 1990   Date of Visit: 9/20/2021       Dear Dr Garcia Sor: Thank you for referring Agatha Sue to me for evaluation  Below are my notes for this consultation  If you have questions, please do not hesitate to call me  I look forward to following your patient along with you  Sincerely,        Daren Elizabeth MD        CC: No Recipients  Daren Elizabeth MD  9/20/2021  5:57 PM  Sign when Signing Visit  Please refer to the [facility] ultrasound report in Ob Procedures for additional information regarding today's visit

## 2021-09-20 NOTE — PATIENT INSTRUCTIONS
Kick Counts in Pregnancy   WHAT YOU NEED TO KNOW:   Kick counts measure how much your baby is moving in your womb  A kick from your baby can be felt as a twist, turn, swish, roll, or jab  It is common to feel your baby kicking at 26 to 28 weeks of pregnancy  You may feel your baby kick as early as 20 weeks of pregnancy  You may want to start counting at 28 weeks  DISCHARGE INSTRUCTIONS:   Contact your healthcare provider immediately if:   · You feel a change in the number of kicks or movements of your baby  · You feel fewer than 10 kicks within 2 hours  · You have questions or concerns about your baby's movements  Why measure kick counts:  Your baby's movement may provide information about your baby's health  He or she may move less, or not at all, if there are problems  Your baby may move less if he or she is not getting enough oxygen or nutrition from the placenta  Do not smoke while you are pregnant  Smoking decreases the amount of oxygen that gets to your baby  Talk to your healthcare provider if you need help to quit smoking  Tell your healthcare provider as soon as you feel a change in your baby's movements  When to measure kick counts:   · Measure kick counts at the same time every day  · Measure kick counts when your baby is awake and most active  Your baby may be most active in the evening  How to measure kick counts:  Check that your baby is awake before you measure kick counts  You can wake up your baby by lightly pushing on your belly, walking, or drinking something cold  Your healthcare provider may tell you different ways to measure kick counts  You may be told to do the following:  · Use a chart or clock to keep track of the time you start and finish counting  · Sit in a chair or lie on your left side  · Place your hands on the largest part of your belly  · Count until you reach 10 kicks  Write down how much time it takes to count 10 kicks       · It may take 30 minutes to 2 hours to count 10 kicks  It should not take more than 2 hours to count 10 kicks  Follow up with your healthcare provider as directed:  Write down your questions so you remember to ask them during your visits  © Copyright [address] 2021 Information is for End User's use only and may not be sold, redistributed or otherwise used for commercial purposes  All illustrations and images included in CareNotes® are the copyrighted property of A D A M , Inc  or [facility]agen   The above information is an  only  It is not intended as medical advice for individual conditions or treatments  Talk to your doctor, nurse or pharmacist before following any medical regimen to see if it is safe and effective for you

## 2021-09-22 ENCOUNTER — TELEPHONE (OUTPATIENT)
Dept: PERINATAL CARE | Facility: CLINIC | Age: 31
End: 2021-09-22

## 2021-09-22 ENCOUNTER — DOCUMENTATION (OUTPATIENT)
Dept: PERINATAL CARE | Facility: CLINIC | Age: 31
End: 2021-09-22

## 2021-09-22 NOTE — PROGRESS NOTES
Date: 09/22/21  Desi Mckee  1990  Estimated Date of Delivery: 11/26/21  30w5d  OB/GYN: [facility]   Diagnosis: Diabetes mellitus type 1, controlled, without complications ([facility] [address] )        Plan:   Increase Levemir from 26 units up to 28 units at bedtime daily   Novolog: Per STU on 009/16/2021   -Use InPen Novolog before meals 1-2-3 using    -Carb ratio     -breakfast: 6    -Lunch: 6    -dinner carb ratio: 5    -if dinner 2 hours PP>120, decrease carb ratio to 4   -Correction factor 30; BG target 90;    -Active insulin time 3 hours 30 minutes  -Currently maximum 18 units per bolus  Diet: Gestational diabetes meal plan; 3 meals and 3 snacks  SMBG: Checks blood sugar 4 times per day; fasting and 2 hour start of each meal    Meter: Freestyle Lite glucose meter  Activity: Walks 30 minutes daily, follow OB recommendations     Support System: Significant Other  Patient Goal:     Labs  4/20/2021 Hgb A1C = 6 0%  06/25/2021 A1c = 5 8%  08/24/2021 CMP - WNL  08/24/2021 A1c = 5 9%  Next A1c due by 10/20/2021    Ultrasounds  05/13/2021 NT - normal growth   06/18/2021 Normal growth and DOMENICA   07/07/2021 Normal growth and DOMENICA   08/06/2021 Fetal ECHO - overall reassuring  09/20/2021 Normal growth and DOMENICA - EFW 90%  Next US  - clerical message sent to schedule     Further fetal surveillance  Beginning at 32 weeks, NST / DOMENICA twice a week (OB office)    Melinda Hart RN

## 2021-09-29 ENCOUNTER — DOCUMENTATION (OUTPATIENT)
Dept: PERINATAL CARE | Facility: CLINIC | Age: 31
End: 2021-09-29

## 2021-09-29 NOTE — PROGRESS NOTES
Date: 09/29/21  Wilhemina Sessions  1990  Estimated Date of Delivery: 11/26/21  31w5d  OB/GYN: [facility]   Diagnosis: Diabetes mellitus type 1, controlled, without complications ([facility] [address] )      Plan:   Increase Levemir from 28 units up to 32 units at bedtime daily   Novolog: Per STU on 009/16/2021   -Use InPen Novolog before meals 1-2-3 using    -Carb ratio     -breakfast: 6    -Lunch: 6    -dinner carb ratio: 4 (decreased from 5)   -Correction factor 30; BG target 90;    -Active insulin time 3 hours 30 minutes  -Currently maximum 18 units per bolus  Diet: Gestational diabetes meal plan; 3 meals and 3 snacks  SMBG: Checks blood sugar 4 times per day; fasting and 2 hour start of each meal    Meter: Freestyle Lite glucose meter  Activity: Walks 30 minutes daily, follow OB recommendations     Support System: Significant Other  Patient Goal:     Labs  4/20/2021 Hgb A1C = 6 0%  06/25/2021 A1c = 5 8%  08/24/2021 CMP - WNL  08/24/2021 A1c = 5 9%  Next A1c due by 10/20/2021    Ultrasounds  05/13/2021 NT - normal growth   06/18/2021 Normal growth and DOMENICA   07/07/2021 Normal growth and DOMENICA   08/06/2021 Fetal ECHO - overall reassuring  09/20/2021 Normal growth and DOMENICA - EFW 90%  Next US  Scheduled for 10/18/2021    Further fetal surveillance  NST / DOMENICA twice a week (OB office)    Chaparrita Belcher RN

## 2021-09-30 ENCOUNTER — ULTRASOUND (OUTPATIENT)
Dept: PERINATAL CARE | Facility: OTHER | Age: 31
End: 2021-09-30
Payer: COMMERCIAL

## 2021-09-30 VITALS
WEIGHT: 159 LBS | BODY MASS INDEX: 26.49 KG/M2 | HEART RATE: 80 BPM | HEIGHT: 65 IN | SYSTOLIC BLOOD PRESSURE: 111 MMHG | DIASTOLIC BLOOD PRESSURE: 64 MMHG

## 2021-09-30 DIAGNOSIS — Z3A.31 31 WEEKS GESTATION OF PREGNANCY: ICD-10-CM

## 2021-09-30 DIAGNOSIS — O24.013 PRE-EXISTING TYPE 1 DIABETES MELLITUS DURING PREGNANCY IN THIRD TRIMESTER: Primary | ICD-10-CM

## 2021-09-30 PROCEDURE — 76815 OB US LIMITED FETUS(S): CPT | Performed by: OBSTETRICS & GYNECOLOGY

## 2021-09-30 PROCEDURE — 3008F BODY MASS INDEX DOCD: CPT | Performed by: OBSTETRICS & GYNECOLOGY

## 2021-09-30 PROCEDURE — 59025 FETAL NON-STRESS TEST: CPT | Performed by: OBSTETRICS & GYNECOLOGY

## 2021-09-30 NOTE — PROGRESS NOTES
NST procedure and expected outcome explained to patient  Daily fetal kick count discussed with handout given  Patient verbalized understanding of all and was receptive      Porfirio Varma RN

## 2021-09-30 NOTE — LETTER
September 30, 2021     [facility],   [address] [facility] [address]    Patient: Cadence Espinoza   YOB: 1990   Date of Visit: 9/30/2021       Dear Dr Ra Stephens: Thank you for referring Usman Gonzalez to me for evaluation  Below are my notes for this consultation  If you have questions, please do not hesitate to call me  I look forward to following your patient along with you  Sincerely,        Minal Soriano MD        CC: No Recipients  Minal Soriano MD  9/30/2021 12:42 PM  Sign when Signing Visit   NST is reactive   Minal Soriano MD

## 2021-09-30 NOTE — LETTER
NST sleeve cover sheet    Patient name: Ananda Sanchez  : 1990  MRN: [id number]    JOHN: Estimated Date of Delivery: 21    Obstetrician: ___                Euel Coral                                      _______________    Reason(s) for testing:  ___________________________Pregest DM_______________      Testing frequency:    ___x 2x/wk  ___ 1x/wk  ___ Dopplers  ___ BPP?       Last growth scan: __________________________________________

## 2021-09-30 NOTE — PATIENT INSTRUCTIONS
Nonstress Test for Pregnancy   WHAT YOU NEED TO KNOW:   What do I need to know about a nonstress test?  A nonstress test measures your baby's heart rate and movements  Nonstress means that no stress will be placed on your baby during the test   How do I prepare for a nonstress test?  Your healthcare provider will talk to you about how to prepare for this test  He or she may tell you to eat and drink plenty of fluids before your test  If you smoke, you may be asked not to smoke within 2 hours before the test  He or she will also tell you which medicines to take or not take on the day of your test   What will happen during a nonstress test?  You may be asked to lie down or recline back for the test on a bed  One or 2 belts with sensors will be placed around your abdomen  Your baby's heart rate will be recorded with a machine  If your baby does not move, your baby may be asleep  Your healthcare provider may make a noise near your abdomen to try to wake your baby  The test usually takes about 20 minutes, but can take longer if your baby needs to be awakened  What do I need to know about the test results? Your baby will be expected to move at least 2 times for a certain amount of time  Your baby's heart rate will be expected to go up by a certain number of beats per minute during movement  If your baby does not move as expected, the test may need to be repeated or you may need other tests  CARE AGREEMENT:   You have the right to help plan your care  Learn about your health condition and how it may be treated  Discuss treatment options with your healthcare providers to decide what care you want to receive  You always have the right to refuse treatment  The above information is an  only  It is not intended as medical advice for individual conditions or treatments  Talk to your doctor, nurse or pharmacist before following any medical regimen to see if it is safe and effective for you    © Copyright [employer] 2021 Information is for [employer] use only and may not be sold, redistributed or otherwise used for commercial purposes  All illustrations and images included in CareNotes® are the copyrighted property of Francisco PAVON  or [address] [place] [address] Art Holguin in Pregnancy   AMBULATORY CARE:   Kick counts  measure how much your baby is moving in your womb  A kick from your baby can be felt as a twist, turn, swish, roll, or jab  It is common to feel your baby kicking at 26 to 28 weeks of pregnancy  You may feel your baby kick as early as 20 weeks of pregnancy  You may want to start counting at 28 weeks  Contact your healthcare provider immediately if:   · You feel a change in the number of kicks or movements of your baby  · You feel fewer than 10 kicks within 2 hours  · You have questions or concerns about your baby's movements  Why measure kick counts:  Your baby's movement may provide information about your baby's health  He or she may move less, or not at all, if there are problems  Your baby may move less if he or she is not getting enough oxygen or nutrition from the placenta  Do not smoke while you are pregnant  Smoking decreases the amount of oxygen that gets to your baby  Talk to your healthcare provider if you need help to quit smoking  Tell your healthcare provider as soon as you feel a change in your baby's movements  When to measure kick counts:   · Measure kick counts at the same time every day  · Measure kick counts when your baby is awake and most active  Your baby may be most active in the evening  How to measure kick counts:  Check that your baby is awake before you measure kick counts  You can wake up your baby by lightly pushing on your belly, walking, or drinking something cold  Your healthcare provider may tell you different ways to measure kick counts  You may be told to do the following:  · Use a chart or clock to keep track of the time you start and finish counting  · Sit in a chair or lie on your left side  · Place your hands on the largest part of your belly  · Count until you reach 10 kicks  Write down how much time it takes to count 10 kicks  · It may take 30 minutes to 2 hours to count 10 kicks  It should not take more than 2 hours to count 10 kicks  Follow up with your healthcare provider as directed:  Write down your questions so you remember to ask them during your visits  © Copyright [employer] 2021 Information is for End User's use only and may not be sold, redistributed or otherwise used for commercial purposes  All illustrations and images included in CareNotes® are the copyrighted property of A D A M , Inc  or [facility]  The above information is an  only  It is not intended as medical advice for individual conditions or treatments  Talk to your doctor, nurse or pharmacist before following any medical regimen to see if it is safe and effective for you

## 2021-10-04 ENCOUNTER — ROUTINE PRENATAL (OUTPATIENT)
Dept: PERINATAL CARE | Facility: OTHER | Age: 31
End: 2021-10-04
Payer: COMMERCIAL

## 2021-10-04 VITALS
SYSTOLIC BLOOD PRESSURE: 112 MMHG | WEIGHT: 163.4 LBS | HEART RATE: 86 BPM | HEIGHT: 65 IN | BODY MASS INDEX: 27.22 KG/M2 | DIASTOLIC BLOOD PRESSURE: 71 MMHG

## 2021-10-04 DIAGNOSIS — O24.013 PRE-EXISTING TYPE 1 DIABETES MELLITUS DURING PREGNANCY IN THIRD TRIMESTER: Primary | ICD-10-CM

## 2021-10-04 DIAGNOSIS — Z3A.32 32 WEEKS GESTATION OF PREGNANCY: ICD-10-CM

## 2021-10-04 PROCEDURE — 59025 FETAL NON-STRESS TEST: CPT | Performed by: OBSTETRICS & GYNECOLOGY

## 2021-10-07 ENCOUNTER — ULTRASOUND (OUTPATIENT)
Dept: PERINATAL CARE | Facility: OTHER | Age: 31
End: 2021-10-07
Payer: COMMERCIAL

## 2021-10-07 VITALS
HEART RATE: 81 BPM | BODY MASS INDEX: 26.82 KG/M2 | SYSTOLIC BLOOD PRESSURE: 116 MMHG | WEIGHT: 161 LBS | HEIGHT: 65 IN | DIASTOLIC BLOOD PRESSURE: 73 MMHG

## 2021-10-07 DIAGNOSIS — O24.013 PRE-EXISTING TYPE 1 DIABETES MELLITUS DURING PREGNANCY IN THIRD TRIMESTER: Primary | ICD-10-CM

## 2021-10-07 DIAGNOSIS — Z3A.32 32 WEEKS GESTATION OF PREGNANCY: ICD-10-CM

## 2021-10-07 PROCEDURE — 59025 FETAL NON-STRESS TEST: CPT | Performed by: OBSTETRICS & GYNECOLOGY

## 2021-10-07 PROCEDURE — 76815 OB US LIMITED FETUS(S): CPT | Performed by: OBSTETRICS & GYNECOLOGY

## 2021-10-11 ENCOUNTER — TELEPHONE (OUTPATIENT)
Dept: FAMILY MEDICINE CLINIC | Facility: CLINIC | Age: 31
End: 2021-10-11

## 2021-10-11 ENCOUNTER — ROUTINE PRENATAL (OUTPATIENT)
Dept: PERINATAL CARE | Facility: CLINIC | Age: 31
End: 2021-10-11
Payer: COMMERCIAL

## 2021-10-11 VITALS
HEIGHT: 65 IN | HEART RATE: 84 BPM | SYSTOLIC BLOOD PRESSURE: 109 MMHG | WEIGHT: 162 LBS | DIASTOLIC BLOOD PRESSURE: 64 MMHG | BODY MASS INDEX: 26.99 KG/M2

## 2021-10-11 DIAGNOSIS — O24.013 PRE-EXISTING TYPE 1 DIABETES MELLITUS DURING PREGNANCY IN THIRD TRIMESTER: Primary | ICD-10-CM

## 2021-10-11 DIAGNOSIS — Z3A.33 33 WEEKS GESTATION OF PREGNANCY: ICD-10-CM

## 2021-10-11 PROCEDURE — 59025 FETAL NON-STRESS TEST: CPT | Performed by: OBSTETRICS & GYNECOLOGY

## 2021-10-12 ENCOUNTER — ROUTINE PRENATAL (OUTPATIENT)
Dept: OBGYN CLINIC | Facility: CLINIC | Age: 31
End: 2021-10-12
Payer: COMMERCIAL

## 2021-10-12 VITALS
BODY MASS INDEX: 27.12 KG/M2 | WEIGHT: 163 LBS | HEART RATE: 88 BPM | SYSTOLIC BLOOD PRESSURE: 104 MMHG | DIASTOLIC BLOOD PRESSURE: 80 MMHG

## 2021-10-12 DIAGNOSIS — O24.013 PRE-EXISTING TYPE 1 DIABETES MELLITUS DURING PREGNANCY IN THIRD TRIMESTER: ICD-10-CM

## 2021-10-12 DIAGNOSIS — Z98.891 HISTORY OF CESAREAN SECTION: ICD-10-CM

## 2021-10-12 DIAGNOSIS — O35.8XX0 ECHOGENIC FOCUS OF HEART OF FETUS AFFECTING ANTEPARTUM CARE OF MOTHER, SINGLE OR UNSPECIFIED FETUS: ICD-10-CM

## 2021-10-12 DIAGNOSIS — Z34.93 PREGNANT AND NOT YET DELIVERED IN THIRD TRIMESTER: Primary | ICD-10-CM

## 2021-10-12 DIAGNOSIS — Z3A.33 33 WEEKS GESTATION OF PREGNANCY: ICD-10-CM

## 2021-10-12 LAB
SL AMB  POCT GLUCOSE, UA: NORMAL
SL AMB POCT URINE PROTEIN: NORMAL

## 2021-10-12 PROCEDURE — PNV: Performed by: OBSTETRICS & GYNECOLOGY

## 2021-10-12 PROCEDURE — 90715 TDAP VACCINE 7 YRS/> IM: CPT | Performed by: OBSTETRICS & GYNECOLOGY

## 2021-10-12 PROCEDURE — 90471 IMMUNIZATION ADMIN: CPT | Performed by: OBSTETRICS & GYNECOLOGY

## 2021-10-14 ENCOUNTER — ULTRASOUND (OUTPATIENT)
Dept: PERINATAL CARE | Facility: OTHER | Age: 31
End: 2021-10-14
Payer: COMMERCIAL

## 2021-10-14 VITALS
DIASTOLIC BLOOD PRESSURE: 65 MMHG | HEIGHT: 65 IN | WEIGHT: 161 LBS | HEART RATE: 80 BPM | BODY MASS INDEX: 26.82 KG/M2 | SYSTOLIC BLOOD PRESSURE: 105 MMHG

## 2021-10-14 DIAGNOSIS — O24.013 PRE-EXISTING TYPE 1 DIABETES MELLITUS DURING PREGNANCY IN THIRD TRIMESTER: Primary | ICD-10-CM

## 2021-10-14 DIAGNOSIS — Z3A.33 33 WEEKS GESTATION OF PREGNANCY: ICD-10-CM

## 2021-10-14 PROCEDURE — 59025 FETAL NON-STRESS TEST: CPT | Performed by: OBSTETRICS & GYNECOLOGY

## 2021-10-14 PROCEDURE — 76815 OB US LIMITED FETUS(S): CPT | Performed by: OBSTETRICS & GYNECOLOGY

## 2021-10-15 ENCOUNTER — HOSPITAL ENCOUNTER (OUTPATIENT)
Facility: HOSPITAL | Age: 31
Discharge: HOME | End: 2021-10-15
Attending: OBSTETRICS & GYNECOLOGY | Admitting: OBSTETRICS & GYNECOLOGY
Payer: COMMERCIAL

## 2021-10-15 ENCOUNTER — NURSE TRIAGE (OUTPATIENT)
Dept: OTHER | Facility: OTHER | Age: 31
End: 2021-10-15

## 2021-10-15 VITALS
HEART RATE: 87 BPM | DIASTOLIC BLOOD PRESSURE: 62 MMHG | SYSTOLIC BLOOD PRESSURE: 103 MMHG | RESPIRATION RATE: 18 BRPM | TEMPERATURE: 98.3 F

## 2021-10-15 LAB
ALBUMIN SERPL BCP-MCNC: 2.8 G/DL (ref 3.5–5)
ALP SERPL-CCNC: 122 U/L (ref 46–116)
ALT SERPL W P-5'-P-CCNC: 22 U/L (ref 12–78)
ANION GAP SERPL CALCULATED.3IONS-SCNC: 9 MMOL/L (ref 4–13)
AST SERPL W P-5'-P-CCNC: 17 U/L (ref 5–45)
BASOPHILS # BLD AUTO: 0.04 THOUSANDS/ΜL (ref 0–0.1)
BASOPHILS NFR BLD AUTO: 0 % (ref 0–1)
BILIRUB SERPL-MCNC: 0.24 MG/DL (ref 0.2–1)
BILIRUB UR QL STRIP: NEGATIVE
BUN SERPL-MCNC: 13 MG/DL (ref 5–25)
CALCIUM ALBUM COR SERPL-MCNC: 9.7 MG/DL (ref 8.3–10.1)
CALCIUM SERPL-MCNC: 8.7 MG/DL (ref 8.3–10.1)
CHLORIDE SERPL-SCNC: 103 MMOL/L (ref 100–108)
CLARITY UR: CLEAR
CO2 SERPL-SCNC: 24 MMOL/L (ref 21–32)
COLOR UR: YELLOW
CREAT SERPL-MCNC: 0.63 MG/DL (ref 0.6–1.3)
CREAT UR-MCNC: 38 MG/DL
EOSINOPHIL # BLD AUTO: 0.1 THOUSAND/ΜL (ref 0–0.61)
EOSINOPHIL NFR BLD AUTO: 1 % (ref 0–6)
ERYTHROCYTE [DISTWIDTH] IN BLOOD BY AUTOMATED COUNT: 12.3 % (ref 11.6–15.1)
GFR SERPL CREATININE-BSD FRML MDRD: 122 ML/MIN/1.73SQ M
GLUCOSE SERPL-MCNC: 104 MG/DL (ref 65–140)
GLUCOSE SERPL-MCNC: 97 MG/DL (ref 65–140)
GLUCOSE UR STRIP-MCNC: NEGATIVE MG/DL
HCT VFR BLD AUTO: 35.1 % (ref 34.8–46.1)
HGB BLD-MCNC: 11.9 G/DL (ref 11.5–15.4)
HGB UR QL STRIP.AUTO: NEGATIVE
IMM GRANULOCYTES # BLD AUTO: 0.17 THOUSAND/UL (ref 0–0.2)
IMM GRANULOCYTES NFR BLD AUTO: 1 % (ref 0–2)
KETONES UR STRIP-MCNC: ABNORMAL MG/DL
LEUKOCYTE ESTERASE UR QL STRIP: NEGATIVE
LYMPHOCYTES # BLD AUTO: 1.57 THOUSANDS/ΜL (ref 0.6–4.47)
LYMPHOCYTES NFR BLD AUTO: 13 % (ref 14–44)
MCH RBC QN AUTO: 31.7 PG (ref 26.8–34.3)
MCHC RBC AUTO-ENTMCNC: 33.9 G/DL (ref 31.4–37.4)
MCV RBC AUTO: 94 FL (ref 82–98)
MONOCYTES # BLD AUTO: 0.78 THOUSAND/ΜL (ref 0.17–1.22)
MONOCYTES NFR BLD AUTO: 6 % (ref 4–12)
NEUTROPHILS # BLD AUTO: 9.49 THOUSANDS/ΜL (ref 1.85–7.62)
NEUTS SEG NFR BLD AUTO: 79 % (ref 43–75)
NITRITE UR QL STRIP: NEGATIVE
NRBC BLD AUTO-RTO: 0 /100 WBCS
PH UR STRIP.AUTO: 6.5 [PH]
PLATELET # BLD AUTO: 142 THOUSANDS/UL (ref 149–390)
PMV BLD AUTO: 12.7 FL (ref 8.9–12.7)
POTASSIUM SERPL-SCNC: 3.5 MMOL/L (ref 3.5–5.3)
PROT SERPL-MCNC: 6.8 G/DL (ref 6.4–8.2)
PROT UR STRIP-MCNC: NEGATIVE MG/DL
PROT UR-MCNC: 6 MG/DL
PROT/CREAT UR: 0.16 MG/G{CREAT} (ref 0–0.1)
RBC # BLD AUTO: 3.75 MILLION/UL (ref 3.81–5.12)
SODIUM SERPL-SCNC: 136 MMOL/L (ref 136–145)
SP GR UR STRIP.AUTO: 1.01 (ref 1–1.03)
UROBILINOGEN UR QL STRIP.AUTO: 0.2 E.U./DL
WBC # BLD AUTO: 12.15 THOUSAND/UL (ref 4.31–10.16)

## 2021-10-15 PROCEDURE — 85025 COMPLETE CBC W/AUTO DIFF WBC: CPT | Performed by: OBSTETRICS & GYNECOLOGY

## 2021-10-15 PROCEDURE — 81003 URINALYSIS AUTO W/O SCOPE: CPT | Performed by: OBSTETRICS & GYNECOLOGY

## 2021-10-15 PROCEDURE — 84156 ASSAY OF PROTEIN URINE: CPT | Performed by: OBSTETRICS & GYNECOLOGY

## 2021-10-15 PROCEDURE — 80053 COMPREHEN METABOLIC PANEL: CPT | Performed by: OBSTETRICS & GYNECOLOGY

## 2021-10-15 PROCEDURE — G0463 HOSPITAL OUTPT CLINIC VISIT: HCPCS

## 2021-10-15 PROCEDURE — 99203 OFFICE O/P NEW LOW 30 MIN: CPT

## 2021-10-15 PROCEDURE — NC001 PR NO CHARGE: Performed by: OBSTETRICS & GYNECOLOGY

## 2021-10-15 PROCEDURE — 82948 REAGENT STRIP/BLOOD GLUCOSE: CPT

## 2021-10-15 PROCEDURE — 82570 ASSAY OF URINE CREATININE: CPT | Performed by: OBSTETRICS & GYNECOLOGY

## 2021-10-15 PROCEDURE — 3061F NEG MICROALBUMINURIA REV: CPT | Performed by: OBSTETRICS & GYNECOLOGY

## 2021-10-21 ENCOUNTER — ULTRASOUND (OUTPATIENT)
Dept: PERINATAL CARE | Facility: OTHER | Age: 31
End: 2021-10-21
Payer: COMMERCIAL

## 2021-10-21 ENCOUNTER — ROUTINE PRENATAL (OUTPATIENT)
Dept: PERINATAL CARE | Facility: OTHER | Age: 31
End: 2021-10-21
Payer: COMMERCIAL

## 2021-10-21 VITALS
HEIGHT: 65 IN | WEIGHT: 162 LBS | DIASTOLIC BLOOD PRESSURE: 70 MMHG | BODY MASS INDEX: 26.99 KG/M2 | HEART RATE: 84 BPM | SYSTOLIC BLOOD PRESSURE: 122 MMHG

## 2021-10-21 DIAGNOSIS — Z3A.34 34 WEEKS GESTATION OF PREGNANCY: Primary | ICD-10-CM

## 2021-10-21 DIAGNOSIS — O24.013 PRE-EXISTING TYPE 1 DIABETES MELLITUS DURING PREGNANCY IN THIRD TRIMESTER: Primary | ICD-10-CM

## 2021-10-21 DIAGNOSIS — O24.013 PRE-EXISTING TYPE 1 DIABETES MELLITUS DURING PREGNANCY IN THIRD TRIMESTER: ICD-10-CM

## 2021-10-21 DIAGNOSIS — Z36.89 ENCOUNTER FOR ULTRASOUND TO ASSESS FETAL GROWTH: ICD-10-CM

## 2021-10-21 DIAGNOSIS — Z3A.34 34 WEEKS GESTATION OF PREGNANCY: ICD-10-CM

## 2021-10-21 PROCEDURE — 76816 OB US FOLLOW-UP PER FETUS: CPT | Performed by: OBSTETRICS & GYNECOLOGY

## 2021-10-21 PROCEDURE — NC001 PR NO CHARGE: Performed by: OBSTETRICS & GYNECOLOGY

## 2021-10-21 PROCEDURE — 99213 OFFICE O/P EST LOW 20 MIN: CPT | Performed by: OBSTETRICS & GYNECOLOGY

## 2021-10-21 PROCEDURE — 59025 FETAL NON-STRESS TEST: CPT | Performed by: OBSTETRICS & GYNECOLOGY

## 2021-10-21 PROCEDURE — 1036F TOBACCO NON-USER: CPT | Performed by: OBSTETRICS & GYNECOLOGY

## 2021-10-22 ENCOUNTER — DOCUMENTATION (OUTPATIENT)
Dept: PERINATAL CARE | Facility: CLINIC | Age: 31
End: 2021-10-22

## 2021-10-25 ENCOUNTER — ROUTINE PRENATAL (OUTPATIENT)
Dept: PERINATAL CARE | Facility: OTHER | Age: 31
End: 2021-10-25
Payer: COMMERCIAL

## 2021-10-25 VITALS
SYSTOLIC BLOOD PRESSURE: 105 MMHG | DIASTOLIC BLOOD PRESSURE: 69 MMHG | BODY MASS INDEX: 27.22 KG/M2 | WEIGHT: 163.4 LBS | HEIGHT: 65 IN | HEART RATE: 80 BPM

## 2021-10-25 DIAGNOSIS — E10.9 DIABETES MELLITUS TYPE 1, CONTROLLED, WITHOUT COMPLICATIONS (HCC): ICD-10-CM

## 2021-10-25 DIAGNOSIS — Z3A.35 35 WEEKS GESTATION OF PREGNANCY: Primary | ICD-10-CM

## 2021-10-25 PROCEDURE — 59025 FETAL NON-STRESS TEST: CPT | Performed by: OBSTETRICS & GYNECOLOGY

## 2021-10-26 ENCOUNTER — ROUTINE PRENATAL (OUTPATIENT)
Dept: OBGYN CLINIC | Facility: CLINIC | Age: 31
End: 2021-10-26

## 2021-10-26 VITALS
WEIGHT: 166 LBS | HEART RATE: 80 BPM | SYSTOLIC BLOOD PRESSURE: 108 MMHG | BODY MASS INDEX: 27.62 KG/M2 | DIASTOLIC BLOOD PRESSURE: 65 MMHG

## 2021-10-26 DIAGNOSIS — Z98.891 HISTORY OF CESAREAN SECTION: ICD-10-CM

## 2021-10-26 DIAGNOSIS — O24.013 PRE-EXISTING TYPE 1 DIABETES MELLITUS DURING PREGNANCY IN THIRD TRIMESTER: ICD-10-CM

## 2021-10-26 DIAGNOSIS — Z3A.35 35 WEEKS GESTATION OF PREGNANCY: ICD-10-CM

## 2021-10-26 DIAGNOSIS — Z34.93 PREGNANT AND NOT YET DELIVERED IN THIRD TRIMESTER: Primary | ICD-10-CM

## 2021-10-26 LAB
SL AMB  POCT GLUCOSE, UA: 1
SL AMB POCT URINE PROTEIN: ABNORMAL

## 2021-10-26 PROCEDURE — PNV: Performed by: OBSTETRICS & GYNECOLOGY

## 2021-10-28 ENCOUNTER — ULTRASOUND (OUTPATIENT)
Dept: PERINATAL CARE | Facility: OTHER | Age: 31
End: 2021-10-28
Payer: COMMERCIAL

## 2021-10-28 VITALS
HEART RATE: 85 BPM | BODY MASS INDEX: 27.26 KG/M2 | HEIGHT: 65 IN | SYSTOLIC BLOOD PRESSURE: 107 MMHG | DIASTOLIC BLOOD PRESSURE: 70 MMHG | WEIGHT: 163.6 LBS

## 2021-10-28 DIAGNOSIS — Z3A.36 36 WEEKS GESTATION OF PREGNANCY: ICD-10-CM

## 2021-10-28 DIAGNOSIS — O24.013 PRE-EXISTING TYPE 1 DIABETES MELLITUS DURING PREGNANCY IN THIRD TRIMESTER: Primary | ICD-10-CM

## 2021-10-28 PROCEDURE — 76815 OB US LIMITED FETUS(S): CPT | Performed by: OBSTETRICS & GYNECOLOGY

## 2021-10-28 PROCEDURE — 59025 FETAL NON-STRESS TEST: CPT | Performed by: OBSTETRICS & GYNECOLOGY

## 2021-10-28 PROCEDURE — 3008F BODY MASS INDEX DOCD: CPT | Performed by: OBSTETRICS & GYNECOLOGY

## 2021-10-29 PROBLEM — Z3A.36 36 WEEKS GESTATION OF PREGNANCY: Status: ACTIVE | Noted: 2021-05-05

## 2021-11-02 ENCOUNTER — ROUTINE PRENATAL (OUTPATIENT)
Dept: PERINATAL CARE | Facility: OTHER | Age: 31
End: 2021-11-02
Payer: COMMERCIAL

## 2021-11-02 VITALS
BODY MASS INDEX: 27.52 KG/M2 | HEIGHT: 65 IN | WEIGHT: 165.2 LBS | DIASTOLIC BLOOD PRESSURE: 69 MMHG | SYSTOLIC BLOOD PRESSURE: 112 MMHG | HEART RATE: 86 BPM

## 2021-11-02 DIAGNOSIS — O24.013 PRE-EXISTING TYPE 1 DIABETES MELLITUS DURING PREGNANCY IN THIRD TRIMESTER: Primary | ICD-10-CM

## 2021-11-02 DIAGNOSIS — Z3A.36 36 WEEKS GESTATION OF PREGNANCY: ICD-10-CM

## 2021-11-02 PROCEDURE — 59025 FETAL NON-STRESS TEST: CPT | Performed by: OBSTETRICS & GYNECOLOGY

## 2021-11-03 ENCOUNTER — DOCUMENTATION (OUTPATIENT)
Dept: PERINATAL CARE | Facility: CLINIC | Age: 31
End: 2021-11-03

## 2021-11-03 DIAGNOSIS — O24.013 PRE-EXISTING TYPE 1 DIABETES MELLITUS DURING PREGNANCY IN THIRD TRIMESTER: Primary | ICD-10-CM

## 2021-11-04 ENCOUNTER — ULTRASOUND (OUTPATIENT)
Dept: PERINATAL CARE | Facility: OTHER | Age: 31
End: 2021-11-04
Payer: COMMERCIAL

## 2021-11-04 VITALS
BODY MASS INDEX: 27.59 KG/M2 | SYSTOLIC BLOOD PRESSURE: 127 MMHG | WEIGHT: 165.6 LBS | HEART RATE: 89 BPM | DIASTOLIC BLOOD PRESSURE: 70 MMHG | HEIGHT: 65 IN

## 2021-11-04 DIAGNOSIS — Z3A.36 36 WEEKS GESTATION OF PREGNANCY: Primary | ICD-10-CM

## 2021-11-04 DIAGNOSIS — O24.013 PRE-EXISTING TYPE 1 DIABETES MELLITUS DURING PREGNANCY IN THIRD TRIMESTER: ICD-10-CM

## 2021-11-04 DIAGNOSIS — O24.012 PRE-EXISTING TYPE 1 DIABETES MELLITUS DURING PREGNANCY IN SECOND TRIMESTER: ICD-10-CM

## 2021-11-04 PROCEDURE — 59025 FETAL NON-STRESS TEST: CPT | Performed by: OBSTETRICS & GYNECOLOGY

## 2021-11-04 PROCEDURE — 76815 OB US LIMITED FETUS(S): CPT | Performed by: OBSTETRICS & GYNECOLOGY

## 2021-11-04 RX ORDER — INSULIN ASPART 100 [IU]/ML
12 INJECTION, SOLUTION INTRAVENOUS; SUBCUTANEOUS
Qty: 15 ML | Refills: 1 | Status: ON HOLD | OUTPATIENT
Start: 2021-11-04 | End: 2021-11-20 | Stop reason: SDUPTHER

## 2021-11-09 ENCOUNTER — ROUTINE PRENATAL (OUTPATIENT)
Dept: PERINATAL CARE | Facility: OTHER | Age: 31
End: 2021-11-09
Payer: COMMERCIAL

## 2021-11-09 ENCOUNTER — ROUTINE PRENATAL (OUTPATIENT)
Dept: OBGYN CLINIC | Facility: CLINIC | Age: 31
End: 2021-11-09

## 2021-11-09 VITALS
BODY MASS INDEX: 27.76 KG/M2 | HEART RATE: 80 BPM | DIASTOLIC BLOOD PRESSURE: 68 MMHG | HEIGHT: 65 IN | SYSTOLIC BLOOD PRESSURE: 114 MMHG | WEIGHT: 166.6 LBS

## 2021-11-09 VITALS
BODY MASS INDEX: 27.59 KG/M2 | WEIGHT: 165.8 LBS | DIASTOLIC BLOOD PRESSURE: 72 MMHG | SYSTOLIC BLOOD PRESSURE: 122 MMHG | HEART RATE: 84 BPM

## 2021-11-09 DIAGNOSIS — O24.013 PRE-EXISTING TYPE 1 DIABETES MELLITUS DURING PREGNANCY IN THIRD TRIMESTER: Primary | ICD-10-CM

## 2021-11-09 DIAGNOSIS — Z3A.37 37 WEEKS GESTATION OF PREGNANCY: ICD-10-CM

## 2021-11-09 DIAGNOSIS — Z34.93 PREGNANT AND NOT YET DELIVERED IN THIRD TRIMESTER: Primary | ICD-10-CM

## 2021-11-09 DIAGNOSIS — O24.013 PRE-EXISTING TYPE 1 DIABETES MELLITUS DURING PREGNANCY IN THIRD TRIMESTER: ICD-10-CM

## 2021-11-09 DIAGNOSIS — Z98.891 HISTORY OF C-SECTION: ICD-10-CM

## 2021-11-09 DIAGNOSIS — E10.9 DIABETES MELLITUS TYPE 1, CONTROLLED, WITHOUT COMPLICATIONS (HCC): ICD-10-CM

## 2021-11-09 LAB
SL AMB  POCT GLUCOSE, UA: ABNORMAL
SL AMB POCT URINE PROTEIN: ABNORMAL

## 2021-11-09 PROCEDURE — 59025 FETAL NON-STRESS TEST: CPT | Performed by: OBSTETRICS & GYNECOLOGY

## 2021-11-09 PROCEDURE — PNV: Performed by: OBSTETRICS & GYNECOLOGY

## 2021-11-11 ENCOUNTER — ULTRASOUND (OUTPATIENT)
Dept: PERINATAL CARE | Facility: OTHER | Age: 31
End: 2021-11-11
Payer: COMMERCIAL

## 2021-11-11 ENCOUNTER — ROUTINE PRENATAL (OUTPATIENT)
Dept: PERINATAL CARE | Facility: OTHER | Age: 31
End: 2021-11-11
Payer: COMMERCIAL

## 2021-11-11 VITALS
DIASTOLIC BLOOD PRESSURE: 66 MMHG | WEIGHT: 164.8 LBS | HEART RATE: 76 BPM | BODY MASS INDEX: 27.46 KG/M2 | HEIGHT: 65 IN | SYSTOLIC BLOOD PRESSURE: 116 MMHG

## 2021-11-11 DIAGNOSIS — Z3A.37 37 WEEKS GESTATION OF PREGNANCY: Primary | ICD-10-CM

## 2021-11-11 DIAGNOSIS — O24.013 PRE-EXISTING TYPE 1 DIABETES MELLITUS DURING PREGNANCY IN THIRD TRIMESTER: ICD-10-CM

## 2021-11-11 DIAGNOSIS — Z36.89 ENCOUNTER FOR ULTRASOUND TO ASSESS FETAL GROWTH: Primary | ICD-10-CM

## 2021-11-11 DIAGNOSIS — Z3A.37 37 WEEKS GESTATION OF PREGNANCY: ICD-10-CM

## 2021-11-11 PROBLEM — M79.671 RIGHT FOOT PAIN: Status: RESOLVED | Noted: 2020-09-30 | Resolved: 2021-11-11

## 2021-11-11 PROCEDURE — 76816 OB US FOLLOW-UP PER FETUS: CPT | Performed by: OBSTETRICS & GYNECOLOGY

## 2021-11-11 PROCEDURE — 59025 FETAL NON-STRESS TEST: CPT | Performed by: OBSTETRICS & GYNECOLOGY

## 2021-11-11 PROCEDURE — NC001 PR NO CHARGE

## 2021-11-11 PROCEDURE — 99213 OFFICE O/P EST LOW 20 MIN: CPT | Performed by: OBSTETRICS & GYNECOLOGY

## 2021-11-12 ENCOUNTER — DOCUMENTATION (OUTPATIENT)
Dept: PERINATAL CARE | Facility: CLINIC | Age: 31
End: 2021-11-12

## 2021-11-15 ENCOUNTER — TELEPHONE (OUTPATIENT)
Dept: OBGYN CLINIC | Facility: CLINIC | Age: 31
End: 2021-11-15

## 2021-11-16 ENCOUNTER — ROUTINE PRENATAL (OUTPATIENT)
Dept: PERINATAL CARE | Facility: OTHER | Age: 31
End: 2021-11-16
Payer: COMMERCIAL

## 2021-11-16 VITALS
DIASTOLIC BLOOD PRESSURE: 70 MMHG | WEIGHT: 168.8 LBS | HEIGHT: 65 IN | SYSTOLIC BLOOD PRESSURE: 107 MMHG | BODY MASS INDEX: 28.12 KG/M2 | HEART RATE: 75 BPM

## 2021-11-16 DIAGNOSIS — O24.013 PRE-EXISTING TYPE 1 DIABETES MELLITUS DURING PREGNANCY IN THIRD TRIMESTER: Primary | ICD-10-CM

## 2021-11-16 PROCEDURE — 59025 FETAL NON-STRESS TEST: CPT | Performed by: OBSTETRICS & GYNECOLOGY

## 2021-11-17 ENCOUNTER — HOSPITAL ENCOUNTER (OUTPATIENT)
Facility: HOSPITAL | Age: 31
Discharge: HOME | End: 2021-11-18
Attending: OBSTETRICS & GYNECOLOGY | Admitting: OBSTETRICS & GYNECOLOGY
Payer: COMMERCIAL

## 2021-11-17 ENCOUNTER — NURSE TRIAGE (OUTPATIENT)
Dept: OTHER | Facility: OTHER | Age: 31
End: 2021-11-17

## 2021-11-17 ENCOUNTER — ROUTINE PRENATAL (OUTPATIENT)
Dept: OBGYN CLINIC | Facility: CLINIC | Age: 31
End: 2021-11-17

## 2021-11-17 VITALS
BODY MASS INDEX: 28.29 KG/M2 | HEART RATE: 85 BPM | WEIGHT: 170 LBS | DIASTOLIC BLOOD PRESSURE: 84 MMHG | SYSTOLIC BLOOD PRESSURE: 126 MMHG

## 2021-11-17 DIAGNOSIS — O24.013 PRE-EXISTING TYPE 1 DIABETES MELLITUS DURING PREGNANCY IN THIRD TRIMESTER: ICD-10-CM

## 2021-11-17 DIAGNOSIS — F41.1 GAD (GENERALIZED ANXIETY DISORDER): ICD-10-CM

## 2021-11-17 DIAGNOSIS — Z3A.38 38 WEEKS GESTATION OF PREGNANCY: ICD-10-CM

## 2021-11-17 DIAGNOSIS — E10.29 MICROALBUMINURIA DUE TO TYPE 1 DIABETES MELLITUS (HCC): ICD-10-CM

## 2021-11-17 DIAGNOSIS — Z98.891 HISTORY OF C-SECTION: ICD-10-CM

## 2021-11-17 DIAGNOSIS — Z3A.29 29 WEEKS GESTATION OF PREGNANCY: ICD-10-CM

## 2021-11-17 DIAGNOSIS — E55.9 VITAMIN D DEFICIENCY: ICD-10-CM

## 2021-11-17 DIAGNOSIS — R80.9 MICROALBUMINURIA DUE TO TYPE 1 DIABETES MELLITUS (HCC): ICD-10-CM

## 2021-11-17 DIAGNOSIS — Z34.93 PREGNANT AND NOT YET DELIVERED IN THIRD TRIMESTER: Primary | ICD-10-CM

## 2021-11-17 LAB
SL AMB  POCT GLUCOSE, UA: 2
SL AMB POCT URINE PROTEIN: ABNORMAL

## 2021-11-17 PROCEDURE — PNV: Performed by: OBSTETRICS & GYNECOLOGY

## 2021-11-17 PROCEDURE — 59025 FETAL NON-STRESS TEST: CPT | Performed by: OBSTETRICS & GYNECOLOGY

## 2021-11-17 PROCEDURE — 99212 OFFICE O/P EST SF 10 MIN: CPT | Performed by: OBSTETRICS & GYNECOLOGY

## 2021-11-17 RX ORDER — INSULIN DETEMIR 100 [IU]/ML
38 INJECTION, SOLUTION SUBCUTANEOUS
Status: ON HOLD
Start: 2021-11-17 | End: 2021-11-20 | Stop reason: SDUPTHER

## 2021-11-18 ENCOUNTER — ULTRASOUND (OUTPATIENT)
Dept: PERINATAL CARE | Facility: OTHER | Age: 31
End: 2021-11-18
Payer: COMMERCIAL

## 2021-11-18 ENCOUNTER — HOSPITAL ENCOUNTER (INPATIENT)
Facility: HOSPITAL | Age: 31
LOS: 2 days | Discharge: HOME | End: 2021-11-21
Attending: OBSTETRICS & GYNECOLOGY | Admitting: OBSTETRICS & GYNECOLOGY
Payer: COMMERCIAL

## 2021-11-18 ENCOUNTER — NURSE TRIAGE (OUTPATIENT)
Dept: OTHER | Facility: OTHER | Age: 31
End: 2021-11-18

## 2021-11-18 VITALS
WEIGHT: 168 LBS | SYSTOLIC BLOOD PRESSURE: 115 MMHG | BODY MASS INDEX: 27.99 KG/M2 | DIASTOLIC BLOOD PRESSURE: 68 MMHG | HEIGHT: 65 IN | HEART RATE: 76 BPM

## 2021-11-18 DIAGNOSIS — O24.013 PRE-EXISTING TYPE 1 DIABETES MELLITUS DURING PREGNANCY IN THIRD TRIMESTER: ICD-10-CM

## 2021-11-18 DIAGNOSIS — Z3A.29 29 WEEKS GESTATION OF PREGNANCY: ICD-10-CM

## 2021-11-18 DIAGNOSIS — O24.012 PRE-EXISTING TYPE 1 DIABETES MELLITUS DURING PREGNANCY IN SECOND TRIMESTER: ICD-10-CM

## 2021-11-18 DIAGNOSIS — E55.9 VITAMIN D DEFICIENCY: ICD-10-CM

## 2021-11-18 DIAGNOSIS — Z3A.38 38 WEEKS GESTATION OF PREGNANCY: ICD-10-CM

## 2021-11-18 LAB — GLUCOSE SERPL-MCNC: 129 MG/DL (ref 65–140)

## 2021-11-18 PROCEDURE — 76815 OB US LIMITED FETUS(S): CPT | Performed by: OBSTETRICS & GYNECOLOGY

## 2021-11-18 PROCEDURE — 59025 FETAL NON-STRESS TEST: CPT | Performed by: OBSTETRICS & GYNECOLOGY

## 2021-11-18 PROCEDURE — 4A1HXCZ MONITORING OF PRODUCTS OF CONCEPTION, CARDIAC RATE, EXTERNAL APPROACH: ICD-10-PCS | Performed by: OBSTETRICS & GYNECOLOGY

## 2021-11-18 PROCEDURE — 82948 REAGENT STRIP/BLOOD GLUCOSE: CPT

## 2021-11-18 PROCEDURE — G0463 HOSPITAL OUTPT CLINIC VISIT: HCPCS

## 2021-11-18 PROCEDURE — 99213 OFFICE O/P EST LOW 20 MIN: CPT

## 2021-11-18 RX ADMIN — SODIUM CHLORIDE 1000 ML: 0.9 INJECTION, SOLUTION INTRAVENOUS at 22:23

## 2021-11-19 ENCOUNTER — ANESTHESIA (INPATIENT)
Dept: ANESTHESIOLOGY | Facility: HOSPITAL | Age: 31
End: 2021-11-19
Payer: COMMERCIAL

## 2021-11-19 ENCOUNTER — ANESTHESIA EVENT (INPATIENT)
Dept: ANESTHESIOLOGY | Facility: HOSPITAL | Age: 31
End: 2021-11-19
Payer: COMMERCIAL

## 2021-11-19 LAB
ABO GROUP BLD: NORMAL
BASE EXCESS BLDCOA CALC-SCNC: -9.7 MMOL/L (ref 3–11)
BASE EXCESS BLDCOV CALC-SCNC: -8.5 MMOL/L (ref 1–9)
BLD GP AB SCN SERPL QL: NEGATIVE
ERYTHROCYTE [DISTWIDTH] IN BLOOD BY AUTOMATED COUNT: 12.8 % (ref 11.6–15.1)
GLUCOSE SERPL-MCNC: 103 MG/DL (ref 65–140)
GLUCOSE SERPL-MCNC: 114 MG/DL (ref 65–140)
GLUCOSE SERPL-MCNC: 119 MG/DL (ref 65–140)
GLUCOSE SERPL-MCNC: 123 MG/DL (ref 65–140)
GLUCOSE SERPL-MCNC: 126 MG/DL (ref 65–140)
GLUCOSE SERPL-MCNC: 132 MG/DL (ref 65–140)
GLUCOSE SERPL-MCNC: 134 MG/DL (ref 65–140)
GLUCOSE SERPL-MCNC: 141 MG/DL (ref 65–140)
GLUCOSE SERPL-MCNC: 150 MG/DL (ref 65–140)
GLUCOSE SERPL-MCNC: 188 MG/DL (ref 65–140)
GLUCOSE SERPL-MCNC: 73 MG/DL (ref 65–140)
GLUCOSE SERPL-MCNC: 77 MG/DL (ref 65–140)
GLUCOSE SERPL-MCNC: 96 MG/DL (ref 65–140)
HCO3 BLDCOA-SCNC: 21.4 MMOL/L (ref 17.3–27.3)
HCO3 BLDCOV-SCNC: 19.8 MMOL/L (ref 12.2–28.6)
HCT VFR BLD AUTO: 37.1 % (ref 34.8–46.1)
HGB BLD-MCNC: 12.5 G/DL (ref 11.5–15.4)
MCH RBC QN AUTO: 32.2 PG (ref 26.8–34.3)
MCHC RBC AUTO-ENTMCNC: 33.7 G/DL (ref 31.4–37.4)
MCV RBC AUTO: 96 FL (ref 82–98)
O2 CT VFR BLDCOA CALC: 8.8 ML/DL
OXYHGB MFR BLDCOA: 35.3 %
OXYHGB MFR BLDCOV: 48.7 %
PCO2 BLDCOA: 68.5 MM[HG] (ref 30–60)
PCO2 BLDCOV: 50.5 MM HG (ref 27–43)
PH BLDCOA: 7.11 [PH] (ref 7.23–7.43)
PH BLDCOV: 7.21 [PH] (ref 7.19–7.49)
PLATELET # BLD AUTO: 145 THOUSANDS/UL (ref 149–390)
PMV BLD AUTO: 13.4 FL (ref 8.9–12.7)
PO2 BLDCOA: 21.2 MM HG (ref 5–25)
PO2 BLDCOV: 22.9 MM HG (ref 15–45)
RBC # BLD AUTO: 3.88 MILLION/UL (ref 3.81–5.12)
RH BLD: POSITIVE
RPR SER QL: NORMAL
SAO2 % BLDCOV: 12.2 ML/DL
SPECIMEN EXPIRATION DATE: NORMAL
WBC # BLD AUTO: 12.69 THOUSAND/UL (ref 4.31–10.16)

## 2021-11-19 PROCEDURE — 99213 OFFICE O/P EST LOW 20 MIN: CPT

## 2021-11-19 PROCEDURE — NC001 PR NO CHARGE: Performed by: OBSTETRICS & GYNECOLOGY

## 2021-11-19 PROCEDURE — 0HQ9XZZ REPAIR PERINEUM SKIN, EXTERNAL APPROACH: ICD-10-PCS | Performed by: OBSTETRICS & GYNECOLOGY

## 2021-11-19 PROCEDURE — G0463 HOSPITAL OUTPT CLINIC VISIT: HCPCS

## 2021-11-19 PROCEDURE — 82948 REAGENT STRIP/BLOOD GLUCOSE: CPT

## 2021-11-19 PROCEDURE — 86850 RBC ANTIBODY SCREEN: CPT | Performed by: OBSTETRICS & GYNECOLOGY

## 2021-11-19 PROCEDURE — 86592 SYPHILIS TEST NON-TREP QUAL: CPT | Performed by: OBSTETRICS & GYNECOLOGY

## 2021-11-19 PROCEDURE — 86900 BLOOD TYPING SEROLOGIC ABO: CPT | Performed by: OBSTETRICS & GYNECOLOGY

## 2021-11-19 PROCEDURE — 82805 BLOOD GASES W/O2 SATURATION: CPT | Performed by: OBSTETRICS & GYNECOLOGY

## 2021-11-19 PROCEDURE — 86901 BLOOD TYPING SEROLOGIC RH(D): CPT | Performed by: OBSTETRICS & GYNECOLOGY

## 2021-11-19 PROCEDURE — 10907ZC DRAINAGE OF AMNIOTIC FLUID, THERAPEUTIC FROM PRODUCTS OF CONCEPTION, VIA NATURAL OR ARTIFICIAL OPENING: ICD-10-PCS | Performed by: OBSTETRICS & GYNECOLOGY

## 2021-11-19 PROCEDURE — 85027 COMPLETE CBC AUTOMATED: CPT | Performed by: OBSTETRICS & GYNECOLOGY

## 2021-11-19 PROCEDURE — 99024 POSTOP FOLLOW-UP VISIT: CPT | Performed by: OBSTETRICS & GYNECOLOGY

## 2021-11-19 RX ORDER — ROPIVACAINE HYDROCHLORIDE 2 MG/ML
INJECTION, SOLUTION EPIDURAL; INFILTRATION; PERINEURAL
Status: COMPLETED
Start: 2021-11-19 | End: 2021-11-19

## 2021-11-19 RX ORDER — SODIUM CHLORIDE 9 MG/ML
125 INJECTION, SOLUTION INTRAVENOUS CONTINUOUS
Status: DISCONTINUED | OUTPATIENT
Start: 2021-11-19 | End: 2021-11-19

## 2021-11-19 RX ORDER — DIAPER,BRIEF,INFANT-TODD,DISP
1 EACH MISCELLANEOUS 4 TIMES DAILY PRN
Status: DISCONTINUED | OUTPATIENT
Start: 2021-11-19 | End: 2021-11-21 | Stop reason: HOSPADM

## 2021-11-19 RX ORDER — LIDOCAINE HYDROCHLORIDE AND EPINEPHRINE 15; 5 MG/ML; UG/ML
INJECTION, SOLUTION EPIDURAL
Status: COMPLETED | OUTPATIENT
Start: 2021-11-19 | End: 2021-11-19

## 2021-11-19 RX ORDER — DOCUSATE SODIUM 100 MG/1
100 CAPSULE, LIQUID FILLED ORAL 2 TIMES DAILY
Status: DISCONTINUED | OUTPATIENT
Start: 2021-11-19 | End: 2021-11-21 | Stop reason: HOSPADM

## 2021-11-19 RX ORDER — ONDANSETRON 2 MG/ML
4 INJECTION INTRAMUSCULAR; INTRAVENOUS EVERY 8 HOURS PRN
Status: DISCONTINUED | OUTPATIENT
Start: 2021-11-19 | End: 2021-11-21 | Stop reason: HOSPADM

## 2021-11-19 RX ORDER — OXYTOCIN/RINGER'S LACTATE 30/500 ML
PLASTIC BAG, INJECTION (ML) INTRAVENOUS
Status: COMPLETED
Start: 2021-11-19 | End: 2021-11-20

## 2021-11-19 RX ORDER — ROPIVACAINE HYDROCHLORIDE 2 MG/ML
INJECTION, SOLUTION EPIDURAL; INFILTRATION; PERINEURAL CONTINUOUS PRN
Status: DISCONTINUED | OUTPATIENT
Start: 2021-11-19 | End: 2021-11-19 | Stop reason: HOSPADM

## 2021-11-19 RX ORDER — OXYCODONE HYDROCHLORIDE 5 MG/1
5 TABLET ORAL ONCE
Status: DISCONTINUED | OUTPATIENT
Start: 2021-11-19 | End: 2021-11-21 | Stop reason: HOSPADM

## 2021-11-19 RX ORDER — ACETAMINOPHEN 325 MG/1
650 TABLET ORAL EVERY 6 HOURS PRN
Status: DISCONTINUED | OUTPATIENT
Start: 2021-11-19 | End: 2021-11-21 | Stop reason: HOSPADM

## 2021-11-19 RX ORDER — DEXTROSE AND SODIUM CHLORIDE 5; .9 G/100ML; G/100ML
100 INJECTION, SOLUTION INTRAVENOUS CONTINUOUS
Status: DISCONTINUED | OUTPATIENT
Start: 2021-11-19 | End: 2021-11-19

## 2021-11-19 RX ORDER — ONDANSETRON 2 MG/ML
4 INJECTION INTRAMUSCULAR; INTRAVENOUS EVERY 6 HOURS PRN
Status: DISCONTINUED | OUTPATIENT
Start: 2021-11-19 | End: 2021-11-19

## 2021-11-19 RX ORDER — CALCIUM CARBONATE 200(500)MG
1000 TABLET,CHEWABLE ORAL DAILY PRN
Status: DISCONTINUED | OUTPATIENT
Start: 2021-11-19 | End: 2021-11-21 | Stop reason: HOSPADM

## 2021-11-19 RX ORDER — DIPHENHYDRAMINE HYDROCHLORIDE 50 MG/ML
25 INJECTION INTRAMUSCULAR; INTRAVENOUS EVERY 6 HOURS PRN
Status: DISCONTINUED | OUTPATIENT
Start: 2021-11-19 | End: 2021-11-20 | Stop reason: SDUPTHER

## 2021-11-19 RX ORDER — IBUPROFEN 600 MG/1
600 TABLET ORAL EVERY 6 HOURS PRN
Status: DISCONTINUED | OUTPATIENT
Start: 2021-11-19 | End: 2021-11-21 | Stop reason: HOSPADM

## 2021-11-19 RX ORDER — ROPIVACAINE HYDROCHLORIDE 2 MG/ML
INJECTION, SOLUTION EPIDURAL; INFILTRATION; PERINEURAL AS NEEDED
Status: DISCONTINUED | OUTPATIENT
Start: 2021-11-19 | End: 2021-11-19 | Stop reason: HOSPADM

## 2021-11-19 RX ADMIN — Medication 2.5 MILLION UNITS: at 04:38

## 2021-11-19 RX ADMIN — INSULIN DETEMIR 18 UNITS: 100 INJECTION, SOLUTION SUBCUTANEOUS at 22:40

## 2021-11-19 RX ADMIN — INSULIN LISPRO 4 UNITS: 100 INJECTION, SOLUTION INTRAVENOUS; SUBCUTANEOUS at 12:44

## 2021-11-19 RX ADMIN — IBUPROFEN 600 MG: 600 TABLET ORAL at 12:39

## 2021-11-19 RX ADMIN — IBUPROFEN 600 MG: 600 TABLET ORAL at 22:36

## 2021-11-19 RX ADMIN — SODIUM CHLORIDE 5 MILLION UNITS: 0.9 INJECTION, SOLUTION INTRAVENOUS at 00:25

## 2021-11-19 RX ADMIN — DOCUSATE SODIUM 100 MG: 100 CAPSULE ORAL at 18:39

## 2021-11-19 RX ADMIN — ROPIVACAINE HYDROCHLORIDE 10 ML/HR: 2 INJECTION, SOLUTION EPIDURAL; INFILTRATION at 01:39

## 2021-11-19 RX ADMIN — BENZOCAINE AND LEVOMENTHOL: 200; 5 SPRAY TOPICAL at 12:40

## 2021-11-19 RX ADMIN — INSULIN LISPRO 4 UNITS: 100 INJECTION, SOLUTION INTRAVENOUS; SUBCUTANEOUS at 18:39

## 2021-11-19 RX ADMIN — DOCUSATE SODIUM 100 MG: 100 CAPSULE ORAL at 12:39

## 2021-11-19 RX ADMIN — ACETAMINOPHEN 650 MG: 325 TABLET, FILM COATED ORAL at 22:36

## 2021-11-19 RX ADMIN — Medication 2.5 MILLION UNITS: at 08:27

## 2021-11-19 RX ADMIN — ROPIVACAINE HYDROCHLORIDE 5 ML: 2 INJECTION, SOLUTION EPIDURAL; INFILTRATION; PERINEURAL at 01:39

## 2021-11-19 RX ADMIN — LIDOCAINE HYDROCHLORIDE AND EPINEPHRINE 3 ML: 15; 5 INJECTION, SOLUTION EPIDURAL at 01:46

## 2021-11-19 RX ADMIN — ACETAMINOPHEN 650 MG: 325 TABLET, FILM COATED ORAL at 15:38

## 2021-11-19 RX ADMIN — INSULIN DETEMIR 16 UNITS: 100 INJECTION, SOLUTION SUBCUTANEOUS at 00:24

## 2021-11-19 RX ADMIN — Medication 250 UNITS: at 10:35

## 2021-11-19 RX ADMIN — DEXTROSE AND SODIUM CHLORIDE 100 ML/HR: 5; .9 INJECTION, SOLUTION INTRAVENOUS at 04:04

## 2021-11-19 RX ADMIN — SODIUM CHLORIDE 0.5 UNITS/HR: 9 INJECTION, SOLUTION INTRAVENOUS at 04:06

## 2021-11-19 RX ADMIN — LIDOCAINE HYDROCHLORIDE AND EPINEPHRINE 3 ML: 15; 5 INJECTION, SOLUTION EPIDURAL at 01:34

## 2021-11-19 NOTE — OB LABOR/OXYTOCIN SAFETY PROGRESS
Labor Progress Note - Janora Runner 36 y o  female MRN: [id number]    Unit/Bed#: L&D 326-01 Encounter: [id number]    Contraction Frequency (minutes): 4-5  Contraction Quality: Mild  Tachysystole: No   Cervical Dilation: 5  Cervical Effacement: 90  Fetal Station: -1  Baseline Rate: 150 bpm  Fetal Heart Rate: 150 BPM  FHR Category: Category II       Vital Signs:   Vitals:    11/19/21 0316   BP: 104/54   Pulse: 88   Resp:    Temp:    SpO2:            Notes/comments: cat II FHT secondary to intermittent lates and variable deceleration  Patient has been repositioned to her left side  IV fluid bolus is running  Cervix unchanged, membranes still intact  2 elevated blood sugars s/p bedtime Levemir  Will start insulin drip  Dr Stacey Roth notified via Anronnieuser-Nallely Kaur MD 11/19/2021 3:19 AM

## 2021-11-19 NOTE — OB LABOR/OXYTOCIN SAFETY PROGRESS
Labor Progress Note - Lloyd Smallwood 36 y o  female MRN: [id number]    Unit/Bed#: L&D 326-01 Encounter: [id number]       Contraction Frequency (minutes): 2-3  Contraction Quality: Moderate  Tachysystole: No   Cervical Dilation: 10  Dilation Complete Date: 11/19/21  Dilation Complete Time: 0822  Cervical Effacement: 100  Fetal Station: 2  Baseline Rate: 145 bpm  Fetal Heart Rate: 145 BPM  FHR Category: Category II               Vital Signs:   Vitals:    11/19/21 0814   BP: 112/68   Pulse: 79   Resp: 18   Temp: 98 7 °F (37 1 °C)   SpO2:            Notes/comments:    Pt seen and examined secondary to spontaneous 6 minute deceleration with a jennifer in the 70s  O2 given, IV fluid bolus started, maternal position changes to right, left and knee chest position performed  Resolution of deceleration noted with knee chest position  Pt maintained in this position for 4-5 minutes and then returned to right side  Will allow fetus to continue recover for 10-15 minutes  If FHR maintained a baseline will begin pushing  Will delivery sooner if FHR warrants  Pt advised that VAVD may be needed if FHR decelerations continue now or with pushing  Pt and partner agreeable with plan of care  NICU notified of possible need at time of delivery       Margaret Howard MD 11/19/2021 8:34 AM

## 2021-11-19 NOTE — LACTATION NOTE
This note was copied from a baby's chart  CONSULT - LACTATION  Baby Girl Dierdre Class) Sherlyn Langston 0 days female MRN: [id number]    [address] [facility] NURSERY Room / Bed: L&D 314(N)/L&D 314(N) Encounter: [id number]    Maternal Information     MOTHER:  Hector Wen  Maternal Age: 36 y o    OB History: # 1 - Date: 10/2016, Sex: None, Weight: None, GA: None, Delivery: None, Apgar1: None, Apgar5: None, Living: None, Birth Comments: 7 week    # 2 - Date: 19, Sex: Male, Weight: 3290 g (7 lb 4 1 oz), GA: 38w3d, Delivery: , Low Transverse, Apgar1: 9, Apgar5: 9, Living: Living, Birth Comments: None    # 3 - Date: 10/2020, Sex: None, Weight: None, GA: None, Delivery: None, Apgar1: None, Apgar5: None, Living: None, Birth Comments: 8 week loss    # 4 - Date: None, Sex: None, Weight: None, GA: None, Delivery: None, Apgar1: None, Apgar5: None, Living: None, Birth Comments: None   Previouse breast reduction surgery? No    Lactation history:   Has patient previously breast fed: Yes   How long had patient previously breast fed: 5 months exclusive the supplement   Previous breast feeding complications: Infant separation (back to work)     Past Surgical History:   Procedure Laterality Date     SECTION      INNER EAR SURGERY Left     [place]  DELIVERY ONLY N/A 2019    Procedure:  SECTION (); Surgeon:  Jaxon Carrasco MD;  Location: [facility];  Service: Obstetrics    WISDOM TOOTH EXTRACTION          Birth information:  YOB: 2019   Time of birth: 10:33 AM   Sex: female   Delivery type:     Birth Weight: 3515 g (7 lb 12 oz)   Percent of Weight Change: 0%     Gestational Age: 39w0d   [unfilled]    Assessment     Breast and nipple assessment: denies need for assistance at this time    [place] Assessment: sleepy    Feeding assessment: feeding well as per mom    LATCH:  Latch: Grasps breast, tongue down, lips flanged, rhythmic sucking Audible Swallowing: Spontaneous and intermittent (24 hours old)   Type of Nipple: Everted (After stimulation)   Comfort (Breast/Nipple): Soft/non-tender   Hold (Positioning): No assist from staff, mother able to position/hold infant   LATCH Score: 10          Feeding recommendations:  breast feed on demand     Met with mother  Provided with Ready, Set, Baby booklet  Discussed Skin to Skin contact an benefits to mom and baby  Talked about the delay of the first bath until baby has adjusted  Spoke about the benefits of rooming in  Feeding on cue and what that means for recognizing infant's hunger  Avoidance of pacifiers for the first month discussed  Talked about exclusive breastfeeding for the first 6 months  Positioning and latch reviewed as well as showing images of other feeding positions  Discussed the properties of a good latch in any position  Reviewed hand/manual expression  Gave spoons, syringes and bottles to collect milk  Recommended prefeed till passess blood sugar protocol  Discussed s/s that baby is getting enough milk and some s/s that breastfeeding dyad may need further help  Gave information on common concerns, what to expect the first few weeks after delivery, preparing for other caregivers, and how partners can help  Resources for support also provided  [employer] MOB  to call for assistance, questions and concerns  Extension number for inpatient lactation support provided          Levon Swenson RN 11/19/2021 6:08 PM

## 2021-11-19 NOTE — H&P
H&P Exam - Obstetrics   Imelda Spain 36 y o  female MRN: [id number]  Unit/Bed#: L&D 326-01 Encounter: [id number]    ASSESSMENT & PLAN:  34yo [id number] at 39w0d weeks gestation who is being admitted for trial of labor after  section  Vertex by ultrasound  EFW: 8lbs    Pregnancy @ 39w  Admit  Follow up CBC, RPR, Blood Type  Start with expectant management  GBS posiitve status: PCN for prophylaxis   Analgesia and/or epidural at patient request    A2GDM  Levemir 16U tonight  Fingersticks per protocol    Plan of care discussed with Dr Keron He  This patient will be an INPATIENT  and I certify the anticipated length of stay is >2 Midnights  History of Present Illness     Chief Complaint: Contractions    HPI:  Imelda Spain is a 36 y o  [id number] female with an JOHN of 2021, by Last Menstrual Period at 39w0d weeks gestation who presents with contractions  Patient was evaluated last night and her cervix was 2 5/80/-2  Her initial cervical exam tonight was 3 5/80/-2 but after a 2-hr recheck she made change to 4 5/90/-2  No leakage of fluid  Vaginal spotting noted  Normal fetal movement  Obstetrician: Krzysztof    Pregnancy complications:   1) H2NDI  2)  x 1  3) GBS positive      OB History    Para Term  AB Living   4 1 1   2 1   SAB IAB Ectopic Multiple Live Births   1 1   0 1      # Outcome Date GA Lbr Star/2nd Weight Sex Delivery Anes PTL Lv   4 Current            3 SAB 10/2020              Birth Comments: 8 week loss   2 Term 19 38w3d  3290 g (7 lb 4 1 oz) M CS-LTranv EPI N PARI      Complications: Fetal Intolerance   1 IAB 10/2016              Birth Comments: 7 week      Obstetric Comments   : about 7270       Baby complications/comments: EFW 3733g = 8lb 4oz (89%)  AC >97%    Review of Systems  12-point ROS negative unless states in HPI      Historical Information   Past Medical History:   Diagnosis Date    Abnormal Pap smear of cervix     History of  delivery, antepartum 2021    HPV (human papilloma virus) infection     Microalbuminuria due to type 1 diabetes mellitus ([facility] [address] ) 2021    Varicella     vaccinated as child     Past Surgical History:   Procedure Laterality Date     SECTION      INNER EAR SURGERY Left     [place]  DELIVERY ONLY N/A 2019    Procedure:  SECTION (); Surgeon: Damien Raman MD;  Location: [facility];  Service: Obstetrics    WISDOM TOOTH EXTRACTION       Social History   Social History     Substance and Sexual Activity   Alcohol Use Not Currently    Comment: occ  Social History     Substance and Sexual Activity   Drug Use No     Social History     Tobacco Use   Smoking Status Never Smoker   Smokeless Tobacco Never Used     Family History: non-contributory    Meds/Allergies      Medications Prior to Admission   Medication    acetone, urine, test strip    Blood Glucose Monitoring Suppl (FreeStyle Lite) DON    Cholecalciferol (Vitamin D-3) 25 MCG (1000 UT) CAPS    Continuous Blood Gluc  (Dexcom G6 ) DON    Continuous Blood Gluc Sensor (Dexcom G6 Sensor) MISC    Continuous Blood Gluc Transmit (Dexcom G6 Transmitter) MISC    FREESTYLE LITE test strip    Injection Device for Insulin (InPen 100-Grey-Juan) DON    Insulin Aspart (NovoLOG PenFill) 100 UNITS/ML cartridge for injection    insulin detemir (Levemir FlexTouch) 100 Units/mL injection pen    Insulin Pen Needle (BD PEN NEEDLE DANYELL U/F) 32G X 4 MM MISC    Insulin Pen Needle (UltiCare Micro Pen Needles) 32G X 4 MM MISC    Lancets MISC    Prenatal Vit-Fe Fumarate-FA (PRENATAL 1+1 PO)      No Known Allergies    OBJECTIVE:  Vitals: Blood pressure 119/81, pulse 86, temperature 98 5 °F (36 9 °C), temperature source Oral, resp  rate 18, last menstrual period 2021, unknown if currently breastfeeding  There is no height or weight on file to calculate BMI      Physical Exam  GEN: appears well, alert and oriented x 3, pleasant and cooperative   CARDIAC: regular rate and rhythm  PULMONARY: labored breathing with contractions, clear to auscultation bilaterally  ABDOMEN: gravid, soft between contractions, no tenderness  EXTREMITIES: nontender, no edema    Cervix: 4 5/90/-2, intact membranes  Fetal heart rate: 145/moderate variability/15x15 accelerations/no decelerations; Category I    [place]: contractions 5-6mins apart    Prenatal Labs:   Blood Type:   Lab Results   Component Value Date/Time    ABO Grouping O 04/20/2021 08:05 AM     , D (Rh type):   Lab Results   Component Value Date/Time    Rh Factor Positive 04/20/2021 08:05 AM     , Antibody Screen: No results found for: ANTIBODYSCR , HCT/HGB:   Lab Results   Component Value Date/Time    Hematocrit 35 1 10/15/2021 10:48 PM    Hemoglobin 11 9 10/15/2021 10:48 PM      , MCV:   Lab Results   Component Value Date/Time    MCV 94 10/15/2021 10:48 PM      , Platelets:   Lab Results   Component Value Date/Time    Platelets 665 (L) 83/16/4425 10:48 PM      , 1 hour Glucola: No results found for: [id number], Varicella: No results found for: VARICELLAIGG    , Rubella:   Lab Results   Component Value Date/Time    Rubella IgG Quant >175 0 04/20/2021 08:05 AM        , VDRL/RPR:   Lab Results   Component Value Date/Time    RPR Non-Reactive 04/20/2021 08:05 AM      , Urine Culture/Screen:   Lab Results   Component Value Date/Time    Urine Culture 30,000-39,000 cfu/ml  06/04/2021 07:47 AM       , Urine Drug Screen: No results found for: AMPHETUR, BARBTUR, BDZUR, THCUR, COCAINEUR, METHADONEUR, OPIATEUR, PCPUR, MTHAMUR, ECSTASYUR, TRICYCLICSUR, Hep B:   Lab Results   Component Value Date/Time    Hepatitis B Surface Ag Non-reactive 04/20/2021 08:05 AM     , Hep C: No components found for: HEPCSAG, EXTHEPCSAG   , HIV:   Lab Results   Component Value Date/Time    HIV-1/HIV-2 Ab Non-Reactive 04/20/2021 08:05 AM     , Chlamydia: No results found for: EXTCHLAMYDIA  , Gonorrhea:   Lab Results   Component Value Date/Time    N gonorrhoeae, DNA Probe Negative 05/06/2021 07:54 AM     , Group B Strep:    Lab Results   Component Value Date/Time    Strep Grp B PCR (A) 09/23/2019 07:28 PM     Positive for Beta Hemolytic Strep Grp B by PCR, Sensitivities to follow  Strep Grp B PCR Beta Hemolytic Streptococcus Group B (A) 09/23/2019 07:28 PM          Invasive Devices  Report    Peripheral Intravenous Line            Peripheral IV 11/18/21 Dorsal (posterior); Left Hand <1 day                    Steph Farris MD  PGY-II, OBGYN  11/19/2021, 12:09 AM

## 2021-11-19 NOTE — OB LABOR/OXYTOCIN SAFETY PROGRESS
Labor Progress Note - Raleigh Dai 36 y o  female MRN: [id number]    Unit/Bed#: L&D 326-01 Encounter: [id number]       Contraction Frequency (minutes): 2  Contraction Quality: Moderate  Tachysystole: Yes   Cervical Dilation: 10  Dilation Complete Date: 11/19/21  Dilation Complete Time: 0822  Cervical Effacement: 100  Fetal Station: 2  Baseline Rate: 150 bpm  Fetal Heart Rate: 145 BPM  FHR Category: Category II               Vital Signs:   Vitals:    11/19/21 0906   BP:    Pulse:    Resp:    Temp: 98 5 °F (36 9 °C)   SpO2:            Notes/comments:   Pt pushing for approximately 1 hour and is complaining of fatigue  Fetal head noted to be LOT, manually rotated to OA, appears to have rotated back partially again while pushing after manual rotation   Will continue pushing     Edwige Esteves MD 11/19/2021 10:00 AM

## 2021-11-19 NOTE — OB LABOR/OXYTOCIN SAFETY PROGRESS
Oxytocin Safety Progress Check Note - Jerry Mar 36 y o  female MRN: [id number]    Unit/Bed#: L&D 326-01 Encounter: [id number]       Contraction Frequency (minutes): 3-6  Contraction Quality: Mild  Tachysystole: No   Cervical Dilation: 6  Cervical Effacement: 90  Fetal Station: -1  Baseline Rate: 150 bpm  Fetal Heart Rate: 150 BPM  FHR Category: Category II               Vital Signs:   Vitals:    11/19/21 0540   BP: 104/55   Pulse: 87   Resp:    Temp:    SpO2:            Notes/comments: FHT is category II secondary to late decelerations and periods of minimal-moderate variability  Patient has been repositioned to her left side  Continue to monitor  Will discuss with Dr Jeri Rouse MD 11/19/2021 5:48 AM

## 2021-11-19 NOTE — L&D DELIVERY NOTE
Vaginal Delivery Summary - OB/GYN   Je Maloney 36 y o  female MRN: [id number]  Unit/Bed#: L&D 326-01 Encounter: [id number]      Pre-delivery Diagnosis:   1  Pregnancy at 39w0d   2  H O  desires TOLAC  3  Type I DM  4  Gestational thrombocytopenia  5  GBS positive by culture    Post-delivery Diagnosis: same, delivered    Procedure: Vaginal birth after  (), repair of first degree laceration    Attending: Dr Melba Mack    Assistant(s): Dr Stephie Mejia    Anesthesia: Epidural    QBL: 258RX    Complications: none apparent    Specimens:   1  Arterial and venous cord gases  2  Cord blood  3  Segment of umbilical cord  4  Placenta to storage     Findings:  1  Viable female on 2021 at 1033, with APGARS of 8 and 9 at 1 and 5 minutes respectively,  2  Spontaneous delivery of intact placenta at 1038  3  1 degree laceration repaired with 2-0 Vicryl rapide  4  Blood gases:   Arterial pH: 7 113   Arterial base excess: -9 7   Venous pH: 7 211   Venous base excess: -8 5    Disposition:  Patient tolerated the procedure well and was recovering in labor and delivery room       Brief history and labor course:  Ms Je Maloney is a 36 y o  [id number] at 39w0d  She presented to labor and delivery for labor  Her pregnancy was complicated with V0JV managed on medication, and thrombocytopenia  GBS prophylaxis was initiated with PCN  On exam she was noted to be 3 5/80/-2  She was admitted for trial of labor after  section  Patient made steady cervical change and AROM was completed with clear fluid appreciated  An epidural was utilized for anesthesia  Intermittent variable decelerations and several prolonged decelerations were appreciated, resolved with fluids and repositioning, as patient continued to make cervical change  Patient was found to be complete at [phone] and began pushing at [address]      Description of procedure:  After pushing for 133 minutes, at 1033 patient delivered a viable female , wt 7lb 12oz, apgars of 8 (1 min) and 9 (5 min)  The fetal vertex delivered spontaneously  There was a single, loose nuchal cord wrapped around the neck that was easily reduced  The left anterior shoulder delivered atraumatically with maternal expulsive forces and the assistance of gentle downward traction  The right posterior shoulder delivered with maternal expulsive forces and the assistance of gentle upward traction  The remainder of the fetus delivered spontaneously  Upon delivery, the infant was placed on the mothers abdomen and the cord was doubly clamped and cut after >30 seconds  The infant was noted to cry spontaneously and was moving all extremities appropriately  There was no evidence for injury  Awaiting nurse resuscitators evaluated the   Arterial and venous cord blood gases and cord blood was collected for analysis  These were promptly sent to the lab  In the immediate post-partum, 30 units of IV pitocin was administered, and the uterus was noted to contract down well with massage and pitocin  The placenta delivered spontaneously at 1038 and was noted to have a centrally inserted 3 vessel cord  The vagina, cervix, perineum, and rectum were inspected and there was noted to be a first degree laceration repaired appropriately with 2-0 Vicryl rapide  Excellent hemostasis was appreciated at the completion of the repair  At the conclusion of the procedure, all needle, sponge, and instrument counts were noted to be correct  Patient tolerated the procedure well and was allowed to recover in labor and delivery room with family and  before being transferred to the post-partum floor  Dr Pop Barnhart was present and participated in all key portions of the case  Aga Tomlinson MD  OB/GYN PGY-1  2021  11:31 AM    I agree with the operative report as dictated by Dr Jeffrey Welch and edited by me  I was present for and participated in the entire procedure      Idalmis Salas MD

## 2021-11-19 NOTE — DISCHARGE SUMMARY
Discharge Summary - OB/GYN   Kaia Ramirez 36 y o  female MRN: [id number]  Unit/Bed#: L&D 326-01 Encounter: [id number]      Admission Date: 2021     Discharge Date: 2021     Admitting Diagnosis:   1  Pregnancy at 39w0d  2  T1DM    Discharge Diagnosis:   Same, delivered      Procedures: spontaneous vaginal delivery    Delivering Attending: Mabel Peres MD  Discharge Attending: GA P  [facility] Course:     Ms Kaia Ramirez is a 36 y o  [id number] at 39w0d  She presented to labor and delivery for labor  Her pregnancy was complicated with B8IR managed on medication, and thrombocytopenia  On exam she was noted to be 3 5/80/-2  She was admitted for trial of labor after  section  Patient made steady cervical change and AROM was completed with clear fluid appreciated  An epidural was utilized for anesthesia  Intermittent variable decelerations and several prolonged decelerations were appreciated, resolved with fluids and repositioning, as patient continued to make cervical change  Patient was found to be complete at [phone] and began pushing at [address]  She delivered a viable female  on 2021 at 1033  Weight 7lbs 12oz via spontaneous vaginal delivery  Apgars were 8 (1 min) and 9 (5 min)   was transferred to  nursery  Patient tolerated the procedure well and was transferred to recovery in stable condition  Her post-partum course was uncomplicated  Her post-partum pain was well controlled with oral analgesics  She was restarted on her pre-pregnancy insulin regimen and her glucose was well controlled  On day of discharge, she was ambulating and able to reasonably perform all ADLs  She was voiding and had appropriate bowel function  Pain was well controlled  She was discharged home on post-partum day #2 without complications   Patient was instructed to follow up with her OB as an outpatient and was given appropriate warnings to call provider if she develops signs of infection or uncontrolled pain  Complications: none apparent    Condition at discharge: good     Discharge instructions/Information to patient and family:   See after visit summary for information provided to patient and family  Provisions for Follow-Up Care:  See after visit summary for information related to follow-up care and any pertinent home health orders  Disposition: Home    Planned Readmission: No    Discharge Medications: For a complete list of the patient's medications, please refer to her med rec

## 2021-11-19 NOTE — OB LABOR/OXYTOCIN SAFETY PROGRESS
Labor Progress Note - Ofilia Drain 36 y o  female MRN: [id number]    Unit/Bed#: L&D 326-01 Encounter: [id number]       Contraction Frequency (minutes): 4-5  Contraction Quality: Mild  Tachysystole: No   Cervical Dilation: 5  Cervical Effacement: 90  Fetal Station: -1  Baseline Rate: 150 bpm  Fetal Heart Rate: 150 BPM  FHR Category: Category I      Vital Signs:   Vitals:    11/19/21 0238   BP: 120/58   Pulse: 86   Resp:    Temp:    SpO2:            Notes/comments: comfortable after epidural  Continues to make cervical change on her own  Tracing cat I  Will discuss with Dr Gilma Pickens MD 11/19/2021 2:42 AM

## 2021-11-20 LAB — GLUCOSE SERPL-MCNC: 56 MG/DL (ref 65–140)

## 2021-11-20 PROCEDURE — 99024 POSTOP FOLLOW-UP VISIT: CPT | Performed by: STUDENT IN AN ORGANIZED HEALTH CARE EDUCATION/TRAINING PROGRAM

## 2021-11-20 PROCEDURE — 82948 REAGENT STRIP/BLOOD GLUCOSE: CPT

## 2021-11-20 RX ORDER — INSULIN ASPART 100 [IU]/ML
4 INJECTION, SOLUTION INTRAVENOUS; SUBCUTANEOUS
Qty: 15 ML | Refills: 0 | Status: SHIPPED | OUTPATIENT
Start: 2021-11-20

## 2021-11-20 RX ORDER — INSULIN DETEMIR 100 [IU]/ML
18 INJECTION, SOLUTION SUBCUTANEOUS
Qty: 15 ML | Refills: 0
Start: 2021-11-20

## 2021-11-20 RX ORDER — DIPHENHYDRAMINE HCL 25 MG
25 TABLET ORAL EVERY 6 HOURS PRN
Status: DISCONTINUED | OUTPATIENT
Start: 2021-11-20 | End: 2021-11-21 | Stop reason: HOSPADM

## 2021-11-20 RX ADMIN — INSULIN LISPRO 4 UNITS: 100 INJECTION, SOLUTION INTRAVENOUS; SUBCUTANEOUS at 08:01

## 2021-11-20 RX ADMIN — DOCUSATE SODIUM 100 MG: 100 CAPSULE ORAL at 08:01

## 2021-11-20 RX ADMIN — DOCUSATE SODIUM 100 MG: 100 CAPSULE ORAL at 17:12

## 2021-11-20 RX ADMIN — INSULIN DETEMIR 15 UNITS: 100 INJECTION, SOLUTION SUBCUTANEOUS at 22:19

## 2021-11-20 RX ADMIN — ACETAMINOPHEN 650 MG: 325 TABLET, FILM COATED ORAL at 06:23

## 2021-11-20 RX ADMIN — IBUPROFEN 600 MG: 600 TABLET ORAL at 22:15

## 2021-11-20 RX ADMIN — IBUPROFEN 600 MG: 600 TABLET ORAL at 14:37

## 2021-11-20 RX ADMIN — ACETAMINOPHEN 650 MG: 325 TABLET, FILM COATED ORAL at 22:15

## 2021-11-20 RX ADMIN — IBUPROFEN 600 MG: 600 TABLET ORAL at 06:24

## 2021-11-20 RX ADMIN — ACETAMINOPHEN 650 MG: 325 TABLET, FILM COATED ORAL at 14:37

## 2021-11-20 NOTE — PLAN OF CARE
Problem: PAIN - ADULT  Goal: Verbalizes/displays adequate comfort level or baseline comfort level  Description: Interventions:  - Encourage patient to monitor pain and request assistance  - Assess pain using appropriate pain scale  - Administer analgesics based on type and severity of pain and evaluate response  - Implement non-pharmacological measures as appropriate and evaluate response  - Consider cultural and social influences on pain and pain management  - Notify physician/advanced practitioner if interventions unsuccessful or patient reports new pain  Outcome: Progressing     Problem: INFECTION - ADULT  Goal: Absence or prevention of progression during hospitalization  Description: INTERVENTIONS:  - Assess and monitor for signs and symptoms of infection  - Monitor lab/diagnostic results  - Monitor all insertion sites, i e  indwelling lines, tubes, and drains  - Monitor endotracheal if appropriate and nasal secretions for changes in amount and color  - [place] appropriate cooling/warming therapies per order  - Administer medications as ordered  - Instruct and encourage patient and family to use good hand hygiene technique  - Identify and instruct in appropriate isolation precautions for identified infection/condition  Outcome: Progressing  Goal: Absence of fever/infection during neutropenic period  Description: INTERVENTIONS:  - Monitor WBC    Outcome: Progressing     Problem: SAFETY ADULT  Goal: Patient will remain free of falls  Description: INTERVENTIONS:  - Educate patient/family on patient safety including physical limitations  - Instruct patient to call for assistance with activity   - Consult OT/PT to assist with strengthening/mobility   - Keep Call bell within reach  - Keep bed low and locked with side rails adjusted as appropriate  - Keep care items and personal belongings within reach  - Initiate and maintain comfort rounds  - Make Fall Risk Sign visible to staff   high fall risk patients  - Consider moving patient to room near nurses station  Outcome: Progressing  Goal: Maintain or return to baseline ADL function  Description: INTERVENTIONS:  -  Assess patient's ability to carry out ADLs; assess patient's baseline for ADL function and identify physical deficits which impact ability to perform ADLs (bathing, care of mouth/teeth, toileting, grooming, dressing, etc )  - Assess/evaluate cause of self-care deficits   - Assess range of motion  - Assess patient's mobility; develop plan if impaired  - Assess patient's need for assistive devices and provide as appropriate  - Encourage maximum independence but intervene and supervise when necessary  - Involve family in performance of ADLs  - Assess for home care needs following discharge   - Consider OT consult to assist with ADL evaluation and planning for discharge  - Provide patient education as appropriate  Outcome: Progressing  Goal: Maintains/Returns to pre admission functional level  Description: INTERVENTIONS:  - Perform BMAT or MOVE assessment daily    - Set and communicate daily mobility goal to care team and patient/family/caregiver  - Collaborate with rehabilitation services on mobility goals if consulted  - Out of bed for toileting  - Record patient progress and toleration of activity level   Outcome: Progressing     Problem: Knowledge Deficit  Goal: Patient/family/caregiver demonstrates understanding of disease process, treatment plan, medications, and discharge instructions  Description: Complete learning assessment and assess knowledge base    Interventions:  - Provide teaching at level of understanding  - Provide teaching via preferred learning methods  Outcome: Progressing     Problem: DISCHARGE PLANNING  Goal: Discharge to home or other facility with appropriate resources  Description: INTERVENTIONS:  - Identify barriers to discharge w/patient and caregiver  - Arrange for needed discharge resources and transportation as appropriate  - Identify discharge learning needs (meds, wound care, etc )  - Arrange for interpretive services to assist at discharge as needed  - Refer to Case Management Department for coordinating discharge planning if the patient needs post-hospital services based on physician/advanced practitioner order or complex needs related to functional status, cognitive ability, or social support system  Outcome: Progressing     Problem: POSTPARTUM  Goal: Experiences normal postpartum course  Description: INTERVENTIONS:  - Monitor maternal vital signs  - Assess uterine involution and lochia  Outcome: Progressing  Goal: Appropriate maternal -  bonding  Description: INTERVENTIONS:  - Identify family support  - Assess for appropriate maternal/infant bonding   -Encourage maternal/infant bonding opportunities  - Referral to  or  as needed  Outcome: Progressing  Goal: Establishment of infant feeding pattern  Description: INTERVENTIONS:  - Assess breast/bottle feeding  - Refer to lactation as needed  Outcome: Progressing  Goal: Incision(s), wounds(s) or drain site(s) healing without S/S of infection  Description: INTERVENTIONS  - Assess and document dressing, incision, wound bed, drain sites and surrounding tissue  - Provide patient and family education  Outcome: Progressing

## 2021-11-20 NOTE — LACTATION NOTE
This note was copied from a baby's chart  Met with mother to go over discharge breastfeeding booklet including the feeding log  Emphasized 8 or more (12) feedings in a 24 hour period, what to expect for the number of diapers per day of life and the progression of properties of the  stooling pattern  Reviewed breastfeeding and your lifestyle, storage and preparation of breast milk, how to keep you breast pump clean, the employed breastfeeding mother and paced bottle feeding handouts  Booklet included Breastfeeding Resources for after discharge including access to the number for the [address]  Encouraged parents to call for assistance, questions, and concerns about breastfeeding  Extension provided

## 2021-11-20 NOTE — PROGRESS NOTES
I evaluated the patient separate from the resident  I agree with the resident's findings and plan of care as documented  Details of my evaluation are as follows:     Saroj Torres is a 36y o  year old now [id number] now PPD#1 s/p successful  at 39w1d  Pregnancy was complicated by V9OL and prior  delivery  Delivery was uncomplicated  This morning, Nel Stafford feels well  She is ambulating, voiding, tolerating a regular diet  Pain is well controlled  Lochia appropriate  Denies dizziness or lightheadedness  /73 (BP Location: Left arm)   Pulse 78   Temp 97 8 °F (36 6 °C) (Oral)   Resp 18   Ht 5' 5" (1 651 m)   Wt 76 2 kg (168 lb)   LMP 2021 (Exact Date)   SpO2 98%   Breastfeeding Yes   BMI 27 96 kg/m²       T1DM  - Insulin adjusted postpartum to Detemire 18 qhs and Lispro 4 units TID AC  - Blood glucoses since delivery have been 150-188 postprandially and 56 fasting this morning for which she took some juice; Consider takign 16 units of Detemir tonight given mild hypoglycemia this AM   Patient feels comfortable with insulin titration until follow up in with endocrine  She will monitor closely and call with concerns  Anticipated barriers to discharge: None  Anticipate discharge home on postpartum day 1  Recommend follow up in the office in 6 weeks for routine care      Marlon Kinsey MD  [address] [place] [address]  2021 11:15 AM

## 2021-11-20 NOTE — PROGRESS NOTES
Progress Note - OB/GYN   Raleigh Dai 36 y o  female MRN: [id number]  Unit/Bed#: L&D 314-01 Encounter: [id number]    Assessment:  Post partum Day #1 s/p , stable, baby in room with parens    Plan:  1  Post partum  - Continue routine post partum   - Encourage ambulation  - Encourage breastfeeding    2  Type 1DM  - Restart home regimen, Levemir 18U qHS, Novolog 4U with meals    3  Discharge planning  - Anticipate discharge today if mom and baby stable        Subjective/Objective   Subjective: This morning, she has no complaints   Feeling well    Pain: yes, some cramping, improved with meds  Tolerating PO: yes  Voiding: yes  Flatus: yes  BM: no  Ambulating: yes  Breastfeeding:  yes  Chest pain: no  Shortness of breath: no  Leg pain: no  Lochia: minimal    Objective:     Vitals: /73 (BP Location: Left arm)   Pulse 78   Temp 97 8 °F (36 6 °C) (Oral)   Resp 18   Ht 5' 5" (1 651 m)   Wt 76 2 kg (168 lb)   LMP 2021 (Exact Date)   SpO2 98%   Breastfeeding Yes   BMI 27 96 kg/m²       Intake/Output Summary (Last 24 hours) at 2021 1008  Last data filed at 2021 2120  Gross per 24 hour   Intake --   Output 1092 ml   Net -1092 ml       Lab Results   Component Value Date    WBC 12 69 (H) 2021    HGB 12 5 2021    HCT 37 1 2021    MCV 96 2021     (L) 2021       Physical Exam:   Physical Exam  NAD  Breathing comfortably on room air  Abdomen soft, nontender, nondistended  Uterine fundus firm, nontender, at 1cm below the umbilicus  WWP, intact distal pulses    Giancarlo Montano MD  2021  10:08 AM

## 2021-11-20 NOTE — PLAN OF CARE
Problem: PAIN - ADULT  Goal: Verbalizes/displays adequate comfort level or baseline comfort level  Description: Interventions:  - Encourage patient to monitor pain and request assistance  - Assess pain using appropriate pain scale  - Administer analgesics based on type and severity of pain and evaluate response  - Implement non-pharmacological measures as appropriate and evaluate response  - Consider cultural and social influences on pain and pain management  - Notify physician/advanced practitioner if interventions unsuccessful or patient reports new pain  Outcome: Progressing     Problem: INFECTION - ADULT  Goal: Absence or prevention of progression during hospitalization  Description: INTERVENTIONS:  - Assess and monitor for signs and symptoms of infection  - Monitor lab/diagnostic results  - Monitor all insertion sites, i e  indwelling lines, tubes, and drains  - Monitor endotracheal if appropriate and nasal secretions for changes in amount and color  - [place] appropriate cooling/warming therapies per order  - Administer medications as ordered  - Instruct and encourage patient and family to use good hand hygiene technique  - Identify and instruct in appropriate isolation precautions for identified infection/condition  Outcome: Progressing  Goal: Absence of fever/infection during neutropenic period  Description: INTERVENTIONS:  - Monitor WBC    Outcome: Progressing     Problem: SAFETY ADULT  Goal: Patient will remain free of falls  Description: INTERVENTIONS:  - Educate patient/family on patient safety including physical limitations  - Instruct patient to call for assistance with activity   - Consult OT/PT to assist with strengthening/mobility   - Keep Call bell within reach  - Keep bed low and locked with side rails adjusted as appropriate  - Keep care items and personal belongings within reach  - Initiate and maintain comfort rounds  - Make Fall Risk Sign visible to staff  - Apply yellow socks and bracelet for high fall risk patients  - Consider moving patient to room near nurses station  Outcome: Progressing  Goal: Maintain or return to baseline ADL function  Description: INTERVENTIONS:  -  Assess patient's ability to carry out ADLs; assess patient's baseline for ADL function and identify physical deficits which impact ability to perform ADLs (bathing, care of mouth/teeth, toileting, grooming, dressing, etc )  - Assess/evaluate cause of self-care deficits   - Assess range of motion  - Assess patient's mobility; develop plan if impaired  - Assess patient's need for assistive devices and provide as appropriate  - Encourage maximum independence but intervene and supervise when necessary  - Involve family in performance of ADLs  - Assess for home care needs following discharge   - Consider OT consult to assist with ADL evaluation and planning for discharge  - Provide patient education as appropriate  Outcome: Progressing  Goal: Maintains/Returns to pre admission functional level  Description: INTERVENTIONS:  - Perform BMAT or MOVE assessment daily    - Set and communicate daily mobility goal to care team and patient/family/caregiver  - Record patient progress and toleration of activity level   Outcome: Progressing     Problem: Knowledge Deficit  Goal: Patient/family/caregiver demonstrates understanding of disease process, treatment plan, medications, and discharge instructions  Description: Complete learning assessment and assess knowledge base    Interventions:  - Provide teaching at level of understanding  - Provide teaching via preferred learning methods  Outcome: Progressing     Problem: DISCHARGE PLANNING  Goal: Discharge to home or other facility with appropriate resources  Description: INTERVENTIONS:  - Identify barriers to discharge w/patient and caregiver  - Arrange for needed discharge resources and transportation as appropriate  - Identify discharge learning needs (meds, wound care, etc )  - Arrange for interpretive services to assist at discharge as needed  - Refer to Case Management Department for coordinating discharge planning if the patient needs post-hospital services based on physician/advanced practitioner order or complex needs related to functional status, cognitive ability, or social support system  Outcome: Progressing     Problem: POSTPARTUM  Goal: Experiences normal postpartum course  Description: INTERVENTIONS:  - Monitor maternal vital signs  - Assess uterine involution and lochia  Outcome: Progressing  Goal: Appropriate maternal -  bonding  Description: INTERVENTIONS:  - Identify family support  - Assess for appropriate maternal/infant bonding   -Encourage maternal/infant bonding opportunities  - Referral to  or  as needed  Outcome: Progressing  Goal: Establishment of infant feeding pattern  Description: INTERVENTIONS:  - Assess breast/bottle feeding  - Refer to lactation as needed  Outcome: Progressing  Goal: Incision(s), wounds(s) or drain site(s) healing without S/S of infection  Description: INTERVENTIONS  - Assess and document dressing, incision, wound bed, drain sites and surrounding tissue  - Provide patient and family education  Outcome: Progressing

## 2021-11-20 NOTE — PLAN OF CARE
Problem: PAIN - ADULT  Goal: Verbalizes/displays adequate comfort level or baseline comfort level  Description: Interventions:  - Encourage patient to monitor pain and request assistance  - Assess pain using appropriate pain scale  - Administer analgesics based on type and severity of pain and evaluate response  - Implement non-pharmacological measures as appropriate and evaluate response  - Consider cultural and social influences on pain and pain management  - Notify physician/advanced practitioner if interventions unsuccessful or patient reports new pain  Outcome: Progressing     Problem: INFECTION - ADULT  Goal: Absence or prevention of progression during hospitalization  Description: INTERVENTIONS:  - Assess and monitor for signs and symptoms of infection  - Monitor lab/diagnostic results  - Monitor all insertion sites, i e  indwelling lines, tubes, and drains  - Monitor endotracheal if appropriate and nasal secretions for changes in amount and color  - [place] appropriate cooling/warming therapies per order  - Administer medications as ordered  - Instruct and encourage patient and family to use good hand hygiene technique  - Identify and instruct in appropriate isolation precautions for identified infection/condition  Outcome: Progressing  Goal: Absence of fever/infection during neutropenic period  Description: INTERVENTIONS:  - Monitor WBC    Outcome: Progressing     Problem: SAFETY ADULT  Goal: Patient will remain free of falls  Description: INTERVENTIONS:  - Educate patient/family on patient safety including physical limitations  - Instruct patient to call for assistance with activity   - Consult OT/PT to assist with strengthening/mobility   - Keep Call bell within reach  - Keep bed low and locked with side rails adjusted as appropriate  - Keep care items and personal belongings within reach  - Initiate and maintain comfort rounds  - Make Fall Risk Sign visible to staff  - Apply yellow socks and bracelet for high fall risk patients  - Consider moving patient to room near nurses station  Outcome: Progressing  Goal: Maintain or return to baseline ADL function  Description: INTERVENTIONS:  -  Assess patient's ability to carry out ADLs; assess patient's baseline for ADL function and identify physical deficits which impact ability to perform ADLs (bathing, care of mouth/teeth, toileting, grooming, dressing, etc )  - Assess/evaluate cause of self-care deficits   - Assess range of motion  - Assess patient's mobility; develop plan if impaired  - Assess patient's need for assistive devices and provide as appropriate  - Encourage maximum independence but intervene and supervise when necessary  - Involve family in performance of ADLs  - Assess for home care needs following discharge   - Consider OT consult to assist with ADL evaluation and planning for discharge  - Provide patient education as appropriate  Outcome: Progressing  Goal: Maintains/Returns to pre admission functional level  Description: INTERVENTIONS:  - Perform BMAT or MOVE assessment daily    - Set and communicate daily mobility goal to care team and patient/family/caregiver  - Collaborate with rehabilitation services on mobility goals if consulted  - Out of bed for toileting  - Record patient progress and toleration of activity level   Outcome: Progressing     Problem: Knowledge Deficit  Goal: Patient/family/caregiver demonstrates understanding of disease process, treatment plan, medications, and discharge instructions  Description: Complete learning assessment and assess knowledge base    Interventions:  - Provide teaching at level of understanding  - Provide teaching via preferred learning methods  Outcome: Progressing     Problem: DISCHARGE PLANNING  Goal: Discharge to home or other facility with appropriate resources  Description: INTERVENTIONS:  - Identify barriers to discharge w/patient and caregiver  - Arrange for needed discharge resources and transportation as appropriate  - Identify discharge learning needs (meds, wound care, etc )  - Arrange for interpretive services to assist at discharge as needed  - Refer to Case Management Department for coordinating discharge planning if the patient needs post-hospital services based on physician/advanced practitioner order or complex needs related to functional status, cognitive ability, or social support system  Outcome: Progressing     Problem: POSTPARTUM  Goal: Experiences normal postpartum course  Description: INTERVENTIONS:  - Monitor maternal vital signs  - Assess uterine involution and lochia  Outcome: Progressing  Goal: Appropriate maternal -  bonding  Description: INTERVENTIONS:  - Identify family support  - Assess for appropriate maternal/infant bonding   -Encourage maternal/infant bonding opportunities  - Referral to  or  as needed  Outcome: Progressing  Goal: Establishment of infant feeding pattern  Description: INTERVENTIONS:  - Assess breast/bottle feeding  - Refer to lactation as needed  Outcome: Progressing  Goal: Incision(s), wounds(s) or drain site(s) healing without S/S of infection  Description: INTERVENTIONS  - Assess and document dressing, incision, wound bed, drain sites and surrounding tissue  - Provide patient and family education  Outcome: Progressing

## 2021-11-21 VITALS
HEART RATE: 77 BPM | TEMPERATURE: 97.8 F | RESPIRATION RATE: 16 BRPM | HEIGHT: 65 IN | DIASTOLIC BLOOD PRESSURE: 76 MMHG | BODY MASS INDEX: 27.99 KG/M2 | WEIGHT: 168 LBS | SYSTOLIC BLOOD PRESSURE: 108 MMHG | OXYGEN SATURATION: 98 %

## 2021-11-21 LAB
GLUCOSE SERPL-MCNC: 151 MG/DL (ref 65–140)
GLUCOSE SERPL-MCNC: 64 MG/DL (ref 65–140)

## 2021-11-21 PROCEDURE — 82948 REAGENT STRIP/BLOOD GLUCOSE: CPT

## 2021-11-21 PROCEDURE — 99024 POSTOP FOLLOW-UP VISIT: CPT | Performed by: OBSTETRICS & GYNECOLOGY

## 2021-11-21 RX ADMIN — ACETAMINOPHEN 650 MG: 325 TABLET, FILM COATED ORAL at 08:11

## 2021-11-21 RX ADMIN — IBUPROFEN 600 MG: 600 TABLET ORAL at 08:10

## 2021-11-21 RX ADMIN — INSULIN LISPRO 4 UNITS: 100 INJECTION, SOLUTION INTRAVENOUS; SUBCUTANEOUS at 08:44

## 2021-11-21 NOTE — NURSING NOTE
Pt rechecked blood sugar at this time with personal monitor and it was 130  Additionally, pt expresses that she is "feeling much better"

## 2021-11-21 NOTE — DISCHARGE INSTRUCTIONS
Self Care After Delivery   AMBULATORY CARE:   The postpartum period  is the period of time from delivery to about 6 weeks  During this time you may experience many physical and emotional changes  It is important to understand what is normal and when you need to call your healthcare provider  It is also important to know how to care for yourself during this time  Call your local emergency number (911 in the [address] [facility]) for any of the following:   · You see or hear things that are not there, or have thoughts of harming yourself or your baby  · You soak through 1 pad in 15 minutes, have blurry vision, clammy or pale skin, and feel faint  · You faint or lose consciousness  · You have trouble breathing  · You cough up blood  · Your  incision comes apart  Seek care immediately if:   · Your heart is beating faster than usual     · You have a bad headache or changes in your vision  · Your episiotomy or  incision is red, swollen, bleeding, or draining pus  · You have severe abdominal pain  Call your doctor or obstetrician if:   · Your leg is painful, red, and larger than usual     · You soak through 1 or more pads in an hour, or pass blood clots larger than a quarter from your vagina  · You have a fever  · You have new or worsening pain in your abdomen or vagina  · You continue to have depression 1 to 2 weeks after you deliver  · You have trouble sleeping  · You have foul-smelling discharge from your vagina  · You have pain or burning when you urinate  · You do not have a bowel movement for 3 days or more  · You have nausea or are vomiting  · You have hard lumps or red streaks over your breasts  · You have cracked nipples or bleed from your nipples  · You have questions or concerns about your condition or care  Physical changes:   The following are normal changes after you give birth:  · Pain in the area between your anus and vagina    · Breast pain    · Constipation or hemorrhoids    · Hot or cold flashes    · Vaginal bleeding or discharge    · Mild to moderate abdominal cramping    · Difficulty controlling bowel movements or urine    Emotional changes:  A drop in hormone levels after you deliver may cause changes in your emotions  You may feel irritable, sad, or anxious  You may cry easily or for no reason  You may also feel depressed  Depression that continues can be a sign of postpartum depression, a condition that can be treated  Treatment may include talk therapy, medicines, or both  Healthcare providers will ask how you are feeling and if you have any depression  These talks can happen during appointments for your medical care and for your baby's care, such as well child visits  Providers can help you find ways to care for yourself and your baby  Talk to your providers about the following:  · When emotional changes or depression started, and if it is getting worse over time    · Problems you are having with daily activities, sleep, or caring for your baby    · If anything makes you feel worse, or makes you feel better    · Feeling that you are not bonding with your baby the way you want    · Any problems your baby has with sleeping or feeding    · Your baby is fussy or cries a lot    · Support you have from friends, family, or others    Breast care for breastfeeding mothers: You may have sore breasts for 3 to 6 days after you give birth  This happens as your milk begins to fill your breasts  You may also have sore breasts if you do not breastfeed frequently  Do the following to care for your breasts:  · Apply a moist, warm, compress to your breast as directed  This may help soothe your breasts  Make sure the washcloth is not too hot before you apply it to your breast     · Nurse your baby or pump your milk frequently  This may prevent clogged milk ducts  Ask your healthcare provider how often to nurse or pump      · Massage your breasts as directed  This may help increase your milk flow  Gently rub your breasts in a circular motion before you breastfeed  You may need to gently squeeze your breast or nipple to help release milk  You can also use a breast pump to help release milk from your breast     · Wash your breasts with warm water only  Do not put soap on your nipples  Soap may cause your nipples to become dry  · Apply lanolin cream to your nipples as directed  Lanolin cream may add moisture to your skin and prevent nipple dryness  Always  wash off lanolin cream with warm water before you breastfeed  · Place pads in your bra  Your nipples may leak milk when you are not breastfeeding  You can place pads inside of your bra to help prevent leaking onto your clothing  Ask your healthcare provider where to purchase bra pads  · Get breastfeeding support if needed  Healthcare providers can answer questions about breastfeeding and provide you with support  Ask your healthcare provider who you can contact if you need breastfeeding support  Breast care for non-breastfeeding mothers:  Milk will fill your breasts even if you bottle feed your baby  Do the following to help stop your milk from filling your breasts and causing pain:  · Wear a bra with support at all times  A sports bra or a tight-fitting bra will help stop your milk from coming in  · Apply ice on each breast for 15 to 20 minutes every hour or as directed  Use an ice pack, or put crushed ice in a plastic bag  Cover it with a towel before you apply it to your breast  Ice helps your milk ducts shrink  · Keep your breasts away from warm water  Warm water will make it easier for milk to fill your breasts  Stand with your breasts away from warm water in the shower  · Limit how much you touch your breasts  This will prevent them from filling with milk  Perineum care: Your perineum is the area between your rectum and vagina   It is normal to have swelling and pain in this area after you give birth  If you had an episiotomy, your healthcare provider may give you special instructions  · Clean your perineum after you use the bathroom  This may prevent infection and help with healing  Use a spray bottle with warm water to clean your perineum  You may also gently spray warm water against your perineum when you urinate  Always wipe front to back  · Take a sitz bath as directed  A sitz bath may help relieve swelling and pain  Fill your bath tub or bucket with water up to your hips and sit in the water  Use cold water for 2 days after you deliver  Then use warm water  Ask your healthcare provider for more information about a sitz bath  · Apply ice packs for the first 24 hours or as directed  Use a plastic glove filled with ice or buy an ice pack  Wrap the ice pack or plastic glove in a small towel or wash cloth  Place the ice pack on your perineum for 20 minutes at a time  · Sit on a donut-shaped pillow  This may relieve pressure on your perineum when you sit  · Use wipes that contain medicine or take pills as directed  Your healthcare provider may tell you to use witch hazel pads  You can place witch hazel pads in the refrigerator before you apply them to your perineum  Your provider may also tell you to take NSAIDs  Ask him or her how often to take pills or use the wipes  · Do not go swimming or take tub baths for 4 to 6 weeks or as directed  This will help prevent an infection in your vagina or uterus  Bowel and bladder care: It may take 3 to 5 days to have a bowel movement after you deliver your baby  You can do the following to prevent or manage constipation, and get control of your bowel or bladder:  · Take stool softeners as directed  A stool softener is medicine that will make your bowel movements softer  This may prevent or relieve constipation  A stool softener may also make bowel movements less painful  · Drink plenty of liquids    Ask how much liquid to drink each day and which liquids are best for you  Liquids may help prevent constipation  · Eat foods high in fiber  Examples include fruits, vegetables, grains, beans, and lentils  Ask your healthcare provider how much fiber you need each day  Fiber may prevent constipation  · Do Kegel exercises as directed  Kegel exercises will help strengthen the muscles that control bowel movements and urination  Ask your healthcare provider for more information on Kegel exercises  · Apply cold compresses or medicine to hemorrhoids as directed  This may relieve swelling and pain  Your healthcare provider may tell you to apply ice or wipes that contain medicine to your hemorrhoids  He or she may also tell you to use a sitz bath  Ask your provider for more information on how to manage hemorrhoids  Nutrition:  Good nutrition is important in the postpartum period  It will help you return to a healthy weight, increase your energy levels, and prevent constipation  It will also help you get enough nutrients and calories if you are going to breastfeed your baby  · Eat a variety of healthy foods  Healthy foods include fruits, vegetables, whole-grain breads, low-fat dairy products, beans, lean meats, and fish  You may need 500 to 700 extra calories each day if you breastfeed your baby  You may also need extra protein  · Limit foods with added sugar and high amounts of fat  These foods are high in calories and low in healthy nutrients  Read food labels so you know how much sugar and fat is in the food you want to eat  · Drink 8 to 10 glasses of water per day  Water will help you make plenty of milk for your baby  It will also help prevent constipation  Drink a glass of water every time you breastfeed your baby  · Take vitamins as directed  Ask your healthcare provider what vitamins you need  · Limit caffeine and alcohol if you are breastfeeding    Caffeine and alcohol can get into your breast milk  Caffeine and alcohol can make your baby fussy  They can also interfere with your baby's sleep  Ask your healthcare provider if you can drink alcohol or caffeine  Rest and sleep: You may feel very tired in the postpartum period  Enough sleep will help you heal and give you energy to care for your baby  The following may help you get sleep and rest:  · Nap when your baby naps  Your baby may nap several times during the day  Get rest during this time  · Limit visitors  Many people may want to see you and your baby  Ask friends or family to visit on different days  This will give you time to rest     · Do not plan too much for one day  Put off household chores so that you have time to rest  Gradually do more each day  · Ask for help from family, friends, or neighbors  Ask them to help you with laundry, cleaning, or errands  Also ask someone to watch the baby while you take a nap or relax  Ask your partner to help with the care of your baby  Pump some of your breast milk so your partner can feed your baby during the night  Exercise after delivery:  Wait until your healthcare provider says it is okay to exercise  Exercise can help you lose weight, increase your energy levels, and manage your mood  It can also prevent constipation and blood clots  Start with gentle exercises such as walking  Do more as you have more energy  You may need to avoid abdominal exercises for 1 to 2 weeks after you deliver  Talk to your healthcare provider about an exercise plan that is right for you  Sexual activity after delivery:   · Do not have sex until your healthcare provider says it is okay  You may need to wait 4 to 6 weeks before you have sex  This may prevent infection and allow time to heal     · Your menstrual cycle may begin as soon as 3 weeks after you deliver  Your period may be delayed if you breastfeed your baby  You can become pregnant before you get your first postpartum period   Talk to your healthcare provider about birth control that is right for you  Some types of birth control are not safe during breastfeeding  For support and more information:  Join a support group for new mothers  Ask for help from family and friends with chores, errands, and care of your baby  · Office of Women's Health,  Department of Health and Human Services  [address], [address] [place] [facility]  [place] [address]  [address], [address] [facility] [place] [facility]  Phone: [phone]  Web Address: www womenshealth gov    · [facility] [address] [facility] , [address]  [address] [facility] , [address] [facility]  Web Address: [email or web address]    Follow up with your doctor or obstetrician as directed: You will need to follow up within 2 to 6 weeks of delivery  Write down your questions so you remember to ask them at your visits  © Copyright [employer] 2021 Information is for End User's use only and may not be sold, redistributed or otherwise used for commercial purposes  All illustrations and images included in CareNotes® are the copyrighted property of [employer] A M , Inc  or [facility] [employer]   The above information is an  only  It is not intended as medical advice for individual conditions or treatments  Talk to your doctor, nurse or pharmacist before following any medical regimen to see if it is safe and effective for you  COVID-19 (Coronavirus Disease 2019)   WHAT YOU NEED TO KNOW:   What do I need to know about coronavirus disease 2019 (COVID-19)? COVID-19 is the disease caused by a coronavirus first discovered in December 2019  Coronaviruses generally cause upper respiratory (nose, throat, and lung) infections, such as a cold  The new virus spreads quickly and easily  The virus can be spread starting 2 days before symptoms even begin   The virus has also changed into several new forms (called variants) since it was discovered  The variants may be more contagious (easily spread) than the original form  Some may also cause more severe illness than others  It is important to follow local, national, and worldwide measures to protect yourself and others from infection  What are the signs and symptoms of COVID-19? You may not develop any signs or symptoms  Signs and symptoms usually start about 5 days after infection but can take 2 to 14 days  You may feel like you have the flu or a bad cold  Some signs and symptoms go away in a few days  Others can last weeks, months, or possibly years  Information on COVID-19 is still being learned  Tell your healthcare provider if you think you were infected but develop signs or symptoms not listed below:  · A cough    · Shortness of breath or trouble breathing that may become severe    · A fever of at least 100 4°F, or 38°C (may be lower in adults 65 or older)    · Chills that might include shaking    · Muscle pain, body aches, or a headache    · A sore throat    · Suddenly not being able to taste or smell anything    · Feeling mentally and physically tired (fatigue)    · Congestion (stuffy head and nose), or a runny nose    · Diarrhea, nausea, or vomiting    How is COVID-19 diagnosed? If you think you have COVID-19, call your healthcare provider  He or she will tell you what to do based on your symptoms and testing guidelines in your area  In general, the following may be used:  · A viral test  shows if you have a current infection  Samples are taken from your nose and throat, usually with swabs  You may need to wait several days to get the test results  Your healthcare provider will tell you how to get your results  You will need to quarantine (stay physically away from others) until you get your results  If results show you have COVID-19, you will need to continue until you are well  Your provider or other health official may give you more directions   You will also need to prevent another infection until it is known if you can get COVID-19 again  · An antibody test  shows if you had a past infection  Blood samples are used for this test  Antibodies are made by your immune system to attack the virus that causes COVID-19  Antibodies will form 1 to 3 weeks after you are infected  It is not known if antibodies prevent a second infection, or for how long a person might be protected  If you have antibodies, you will still need to be careful around others until more is known  · CT scans or x-rays  may be used to check for signs of pneumonia  The 2019 coronavirus causes a specific kind of pneumonia, usually in both lungs  The pictures may also be used to check for health problems in other parts of your body  How is COVID-19 treated? Treatment such as monoclonal antibodies and convalescent plasma have emergency use authorization (EUA)  This means they may be given only to patients who are hospitalized with severe signs and symptoms  The following may be used to manage your symptoms:  · Mild symptoms  may get better on their own  If you do not need to be treated in a hospital, you will be given instructions to use at home  Your condition will be closely monitored  You will need to watch for worsening symptoms and seek immediate care if needed  Talk to your healthcare provider about the following:    ? Decongestants  help reduce nasal congestion and help you breathe more easily  If you take decongestant pills, they may make you feel restless or cause problems with your sleep  Do not use decongestant sprays for more than a few days  ? Cough suppressants  help reduce coughing  Ask your healthcare provider which type of cough medicine is best for you  ? To soothe a sore throat,  gargle with warm salt water, or use throat lozenges or a throat spray  Drink more liquids to thin and loosen mucus and to prevent dehydration      ? NSAIDs or acetaminophen  can help lower a fever and relieve body aches or a headache  Follow directions  If not taken correctly, NSAIDs can cause kidney damage and acetaminophen can cause liver damage  · Severe or life-threatening symptoms  are treated in the hospital  You may need a combination of the following:    ? Medicines  may be given to lower or prevent inflammation or to fight the virus  You may also need blood thinners to prevent or treat blood clots  If you have a deep vein thrombosis (DVT) or pulmonary embolism (PE), you may need to keep using blood thinners for 3 months  ? Extra oxygen  may be given if you have respiratory failure  This means your lungs cannot get enough oxygen into your blood and out to your organs  Extra oxygen can help prevent organ failure  ? A ventilator  may be used to help you breathe  What do I need to know about health problems the virus may cause? Serious health problems may improve or continue for weeks, months, and possibly years  Health problems that continue may be called long COVID  Anyone can develop serious problems from this virus, but your risk is higher if you are 72 or older  A weak immune system, diabetes, or a heart or lung condition can also increase your risk  Your risk is also higher if you are a current or former cigarette smoker  COVID-19 can lead to any of the following:  · Multisymptom inflammatory syndrome in adults (MIS-A) or in children (MIS-C), causing inflammation in the heart, digestive system, skin, or brain    · Serious lower respiratory conditions, such as pneumonia or acute respiratory distress syndrome (ARDS)    · Blood vessel damage, leading to blood clots    · Organ damage from a lack of oxygen or from blood clots    · Sleep problems    · Problems thinking clearly, remembering information, or concentrating    · Mood changes, depression, or anxiety    How does the 2019 coronavirus spread?   The following are ways the virus is thought to spread, but more information may be coming:  · Droplets are the main way all coronaviruses spread  The virus travels in droplets that form when a person talks, coughs, or sneezes  The droplets can also float in the air for minutes or hours  Infection happens when you breathe in the droplets or get them in your eyes or nose  Close personal contact with an infected person increases your risk for infection  This means being within 6 feet (2 meters) of the person for at least 15 minutes over 24 hours  · Person-to-person contact can spread the virus  For example, a person with the virus on his or her hands can spread it by shaking hands with someone  · The virus can stay on objects and surfaces for a short time  You may become infected by touching the object or surface and then touching your eyes or mouth  · An infected animal may be able to infect a person who touches it  This may happen at live markets or on a farm  How can I help lower the risk for COVID-19? The best way to prevent infection is to avoid anyone who is infected, but this can be hard to do  An infected person can spread the virus before signs or symptoms begin, or even if signs or symptoms never develop  The following can help lower the risk for infection:      · Wash your hands often throughout the day  Use soap and water  Rub your soapy hands together, lacing your fingers, for at least 20 seconds  Rinse with warm, running water  Dry your hands with a clean towel or paper towel  Use hand  that contains alcohol if soap and water are not available  Teach children how to wash their hands and use hand   · Cover sneezes and coughs  Turn your face away and cover your mouth and nose with a tissue  Throw the tissue away  Use the bend of your arm if a tissue is not available  Then wash your hands well with soap and water or use hand   Teach children how to cover a cough or sneeze      · Wear a face covering (mask) around anyone who does not live in your home  Use a cloth covering with at least 2 layers  You can also create layers by putting a cloth covering over a disposable non-medical mask  Cover your mouth and your nose  The covering should fit snugly against the bridge of your nose  Securely fasten it under your chin and on the sides of your face  Do not  wear a plastic face shield instead of a covering  Continue social distancing and washing your hands often  A face covering is not a substitute for social distancing safety measures  · Follow worldwide, national, and local social distancing guidelines  Keep at least 6 feet (2 meters) between you and others  Also keep this distance from anyone who comes to your home, such as someone making a delivery  Wear a face covering while you are around others  You will need to wear a covering in restaurants, stores, and other public buildings  You will also need a covering on mass transit, such as a bus, subway, or airplane  Remember to use a covering made from thick material or wear 2 coverings together  · Make a habit of not touching your face  If you get the virus on your hands, you can transfer it to your eyes, nose, or mouth and become infected  You can also transfer it to objects, surfaces, or people  Do not touch your eyes, nose, or mouth without washing your hands first     · Clean and disinfect high-touch surfaces and objects often  Use disinfecting wipes, or make a solution of 4 teaspoons of bleach in 1 quart (4 cups) of water  Clean and disinfect even if you think no one living in or coming to your home is infected with the virus  · Ask about vaccines you may need  Get a COVID-19 vaccine when it is available to you  The current vaccines are given as a shot in 1 or 2 doses  Get the influenza (flu) vaccine as soon as recommended each year, usually starting in September or October  Get the pneumonia vaccine if recommended      How do I follow social distancing guidelines to help lower the risk for COVID-19? National and local social distancing rules vary  Rules may change over time as restrictions are lifted  Restrictions may return if an outbreak happens where you live  It is important to know and follow all current social distancing rules in your area  The following are general guidelines:  · Stay home if you are sick or think you may have COVID-19  It is important to stay home if you are waiting for a testing appointment or for test results  Even if you do not have symptoms, you can pass the virus to others  · Limit trips out of your home  Have food, medicines, and other supplies delivered and left at your door or other area, if possible  Plan trips out of your home so you make the fewest stops possible to limit close personal contact  Keep track of places you go  This will help contact tracers notify others if you become infected  · Avoid close physical contact with anyone who does not live in your home  Do not shake hands with, hug, or kiss a person as a greeting  If you must use public transportation (such as a bus or subway), try to sit or stand away from others  Only allow necessary people into your home  Wear your face covering, and remind others to wear a face covering  Remind them to wash their hands when they arrive and before they leave  Do not  let someone into your home or go to someone's home just to visit  Even if you both do not feel sick, the virus can pass from one of you to the other  · Avoid in-person gatherings and crowds  Gatherings or crowds of 10 or more individuals can cause the virus to spread  Avoid places such as [place], beaches, sporting events, and tourist attractions  For events such as parties, holiday meals, [employer] services, and conferences, attend virtually (on a computer), if possible  · Ask your healthcare provider for other ways to have appointments  Some providers offer phone, video, or other types of appointments   You may also be able to get prescriptions for a few months of your medicines at a time  · Stay safe if you must go out to work  Keep physical distance between you and other workers as much as possible  Follow your employer's rules so everyone stays safe  What should I do if I have COVID-19 and am recovering at home? Healthcare providers will give you specific instructions to follow  The following are general guidelines to remind you how to keep others safe until you are well:  · Wash your hands often  Use soap and water as much as possible  Use hand  that contains alcohol if soap and water are not available  Dry your hands with a clean towel or paper towel  Do not share towels with anyone  If you use paper towels, throw them away in a lined trash can kept in your room or area  Use a covered trash can, if possible  · Do not go out of your home unless it is necessary  Ask someone who is not infected to go out for groceries or supplies, or have them delivered  Do not go to your healthcare provider's office without an appointment  · Only have close physical contact with a person giving direct care, or a baby or child you must care for  Family members and friends should not visit you  If possible, stay in a separate area or room of your home if you live with others  No one should go into the area or room except to give you care  You can visit with others by phone, video chat, e-mail, or similar systems  · Wear a face covering while others are near you  This can help prevent droplets from spreading the virus when you talk, sneeze, or cough  Put the covering on before anyone comes into your room or area  Remind the person to cover his or her nose and mouth before coming in to provide care for you  · Do not share items  Do not share dishes, towels, or other items with anyone  Items need to be washed after you use them  · Protect your baby  Some newborns have tested positive for the virus   It is not known if they became infected before or after birth  The highest risk is when a  has close contact with an infected person  If you are pregnant or breastfeeding, talk to your healthcare provider or obstetrician about any concerns you have  He or she will tell you when to bring your baby in for check-ups and vaccines  He or she will also tell you what to do if you think your baby was infected with the coronavirus  Wash your hands and put on a clean face covering before you breastfeed or care for your baby  · Do not handle live animals unless it is necessary  Some animals, including pets, have been infected with the new coronavirus  Ask someone who is not infected to take care of your pet until you are well  If you must care for a pet, wear a face covering  Wash your hands before and after you give care  Talk to your healthcare provider about how to keep a service animal safe, if needed  · Follow directions from your healthcare provider for being around others after you recover  It is not known if or for how long a recovered person can pass the virus to others  Your provider may give you instructions, such as continuing social distancing and wearing a face covering  He or she will tell you when it is okay to be around others again  This may be 10 to 20 days after symptoms started or you had a positive test  Most symptoms will also need to be gone  Your provider will give you more information  Where can I find more information? · Centers for Disease Control and Prevention  [address] , [address]  Phone: [phone]  Web Address: [employer] com     What should I do if I think I or someone I know may be infected? Do the following to protect others:  · If emergency care is needed,  tell the  about the possible infection, or call ahead and tell the emergency department  · Call a healthcare provider  for instructions if symptoms are mild   Anyone who may be infected should not arrive without calling first  The provider will need to protect staff members and other patients  · The person who may be infected needs to wear a face covering  while getting medical care  This will help lower the risk of infecting others  Coverings are not used for anyone who is younger than 2 years, has breathing problems, or cannot remove it  The provider can give you instructions for anyone who cannot wear a covering  Call your local emergency number (911 in the [address] [facility]) or an emergency department if:   · You have trouble breathing or shortness of breath at rest     · You have chest pain or pressure that lasts longer than 5 minutes  · You become confused or hard to wake  · Your lips or face are blue  · You have a fever of 104°F (40°C) or higher  When should I call my doctor? · You do not  have symptoms of COVID-19 but had close physical contact within 14 days with someone who tested positive  · You have questions or concerns about your condition or care  CARE AGREEMENT:   You have the right to help plan your care  Learn about your health condition and how it may be treated  Discuss treatment options with your healthcare providers to decide what care you want to receive  You always have the right to refuse treatment  The above information is an  only  It is not intended as medical advice for individual conditions or treatments  Talk to your doctor, nurse or pharmacist before following any medical regimen to see if it is safe and effective for you  © Copyright [employer] 2021 Information is for End User's use only and may not be sold, redistributed or otherwise used for commercial purposes  All illustrations and images included in CareNotes® are the copyrighted property of A D A M , Inc  or [employer]  Postpartum Bleeding   WHAT YOU NEED TO KNOW:   Postpartum bleeding is vaginal bleeding after childbirth   This bleeding is normal, whether your baby was born vaginally or by   It contains blood and the tissue that lined the inside of your uterus when you were pregnant  DISCHARGE INSTRUCTIONS:   Call your local emergency number (911 in the [address]) if:   · You are suddenly short of breath and feel lightheaded  · You have sudden chest pain  · You are breathing faster than normal     · Your heart is beating faster than normal     Call your doctor or obstetrician if:   · Your bleeding increases, or you have heavy bleeding that soaks 1 pad in 1 hour for 2 hours in a row  · You have a fever  · You pass large blood clots  · You feel dizzy  · You have a low back ache, abdominal pain or tenderness, or loss of appetite  · You are urinating less than usual, or not at all  · You have questions or concerns about your condition or care  What to expect with postpartum bleeding:  Postpartum bleeding usually lasts at least 10 days, and may last longer than 6 weeks  Your bleeding may range from light (barely staining a pad) to heavy (soaking a pad in 1 hour)  Usually, you have heavier bleeding right after childbirth, which slows over the next few weeks until it stops  The bleeding is red or dark brown with clots for the first 1 to 3 days  It then turns pink for several days, and then becomes a white or yellow discharge until it ends  Follow up with your obstetrician as directed:  Do not have sex until your obstetrician says it is okay  Write down your questions so you remember to ask them during your visits  © Copyright [employer]  Information is for End User's use only and may not be sold, redistributed or otherwise used for commercial purposes  All illustrations and images included in CareNotes® are the copyrighted property of [employer]  or [employer]   The above information is an  only  It is not intended as medical advice for individual conditions or treatments   Talk to your doctor, nurse or pharmacist before following any medical regimen to see if it is safe and effective for you  Postpartum Depression   WHAT YOU NEED TO KNOW:   What is postpartum depression? Postpartum depression is a mood disorder that occurs after your baby is born  Your symptoms may last up to 12 months after delivery  Your symptoms may become serious and affect your daily activities and relationships  What are the symptoms of postpartum depression? · Feeling sad, anxious, tearful, discouraged, hopeless, or alone    · Thoughts that you are not a good mother    · Trouble completing daily tasks, concentrating, or remembering things    · Lack of appetite    · Lack interest in your baby    · Feeling restless, irritable, or withdrawn    · An overwhelmed feeling with your new baby and a belief that it will not get better    · Feeling unimportant or guilty most of the time    · Trouble sleeping, even after the baby is asleep    What causes postpartum depression? The exact cause is not known  Hormone levels that increased during pregnancy suddenly drop after your baby is born  This can cause your symptoms  A past episode of postpartum depression or a family history of depression may increase your risk  Postpartum depression may be triggered by giving birth to more than 1 baby  Postpartum depression may also be trigged by a lack of support at home, stress, or medical problems  How is postpartum depression diagnosed? Healthcare providers will talk to you about how you are feeling and ask if you have any depression  These talks can happen during appointments for your medical care and for your baby's care, such as well child visits   Talk to your providers about the following:  · When you started to feel depressed, and if it is getting worse over time    · Problems you are having with daily activities, sleep, or caring for your baby    · If anything makes your depression worse, or makes you feel better    · Feeling that you are not bonding with your baby the way you want    · Any problems your baby has with sleeping or feeding    · If your baby is fussy or cries a lot    · Support you have from friends, family, or others    How is postpartum depression treated? Treatment may include medicine, talk therapy, or both  · A therapist  can help you find ways to cope with your feelings  This can be done alone or in a group  · Antidepressants  help decrease or stop your symptoms  What can I do to feel better? You may feel better quickly, or if may take a few weeks to feel better  Be patient with yourself  Do the following to take care of yourself:  · Rest as needed  Take a nap or rest while your baby sleeps  Ask someone to watch your baby while you nap  · Get emotional support  Share your feelings with your partner, a friend, or another mother  Ask your partner, friends, or family to help with cooking or cleaning  Do only what is needed and let other things wait until later  · Exercise when you can  Even 5 or 10 minutes of exercise can help improve your mood  Walk around the block or do some stretching exercises  · Eat a variety of healthy foods  Healthy foods include fruits, vegetables, whole-grain breads, low-fat dairy products, beans, lean meats, and fish  Do not skip meals  · Take care of yourself  Shower and dress each day  Write in a journal  Celebrate small achievements, even if it is only 1 thing a day  Try to get out of the house a little each day  Meet a friend for coffee  Call your local emergency number (911 in the [address] [facility] [address]) if:   · You think about hurting yourself or your baby  When should I seek immediate care? · Your feelings of depression or sadness are strong  Call your doctor if:   · Your symptoms last most of the day for days in a row  · Your symptoms last more than 1 week  · You have questions or concerns about your condition or care  CARE AGREEMENT:   You have the right to help plan your care  Learn about your health condition and how it may be treated  Discuss treatment options with your healthcare providers to decide what care you want to receive  You always have the right to refuse treatment  The above information is an  only  It is not intended as medical advice for individual conditions or treatments  Talk to your doctor, nurse or pharmacist before following any medical regimen to see if it is safe and effective for you  © Copyright [employer] 2021 Information is for End User's use only and may not be sold, redistributed or otherwise used for commercial purposes  All illustrations and images included in CareNotes® are the copyrighted property of [employer]  or [employer]ox Hagen     Postpartum Perineal Care   WHAT YOU NEED TO KNOW:   Postpartum perineal care is care for your perineum after you have a baby  The perineum is the area between your vagina and anus  DISCHARGE INSTRUCTIONS:   Contact your healthcare provider if:   · You have large blood clots or bright red blood coming from your vagina  · You have abdominal pain, vomiting, and a fever  · You have heavy vaginal bleeding that fills 1 or more sanitary pads in 1 hour  · You have foul-smelling vaginal discharge  · You feel weak or lightheaded  · You have questions or concerns about your condition or care  Care for your perineum:  Healthcare providers will give you a small squirt bottle and show you how to use it  Do the following after you use the toilet and before you put on a new pad:  · Remove the soiled pad    · Use the squirt bottle to rinse your perineum from front to back while you sit on the toilet     · Pat the area dry from front to back with toilet paper or a cotton cloth     · Put on a fresh pad    · Wash your hands    Decrease pain:   · Take a sitz bath  Fill a bathtub with 4 to 6 inches of warm water  You may also use a sitz bath pan that fits over a toilet   Sit in the sitz bath for 20 minutes  Do this 2 to 3 times a day, or as directed  · Apply ice  on your perineal area for 15 to 20 minutes every hour or as directed  Use an ice pack, or put crushed ice in a plastic bag  Cover it with a towel  · Use medicine spray, wipes, or pads as directed  Healthcare providers may give you a medicine spray or wipes soaked with numbing medicine to decrease the pain  Pads that contain an herb called witch hazel may also help reduce pain  Use these after perineal care or a sitz bath  Follow up with your doctor as directed:  Write down your questions so you remember to ask them during your visits  © Copyright [employer] 2021 Information is for End User's use only and may not be sold, redistributed or otherwise used for commercial purposes  All illustrations and images included in CareNotes® are the copyrighted property of [employer]  or [employer]ox Hagen   The above information is an  only  It is not intended as medical advice for individual conditions or treatments  Talk to your doctor, nurse or pharmacist before following any medical regimen to see if it is safe and effective for you

## 2021-11-21 NOTE — LACTATION NOTE
This note was copied from a baby's chart  Met with mom to encourage breast feeding  Mom states baby is breast feeding well  [employer] [facility]  to call for assistance, questions and concerns  Extension number for inpatient lactation support provided

## 2021-11-21 NOTE — PROGRESS NOTES
Post partum discharge instructions, teaching, & follow-up appointment reviewed with patient  Patient verbalizes understanding & denies further questions at this time

## 2021-11-21 NOTE — NURSING NOTE
Discharge education and handouts reviewed with pt  "Save Your Life" magnet explained and given to pt  Questions encouraged and addressed

## 2021-11-21 NOTE — PROGRESS NOTES
Pt is doing very well  She is aware of her sugar and its control   She will get her continuous monitor at discharge   For now will decrease her evening dose from 15 to 12 and then titrate up/down as needed  She was 39 this am on 15 units    Normal lochia   Cleared for dc home today

## 2021-11-21 NOTE — PLAN OF CARE
Problem: PAIN - ADULT  Goal: Verbalizes/displays adequate comfort level or baseline comfort level  Description: Interventions:  - Encourage patient to monitor pain and request assistance  - Assess pain using appropriate pain scale  - Administer analgesics based on type and severity of pain and evaluate response  - Implement non-pharmacological measures as appropriate and evaluate response  - Consider cultural and social influences on pain and pain management  - Notify physician/advanced practitioner if interventions unsuccessful or patient reports new pain  Outcome: Progressing     Problem: INFECTION - ADULT  Goal: Absence or prevention of progression during hospitalization  Description: INTERVENTIONS:  - Assess and monitor for signs and symptoms of infection  - Monitor lab/diagnostic results  - Monitor all insertion sites, i e  indwelling lines, tubes, and drains  - Monitor endotracheal if appropriate and nasal secretions for changes in amount and color  - [place] appropriate cooling/warming therapies per order  - Administer medications as ordered  - Instruct and encourage patient and family to use good hand hygiene technique  - Identify and instruct in appropriate isolation precautions for identified infection/condition  Outcome: Progressing  Goal: Absence of fever/infection during neutropenic period  Description: INTERVENTIONS:  - Monitor WBC    Outcome: Progressing     Problem: SAFETY ADULT  Goal: Patient will remain free of falls  Description: INTERVENTIONS:  - Educate patient/family on patient safety including physical limitations  - Instruct patient to call for assistance with activity   - Consult OT/PT to assist with strengthening/mobility   - Keep Call bell within reach  - Keep bed low and locked with side rails adjusted as appropriate  - Keep care items and personal belongings within reach  - Initiate and maintain comfort rounds  - Make Fall Risk Sign visible to staff  - Apply yellow socks and bracelet for high fall risk patients  - Consider moving patient to room near nurses station  Outcome: Progressing  Goal: Maintain or return to baseline ADL function  Description: INTERVENTIONS:  -  Assess patient's ability to carry out ADLs; assess patient's baseline for ADL function and identify physical deficits which impact ability to perform ADLs (bathing, care of mouth/teeth, toileting, grooming, dressing, etc )  - Assess/evaluate cause of self-care deficits   - Assess range of motion  - Assess patient's mobility; develop plan if impaired  - Assess patient's need for assistive devices and provide as appropriate  - Encourage maximum independence but intervene and supervise when necessary  - Involve family in performance of ADLs  - Assess for home care needs following discharge   - Consider OT consult to assist with ADL evaluation and planning for discharge  - Provide patient education as appropriate  Outcome: Progressing  Goal: Maintains/Returns to pre admission functional level  Description: INTERVENTIONS:  - Perform BMAT or MOVE assessment daily    - Set and communicate daily mobility goal to care team and patient/family/caregiver  - Collaborate with rehabilitation services on mobility goals if consulted  - Out of bed for toileting  - Record patient progress and toleration of activity level   Outcome: Progressing     Problem: Knowledge Deficit  Goal: Patient/family/caregiver demonstrates understanding of disease process, treatment plan, medications, and discharge instructions  Description: Complete learning assessment and assess knowledge base    Interventions:  - Provide teaching at level of understanding  - Provide teaching via preferred learning methods  Outcome: Progressing     Problem: DISCHARGE PLANNING  Goal: Discharge to home or other facility with appropriate resources  Description: INTERVENTIONS:  - Identify barriers to discharge w/patient and caregiver  - Arrange for needed discharge resources and transportation as appropriate  - Identify discharge learning needs (meds, wound care, etc )  - Arrange for interpretive services to assist at discharge as needed  - Refer to Case Management Department for coordinating discharge planning if the patient needs post-hospital services based on physician/advanced practitioner order or complex needs related to functional status, cognitive ability, or social support system  Outcome: Progressing     Problem: POSTPARTUM  Goal: Experiences normal postpartum course  Description: INTERVENTIONS:  - Monitor maternal vital signs  - Assess uterine involution and lochia  Outcome: Progressing  Goal: Appropriate maternal -  bonding  Description: INTERVENTIONS:  - Identify family support  - Assess for appropriate maternal/infant bonding   -Encourage maternal/infant bonding opportunities  - Referral to  or  as needed  Outcome: Progressing  Goal: Establishment of infant feeding pattern  Description: INTERVENTIONS:  - Assess breast/bottle feeding  - Refer to lactation as needed  Outcome: Progressing  Goal: Incision(s), wounds(s) or drain site(s) healing without S/S of infection  Description: INTERVENTIONS  - Assess and document dressing, incision, wound bed, drain sites and surrounding tissue  - Provide patient and family education  Outcome: Progressing

## 2021-11-21 NOTE — NURSING NOTE
Pt called RN into room  Pt explains she checked her blood sugar herself with personal monitor and it was 35  RN gave pt juice, crackers, and peanut butter and instructed to pt to check again after finishing  Dr Uriel Gann aware of situation

## 2021-11-27 LAB — PLACENTA IN STORAGE: NORMAL

## 2021-12-07 ENCOUNTER — TELEPHONE (OUTPATIENT)
Dept: FAMILY MEDICINE CLINIC | Facility: CLINIC | Age: 31
End: 2021-12-07

## 2021-12-07 DIAGNOSIS — Z20.822 EXPOSURE TO COVID-19 VIRUS: Primary | ICD-10-CM

## 2021-12-08 PROCEDURE — U0003 INFECTIOUS AGENT DETECTION BY NUCLEIC ACID (DNA OR RNA); SEVERE ACUTE RESPIRATORY SYNDROME CORONAVIRUS 2 (SARS-COV-2) (CORONAVIRUS DISEASE [COVID-19]), AMPLIFIED PROBE TECHNIQUE, MAKING USE OF HIGH THROUGHPUT TECHNOLOGIES AS DESCRIBED BY CMS-2020-01-R: HCPCS | Performed by: FAMILY MEDICINE

## 2021-12-08 PROCEDURE — U0005 INFEC AGEN DETEC AMPLI PROBE: HCPCS | Performed by: FAMILY MEDICINE

## 2021-12-29 ENCOUNTER — TELEPHONE (OUTPATIENT)
Dept: OBGYN CLINIC | Facility: CLINIC | Age: 31
End: 2021-12-29

## 2021-12-29 ENCOUNTER — POSTPARTUM VISIT (OUTPATIENT)
Dept: OBGYN CLINIC | Facility: CLINIC | Age: 31
End: 2021-12-29
Payer: COMMERCIAL

## 2021-12-29 VITALS — WEIGHT: 147.2 LBS | BODY MASS INDEX: 24.5 KG/M2 | SYSTOLIC BLOOD PRESSURE: 104 MMHG | DIASTOLIC BLOOD PRESSURE: 80 MMHG

## 2021-12-29 DIAGNOSIS — Z30.014 ENCOUNTER FOR INITIAL PRESCRIPTION OF INTRAUTERINE CONTRACEPTIVE DEVICE (IUD): Primary | ICD-10-CM

## 2021-12-29 DIAGNOSIS — Z98.891 HISTORY OF VBAC: ICD-10-CM

## 2021-12-29 DIAGNOSIS — Z98.891 HISTORY OF CESAREAN SECTION: ICD-10-CM

## 2021-12-29 LAB — SL AMB POCT HGB: 12.2

## 2021-12-29 PROCEDURE — 85018 HEMOGLOBIN: CPT | Performed by: OBSTETRICS & GYNECOLOGY

## 2021-12-29 PROCEDURE — 99024 POSTOP FOLLOW-UP VISIT: CPT | Performed by: OBSTETRICS & GYNECOLOGY

## 2021-12-29 PROCEDURE — 87491 CHLMYD TRACH DNA AMP PROBE: CPT | Performed by: OBSTETRICS & GYNECOLOGY

## 2021-12-29 PROCEDURE — 87591 N.GONORRHOEAE DNA AMP PROB: CPT | Performed by: OBSTETRICS & GYNECOLOGY

## 2021-12-30 LAB
C TRACH DNA SPEC QL NAA+PROBE: NEGATIVE
N GONORRHOEA DNA SPEC QL NAA+PROBE: NEGATIVE

## 2022-01-12 ENCOUNTER — APPOINTMENT (OUTPATIENT)
Dept: LAB | Facility: HOSPITAL | Age: 32
End: 2022-01-12
Payer: COMMERCIAL

## 2022-01-12 ENCOUNTER — PROCEDURE VISIT (OUTPATIENT)
Dept: OBGYN CLINIC | Facility: CLINIC | Age: 32
End: 2022-01-12
Payer: COMMERCIAL

## 2022-01-12 VITALS
HEIGHT: 65 IN | WEIGHT: 150.2 LBS | DIASTOLIC BLOOD PRESSURE: 74 MMHG | BODY MASS INDEX: 25.02 KG/M2 | SYSTOLIC BLOOD PRESSURE: 114 MMHG

## 2022-01-12 DIAGNOSIS — IMO0002 DIABETES MELLITUS TYPE 1, UNCONTROLLED, WITH COMPLICATIONS: ICD-10-CM

## 2022-01-12 DIAGNOSIS — Z30.430 ENCOUNTER FOR INSERTION OF INTRAUTERINE CONTRACEPTIVE DEVICE (IUD): Primary | ICD-10-CM

## 2022-01-12 LAB — SL AMB POCT URINE HCG: NORMAL

## 2022-01-12 PROCEDURE — 36415 COLL VENOUS BLD VENIPUNCTURE: CPT

## 2022-01-12 PROCEDURE — 83036 HEMOGLOBIN GLYCOSYLATED A1C: CPT

## 2022-01-12 PROCEDURE — 81025 URINE PREGNANCY TEST: CPT | Performed by: OBSTETRICS & GYNECOLOGY

## 2022-01-12 PROCEDURE — 58300 INSERT INTRAUTERINE DEVICE: CPT | Performed by: OBSTETRICS & GYNECOLOGY

## 2022-01-12 NOTE — PROGRESS NOTES
Iud insertions    Date/Time: 1/12/2022 4:13 PM  Performed by: Amalia Merlos DO  Authorized by: Amalia Merlos DO   Universal Protocol:  Consent: Written consent obtained  Risks and benefits: risks, benefits and alternatives were discussed  Consent given by: patient  Patient understanding: patient states understanding of the procedure being performed  Patient consent: the patient's understanding of the procedure matches consent given  Procedure consent: procedure consent matches procedure scheduled  Patient identity confirmed: verbally with patient        Procedure:     Pelvic exam performed: yes      Negative GC/chlamydia test: yes      Negative urine pregnancy test: yes      Cervix cleaned and prepped: yes      Speculum placed in vagina: yes      Tenaculum applied to cervix:  Allis clamp  Uterus sounded: yes      Uterus sound depth (cm):  8 5    IUD inserted with no complications: yes      IUD type:  Mirena    Strings trimmed: yes ( 2 in exposed from cervical os)    Post-procedure:     Patient tolerated procedure well: yes      Patient will follow up after next period: yes    Comments:       Uncomplicated insertion follow-up in 6 weeks and p r n  Ignacio Aquino

## 2022-01-13 LAB
EST. AVERAGE GLUCOSE BLD GHB EST-MCNC: 137 MG/DL
HBA1C MFR BLD: 6.4 %

## 2022-02-22 ENCOUNTER — OFFICE VISIT (OUTPATIENT)
Dept: OBGYN CLINIC | Facility: CLINIC | Age: 32
End: 2022-02-22
Payer: COMMERCIAL

## 2022-02-22 VITALS
BODY MASS INDEX: 24.46 KG/M2 | HEIGHT: 65 IN | WEIGHT: 146.8 LBS | DIASTOLIC BLOOD PRESSURE: 80 MMHG | SYSTOLIC BLOOD PRESSURE: 116 MMHG

## 2022-02-22 DIAGNOSIS — Z30.431 IUD CHECK UP: Primary | ICD-10-CM

## 2022-02-22 DIAGNOSIS — E10.9 DIABETES MELLITUS TYPE 1, CONTROLLED, WITHOUT COMPLICATIONS (HCC): ICD-10-CM

## 2022-02-22 DIAGNOSIS — Z98.891 HISTORY OF VBAC: ICD-10-CM

## 2022-02-22 DIAGNOSIS — Z98.891 HISTORY OF C-SECTION: ICD-10-CM

## 2022-02-22 PROCEDURE — 3008F BODY MASS INDEX DOCD: CPT | Performed by: OBSTETRICS & GYNECOLOGY

## 2022-02-22 PROCEDURE — 99213 OFFICE O/P EST LOW 20 MIN: CPT | Performed by: OBSTETRICS & GYNECOLOGY

## 2022-02-22 NOTE — PROGRESS NOTES
Assessment/Plan:    Diagnoses and all orders for this visit:    IUD check up    Diabetes mellitus type 1, controlled, without complications ([facility] [address] )    History of     History of         Subjective: here for IUD checkup     Patient ID: Lucie Bhatt is a 36 y o  female  HPI    68-year-old female  2 para 2 recent successful  delivery 3 months ago  Patient had a Mirena IUD placed approximately 6 weeks ago  She is doing well with it  She did have some bleeding for about a month after was initially placed  Bleeding has since subsided  Not having any pain symptoms, no pain with intercourse no unusual discharge or irritation  Review of Systems    Unchanged    Objective: no acute distress  /80   Ht 5' 5" (1 651 m)   Wt 66 6 kg (146 lb 12 8 oz)   BMI 24 43 kg/m²      Physical Exam  Vitals reviewed  Exam conducted with a chaperone present  Constitutional:       Appearance: Normal appearance  She is normal weight  HENT:      Head: Normocephalic  Eyes:      Pupils: Pupils are equal, round, and reactive to light  Pulmonary:      Effort: Pulmonary effort is normal    Abdominal:      General: Abdomen is flat  Palpations: Abdomen is soft  Genitourinary:     General: Normal vulva  Comments: Normal external genitalia  Normal size uterus  Normal cervix IUD strings clearly seen from cervical os  No CMT  No pelvic masses    Normal vaginal discharge  Musculoskeletal:         General: Normal range of motion  Cervical back: Normal range of motion  Skin:     General: Skin is warm and dry  Neurological:      Mental Status: She is alert and oriented to person, place, and time  Psychiatric:         Mood and Affect: Mood normal          Behavior: Behavior normal          Thought Content:  Thought content normal          Judgment: Judgment normal         Please note    In addition to the time spent discussing the findings and results of today's visit and exam, I spent approximately 20 minutes of face-to-face time with the patient, greater than 50% of which was spent in counseling and coordination of care for this patient

## 2022-05-17 ENCOUNTER — OFFICE VISIT (OUTPATIENT)
Dept: URGENT CARE | Age: 32
End: 2022-05-17
Payer: COMMERCIAL

## 2022-05-17 VITALS
DIASTOLIC BLOOD PRESSURE: 87 MMHG | WEIGHT: 137.6 LBS | BODY MASS INDEX: 22.92 KG/M2 | SYSTOLIC BLOOD PRESSURE: 121 MMHG | TEMPERATURE: 97.1 F | HEART RATE: 80 BPM | OXYGEN SATURATION: 100 % | HEIGHT: 65 IN | RESPIRATION RATE: 20 BRPM

## 2022-05-17 DIAGNOSIS — H66.002 NON-RECURRENT ACUTE SUPPURATIVE OTITIS MEDIA OF LEFT EAR WITHOUT SPONTANEOUS RUPTURE OF TYMPANIC MEMBRANE: Primary | ICD-10-CM

## 2022-05-17 PROCEDURE — 99213 OFFICE O/P EST LOW 20 MIN: CPT

## 2022-05-17 RX ORDER — AMOXICILLIN 500 MG/1
500 CAPSULE ORAL EVERY 12 HOURS SCHEDULED
Qty: 20 CAPSULE | Refills: 0 | Status: SHIPPED | OUTPATIENT
Start: 2022-05-17 | End: 2022-05-27

## 2022-05-17 NOTE — PROGRESS NOTES
[address]        NAME: Pedro Luis Griffiths is a 36 y o  female  : 1990    MRN: [id number]  DATE: May 17, 2022  TIME: 6:46 PM    Assessment and Plan   Non-recurrent acute suppurative otitis media of left ear without spontaneous rupture of tympanic membrane [H66 002]  1  Non-recurrent acute suppurative otitis media of left ear without spontaneous rupture of tympanic membrane  amoxicillin (AMOXIL) 500 mg capsule     Patient states her left ear has been feeling clogged  States her family recently had a URI that travelled thru  Has minimal pain but has a pressure, clogged, muffled feeling  Denies fevers, denies other symptoms  Assessment noted left ear effusion and OM  Will order Amoxicillin  F/U with PCP as needed    Patient Instructions     Take Amoxicillin as ordered  Follow up with PCP as needed    Chief Complaint     Chief Complaint   Patient presents with    Earache     Bilateral ear pain, clogged and muffled  x3 days         History of Present Illness       Patient states her left ear has been feeling clogged  States her family recently had a URI that travelled thru  Has minimal pain but has a pressure, clogged, muffled feeling  Denies fevers, denies other symptoms  Review of Systems   Review of Systems   Constitutional: Negative for chills and fever  HENT: Positive for ear pain  Negative for congestion, postnasal drip, sinus pain and sore throat  Eyes: Negative for pain and itching  Respiratory: Negative for cough, shortness of breath and wheezing  Cardiovascular: Negative for chest pain and palpitations  Gastrointestinal: Negative for abdominal pain, constipation, diarrhea, nausea and vomiting  Genitourinary: Negative for difficulty urinating and hematuria  Musculoskeletal: Negative for arthralgias and myalgias  Skin: Negative for rash  Neurological: Negative for dizziness, light-headedness and headaches     Psychiatric/Behavioral: Negative for agitation and sleep disturbance  The patient is not nervous/anxious  Current Medications       Current Outpatient Medications:     acetone, urine, test strip, PRN AS NEEDED, Disp: , Rfl:     amoxicillin (AMOXIL) 500 mg capsule, Take 1 capsule (500 mg total) by mouth every 12 (twelve) hours for 10 days, Disp: 20 capsule, Rfl: 0    Blood Glucose Monitoring Suppl (FreeStyle Lite) DON, by Does not apply route, Disp: , Rfl:     Continuous Blood Gluc  (Dexcom G6 ) DON, USED TO MONITOR BLOOD SUGAR LEVELS DAILY, Disp: , Rfl:     Continuous Blood Gluc Sensor (Dexcom G6 Sensor) MISC, CHANGE EVERY 10 DAYS, Disp: , Rfl:     Continuous Blood Gluc Transmit (Dexcom G6 Transmitter) MISC, Transmitter change every 90 days  , Disp: 1 each, Rfl: 3    Droplet Pen Needles 32G X 4 MM MISC, inject UNDER THE SKIN 3 TIMES DAILY BEFORE MEALS, Disp: 100 each, Rfl: 1    FREESTYLE LITE test strip, Test up to 8 times a day and as instructed, Disp: 200 each, Rfl: 3    Injection Device for Insulin (InPen 100-Grey-Juan) DON, Use up to 3 times a day with Novolog cartridge , Disp: 2 each, Rfl: 0    Insulin Aspart (NovoLOG PenFill) 100 UNITS/ML cartridge for injection, Inject 4 Units under the skin 3 (three) times a day with meals To be titrated up to 50 units a day   To replace Novolog flexpen , Disp: 15 mL, Rfl: 0    insulin detemir (Levemir FlexTouch) 100 Units/mL injection pen, Inject 18 Units under the skin daily at bedtime, Disp: 15 mL, Rfl: 0    Insulin Pen Needle (BD PEN NEEDLE DANYELL U/F) 32G X 4 MM MISC, Use 4 a day and as directed , Disp: 100 each, Rfl: 3    Lancets MISC, by Does not apply route, Disp: , Rfl:     Cholecalciferol (Vitamin D-3) 25 MCG (1000 UT) CAPS, Take 1 capsule by mouth (Patient not taking: Reported on 5/17/2022), Disp: , Rfl:     Prenatal Vit-Fe Fumarate-FA (PRENATAL 1+1 PO), Take 1 tablet by mouth daily (Patient not taking: No sig reported), Disp: , Rfl:     Current Allergies     Allergies as of 2022    (No Known Allergies)            The following portions of the patient's history were reviewed and updated as appropriate: allergies, current medications, past family history, past medical history, past social history, past surgical history and problem list      Past Medical History:   Diagnosis Date    Abnormal Pap smear of cervix     Diabetes mellitus ([facility] [address] )     History of  delivery, antepartum 2021    HPV (human papilloma virus) infection     Microalbuminuria due to type 1 diabetes mellitus ([facility] [address] ) 2021    Varicella     vaccinated as child       Past Surgical History:   Procedure Laterality Date     SECTION      INNER EAR SURGERY Left     [place]  DELIVERY ONLY N/A 2019    Procedure:  SECTION (); Surgeon: Lavetta Nageotte, MD;  Location: [facility];  Service: Obstetrics    WISDOM TOOTH EXTRACTION         Family History   Problem Relation Age of Onset    No Known Problems Mother     No Known Problems Father     Lung cancer Maternal Grandfather          Medications have been verified  Objective   /87   Pulse 80   Temp (!) 97 1 °F (36 2 °C)   Resp 20   Ht 5' 5" (1 651 m)   Wt 62 4 kg (137 lb 9 6 oz)   LMP 2022   SpO2 100%   BMI 22 90 kg/m²   Patient's last menstrual period was 2022  Physical Exam     Physical Exam  Vitals reviewed  Constitutional:       General: She is not in acute distress  Appearance: Normal appearance  She is not ill-appearing  HENT:      Head: Normocephalic and atraumatic  Right Ear: Tympanic membrane, ear canal and external ear normal       Left Ear: Decreased hearing noted  A middle ear effusion is present  Tympanic membrane is erythematous  Nose: Nose normal    Eyes:      Extraocular Movements: Extraocular movements intact  Conjunctiva/sclera: Conjunctivae normal    Pulmonary:      Effort: Pulmonary effort is normal    Skin:     General: Skin is warm  Neurological:      General: No focal deficit present  Mental Status: She is alert     Psychiatric:         Mood and Affect: Mood normal          Behavior: Behavior normal          Judgment: Judgment normal

## 2022-05-21 ENCOUNTER — APPOINTMENT (OUTPATIENT)
Dept: LAB | Facility: HOSPITAL | Age: 32
End: 2022-05-21
Payer: COMMERCIAL

## 2022-05-21 DIAGNOSIS — E10.8 TYPE I DIABETES MELLITUS WITH COMPLICATION (HCC): ICD-10-CM

## 2022-05-21 DIAGNOSIS — E10.65 POOR CONTROL TYPE I DIABETES MELLITUS (HCC): ICD-10-CM

## 2022-05-21 LAB
ALBUMIN SERPL BCP-MCNC: 4.1 G/DL (ref 3.5–5)
ALP SERPL-CCNC: 79 U/L (ref 46–116)
ALT SERPL W P-5'-P-CCNC: 24 U/L (ref 12–78)
ANION GAP SERPL CALCULATED.3IONS-SCNC: 8 MMOL/L (ref 4–13)
AST SERPL W P-5'-P-CCNC: 13 U/L (ref 5–45)
BILIRUB SERPL-MCNC: 0.26 MG/DL (ref 0.2–1)
BUN SERPL-MCNC: 15 MG/DL (ref 5–25)
CALCIUM SERPL-MCNC: 9 MG/DL (ref 8.3–10.1)
CHLORIDE SERPL-SCNC: 105 MMOL/L (ref 100–108)
CHOLEST SERPL-MCNC: 121 MG/DL
CO2 SERPL-SCNC: 28 MMOL/L (ref 21–32)
CREAT SERPL-MCNC: 0.83 MG/DL (ref 0.6–1.3)
EST. AVERAGE GLUCOSE BLD GHB EST-MCNC: 137 MG/DL
GFR SERPL CREATININE-BSD FRML MDRD: 95 ML/MIN/1.73SQ M
GLUCOSE P FAST SERPL-MCNC: 93 MG/DL (ref 65–99)
HBA1C MFR BLD: 6.4 %
HDLC SERPL-MCNC: 48 MG/DL
LDLC SERPL CALC-MCNC: 59 MG/DL (ref 0–100)
NONHDLC SERPL-MCNC: 73 MG/DL
POTASSIUM SERPL-SCNC: 4.3 MMOL/L (ref 3.5–5.3)
PROT SERPL-MCNC: 7.5 G/DL (ref 6.4–8.2)
SODIUM SERPL-SCNC: 141 MMOL/L (ref 136–145)
TRIGL SERPL-MCNC: 68 MG/DL
TSH SERPL DL<=0.05 MIU/L-ACNC: 1.45 UIU/ML (ref 0.45–4.5)

## 2022-05-21 PROCEDURE — 84443 ASSAY THYROID STIM HORMONE: CPT

## 2022-05-21 PROCEDURE — 80053 COMPREHEN METABOLIC PANEL: CPT

## 2022-05-21 PROCEDURE — 83036 HEMOGLOBIN GLYCOSYLATED A1C: CPT

## 2022-05-21 PROCEDURE — 36415 COLL VENOUS BLD VENIPUNCTURE: CPT

## 2022-05-21 PROCEDURE — 80061 LIPID PANEL: CPT

## 2022-05-31 ENCOUNTER — OFFICE VISIT (OUTPATIENT)
Dept: OBGYN CLINIC | Facility: CLINIC | Age: 32
End: 2022-05-31
Payer: COMMERCIAL

## 2022-05-31 VITALS
SYSTOLIC BLOOD PRESSURE: 110 MMHG | DIASTOLIC BLOOD PRESSURE: 82 MMHG | WEIGHT: 142.8 LBS | BODY MASS INDEX: 23.79 KG/M2 | HEIGHT: 65 IN

## 2022-05-31 DIAGNOSIS — Z86.39 HISTORY OF DIABETES MELLITUS: ICD-10-CM

## 2022-05-31 DIAGNOSIS — R80.9 MICROALBUMINURIA DUE TO TYPE 1 DIABETES MELLITUS (HCC): ICD-10-CM

## 2022-05-31 DIAGNOSIS — E10.29 MICROALBUMINURIA DUE TO TYPE 1 DIABETES MELLITUS (HCC): ICD-10-CM

## 2022-05-31 DIAGNOSIS — Z98.891 HISTORY OF VBAC: ICD-10-CM

## 2022-05-31 DIAGNOSIS — N89.8 VAGINAL DISCHARGE: ICD-10-CM

## 2022-05-31 DIAGNOSIS — Z30.432 ENCOUNTER FOR IUD REMOVAL: Primary | ICD-10-CM

## 2022-05-31 DIAGNOSIS — B96.89 BV (BACTERIAL VAGINOSIS): ICD-10-CM

## 2022-05-31 DIAGNOSIS — N76.0 BV (BACTERIAL VAGINOSIS): ICD-10-CM

## 2022-05-31 DIAGNOSIS — Z98.891 HISTORY OF C-SECTION: ICD-10-CM

## 2022-05-31 PROCEDURE — 99213 OFFICE O/P EST LOW 20 MIN: CPT | Performed by: OBSTETRICS & GYNECOLOGY

## 2022-05-31 PROCEDURE — 58301 REMOVE INTRAUTERINE DEVICE: CPT | Performed by: OBSTETRICS & GYNECOLOGY

## 2022-05-31 RX ORDER — METRONIDAZOLE 500 MG/1
500 TABLET ORAL EVERY 12 HOURS SCHEDULED
Qty: 14 TABLET | Refills: 0 | Status: SHIPPED | OUTPATIENT
Start: 2022-05-31 | End: 2022-06-07

## 2022-05-31 NOTE — PROGRESS NOTES
Iud removal    Date/Time: 5/31/2022 6:33 PM  Performed by: Abdiel Castellano DO  Authorized by: Abdiel Castellano DO   [place] Protocol:  Consent: Written consent obtained    Risks and benefits: risks, benefits and alternatives were discussed  Consent given by: patient  Patient understanding: patient states understanding of the procedure being performed  Patient consent: the patient's understanding of the procedure matches consent given  Patient identity confirmed: verbally with patient      Procedure:     Removed with no complications: yes      Other reason for removal:   general discomfort

## 2022-05-31 NOTE — PROGRESS NOTES
Assessment/Plan:    Diagnoses and all orders for this visit:    Vaginal discharge    BV (bacterial vaginosis)  -     metroNIDAZOLE (FLAGYL) 500 mg tablet; Take 1 tablet (500 mg total) by mouth every 12 (twelve) hours for 7 days    History of     History of     Encounter for IUD removal    History of diabetes mellitus    Microalbuminuria due to type 1 diabetes mellitus ([facility] [address] )        Subjective: here for IUD removal, vaginal discharge     Patient ID: Vernell Cartwright is a 36 y o  female  HPI    62-year-old female  2 para 2 insulin-dependent diabetic, history of  x1, successful  delivery 6 months ago  Mirena IUD placed in January this past year  Patient complains of recurrent vaginal infections and discharged and today she also has a discharge with irritation  She would like the IUD removed  She declines any other contraception at this time we did review what is available including Depo-Provera and OCPs  Patient will be using natural family planning for now recommend follow-up in 3 months time or sooner if she changes her mind  Review of Systems    Unchanged from prior visit    Objective: no acute distress  /82   Ht 5' 5" (1 651 m)   Wt 64 8 kg (142 lb 12 8 oz)   LMP 2022   Breastfeeding No   BMI 23 76 kg/m²      Physical Exam  Vitals reviewed  Constitutional:       Appearance: Normal appearance  She is normal weight  HENT:      Head: Normocephalic  Eyes:      Pupils: Pupils are equal, round, and reactive to light  Pulmonary:      Effort: Pulmonary effort is normal    Abdominal:      General: Abdomen is flat  Palpations: Abdomen is soft  Genitourinary:     General: Normal vulva        Comments: Normal external genitalia  Normal size uterus  Normal cervix  IUD string present  No CMT  No pelvic masses   yellow vaginal discharge, wet mount positive for clue cells consistent with bacterial vaginosis   IUD was removed today see procedure note  Musculoskeletal:         General: Normal range of motion  Cervical back: Normal range of motion  Skin:     General: Skin is warm and dry  Neurological:      Mental Status: She is alert and oriented to person, place, and time  Psychiatric:         Mood and Affect: Mood normal          Behavior: Behavior normal          Thought Content: Thought content normal          Judgment: Judgment normal         Please note    In addition to the time spent discussing the findings and results of today's visit and exam, I spent approximately 20 minutes of face-to-face time with the patient, greater than 50% of which was spent in counseling and coordination of care for this patient

## 2022-06-08 DIAGNOSIS — N89.8 VAGINAL ITCHING: Primary | ICD-10-CM

## 2022-06-08 RX ORDER — CLOTRIMAZOLE AND BETAMETHASONE DIPROPIONATE 10; .64 MG/G; MG/G
CREAM TOPICAL 2 TIMES DAILY
Qty: 30 G | Refills: 0 | Status: SHIPPED | OUTPATIENT
Start: 2022-06-08

## 2022-06-21 LAB
LEFT EYE DIABETIC RETINOPATHY: NORMAL
RIGHT EYE DIABETIC RETINOPATHY: NORMAL

## 2022-07-20 ENCOUNTER — OFFICE VISIT (OUTPATIENT)
Dept: FAMILY MEDICINE CLINIC | Facility: CLINIC | Age: 32
End: 2022-07-20
Payer: COMMERCIAL

## 2022-07-20 ENCOUNTER — OFFICE VISIT (OUTPATIENT)
Dept: OBGYN CLINIC | Facility: CLINIC | Age: 32
End: 2022-07-20
Payer: COMMERCIAL

## 2022-07-20 VITALS
BODY MASS INDEX: 24.32 KG/M2 | DIASTOLIC BLOOD PRESSURE: 70 MMHG | SYSTOLIC BLOOD PRESSURE: 108 MMHG | HEIGHT: 65 IN | WEIGHT: 146 LBS

## 2022-07-20 VITALS
DIASTOLIC BLOOD PRESSURE: 60 MMHG | TEMPERATURE: 96.7 F | BODY MASS INDEX: 24.16 KG/M2 | HEIGHT: 65 IN | SYSTOLIC BLOOD PRESSURE: 110 MMHG | WEIGHT: 145 LBS | HEART RATE: 76 BPM

## 2022-07-20 DIAGNOSIS — N89.8 VAGINAL DISCHARGE: ICD-10-CM

## 2022-07-20 DIAGNOSIS — E10.9 DIABETES MELLITUS TYPE 1, CONTROLLED, WITHOUT COMPLICATIONS (HCC): ICD-10-CM

## 2022-07-20 DIAGNOSIS — B96.89 BV (BACTERIAL VAGINOSIS): ICD-10-CM

## 2022-07-20 DIAGNOSIS — Z98.891 HISTORY OF C-SECTION: ICD-10-CM

## 2022-07-20 DIAGNOSIS — Z98.891 HISTORY OF VBAC: ICD-10-CM

## 2022-07-20 DIAGNOSIS — N76.0 BV (BACTERIAL VAGINOSIS): ICD-10-CM

## 2022-07-20 DIAGNOSIS — H69.82 DYSFUNCTION OF LEFT EUSTACHIAN TUBE: ICD-10-CM

## 2022-07-20 DIAGNOSIS — H65.02 NON-RECURRENT ACUTE SEROUS OTITIS MEDIA OF LEFT EAR: Primary | ICD-10-CM

## 2022-07-20 PROCEDURE — 99213 OFFICE O/P EST LOW 20 MIN: CPT | Performed by: OBSTETRICS & GYNECOLOGY

## 2022-07-20 PROCEDURE — 99214 OFFICE O/P EST MOD 30 MIN: CPT | Performed by: FAMILY MEDICINE

## 2022-07-20 PROCEDURE — 3725F SCREEN DEPRESSION PERFORMED: CPT | Performed by: FAMILY MEDICINE

## 2022-07-20 RX ORDER — METRONIDAZOLE 500 MG/1
500 TABLET ORAL EVERY 12 HOURS SCHEDULED
Qty: 14 TABLET | Refills: 0 | Status: SHIPPED | OUTPATIENT
Start: 2022-07-20 | End: 2022-07-27

## 2022-07-20 RX ORDER — CEFUROXIME AXETIL 500 MG/1
500 TABLET ORAL EVERY 12 HOURS SCHEDULED
Qty: 14 TABLET | Refills: 0 | Status: SHIPPED | OUTPATIENT
Start: 2022-07-20 | End: 2022-07-27

## 2022-07-20 RX ORDER — FLUCONAZOLE 150 MG/1
150 TABLET ORAL ONCE
Qty: 2 TABLET | Refills: 0 | Status: SHIPPED | OUTPATIENT
Start: 2022-07-20 | End: 2022-07-20

## 2022-07-20 RX ORDER — FLUTICASONE PROPIONATE 50 MCG
2 SPRAY, SUSPENSION (ML) NASAL DAILY
Qty: 16 G | Refills: 0 | Status: SHIPPED | OUTPATIENT
Start: 2022-07-20 | End: 2022-07-27

## 2022-07-20 NOTE — PROGRESS NOTES
Assessment/Plan:    Diagnoses and all orders for this visit:    BV (bacterial vaginosis)  -     metroNIDAZOLE (FLAGYL) 500 mg tablet; Take 1 tablet (500 mg total) by mouth every 12 (twelve) hours for 7 days    Vaginal discharge  -     fluconazole (DIFLUCAN) 150 mg tablet; Take 1 tablet (150 mg total) by mouth once for 1 dose    Diabetes mellitus type 1, controlled, without complications (HCC)    History of     History of         Subjective:  Recurrent vaginal discharge     Patient ID: Eder Pickens is a 36 y o  female  HPI   53-year-old female  2 para 2 history of  section x1 and successful  delivery  Patient was seen several weeks ago with symptoms of bacterial vaginosis  Now has recurrent symptoms itching and discharge  Review of Systems  unchanged from previous visit    Objective:  No acute distress  /70 (Cuff Size: Standard)   Ht 5' 5" (1 651 m)   Wt 66 2 kg (146 lb)   LMP 2022 (Exact Date)   BMI 24 30 kg/m²      Physical Exam  Vitals reviewed  Exam conducted with a chaperone present  Constitutional:       Appearance: Normal appearance  She is normal weight  HENT:      Head: Normocephalic  Eyes:      Pupils: Pupils are equal, round, and reactive to light  Pulmonary:      Effort: Pulmonary effort is normal    Abdominal:      General: Abdomen is flat  Palpations: Abdomen is soft  Genitourinary:     General: Normal vulva  Comments: Normal external genitalia  Normal size uterus  Normal cervix  No CMT  No pelvic masses  Yellow white vaginal discharge  Wet mount positive for clue cells consistent with recurrent bacterial vaginosis  Musculoskeletal:         General: Normal range of motion  Cervical back: Normal range of motion  Skin:     General: Skin is warm and dry  Neurological:      Mental Status: She is alert and oriented to person, place, and time     Psychiatric:         Mood and Affect: Mood normal          Behavior: Behavior normal          Thought Content:  Thought content normal          Judgment: Judgment normal

## 2022-07-20 NOTE — PROGRESS NOTES
Assessment/Plan:   Recommend warm compresses the sinuses and help drain the station 2  Follow-up if not a lot better in 7-10 days  Diagnoses and all orders for this visit:    Non-recurrent acute serous otitis media of left ear  -     cefuroxime (CEFTIN) 500 mg tablet; Take 1 tablet (500 mg total) by mouth every 12 (twelve) hours for 7 days  -     fluticasone (FLONASE) 50 mcg/act nasal spray; 2 sprays into each nostril daily for 7 days    Dysfunction of left eustachian tube  -     cefuroxime (CEFTIN) 500 mg tablet; Take 1 tablet (500 mg total) by mouth every 12 (twelve) hours for 7 days  -     fluticasone (FLONASE) 50 mcg/act nasal spray; 2 sprays into each nostril daily for 7 days        Subjective:     Patient ID: Elizabeth Ireland is a 36 y o  female  She was treated at the care now May of this year for your infection  She went through course of amoxicillin  She now complains of the left ear feeling blocked with some occasional pain  Review of Systems   Constitutional: Negative  HENT: Positive for ear pain  Negative for postnasal drip, rhinorrhea, sinus pressure, sinus pain and sore throat  As noted in HPI  Respiratory: Negative  Cardiovascular: Negative  Objective:     Physical Exam  Vitals and nursing note reviewed  Constitutional:       Appearance: She is well-developed  HENT:      Head: Normocephalic and atraumatic  Right Ear: Tympanic membrane, ear canal and external ear normal  There is no impacted cerumen  Left Ear: There is no impacted cerumen  Ears:      Comments: Exam of the left ear shows cloudy TM  Mild erythematous throughout the canal some swelling  Nose: Nose normal       Mouth/Throat:      Mouth: Mucous membranes are moist       Pharynx: Oropharynx is clear  No oropharyngeal exudate or posterior oropharyngeal erythema  Eyes:      Pupils: Pupils are equal, round, and reactive to light  Neck:      Vascular: No JVD  Cardiovascular:      Rate and Rhythm: Normal rate  Pulmonary:      Effort: Pulmonary effort is normal    Abdominal:      Palpations: Abdomen is soft  Musculoskeletal:      Cervical back: Normal range of motion  Lymphadenopathy:      Cervical: No cervical adenopathy  Neurological:      Mental Status: She is alert and oriented to person, place, and time

## 2022-08-12 ENCOUNTER — OFFICE VISIT (OUTPATIENT)
Dept: FAMILY MEDICINE CLINIC | Facility: CLINIC | Age: 32
End: 2022-08-12
Payer: COMMERCIAL

## 2022-08-12 VITALS
OXYGEN SATURATION: 99 % | TEMPERATURE: 97.5 F | DIASTOLIC BLOOD PRESSURE: 78 MMHG | HEIGHT: 65 IN | HEART RATE: 82 BPM | RESPIRATION RATE: 16 BRPM | WEIGHT: 137 LBS | BODY MASS INDEX: 22.82 KG/M2 | SYSTOLIC BLOOD PRESSURE: 110 MMHG

## 2022-08-12 DIAGNOSIS — E10.9 DIABETES MELLITUS TYPE 1, CONTROLLED, WITHOUT COMPLICATIONS (HCC): Primary | ICD-10-CM

## 2022-08-12 DIAGNOSIS — Z00.00 ROUTINE ADULT HEALTH MAINTENANCE: ICD-10-CM

## 2022-08-12 LAB — SL AMB POCT HEMOGLOBIN AIC: 6.1 (ref ?–6.5)

## 2022-08-12 PROCEDURE — 3044F HG A1C LEVEL LT 7.0%: CPT | Performed by: FAMILY MEDICINE

## 2022-08-12 PROCEDURE — 83036 HEMOGLOBIN GLYCOSYLATED A1C: CPT | Performed by: FAMILY MEDICINE

## 2022-08-12 PROCEDURE — 99395 PREV VISIT EST AGE 18-39: CPT | Performed by: FAMILY MEDICINE

## 2022-08-12 PROCEDURE — 99213 OFFICE O/P EST LOW 20 MIN: CPT | Performed by: FAMILY MEDICINE

## 2022-08-12 PROCEDURE — 3725F SCREEN DEPRESSION PERFORMED: CPT | Performed by: FAMILY MEDICINE

## 2022-08-15 PROBLEM — Z00.00 ROUTINE ADULT HEALTH MAINTENANCE: Status: ACTIVE | Noted: 2022-08-15

## 2022-08-15 NOTE — PROGRESS NOTES
Assessment/Plan:    82-year-old woman with:  Type 1 diabetes stable continue current medications encouraged follow-up with specialty discussed supportive care return parameters follow-up in 6 month    No problem-specific Assessment & Plan notes found for this encounter  Diagnoses and all orders for this visit:    Diabetes mellitus type 1, controlled, without complications ([facility] [address] )  -     Ambulatory Referral to Ophthalmology; Future  -     POCT hemoglobin A1c          Subjective:     Chief Complaint   Patient presents with    Well Check     Annual physical and A1C, DM foot exam          Patient ID: Ramiro Gee is a 29 y o  female  Patient is a 82-year-old woman who presents for follow-up on type 1 diabetes she admits being stable medications denies acute complaints at this time no fevers chills nausea vomiting no complaints at this time      The following portions of the patient's history were reviewed and updated as appropriate: allergies, current medications, past family history, past medical history, past social history, past surgical history and problem list     Review of Systems   Constitutional: Negative  HENT: Negative  Eyes: Negative  Respiratory: Negative  Cardiovascular: Negative  Gastrointestinal: Negative  Endocrine: Negative  Genitourinary: Negative  Musculoskeletal: Negative  Allergic/Immunologic: Negative  Neurological: Negative  Hematological: Negative  Psychiatric/Behavioral: Negative  All other systems reviewed and are negative  Objective:      /78 (BP Location: Right arm, Patient Position: Sitting, Cuff Size: Adult)   Pulse 82   Temp 97 5 °F (36 4 °C) (Tympanic)   Resp 16   Ht 5' 5" (1 651 m)   Wt 62 1 kg (137 lb)   SpO2 99%   BMI 22 80 kg/m²          Physical Exam  Constitutional:       Appearance: She is well-developed  HENT:      Head: Atraumatic        Right Ear: External ear normal       Left Ear: External ear normal    Eyes:      Conjunctiva/sclera: Conjunctivae normal       Pupils: Pupils are equal, round, and reactive to light  Cardiovascular:      Rate and Rhythm: Normal rate and regular rhythm  Heart sounds: Normal heart sounds  Pulmonary:      Effort: Pulmonary effort is normal  No respiratory distress  Breath sounds: Normal breath sounds  Abdominal:      General: There is no distension  Palpations: Abdomen is soft  Tenderness: There is no abdominal tenderness  There is no guarding or rebound  Musculoskeletal:         General: Normal range of motion  Cervical back: Normal range of motion  Skin:     General: Skin is warm and dry  Neurological:      Mental Status: She is alert and oriented to person, place, and time  Cranial Nerves: No cranial nerve deficit  Psychiatric:         Behavior: Behavior normal          Thought Content: Thought content normal          Judgment: Judgment normal            Depression Screening and Follow-up Plan: Patient was screened for depression during today's encounter  They screened negative with a PHQ-2 score of 0

## 2022-08-15 NOTE — PROGRESS NOTES
Assessment/Plan:    82-year-old woman with: Annual well visit discussed various safety and health maintenance issues including healthy diet like the Mediterranean diet exercise healthy weight as tolerated ample sleep stress reduction strategies discussed supportive care return parameters otherwise follow-up    No problem-specific Assessment & Plan notes found for this encounter  Diagnoses and all orders for this visit:    Diabetes mellitus type 1, controlled, without complications ([facility] [address] )  -     Ambulatory Referral to Ophthalmology; Future  -     POCT hemoglobin A1c    Routine adult health maintenance          Subjective:     Chief Complaint   Patient presents with    Well Check     Annual physical and A1C, DM foot exam          Patient ID: Brock Ordonez is a 29 y o  female  Patient is a 82-year-old woman who presents for an annual well visit she admits being physically active generally eats healthy diet she sleeps well no other health maintenance issues      The following portions of the patient's history were reviewed and updated as appropriate: allergies, current medications, past family history, past medical history, past social history, past surgical history and problem list     Review of Systems   Constitutional: Negative  HENT: Negative  Eyes: Negative  Respiratory: Negative  Cardiovascular: Negative  Gastrointestinal: Negative  Endocrine: Negative  Genitourinary: Negative  Musculoskeletal: Negative  Allergic/Immunologic: Negative  Neurological: Negative  Hematological: Negative  Psychiatric/Behavioral: Negative  All other systems reviewed and are negative          Objective:      /78 (BP Location: Right arm, Patient Position: Sitting, Cuff Size: Adult)   Pulse 82   Temp 97 5 °F (36 4 °C) (Tympanic)   Resp 16   Ht 5' 5" (1 651 m)   Wt 62 1 kg (137 lb)   SpO2 99%   BMI 22 80 kg/m²          Physical Exam  Constitutional:       Appearance: She is well-developed  HENT:      Head: Atraumatic  Right Ear: External ear normal       Left Ear: External ear normal    Eyes:      Conjunctiva/sclera: Conjunctivae normal       Pupils: Pupils are equal, round, and reactive to light  Cardiovascular:      Rate and Rhythm: Normal rate and regular rhythm  Heart sounds: Normal heart sounds  Pulmonary:      Effort: Pulmonary effort is normal  No respiratory distress  Breath sounds: Normal breath sounds  Abdominal:      General: Bowel sounds are normal  There is no distension  Palpations: Abdomen is soft  Tenderness: There is no abdominal tenderness  There is no guarding or rebound  Musculoskeletal:         General: Normal range of motion  Cervical back: Normal range of motion  Skin:     General: Skin is warm and dry  Neurological:      Mental Status: She is alert and oriented to person, place, and time  Cranial Nerves: No cranial nerve deficit  Psychiatric:         Behavior: Behavior normal          Thought Content:  Thought content normal          Judgment: Judgment normal

## 2022-10-14 PROBLEM — Z00.00 ROUTINE ADULT HEALTH MAINTENANCE: Status: RESOLVED | Noted: 2022-08-15 | Resolved: 2022-10-14

## 2022-10-21 ENCOUNTER — DOCUMENTATION (OUTPATIENT)
Dept: FAMILY MEDICINE CLINIC | Facility: CLINIC | Age: 32
End: 2022-10-21

## 2022-10-21 ENCOUNTER — OFFICE VISIT (OUTPATIENT)
Dept: FAMILY MEDICINE CLINIC | Facility: CLINIC | Age: 32
End: 2022-10-21
Payer: COMMERCIAL

## 2022-10-21 VITALS
HEART RATE: 73 BPM | WEIGHT: 144.6 LBS | DIASTOLIC BLOOD PRESSURE: 68 MMHG | SYSTOLIC BLOOD PRESSURE: 118 MMHG | TEMPERATURE: 98.8 F | OXYGEN SATURATION: 98 % | HEIGHT: 65 IN | BODY MASS INDEX: 24.09 KG/M2

## 2022-10-21 DIAGNOSIS — H66.90 ACUTE OTITIS MEDIA, UNSPECIFIED OTITIS MEDIA TYPE: Primary | ICD-10-CM

## 2022-10-21 PROCEDURE — 99213 OFFICE O/P EST LOW 20 MIN: CPT | Performed by: FAMILY MEDICINE

## 2022-10-21 RX ORDER — AMOXICILLIN AND CLAVULANATE POTASSIUM 875; 125 MG/1; MG/1
1 TABLET, FILM COATED ORAL EVERY 12 HOURS SCHEDULED
Qty: 14 TABLET | Refills: 0 | Status: SHIPPED | OUTPATIENT
Start: 2022-10-21 | End: 2022-10-28

## 2022-10-24 ENCOUNTER — TELEPHONE (OUTPATIENT)
Dept: ADMINISTRATIVE | Facility: OTHER | Age: 32
End: 2022-10-24

## 2022-10-24 NOTE — LETTER
Diabetic Eye Exam Form    Date Requested: 10/24/22  Patient: Juan Hairston  Patient : 1990   Referring Provider: Den Drake MD    DIABETIC Eye Exam Date _______________________________    Type of Exam MUST be documented for Diabetic Eye Exams  Please CHECK ONE  Retinal Exam       Dilated Retinal Exam       OCT       Optomap-Iris Exam      Fundus Photography     Left Eye - Please check Retinopathy AND Type or No Retinopathy      Exam did show retinopathy    Exam did not show retinopathy         Mild     Proliferative           Moderate    Severe            None         Right Eye - Please check Retinopathy AND Type or No Retinopathy     Exam did show retinopathy    Exam did not show retinopathy         Mild     Proliferative        Moderate    Severe        None       Comments __________________________________________________________    Practice Providing Exam ______________________________________________    Exam Performed By (print name) _______________________________________      Provider Signature ___________________________________________________    These reports are needed for  compliance  Please fax this completed form and a copy of the Diabetic Eye Exam report to our office located at [place] [address] as soon as possible via [phone] [facility] [place]: Phone [phone]  We thank you for your assistance in treating our mutual patient

## 2022-10-24 NOTE — TELEPHONE ENCOUNTER
----- Message from Taniya Soriano sent at 10/21/2022  3:32 PM EDT -----  Regarding: dm eye  10/21/22 3:32 PM    Hello, our patient Alex Zaragoza has had Diabetic Eye Exam completed/performed  Please assist in updating the patient chart by making an External outreach to Dr Douglas Manzanares facility located [address], [address]  Patient states she was seen at the beginning of the summer 2022 unaware of the exact date      Thank you,  Edda Wheeler  [facility]

## 2022-10-24 NOTE — PROGRESS NOTES
Assessment/Plan:    16-year-old woman with: Acute otitis media will give script for Augmentin encouraged continued use of Flonase advised if not improving worsening we can refer to ENT discussed supportive care return parameters otherwise    No problem-specific Assessment & Plan notes found for this encounter  Diagnoses and all orders for this visit:    Acute otitis media, unspecified otitis media type  -     amoxicillin-clavulanate (Augmentin) 875-125 mg per tablet; Take 1 tablet by mouth every 12 (twelve) hours for 7 days          Subjective:     Chief Complaint   Patient presents with   • Earache     Mild ear ache and left ear clogged         Patient ID: Caty Renee is a 29 y o  female  Patient is a 16-year-old woman who presents complaining of left ear pain that is not improved over the past week or so no fevers chills nausea vomiting tolerating p o  intake no other complaints at this time    Earache         The following portions of the patient's history were reviewed and updated as appropriate: allergies, current medications, past family history, past medical history, past social history, past surgical history and problem list     Review of Systems   Constitutional: Negative  HENT: Positive for ear pain  Eyes: Positive for pain  Respiratory: Negative  Cardiovascular: Negative  Gastrointestinal: Negative  Endocrine: Negative  Genitourinary: Negative  Musculoskeletal: Negative  Allergic/Immunologic: Negative  Neurological: Negative  Hematological: Negative  Psychiatric/Behavioral: Negative  All other systems reviewed and are negative  Objective:      /68 (BP Location: Left arm, Patient Position: Sitting, Cuff Size: Standard)   Pulse 73   Temp 98 8 °F (37 1 °C) (Temporal)   Ht 5' 5" (1 651 m)   Wt 65 6 kg (144 lb 9 6 oz)   SpO2 98%   BMI 24 06 kg/m²          Physical Exam  Constitutional:       Appearance: She is well-developed     HENT: Head: Atraumatic  Right Ear: External ear normal       Left Ear: External ear normal    Eyes:      Conjunctiva/sclera: Conjunctivae normal       Pupils: Pupils are equal, round, and reactive to light  Cardiovascular:      Rate and Rhythm: Normal rate and regular rhythm  Heart sounds: Normal heart sounds  Pulmonary:      Effort: Pulmonary effort is normal  No respiratory distress  Breath sounds: Normal breath sounds  Abdominal:      General: Bowel sounds are normal  There is no distension  Palpations: Abdomen is soft  Tenderness: There is no abdominal tenderness  There is no guarding or rebound  Musculoskeletal:         General: Normal range of motion  Cervical back: Normal range of motion  Skin:     General: Skin is warm and dry  Neurological:      Mental Status: She is alert and oriented to person, place, and time  Cranial Nerves: No cranial nerve deficit  Psychiatric:         Behavior: Behavior normal          Thought Content:  Thought content normal          Judgment: Judgment normal

## 2022-10-24 NOTE — TELEPHONE ENCOUNTER
Upon review of the In Basket request and the patient's chart, initial outreach has been made via fax to facility  , please see Contacts section for details       Thank you  Feng Davis

## 2022-10-26 NOTE — TELEPHONE ENCOUNTER
Upon review of the In Basket request we were able to locate, review, and update the patient chart as requested for Diabetic Eye Exam     Any additional questions or concerns should be emailed to the Practice Liaisons via the appropriate education email address, please do not reply via In Basket      Thank you  Jackie Long

## 2022-11-08 ENCOUNTER — TELEPHONE (OUTPATIENT)
Dept: OTHER | Facility: HOSPITAL | Age: 32
End: 2022-11-08

## 2022-11-08 DIAGNOSIS — H71.92 CHOLESTEATOMA OF LEFT MIDDLE EAR: Primary | ICD-10-CM

## 2022-11-09 ENCOUNTER — HOSPITAL ENCOUNTER (EMERGENCY)
Facility: HOSPITAL | Age: 32
Discharge: HOME | End: 2022-11-09
Attending: EMERGENCY MEDICINE

## 2022-11-09 ENCOUNTER — APPOINTMENT (EMERGENCY)
Dept: CT IMAGING | Facility: HOSPITAL | Age: 32
End: 2022-11-09

## 2022-11-09 VITALS
TEMPERATURE: 98.2 F | HEART RATE: 79 BPM | DIASTOLIC BLOOD PRESSURE: 91 MMHG | RESPIRATION RATE: 18 BRPM | SYSTOLIC BLOOD PRESSURE: 135 MMHG | OXYGEN SATURATION: 98 %

## 2022-11-09 DIAGNOSIS — H92.02 LEFT EAR PAIN: Primary | ICD-10-CM

## 2022-11-10 NOTE — ED PROVIDER NOTES
History  Chief Complaint   Patient presents with   • Earache     Patient arrives to the ER from home with complaints of left ear pain  Radiates into left side of head  GCS 15     • Sore Throat     HPI     29-year-old female presents emergency department for evaluation of left ear pain that radiates left-sided her head  Denies any falls or trauma  Follows with ENT, had examination which was reportedly normal   She continued to have pain, called the office, outpatient CT scan was ordered  Patient had continued severe pain in the left ear 2nd emergency department for further evaluation  Prior to Admission Medications   Prescriptions Last Dose Informant Patient Reported? Taking? Blood Glucose Monitoring Suppl (FreeStyle Lite) DON  Self Yes No   Sig: by Does not apply route   Cholecalciferol (Vitamin D-3) 25 MCG (1000 UT) CAPS   Yes No   Sig: Take 1 capsule by mouth   Patient not taking: No sig reported   Continuous Blood Gluc  (Dexcom G6 ) DON  Self Yes No   Sig: USED TO MONITOR BLOOD SUGAR LEVELS DAILY   Continuous Blood Gluc Sensor (Dexcom G6 Sensor) MISC  Self Yes No   Sig: CHANGE EVERY 10 DAYS   Continuous Blood Gluc Transmit (Dexcom G6 Transmitter) MISC  Self No No   Sig: Transmitter change every 90 days  Droplet Pen Needles 32G X 4 MM MISC   No No   Sig: inject UNDER THE SKIN 3 TIMES DAILY BEFORE MEALS   FREESTYLE LITE test strip  Self No No   Sig: Test up to 8 times a day and as instructed   Injection Device for Insulin (InPen 100-Grey-Juan) DON   No No   Sig: Use up to 3 times a day with Novolog cartridge  Insulin Aspart (NovoLOG PenFill) 100 UNITS/ML cartridge for injection   No No   Sig: Inject 4 Units under the skin 3 (three) times a day with meals To be titrated up to 50 units a day  To replace Novolog flexpen  Insulin Pen Needle (BD PEN NEEDLE DANYELL U/F) 32G X 4 MM MISC  Self No No   Sig: Use 4 a day and as directed     Lancets MISC  Self Yes No   Sig: by Does not apply route Prenatal Vit-Fe Fumarate-FA (PRENATAL 1+1 PO)   Yes No   Sig: Take 1 tablet by mouth daily   Patient not taking: No sig reported   acetone, urine, test strip   Yes No   Sig: PRN AS NEEDED   Patient not taking: No sig reported   clotrimazole-betamethasone (LOTRISONE) 1-0 05 % cream   No No   Sig: Apply topically 2 (two) times a day   Patient not taking: No sig reported   fluticasone (FLONASE) 50 mcg/act nasal spray   No No   Si sprays into each nostril daily for 7 days   insulin detemir (Levemir FlexTouch) 100 Units/mL injection pen   No No   Sig: Inject 18 Units under the skin daily at bedtime      Facility-Administered Medications: None       Past Medical History:   Diagnosis Date   • Abnormal Pap smear of cervix    • Anxiety    • Diabetes mellitus (HCC)    • History of  delivery, antepartum 2021   • HPV (human papilloma virus) infection    • Microalbuminuria due to type 1 diabetes mellitus ([facility] [address] ) 2021   • Varicella     vaccinated as child       Past Surgical History:   Procedure Laterality Date   •  SECTION     • INNER EAR SURGERY Left    • [place]  DELIVERY ONLY N/A 2019    Procedure:  SECTION (); Surgeon: Mohsen Painting MD;  [facility]: [facility];  Service: Obstetrics   • WISDOM TOOTH EXTRACTION         Family History   Problem Relation Age of Onset   • No Known Problems Mother    • No Known Problems Father    • Lung cancer Maternal Grandfather      I have reviewed and agree with the history as documented  E-Cigarette/Vaping   • E-Cigarette Use Never User      E-Cigarette/Vaping Substances   • Nicotine No    • THC No    • CBD No    • Flavoring No    • Other No    • Unknown No      Social History     Tobacco Use   • Smoking status: Never Smoker   • Smokeless tobacco: Never Used   Vaping Use   • Vaping Use: Never used   Substance Use Topics   • Alcohol use: Yes     Comment: occ     • Drug use: No       Review of Systems   HENT: Positive for ear pain and sore throat  All other systems reviewed and are negative  Physical Exam  Physical Exam  Vitals and nursing note reviewed  Constitutional:       Appearance: She is well-developed  HENT:      Head: Normocephalic and atraumatic  Right Ear: Tympanic membrane normal  No tenderness  Left Ear: No tenderness  Ears:      Comments: No tenderness on manipulation of external ear     Mouth/Throat:      Pharynx: Uvula midline  Tonsils: No tonsillar exudate or tonsillar abscesses  Eyes:      Extraocular Movements:      Left eye: Normal extraocular motion  Conjunctiva/sclera: Conjunctivae normal       Pupils: Pupils are equal, round, and reactive to light  Cardiovascular:      Rate and Rhythm: Normal rate  Pulmonary:      Effort: Pulmonary effort is normal    Abdominal:      General: There is no distension  Musculoskeletal:      Cervical back: Normal range of motion  Neurological:      General: No focal deficit present  Mental Status: She is alert and oriented to person, place, and time     Psychiatric:         Mood and Affect: Mood normal          Behavior: Behavior normal          Vital Signs  ED Triage Vitals   Temperature Pulse Respirations Blood Pressure SpO2   11/09/22 1753 11/09/22 1750 11/09/22 1750 11/09/22 1750 11/09/22 1750   98 2 °F (36 8 °C) 79 18 135/91 98 %      Temp src Heart Rate Source Patient Position - Orthostatic VS BP Location FiO2 (%)   -- -- -- -- --             Pain Score       --                  Vitals:    11/09/22 1750 11/09/22 1800   BP: 135/91 135/91   Pulse: 79          Visual Acuity  Visual Acuity    Flowsheet Row Most Recent Value   L Pupil Size (mm) 3   R Pupil Size (mm) 3          ED Medications  Medications - No data to display    Diagnostic Studies  Results Reviewed     None                 CT orbits/temporal bones/skull base wo contrast   Final Result by Cesar Kemp DO (11/09 1923)      Postoperative change on the left with previous mastoidectomy  Normal aeration of the mastoidectomy cavity  No opacification to suggest residual or recurrent cholesteatoma  Workstation performed: [id number]                    Procedures  Procedures         ED Course                               SBIRT 22yo+    Flowsheet Row Most Recent Value   SBIRT (25 yo +)    In order to provide better care to our patients, we are screening all of our patients for alcohol and drug use  Would it be okay to ask you these screening questions? Unable to answer at this time Filed at: 11/09/2022 0146                    [facility]  Number of Diagnoses or Management Options  Left ear pain: new and requires workup  Diagnosis management comments: CT scan obtained more expeditiously due to escalating pain  CT scan with no acute emergent pathology identified  External ear examination unremarkable  No rash noted  Additional HEENT exam within normal limits  Recommended outpatient follow with her ENT due to escalation of pain despite negative workup thus far  Amount and/or Complexity of Data Reviewed  Tests in the radiology section of CPT®: reviewed and ordered  Review and summarize past medical records: yes    Risk of Complications, Morbidity, and/or Mortality  Presenting problems: moderate  Diagnostic procedures: moderate  Management options: moderate    Patient Progress  Patient progress: stable      Disposition  Final diagnoses:   Left ear pain     Time reflects when diagnosis was documented in both MDM as applicable and the Disposition within this note     Time User Action Codes Description Comment    11/9/2022  7:35 PM Kamryn Lora Add [H92 02] Left ear pain       ED Disposition     ED Disposition   Discharge    Condition   Stable    Date/Time   Wed Nov 9, 2022  7:35 PM    Monica Rodrigez discharge to home/self care                 Follow-up Information     Follow up With Specialties Details Why Contact Info Additional Information Scotty Shelton MD Family Medicine Go to  As needed [address]       Your ENT doctor  Call in 1 day Follow-up for continued pain  [facility] Emergency Medicine Go to  As needed [address] [facility] [address] [facility], [address], [place], [place], [address]          Discharge Medication List as of 11/9/2022  7:36 PM      CONTINUE these medications which have NOT CHANGED    Details   acetone, urine, test strip PRN AS NEEDED, Historical Med      Blood Glucose Monitoring Suppl (FreeStyle Lite) DON by Does not apply route, Historical Med      Cholecalciferol (Vitamin D-3) 25 MCG (1000 UT) CAPS Take 1 capsule by mouth, Historical Med      clotrimazole-betamethasone (LOTRISONE) 1-0 05 % cream Apply topically 2 (two) times a day, Starting Wed 6/8/2022, Normal      Continuous Blood Gluc  (Dexcom G6 ) DON USED TO MONITOR BLOOD SUGAR LEVELS DAILY, Historical Med      Continuous Blood Gluc Sensor (Dexcom G6 Sensor) MISC CHANGE EVERY 10 DAYS, Historical Med      Continuous Blood Gluc Transmit (Dexcom G6 Transmitter) MISC Transmitter change every 90 days  , Normal      !! Droplet Pen Needles 32G X 4 MM MISC inject UNDER THE SKIN 3 TIMES DAILY BEFORE MEALS, Normal      fluticasone (FLONASE) 50 mcg/act nasal spray 2 sprays into each nostril daily for 7 days, Starting Wed 7/20/2022, Until Wed 7/27/2022, Normal      FREESTYLE LITE test strip Test up to 8 times a day and as instructed, Normal      Injection Device for Insulin (InPen 100-Grey-Juan) DON Use up to 3 times a day with Novolog cartridge , Normal      Insulin Aspart (NovoLOG PenFill) 100 UNITS/ML cartridge for injection Inject 4 Units under the skin 3 (three) times a day with meals To be titrated up to 50 units a day   To replace Novolog flexpen , Starting Sat 11/20/2021, Normal insulin detemir (Levemir FlexTouch) 100 Units/mL injection pen Inject 18 Units under the skin daily at bedtime, Starting Sat 11/20/2021, No Print      !! Insulin Pen Needle (BD PEN NEEDLE DANYELL U/F) 32G X 4 MM MISC Use 4 a day and as directed , Normal      Lancets MISC by Does not apply route, Historical Med      Prenatal Vit-Fe Fumarate-FA (PRENATAL 1+1 PO) Take 1 tablet by mouth daily, Historical Med       !! - Potential duplicate medications found  Please discuss with provider  No discharge procedures on file      PDMP Review     None          ED Provider  Electronically Signed by           Rosina Amin MD  11/09/22 3798

## 2022-11-19 ENCOUNTER — APPOINTMENT (OUTPATIENT)
Dept: LAB | Facility: HOSPITAL | Age: 32
End: 2022-11-19

## 2022-11-19 DIAGNOSIS — E10.9 CONTROLLED DIABETES MELLITUS TYPE 1 WITHOUT COMPLICATIONS (HCC): ICD-10-CM

## 2022-11-19 LAB
BASOPHILS # BLD AUTO: 0.05 THOUSANDS/ÂΜL (ref 0–0.1)
BASOPHILS NFR BLD AUTO: 1 % (ref 0–1)
EOSINOPHIL # BLD AUTO: 0.09 THOUSAND/ÂΜL (ref 0–0.61)
EOSINOPHIL NFR BLD AUTO: 1 % (ref 0–6)
ERYTHROCYTE [DISTWIDTH] IN BLOOD BY AUTOMATED COUNT: 11.7 % (ref 11.6–15.1)
HCT VFR BLD AUTO: 41.6 % (ref 34.8–46.1)
HGB BLD-MCNC: 14.3 G/DL (ref 11.5–15.4)
IMM GRANULOCYTES # BLD AUTO: 0.01 THOUSAND/UL (ref 0–0.2)
IMM GRANULOCYTES NFR BLD AUTO: 0 % (ref 0–2)
LYMPHOCYTES # BLD AUTO: 2.01 THOUSANDS/ÂΜL (ref 0.6–4.47)
LYMPHOCYTES NFR BLD AUTO: 32 % (ref 14–44)
MCH RBC QN AUTO: 31.6 PG (ref 26.8–34.3)
MCHC RBC AUTO-ENTMCNC: 34.4 G/DL (ref 31.4–37.4)
MCV RBC AUTO: 92 FL (ref 82–98)
MONOCYTES # BLD AUTO: 0.52 THOUSAND/ÂΜL (ref 0.17–1.22)
MONOCYTES NFR BLD AUTO: 8 % (ref 4–12)
NEUTROPHILS # BLD AUTO: 3.55 THOUSANDS/ÂΜL (ref 1.85–7.62)
NEUTS SEG NFR BLD AUTO: 58 % (ref 43–75)
NRBC BLD AUTO-RTO: 0 /100 WBCS
PLATELET # BLD AUTO: 162 THOUSANDS/UL (ref 149–390)
PMV BLD AUTO: 12.5 FL (ref 8.9–12.7)
RBC # BLD AUTO: 4.53 MILLION/UL (ref 3.81–5.12)
WBC # BLD AUTO: 6.23 THOUSAND/UL (ref 4.31–10.16)

## 2022-12-28 ENCOUNTER — ANNUAL EXAM (OUTPATIENT)
Dept: GYNECOLOGY | Facility: CLINIC | Age: 32
End: 2022-12-28

## 2022-12-28 VITALS
BODY MASS INDEX: 23.53 KG/M2 | DIASTOLIC BLOOD PRESSURE: 74 MMHG | HEIGHT: 65 IN | SYSTOLIC BLOOD PRESSURE: 110 MMHG | WEIGHT: 141.2 LBS

## 2022-12-28 DIAGNOSIS — Z01.419 WOMEN'S ANNUAL ROUTINE GYNECOLOGICAL EXAMINATION: ICD-10-CM

## 2022-12-28 DIAGNOSIS — Z98.891 HISTORY OF C-SECTION: ICD-10-CM

## 2022-12-28 DIAGNOSIS — Z11.3 SCREENING FOR STD (SEXUALLY TRANSMITTED DISEASE): ICD-10-CM

## 2022-12-28 DIAGNOSIS — N91.1 SECONDARY AMENORRHEA: ICD-10-CM

## 2022-12-28 DIAGNOSIS — E10.9 DIABETES MELLITUS TYPE 1, CONTROLLED, WITHOUT COMPLICATIONS (HCC): ICD-10-CM

## 2022-12-28 DIAGNOSIS — Z32.01 PREGNANCY TEST POSITIVE: ICD-10-CM

## 2022-12-28 DIAGNOSIS — Z98.891 HISTORY OF VBAC: ICD-10-CM

## 2022-12-28 NOTE — PROGRESS NOTES
Assessment     Annual well-woman exam    Pregnancy test positive    History of  x1    History of     Diabetes type 1 well controlled without complication    Screening for STDs, endocervical cultures for GC and chlamydia obtained        Plan     Refer for obstetrical care, high-risk OB, history noted above    Screening laboratory studies ordered    Pap smear deferred, normal in 2020   All questions answered  Subjective  Here for annual exam     Zafar Jung is a 29 y o  female who presents for annual exam  Patient reports positive home pregnancy test FD LMP was 2022  Menstrual cycles previously were regular once a month  She did have an IUD in place last year for about 5 months time, she did not like this  She states after this pregnancy has completed her  is considering vasectomy  Patient not having any pelvic pain symptoms denies any vaginal bleeding or breast tenderness  Referral to obstetrical care pending  The patient reports that there is not domestic violence in her life  Current contraception: none  History of abnormal Pap smear: no  Family history of uterine or ovarian cancer: no  Regular self breast exam: yes  History of abnormal mammogram: no  Family history of breast cancer: no  History of abnormal lipids: no  Menstrual History:  OB History        5    Para   2    Term   2            AB   2    Living   2       SAB   1    IAB   1    Ectopic        Multiple   0    Live Births   2           Obstetric Comments   : about 1            Menarche age: 17  Patient's last menstrual period was 2022 (exact date)  Review of Systems  Pertinent items are noted in HPI        Objective   No acute distress   /74 (BP Location: Right arm, Patient Position: Sitting, Cuff Size: Standard)   Ht 5' 5" (1 651 m)   Wt 64 kg (141 lb 3 2 oz)   LMP 2022 (Exact Date)   Breastfeeding No   BMI 23 50 kg/m²     General:   alert and oriented, in no acute distress, alert, appears stated age and cooperative   Heart: regular rate and rhythm, S1, S2 normal, no murmur, click, rub or gallop   Lungs: clear to auscultation bilaterally   Abdomen: soft, non-tender, without masses or organomegaly   Vulva: normal, Bartholin's, Urethra, [place]'s normal, female escutcheon   Vagina: normal mucosa, normal discharge, no palpable nodules   Cervix: anteverted, multiparous appearance, no cervical motion tenderness and no lesions  Endocervical cultures obtained for GC and chlamydia   Uterus: normal size, anteverted, mobile, non-tender, normal shape and consistency   Adnexa: normal adnexa and no mass, fullness, tenderness   Bilateral breast exam in the sitting and supine position with chaperone present, no visible or palpable breast lesions identified  No breast masses noted  No supraclavicular or axillary lymphadenopathy noted  No nipple discharge  Reviewed self-breast exam techniques     Rectal exam,  deferred

## 2022-12-29 LAB
C TRACH DNA SPEC QL NAA+PROBE: NEGATIVE
N GONORRHOEA DNA SPEC QL NAA+PROBE: NEGATIVE

## 2023-01-03 ENCOUNTER — TELEPHONE (OUTPATIENT)
Dept: OBGYN CLINIC | Facility: MEDICAL CENTER | Age: 33
End: 2023-01-03

## 2023-01-03 DIAGNOSIS — N92.6 MISSED MENSES: Primary | ICD-10-CM

## 2023-01-08 ENCOUNTER — APPOINTMENT (OUTPATIENT)
Dept: LAB | Facility: HOSPITAL | Age: 33
End: 2023-01-08

## 2023-01-08 DIAGNOSIS — N92.6 MISSED MENSES: ICD-10-CM

## 2023-01-08 LAB
B-HCG SERPL-ACNC: ABNORMAL MIU/ML (ref 0–11.6)
PROGEST SERPL-MCNC: 19.4 NG/ML

## 2023-01-17 ENCOUNTER — TELEPHONE (OUTPATIENT)
Dept: OBGYN CLINIC | Facility: CLINIC | Age: 33
End: 2023-01-17

## 2023-01-30 NOTE — PROGRESS NOTES
Pregnancy Confirmation Visit  [facility] [address] [place] [address]    Assessment/Plan:  29 y o  [id number] presenting with missed menses  Viable pregnancy 9w3d by LMP consistent with ultrasound today  - Continue/start prenatal vitamin  - Prenatal labs ordered  - Schedule prenatal nursing Intake in 2 weeks  - Initial prenatal visit in 4 weeks    Subjective:    CC: Missed period    Prateek Fitch is a 29 y o  [id number] who presents with missed menses  LMP Patient's last menstrual period was 11/26/2022 (exact date)         Patient notes that this pregnancy was unplanned but wanted  She was not using contraception at the time of conception  She reports she is certain of her LMP and that she has regular menses  She has has no vaginal bleeding since her LMP      Objective:  LMP 11/26/2022 (Exact Date)     Physical Exam:  General: Well appearing, no distress  CV: Regular rate  Respiratory: Unlabored breathing  Abdomen: Soft, nontender  Extremities: Without edema  Mood and Affect: Appropriate    Transvaginal Pelvic Ultrasound  [place] IUP  Yolk sac: Present  Fetal Pole: Present  CRL consistent with EGA 9w3d  Cardiac activity: Present   bpm  No adnexal masses appreciated

## 2023-01-31 ENCOUNTER — ULTRASOUND (OUTPATIENT)
Dept: OBGYN CLINIC | Facility: MEDICAL CENTER | Age: 33
End: 2023-01-31

## 2023-01-31 VITALS
SYSTOLIC BLOOD PRESSURE: 123 MMHG | HEIGHT: 65 IN | DIASTOLIC BLOOD PRESSURE: 78 MMHG | WEIGHT: 139 LBS | BODY MASS INDEX: 23.16 KG/M2

## 2023-01-31 DIAGNOSIS — Z36.9 FIRST TRIMESTER SCREENING: Primary | ICD-10-CM

## 2023-01-31 DIAGNOSIS — Z32.01 POSITIVE URINE PREGNANCY TEST: ICD-10-CM

## 2023-02-10 ENCOUNTER — TELEMEDICINE (OUTPATIENT)
Facility: HOSPITAL | Age: 33
End: 2023-02-10

## 2023-02-10 DIAGNOSIS — O24.011 PRE-EXISTING TYPE 1 DIABETES MELLITUS WITH HYPERGLYCEMIA DURING PREGNANCY IN FIRST TRIMESTER (HCC): Primary | ICD-10-CM

## 2023-02-10 DIAGNOSIS — O24.012 PRE-EXISTING TYPE 1 DIABETES MELLITUS DURING PREGNANCY IN SECOND TRIMESTER: ICD-10-CM

## 2023-02-10 DIAGNOSIS — E10.65 PRE-EXISTING TYPE 1 DIABETES MELLITUS WITH HYPERGLYCEMIA DURING PREGNANCY IN FIRST TRIMESTER (HCC): Primary | ICD-10-CM

## 2023-02-10 DIAGNOSIS — Z3A.10 10 WEEKS GESTATION OF PREGNANCY: ICD-10-CM

## 2023-02-10 DIAGNOSIS — E55.9 VITAMIN D DEFICIENCY: ICD-10-CM

## 2023-02-10 PROBLEM — O24.013 PRE-EXISTING TYPE 1 DIABETES MELLITUS DURING PREGNANCY IN THIRD TRIMESTER: Status: RESOLVED | Noted: 2019-02-19 | Resolved: 2023-02-10

## 2023-02-10 PROBLEM — Z3A.38 38 WEEKS GESTATION OF PREGNANCY: Status: RESOLVED | Noted: 2021-05-05 | Resolved: 2023-02-10

## 2023-02-10 RX ORDER — PEN NEEDLE, DIABETIC 32GX 5/32"
NEEDLE, DISPOSABLE MISCELLANEOUS
Qty: 100 EACH | Refills: 3 | Status: SHIPPED | OUTPATIENT
Start: 2023-02-10

## 2023-02-10 NOTE — PATIENT INSTRUCTIONS
-Last A1c 6 3% not at goal   -Repeat A1c, CMP and urine protein creatinine ratio  -Adjust Levemir to 10 units at 7 to 8 AM and 10 units at 7 to 8 PM    -Novolog using carb ratio 10; correction factor 45 and BG target of 100 for now  Goal is to bolus before each meal and not needing to correction post meals   -Please have a bedtime snack with at least 15 grams of carbohydrates and 2 to 3 ounces of protein  -Try to eat at least every 3 5 hours during the day and no more than 8 to 10 hours of fasting overnight  -SMBG fasting, 2 hours post start of meals and with hypoglycemia   -Okay to use Dexcom for pre-meal reading   -Report glucose readings via flowsheet every week  -Always have glucose available to treat hypoglycemia, use 15 by 15 rule  -Glucose goals fasting 60-90; 2 hours post meals 120 or less; before meals/overnight     -Follow up with OB and MFM    -Stay in close contact with diabetes team

## 2023-02-10 NOTE — ASSESSMENT & PLAN NOTE
-Last A1c 6 3% not at goal   -Repeat A1c, CMP and urine protein creatinine ratio  -Adjust Levemir to 10 units at 7 to 8 AM and 10 units at 7 to 8 PM    -Novolog using carb ratio 10; correction factor 45 and BG target of 100 for now  Goal is to bolus before each meal and not needing to correction post meals   -Please have a bedtime snack with at least 15 grams of carbohydrates and 2 to 3 ounces of protein  -Try to eat at least every 3 5 hours during the day and no more than 8 to 10 hours of fasting overnight  -SMBG fasting, 2 hours post start of meals and with hypoglycemia   -Okay to use Dexcom for pre-meal reading   -Report glucose readings via flowsheet every week  -Always have glucose available to treat hypoglycemia, use 15 by 15 rule  -Glucose goals fasting 60-90; 2 hours post meals 120 or less; before meals/overnight     -Follow up with OB and MFM    -Stay in close contact with diabetes team    Lab Results   Component Value Date    HGBA1C 6 3 (H) 11/19/2022

## 2023-02-10 NOTE — PROGRESS NOTES
From: Tc Salcedo  To: Nely Subramanian  Sent: 12/11/2020 4:30 PM CST  Subject: Medication Question    Hi Dr. Subramanian  This is Tc Salcedos daughter Rhonda Cortes. My mom has seen a bone health specialist who is recommending either Abaloparatide (Tymlos) or Romosozumab (Eventy) for her severe osteoporosis. The neurosuregon wants her to be treated for the osteoporposis prior to having surgery. We have the following questions for you:  1. Which one you would recommend?   2. Are both covered by insurance?  3. Can we get a referal to the womens bone health program (Dr. Yudi Kennedy) so she can begin treatment.    Thank You,    Rhonda   Virtual Regular Visit    Verification of patient location:[place]    Patient is located in the following state in which I hold an active license [place]      Assessment/Plan:    Problem List Items Addressed This Visit        Endocrine    Pre-existing type 1 diabetes mellitus with hyperglycemia during pregnancy in first trimester ([facility] [address] ) - Primary     -Last A1c 6 3% not at goal   -Repeat A1c, CMP and urine protein creatinine ratio  -Adjust Levemir to 10 units at 7 to 8 AM and 10 units at 7 to 8 PM    -Novolog using carb ratio 10; correction factor 45 and BG target of 100 for now  Goal is to bolus before each meal and not needing to correction post meals   -Please have a bedtime snack with at least 15 grams of carbohydrates and 2 to 3 ounces of protein  -Try to eat at least every 3 5 hours during the day and no more than 8 to 10 hours of fasting overnight  -SMBG fasting, 2 hours post start of meals and with hypoglycemia   -Okay to use Dexcom for pre-meal reading   -Report glucose readings via flowsheet every week  -Always have glucose available to treat hypoglycemia, use 15 by 15 rule  -Glucose goals fasting 60-90; 2 hours post meals 120 or less; before meals/overnight     -Follow up with OB and MFM  -Stay in close contact with diabetes team    Lab Results   Component Value Date    HGBA1C 6 3 (H) 11/19/2022            Relevant Medications    Insulin Pen Needle (BD Pen Needle Aide U/F) 32G X 4 MM MISC    Other Relevant Orders    Hemoglobin A1C    Comprehensive metabolic panel    Protein / creatinine ratio, urine       Other    Vitamin D deficiency     -On prenatal vitamin  Recommended restarting OTC D3 in addition to prenatal vitamin            Relevant Medications    Insulin Pen Needle (BD Pen Needle Aide U/F) 32G X 4 MM MISC    Other Relevant Orders    Hemoglobin A1C    Comprehensive metabolic panel    Protein / creatinine ratio, urine    10 weeks gestation of pregnancy    Relevant Medications    Insulin Pen Needle (BD Pen Needle Aide U/F) 32G X 4 MM MISC    Other Relevant Orders    Hemoglobin A1C    Comprehensive metabolic panel    Protein / creatinine ratio, urine    BMI 23 0-23 9, adult     -First visit weight 139 lbs  -Recommended weight gain 25 to 35 lbs   -Start GDM diet and follow up with dietitian  Relevant Medications    Insulin Pen Needle (BD Pen Needle Aide U/F) 32G X 4 MM MISC    Other Relevant Orders    Hemoglobin A1C    Comprehensive metabolic panel    Protein / creatinine ratio, urine   Other Visit Diagnoses     Pre-existing type 1 diabetes mellitus during pregnancy in second trimester            63 kg times 0 6 units/hour equals about 38 units a day  Currently using about 35 units a day  Does still have InPen, will check if cartridges are covered        -Insulin requirements during pregnancy discussed  -Tight glucose control during pregnancy to decrease risk factors including fetal macrosomia; birth injury; risk of ; polyhydramnios; pre-term labor; pre-eclampsia;  hypoglycemia; jaundice and stillbirth  -Via MyChart diet plan and hypoglycemia patient education  Reason for visit is   Chief Complaint   Patient presents with   • Virtual Regular Visit   • Diabetes Type 1   • Patient Education        Encounter provider Al Prieto, [address] [place] [address]    Provider located at [facility] [id number]      Recent Visits  Date Type Provider Dept   02/10/23 [address]    Showing recent visits within past 7 days and meeting all other requirements  Future Appointments  No visits were found meeting these conditions  Showing future appointments within next 150 days and meeting all other requirements       The patient was identified by name and date of birth   Jo-Ann Jeffries was informed that this is a telemedicine visit and that the visit is being conducted through Telephone  My office door was closed  No one else was in the room  She acknowledged consent and understanding of privacy and security of the video platform  The patient has agreed to participate and understands they can discontinue the visit at any time  Patient is aware this is a billable service  Subjective  Ericka Zelaya is a 29 y o  female  10 6/7 weeks gestation JOHN 2023 T1DM on Levemir 15 units at 7 to 8 AM and 8 units at 7 to 8 PM daily, Novolog 3 to 4 units before meals; has given correction bolus post meals  Eating 3 times a day and no snacks  Dexcom G6 CGM in place  Encouraged to input units of insulin into Dexcom  Feels lows in the 70's, treats with juice    This will be 3rd pregnancy with diabetes team  Has 3 1/2 you son who weighed 7 lbs 4 oz at birth and 3 1/1 yo daughter who weighed 7 lbs 12 oz at birth, both term deliveries; both via   HPI     Past Medical History:   Diagnosis Date   • Abnormal Pap smear of cervix    • Anxiety    • Diabetes mellitus ([facility] )    • History of  delivery, antepartum 2021   • HPV (human papilloma virus) infection    • Microalbuminuria due to type 1 diabetes mellitus [facility] ) 2021   • Varicella     vaccinated as child       Past Surgical History:   Procedure Laterality Date   •  SECTION     • INNER EAR SURGERY Left    • MA  DELIVERY ONLY N/A 2019    Procedure:  SECTION (); Surgeon:  Steven Jimenez MD;  Location: [facility];  Service: Obstetrics   • WISDOM TOOTH EXTRACTION         Current Outpatient Medications   Medication Sig Dispense Refill   • acetone, urine, test strip PRN AS NEEDED     • Blood Glucose Monitoring Suppl (FreeStyle Lite) DON by Does not apply route     • Continuous Blood Gluc  (Dexcom G6 ) DON USED TO MONITOR BLOOD SUGAR LEVELS DAILY     • Continuous Blood Gluc Sensor (Dexcom G6 Sensor) MISC CHANGE EVERY 10 DAYS     • Continuous Blood Gluc Transmit (Dexcom G6 Transmitter) MISC Transmitter change every 90 days  1 each 3   • FREESTYLE LITE test strip Test up to 8 times a day and as instructed 200 each 3   • Insulin Aspart (NovoLOG PenFill) 100 UNITS/ML cartridge for injection Inject 4 Units under the skin 3 (three) times a day with meals To be titrated up to 50 units a day  To replace Novolog flexpen  15 mL 0   • insulin detemir (Levemir FlexTouch) 100 Units/mL injection pen Inject 18 Units under the skin daily at bedtime 15 mL 0   • Insulin Pen Needle (BD Pen Needle Aide U/F) 32G X 4 MM MISC Use 5 a day and as directed  100 each 3   • Lancets MISC by Does not apply route     • Prenatal Vit-Fe Fumarate-FA (PRENATAL 1+1 PO) Take 1 tablet by mouth daily     • Cholecalciferol (Vitamin D-3) 25 MCG (1000 UT) CAPS Take 1 capsule by mouth (Patient not taking: Reported on 5/17/2022)     • Injection Device for Insulin (InPen 100-Grey-Juan) DON Use up to 3 times a day with Novolog cartridge  (Patient not taking: Reported on 2/10/2023) 2 each 0     No current facility-administered medications for this visit  No Known Allergies    Review of Systems   Constitutional: Positive for fatigue (improving )  Negative for fever  HENT: Negative for congestion and trouble swallowing  Eyes: Negative for visual disturbance  Last eye exam Summer 2022  Respiratory: Negative for cough and shortness of breath  Cardiovascular: Negative for chest pain, palpitations and leg swelling  Gastrointestinal: Positive for nausea  Negative for constipation and vomiting  Endocrine: Negative for polydipsia, polyphagia and polyuria  Genitourinary: Negative for vaginal bleeding  Musculoskeletal: Negative for back pain  Skin: Negative for rash  Neurological: Negative for headaches  Psychiatric/Behavioral: Negative for sleep disturbance         Video Exam  CGM Interpretation  [facility]   Dexcom G6   Home use   Indication: Type 1 Diabetes   More than 72 hours of data was reviewed  Report to be scanned to chart  Date Range: 1/28/2023 to 2/10/2023  Analysis of data:   Average Glucose: 159   Coefficient of Variation: 29 6%  SD : 47  Time in Target Range (): 37 6%  Time Above Range: 62 4%   Time Below Range: 0%  Interpretation of data:   Hyperglycemia noted overall; basal adjustments made with carb ratio and correction factor given for bolus insulin  Plan: Time in range goal 70% of the time with below range 4% or less  Vitals:    02/13/23 1754   Weight: 63 kg (139 lb)   Height: 5' 5" (1 651 m)       I spent 40 minutes directly with the patient during this visit

## 2023-02-13 VITALS — HEIGHT: 65 IN | WEIGHT: 139 LBS | BODY MASS INDEX: 23.16 KG/M2

## 2023-02-13 DIAGNOSIS — E10.65 PRE-EXISTING TYPE 1 DIABETES MELLITUS WITH HYPERGLYCEMIA DURING PREGNANCY IN FIRST TRIMESTER (HCC): Primary | ICD-10-CM

## 2023-02-13 DIAGNOSIS — O24.011 PRE-EXISTING TYPE 1 DIABETES MELLITUS WITH HYPERGLYCEMIA DURING PREGNANCY IN FIRST TRIMESTER (HCC): Primary | ICD-10-CM

## 2023-02-13 NOTE — ASSESSMENT & PLAN NOTE
-First visit weight 139 lbs  -Recommended weight gain 25 to 35 lbs   -Start GDM diet and follow up with dietitian

## 2023-02-17 ENCOUNTER — TELEPHONE (OUTPATIENT)
Facility: HOSPITAL | Age: 33
End: 2023-02-17

## 2023-02-17 NOTE — TELEPHONE ENCOUNTER
Left voice message on 2/17 regarding PT scheduled 1 pm virtual visit today  Appt was scheduled incorrectly, and needs to be r/s with a dietician  PT can call office back when available, left phone number in voice message

## 2023-02-20 ENCOUNTER — APPOINTMENT (OUTPATIENT)
Dept: LAB | Facility: HOSPITAL | Age: 33
End: 2023-02-20

## 2023-02-20 DIAGNOSIS — E55.9 VITAMIN D DEFICIENCY: ICD-10-CM

## 2023-02-20 DIAGNOSIS — Z3A.10 10 WEEKS GESTATION OF PREGNANCY: ICD-10-CM

## 2023-02-20 DIAGNOSIS — O24.011 PRE-EXISTING TYPE 1 DIABETES MELLITUS WITH HYPERGLYCEMIA DURING PREGNANCY IN FIRST TRIMESTER (HCC): ICD-10-CM

## 2023-02-20 DIAGNOSIS — E10.65 PRE-EXISTING TYPE 1 DIABETES MELLITUS WITH HYPERGLYCEMIA DURING PREGNANCY IN FIRST TRIMESTER (HCC): ICD-10-CM

## 2023-02-20 LAB
ALBUMIN SERPL BCP-MCNC: 3.6 G/DL (ref 3.5–5)
ALP SERPL-CCNC: 55 U/L (ref 46–116)
ALT SERPL W P-5'-P-CCNC: 17 U/L (ref 12–78)
ANION GAP SERPL CALCULATED.3IONS-SCNC: 12 MMOL/L (ref 4–13)
AST SERPL W P-5'-P-CCNC: 10 U/L (ref 5–45)
BILIRUB SERPL-MCNC: 0.37 MG/DL (ref 0.2–1)
BUN SERPL-MCNC: 13 MG/DL (ref 5–25)
CALCIUM SERPL-MCNC: 9.3 MG/DL (ref 8.3–10.1)
CHLORIDE SERPL-SCNC: 103 MMOL/L (ref 96–108)
CO2 SERPL-SCNC: 23 MMOL/L (ref 21–32)
CREAT SERPL-MCNC: 0.66 MG/DL (ref 0.6–1.3)
CREAT UR-MCNC: 241 MG/DL
GFR SERPL CREATININE-BSD FRML MDRD: 118 ML/MIN/1.73SQ M
GLUCOSE SERPL-MCNC: 150 MG/DL (ref 65–140)
POTASSIUM SERPL-SCNC: 4.4 MMOL/L (ref 3.5–5.3)
PROT SERPL-MCNC: 7.4 G/DL (ref 6.4–8.4)
PROT UR-MCNC: 24 MG/DL
PROT/CREAT UR: 0.1 MG/G{CREAT} (ref 0–0.1)
SODIUM SERPL-SCNC: 138 MMOL/L (ref 135–147)

## 2023-02-22 LAB
EST. AVERAGE GLUCOSE BLD GHB EST-MCNC: 137 MG/DL
HBA1C MFR BLD: 6.4 %

## 2023-02-23 ENCOUNTER — INITIAL PRENATAL (OUTPATIENT)
Dept: OBGYN CLINIC | Facility: MEDICAL CENTER | Age: 33
End: 2023-02-23

## 2023-02-23 VITALS
SYSTOLIC BLOOD PRESSURE: 118 MMHG | BODY MASS INDEX: 22.66 KG/M2 | HEIGHT: 65 IN | DIASTOLIC BLOOD PRESSURE: 76 MMHG | WEIGHT: 136 LBS

## 2023-02-23 DIAGNOSIS — Z34.81 PRENATAL CARE, SUBSEQUENT PREGNANCY, FIRST TRIMESTER: Primary | ICD-10-CM

## 2023-02-23 NOTE — PROGRESS NOTES
[id number]  OB History    Para Term  AB Living   5 2 2   2 2   SAB IAB Ectopic Multiple Live Births   1 1   0 2      # Outcome Date GA Lbr Star/2nd Weight Sex Delivery Anes PTL Lv   5 Current            4 Term 21 39w0d / 02:11 3515 g (7 lb 12 oz) F Vag-Spont EPI N PARI   3 SAB 10/2020              Birth Comments: 8 week loss   2 Term 19 38w3d  3290 g (7 lb 4 1 oz) M CS-LTranv EPI N PARI      Complications: Fetal Intolerance   1 IAB 10/2016              Birth Comments: 7 week      Obstetric Comments   : about          * Pt presents for OB intake  *[id number]  *Pt's LMP was Patient's last menstrual period was 2022 (exact date)  *Ultrasound date:2023   9weeks 3days  *Estimated date of delivery: 2023   * confirmed by lmp    *Signs/Symptoms of Pregnancy   *no Constipation    *no Headaches   *no Cramping  *no Spotting   Diabetes     type1 diabetic referral in with MFM  Hypertension-    *NO Hx of chronic HTN    *NO Hx of gestational HTN  *Infection Screening   *NO Does the pt have a hx of MRSA? *NO History of herpes?    *Immunizations:   *YES Discussed influenza vaccine    Declined at this visit   *yes Discussed TDaP vaccine   *yes COVID Vaccine *[address] CONDITIONS  Depression *n   Anxiety*n  Medications*n    *Interview education   *Handouts given:    *Baby and Me support center     *MyChart sign up instructions    *Lab Locations    *[facility] Pediatricians List/Choosing Pediatrician Sheet    *[facility] Childbirth and Parenting Classes    *Schedule for Prenatal Visits    *Pregnancy Warning Signs Reviewed    *Safe Medications During Pregnancy    *SMA and CF Testing information sheet    *CPT Code Sheet/MFM 13week NT pamphlet    *[employer]      SBIRT Screen:  Depression Screening Follow-up Plan: Patient's depression screening was negative with an Leo Taylor score of  5        *[facility]   *yes discussed genetic testing- pt interested   Patient has been informed of basic prenatal advice such as avoiding alcohol, drugs, and smoking  She should remain hydrated and take daily prenatal vitamins  Patient should avoid caffeine, raw sprouts, high mercury fish, undercooked fish, raw eggs, organ meat, unwashed produce, and unpasteurized cheeses, milk, and fruit juice and undercooked meats  She has been informed about toxoplasmosis and to avoid cat feces  *Details that I feel the provider should be aware of:  Hugo Branchinas was seen for her ob intake at 12w5d  Prenatal panel ordered  PN1 visit scheduled for *3/7/2023  The patient was oriented to our practice and all questions were answered      Interviewed by:  Tarun Moya

## 2023-02-23 NOTE — PROGRESS NOTES
Please refer to the [facility] ultrasound report in Ob Procedures for additional information regarding today's visit

## 2023-02-24 ENCOUNTER — ROUTINE PRENATAL (OUTPATIENT)
Dept: PERINATAL CARE | Facility: OTHER | Age: 33
End: 2023-02-24

## 2023-02-24 VITALS
HEIGHT: 65 IN | HEART RATE: 95 BPM | DIASTOLIC BLOOD PRESSURE: 70 MMHG | SYSTOLIC BLOOD PRESSURE: 104 MMHG | WEIGHT: 134.6 LBS | BODY MASS INDEX: 22.42 KG/M2

## 2023-02-24 DIAGNOSIS — Z98.891 HISTORY OF CESAREAN SECTION: ICD-10-CM

## 2023-02-24 DIAGNOSIS — Z3A.12 12 WEEKS GESTATION OF PREGNANCY: ICD-10-CM

## 2023-02-24 DIAGNOSIS — Z36.82 ENCOUNTER FOR ANTENATAL SCREENING FOR NUCHAL TRANSLUCENCY: ICD-10-CM

## 2023-02-24 DIAGNOSIS — O24.011 PRE-EXISTING TYPE 1 DIABETES MELLITUS WITH HYPERGLYCEMIA DURING PREGNANCY IN FIRST TRIMESTER (HCC): Primary | ICD-10-CM

## 2023-02-24 DIAGNOSIS — O09.891 SHORT INTERVAL BETWEEN PREGNANCIES AFFECTING PREGNANCY IN FIRST TRIMESTER, ANTEPARTUM: ICD-10-CM

## 2023-02-24 DIAGNOSIS — E10.65 PRE-EXISTING TYPE 1 DIABETES MELLITUS WITH HYPERGLYCEMIA DURING PREGNANCY IN FIRST TRIMESTER (HCC): Primary | ICD-10-CM

## 2023-02-24 RX ORDER — ASPIRIN 81 MG/1
162 TABLET, CHEWABLE ORAL DAILY
Qty: 180 TABLET | Refills: 1 | Status: SHIPPED | OUTPATIENT
Start: 2023-02-24 | End: 2023-05-25

## 2023-02-24 NOTE — PROGRESS NOTES
Patient chose to have [facility] NIPT Screening  Genetic counselor discussed with pt:  Patient made aware she will need to respond to text message or e-mail from [facility] within 2 business days or testing will be run through insurance  Patient informed text message will come from area code  "415"  Provided [facility] [facility] [employer] # [phone] and web site : [email or web address]     2 vials of blood drawn from left arm, patient tolerated blood draw without difficulty  Specimen labeled with patient identifiers (name, date of birth, specimen collection date), order and specimen was verified with patient, packed and sent via [place]  Copy of lab order scanned to Epic media by genetic counselor  Maternal Fetal Medicine will have results in 14+ business days and will call patient or notify via [address]  Patient verbalized understanding of all instructions and no questions at this time

## 2023-02-24 NOTE — LETTER
February 24, 2023     Stephanie Jenkins MD  [address]    Patient: Abigail Reyes   YOB: 1990   Date of Visit: 2/24/2023       Dear Dr Cristopher Kern:    Thank you for referring Mariah Cronejo to me for evaluation  Below are my notes for this consultation  If you have questions, please do not hesitate to call me  I look forward to following your patient along with you           Sincerely,        Perri Freitas MD        CC: No Recipients  Perri Freitas MD  2/23/2023  4:58 PM  Sign when Signing Visit  Please refer to the [facility] ultrasound report in Ob Procedures for additional information regarding today's visit

## 2023-03-07 ENCOUNTER — INITIAL PRENATAL (OUTPATIENT)
Dept: OBGYN CLINIC | Facility: MEDICAL CENTER | Age: 33
End: 2023-03-07

## 2023-03-07 VITALS — WEIGHT: 137 LBS | BODY MASS INDEX: 22.8 KG/M2 | SYSTOLIC BLOOD PRESSURE: 122 MMHG | DIASTOLIC BLOOD PRESSURE: 80 MMHG

## 2023-03-07 DIAGNOSIS — O09.891 SHORT INTERVAL BETWEEN PREGNANCIES AFFECTING PREGNANCY IN FIRST TRIMESTER, ANTEPARTUM: ICD-10-CM

## 2023-03-07 DIAGNOSIS — E10.9 DIABETES MELLITUS TYPE 1, CONTROLLED, WITHOUT COMPLICATIONS (HCC): ICD-10-CM

## 2023-03-07 DIAGNOSIS — Z98.891 HISTORY OF C-SECTION: ICD-10-CM

## 2023-03-07 DIAGNOSIS — Z3A.14 14 WEEKS GESTATION OF PREGNANCY: Primary | ICD-10-CM

## 2023-03-07 NOTE — PROGRESS NOTES
Jada Guillen is a 29y o  year old [id number] at 14w3d for first prenatal visit  Pregnancy was unplanned but welcomed    She is currently taking PNV    Nausea No Vomiting No   Exam done today - see OB flowsheet  Pap done Yes  Gonorrhea and Chlamydia sent  Labs - order in chart , recommend getting them done , added TSH   Genetic testing- NIPT dine , will need msafp ordered next visit     OB complications   Type 1 diabetes  - following with diabetes in pregnancy / no prior hx of DKA / last seen by optometrist 9 months ago (will make follow up appt), ordered EKG - MFM follow up scheduled   Prior C/S / successful   - [facility]  Short interval pregnancy   Risk of preeclampsia - on ASA       Pt has been counseled re diet, exercise, weight gain, foods to avoid, vaccines in pregnancy, trisomy screening, travel precautions to include seat belt use and VTE risk reduction  She has been provided our pregnancy packet which includes how and when to contact providers, medication recommendations, dietary suggestions, breastfeeding information as well as websites for additional information, hospital and delivery concerns

## 2023-03-08 LAB
HPV HR 12 DNA CVX QL NAA+PROBE: NEGATIVE
HPV16 DNA CVX QL NAA+PROBE: NEGATIVE
HPV18 DNA CVX QL NAA+PROBE: NEGATIVE

## 2023-03-09 LAB
C TRACH DNA SPEC QL NAA+PROBE: NEGATIVE
N GONORRHOEA DNA SPEC QL NAA+PROBE: NEGATIVE

## 2023-03-13 DIAGNOSIS — O24.012 PRE-EXISTING TYPE 1 DIABETES MELLITUS WITH HYPERGLYCEMIA DURING PREGNANCY IN SECOND TRIMESTER (HCC): ICD-10-CM

## 2023-03-13 DIAGNOSIS — O24.012 PRE-EXISTING TYPE 1 DIABETES MELLITUS DURING PREGNANCY IN SECOND TRIMESTER: Primary | ICD-10-CM

## 2023-03-13 DIAGNOSIS — E10.65 PRE-EXISTING TYPE 1 DIABETES MELLITUS WITH HYPERGLYCEMIA DURING PREGNANCY IN SECOND TRIMESTER (HCC): ICD-10-CM

## 2023-03-13 RX ORDER — INSULIN ASPART 100 [IU]/ML
4 INJECTION, SOLUTION INTRAVENOUS; SUBCUTANEOUS
Qty: 15 ML | Refills: 0 | Status: SHIPPED | OUTPATIENT
Start: 2023-03-13

## 2023-03-15 LAB
LAB AP GYN PRIMARY INTERPRETATION: NORMAL
Lab: NORMAL
PATH INTERP SPEC-IMP: NORMAL

## 2023-03-17 ENCOUNTER — TELEPHONE (OUTPATIENT)
Dept: PERINATAL CARE | Facility: OTHER | Age: 33
End: 2023-03-17

## 2023-03-17 ENCOUNTER — ROUTINE PRENATAL (OUTPATIENT)
Dept: PERINATAL CARE | Facility: OTHER | Age: 33
End: 2023-03-17

## 2023-03-17 VITALS
WEIGHT: 140.2 LBS | HEIGHT: 65 IN | HEART RATE: 81 BPM | BODY MASS INDEX: 23.36 KG/M2 | DIASTOLIC BLOOD PRESSURE: 58 MMHG | SYSTOLIC BLOOD PRESSURE: 116 MMHG

## 2023-03-17 DIAGNOSIS — Z36.3 ENCOUNTER FOR ANTENATAL SCREENING FOR MALFORMATION USING ULTRASOUND: ICD-10-CM

## 2023-03-17 DIAGNOSIS — Z3A.15 15 WEEKS GESTATION OF PREGNANCY: ICD-10-CM

## 2023-03-17 DIAGNOSIS — E10.65 PRE-EXISTING TYPE 1 DIABETES MELLITUS WITH HYPERGLYCEMIA DURING PREGNANCY IN SECOND TRIMESTER (HCC): Primary | ICD-10-CM

## 2023-03-17 DIAGNOSIS — O24.012 PRE-EXISTING TYPE 1 DIABETES MELLITUS WITH HYPERGLYCEMIA DURING PREGNANCY IN SECOND TRIMESTER (HCC): Primary | ICD-10-CM

## 2023-03-17 RX ORDER — ISOPROPYL ALCOHOL 0.7 ML/ML
SWAB TOPICAL
COMMUNITY
Start: 2023-03-13

## 2023-03-17 NOTE — PROGRESS NOTES
The patient was seen today for an ultrasound  Please see ultrasound report (located under Ob Procedures) for additional details  Thank you very much for allowing us to participate in the care of this very nice patient  Should you have any questions, please do not hesitate to contact me  Sonu Morris MD [address]  Attending Physician, [place]

## 2023-03-17 NOTE — LETTER
To whom it may concern,    Montana Logan was seen in office today  She is pregnant at 13 6/7 weeks with an EDC of 9/2/2023      Sincerely,    Kaitlynn Castro MD

## 2023-03-26 ENCOUNTER — LAB (OUTPATIENT)
Dept: LAB | Facility: HOSPITAL | Age: 33
End: 2023-03-26

## 2023-03-26 DIAGNOSIS — Z34.81 PRENATAL CARE, SUBSEQUENT PREGNANCY, FIRST TRIMESTER: ICD-10-CM

## 2023-03-26 DIAGNOSIS — E10.9 DIABETES MELLITUS TYPE 1, CONTROLLED, WITHOUT COMPLICATIONS (HCC): ICD-10-CM

## 2023-03-26 LAB
ABO GROUP BLD: NORMAL
BACTERIA UR QL AUTO: ABNORMAL /HPF
BASOPHILS # BLD AUTO: 0.03 THOUSANDS/ÂΜL (ref 0–0.1)
BASOPHILS NFR BLD AUTO: 0 % (ref 0–1)
BILIRUB UR QL STRIP: NEGATIVE
BLD GP AB SCN SERPL QL: NEGATIVE
CLARITY UR: ABNORMAL
COLOR UR: YELLOW
EOSINOPHIL # BLD AUTO: 0.12 THOUSAND/ÂΜL (ref 0–0.61)
EOSINOPHIL NFR BLD AUTO: 2 % (ref 0–6)
ERYTHROCYTE [DISTWIDTH] IN BLOOD BY AUTOMATED COUNT: 13.5 % (ref 11.6–15.1)
GLUCOSE UR STRIP-MCNC: ABNORMAL MG/DL
HCT VFR BLD AUTO: 34.4 % (ref 34.8–46.1)
HGB BLD-MCNC: 11.9 G/DL (ref 11.5–15.4)
HGB UR QL STRIP.AUTO: NEGATIVE
IMM GRANULOCYTES # BLD AUTO: 0.05 THOUSAND/UL (ref 0–0.2)
IMM GRANULOCYTES NFR BLD AUTO: 1 % (ref 0–2)
KETONES UR STRIP-MCNC: NEGATIVE MG/DL
LEUKOCYTE ESTERASE UR QL STRIP: NEGATIVE
LYMPHOCYTES # BLD AUTO: 1.38 THOUSANDS/ÂΜL (ref 0.6–4.47)
LYMPHOCYTES NFR BLD AUTO: 18 % (ref 14–44)
MCH RBC QN AUTO: 32.3 PG (ref 26.8–34.3)
MCHC RBC AUTO-ENTMCNC: 34.6 G/DL (ref 31.4–37.4)
MCV RBC AUTO: 94 FL (ref 82–98)
MONOCYTES # BLD AUTO: 0.39 THOUSAND/ÂΜL (ref 0.17–1.22)
MONOCYTES NFR BLD AUTO: 5 % (ref 4–12)
MUCOUS THREADS UR QL AUTO: ABNORMAL
NEUTROPHILS # BLD AUTO: 5.56 THOUSANDS/ÂΜL (ref 1.85–7.62)
NEUTS SEG NFR BLD AUTO: 74 % (ref 43–75)
NITRITE UR QL STRIP: NEGATIVE
NON-SQ EPI CELLS URNS QL MICRO: ABNORMAL /HPF
NRBC BLD AUTO-RTO: 0 /100 WBCS
PH UR STRIP.AUTO: 6 [PH]
PLATELET # BLD AUTO: 163 THOUSANDS/UL (ref 149–390)
PMV BLD AUTO: 12.5 FL (ref 8.9–12.7)
PROT UR STRIP-MCNC: ABNORMAL MG/DL
RBC # BLD AUTO: 3.68 MILLION/UL (ref 3.81–5.12)
RBC #/AREA URNS AUTO: ABNORMAL /HPF
RH BLD: POSITIVE
RUBV IGG SERPL IA-ACNC: >175 IU/ML
SP GR UR STRIP.AUTO: >=1.03 (ref 1–1.03)
SPECIMEN EXPIRATION DATE: NORMAL
TSH SERPL DL<=0.05 MIU/L-ACNC: 0.82 UIU/ML (ref 0.45–4.5)
UROBILINOGEN UR QL STRIP.AUTO: 0.2 E.U./DL
WBC # BLD AUTO: 7.53 THOUSAND/UL (ref 4.31–10.16)
WBC #/AREA URNS AUTO: ABNORMAL /HPF

## 2023-03-27 LAB
BACTERIA UR CULT: ABNORMAL
BACTERIA UR CULT: ABNORMAL
HBV SURFACE AG SER QL: NORMAL
HIV 1+2 AB+HIV1 P24 AG SERPL QL IA: NORMAL
HIV 2 AB SERPL QL IA: NORMAL
HIV1 AB SERPL QL IA: NORMAL
HIV1 P24 AG SERPL QL IA: NORMAL
TREPONEMA PALLIDUM IGG+IGM AB [PRESENCE] IN SERUM OR PLASMA BY IMMUNOASSAY: NORMAL

## 2023-03-27 NOTE — RESULT ENCOUNTER NOTE
Please call patient with results  Prenatal panel is normal, apart from UCx which is notable for a small amount of GBS  This likely represents a contaminant and is lower that would be typical for a UTI  If having UTI symptoms, recommend repeat UCx  Otherwise, we will discuss this test at her next visit, as a positive culture for this during pregnancy necessitates antibiotics during labor

## 2023-04-03 ENCOUNTER — TELEPHONE (OUTPATIENT)
Dept: OBGYN CLINIC | Facility: MEDICAL CENTER | Age: 33
End: 2023-04-03

## 2023-04-03 NOTE — TELEPHONE ENCOUNTER
Spoke to pt and advised her to keep hydrated and rested , if cramping worsens or bleeding starts pt can go to ER

## 2023-04-07 ENCOUNTER — ROUTINE PRENATAL (OUTPATIENT)
Dept: OBGYN CLINIC | Facility: MEDICAL CENTER | Age: 33
End: 2023-04-07

## 2023-04-07 VITALS — SYSTOLIC BLOOD PRESSURE: 120 MMHG | DIASTOLIC BLOOD PRESSURE: 70 MMHG | WEIGHT: 144 LBS | BODY MASS INDEX: 23.96 KG/M2

## 2023-04-07 DIAGNOSIS — E10.65 PRE-EXISTING TYPE 1 DIABETES MELLITUS WITH HYPERGLYCEMIA DURING PREGNANCY IN SECOND TRIMESTER (HCC): Primary | ICD-10-CM

## 2023-04-07 DIAGNOSIS — Z3A.18 18 WEEKS GESTATION OF PREGNANCY: ICD-10-CM

## 2023-04-07 DIAGNOSIS — O24.012 PRE-EXISTING TYPE 1 DIABETES MELLITUS WITH HYPERGLYCEMIA DURING PREGNANCY IN SECOND TRIMESTER (HCC): Primary | ICD-10-CM

## 2023-04-07 DIAGNOSIS — Z98.891 HISTORY OF C-SECTION: ICD-10-CM

## 2023-04-07 DIAGNOSIS — Z34.82 ENCOUNTER FOR SUPERVISION OF OTHER NORMAL PREGNANCY IN SECOND TRIMESTER: ICD-10-CM

## 2023-04-07 NOTE — PROGRESS NOTES
Routine Prenatal Visit  [facility] [address] [place] [address]    Assessment/Plan:  Blanca Underwood is a 29y o  year old [id number] at 19w11d who presents for routine prenatal visit  1  Pre-existing type 1 diabetes mellitus with hyperglycemia during pregnancy in second trimester ([address] )    2  18 weeks gestation of pregnancy  -     Alpha fetoprotein, maternal; Future    3  Encounter for supervision of other normal pregnancy in second trimester    4  History of           Subjective:     CC: Prenatal care    Corrina Kang is a 29 y o  [id number] female who presents for routine prenatal care at 18w6d  Pregnancy ROS: no leakage of fluid, pelvic pain, or vaginal bleeding   good fetal movement  Reports cramping, which has improved  Also notices low back pain  States sugars are controlled    The following portions of the patient's history were reviewed and updated as appropriate: allergies, current medications, past family history, past medical history, obstetric history, gynecologic history, past social history, past surgical history and problem list       Objective:  /70   Wt 65 3 kg (144 lb)   LMP 2022 (Exact Date)   BMI 23 96 kg/m²   Pregravid Weight/BMI: Pregravid weight not on file (BMI Could not be calculated)  Current Weight: 65 3 kg (144 lb)   Total Weight Gain: Not found  Pre- Vitals    Flowsheet Row Most Recent Value   Prenatal Assessment    Fetal Heart Rate 145   Movement Present   Prenatal Vitals    Blood Pressure 120/70   Weight - Scale 65 3 kg (144 lb)   Urine Albumin/Glucose    Dilation/Effacement/Station    Vaginal Drainage    Edema    LLE Edema None   RLE Edema None   Facial Edema None           General: Well appearing, no distress  Respiratory: Unlabored breathing  Cardiovascular: Regular rate  Abdomen: Soft, gravid, nontender  Fundal Height: Appropriate for gestational age  Extremities: Warm and well perfused  Non tender

## 2023-04-12 PROBLEM — Z3A.19 19 WEEKS GESTATION OF PREGNANCY: Status: ACTIVE | Noted: 2023-02-10

## 2023-04-19 PROBLEM — Z3A.20 20 WEEKS GESTATION OF PREGNANCY: Status: ACTIVE | Noted: 2023-02-10

## 2023-05-05 ENCOUNTER — TELEPHONE (OUTPATIENT)
Dept: FAMILY MEDICINE CLINIC | Facility: CLINIC | Age: 33
End: 2023-05-05

## 2023-05-12 ENCOUNTER — TELEPHONE (OUTPATIENT)
Dept: PERINATAL CARE | Facility: CLINIC | Age: 33
End: 2023-05-12

## 2023-05-12 ENCOUNTER — ROUTINE PRENATAL (OUTPATIENT)
Dept: PERINATAL CARE | Facility: OTHER | Age: 33
End: 2023-05-12

## 2023-05-12 VITALS
HEIGHT: 65 IN | DIASTOLIC BLOOD PRESSURE: 60 MMHG | BODY MASS INDEX: 24.86 KG/M2 | HEART RATE: 82 BPM | WEIGHT: 149.2 LBS | SYSTOLIC BLOOD PRESSURE: 104 MMHG

## 2023-05-12 DIAGNOSIS — E10.65 PRE-EXISTING TYPE 1 DIABETES MELLITUS WITH HYPERGLYCEMIA DURING PREGNANCY IN SECOND TRIMESTER (HCC): Primary | ICD-10-CM

## 2023-05-12 DIAGNOSIS — O24.012 PRE-EXISTING TYPE 1 DIABETES MELLITUS WITH HYPERGLYCEMIA DURING PREGNANCY IN SECOND TRIMESTER (HCC): Primary | ICD-10-CM

## 2023-05-12 DIAGNOSIS — Z3A.23 23 WEEKS GESTATION OF PREGNANCY: ICD-10-CM

## 2023-05-12 DIAGNOSIS — R80.9 MICROALBUMINURIA DUE TO TYPE 1 DIABETES MELLITUS (HCC): ICD-10-CM

## 2023-05-12 DIAGNOSIS — E55.9 VITAMIN D DEFICIENCY: ICD-10-CM

## 2023-05-12 DIAGNOSIS — O24.012 PRE-EXISTING TYPE 1 DIABETES MELLITUS DURING PREGNANCY IN SECOND TRIMESTER: ICD-10-CM

## 2023-05-12 DIAGNOSIS — E10.29 MICROALBUMINURIA DUE TO TYPE 1 DIABETES MELLITUS (HCC): ICD-10-CM

## 2023-05-12 DIAGNOSIS — O24.013 PRE-EXISTING TYPE 1 DIABETES MELLITUS DURING PREGNANCY IN THIRD TRIMESTER: ICD-10-CM

## 2023-05-12 DIAGNOSIS — Z3A.29 29 WEEKS GESTATION OF PREGNANCY: ICD-10-CM

## 2023-05-12 DIAGNOSIS — O34.219 PREGNANCY WITH HISTORY OF CESAREAN SECTION, ANTEPARTUM: ICD-10-CM

## 2023-05-12 RX ORDER — INSULIN DETEMIR 100 [IU]/ML
28 INJECTION, SOLUTION SUBCUTANEOUS DAILY
Start: 2023-05-12

## 2023-05-12 NOTE — TELEPHONE ENCOUNTER
Instructed per [address] to reach out to pt to encourage to input carbs into InPen  Called pt  No Answer  Left VM  Encouraged pt to put carbohydrate into the InPen gasper to calculate insulin doses for her meals  Explained that I will also send a [employer] message as a reminder and that she can reply to the message if she has any questions or call give a call to the office at [phone]       Jaymie Casas RD  Diabetes Educator   [facility]  Diabetes and Pregnancy Program

## 2023-05-12 NOTE — Clinical Note
I saw this patient and reviewed her Dexcom sensor and recommended some changes in her insulin to optimize her blood sugar control I think she would feel better if you made the changes    Jamie Marshall

## 2023-05-12 NOTE — LETTER
May 12, 2023     Ajay Juarez MD  [address]    Patient: Sekou Mosher   YOB: 1990   Date of Visit: 2023       Dear Dr Sania Lowry:    Thank you for referring Jenise Schumacher to me for evaluation  Below are my notes for this consultation  If you have questions, please do not hesitate to call me  I look forward to following your patient along with you  Sincerely,        Slime Bishop MD        CC: No Recipients  Slime Bishop MD  2023  2:31 PM  Sign when Signing Visit  Sekou Mosher  has no complaints today at 23w6d  She reports fetal movements and does not report any vaginal bleeding or signs of labor  Her recently completed fetal testing revealed a normal NIPT and MSAFP  She is here today for a fetal echo  Problem list:  1  Type 1 diabetes currently on Levemir 14 units twice daily and NovoLog with meals through carb counting  She is monitoring her blood sugars through a glucose sensor  Hemoglobin A1c prior to pregnancy was 6 3  Her most recent hemoglobin A1c is 6 5 on 3/26  Review of her Dexcom sensor data shows that 48% of her sugars are in range ( )  2  Prior  with successful   3  History of microalbuminuria    Ultrasound findings: The ultrasound today shows a normal fetal echo  Pregnancy ultrasound has limitations and is unable to detect all forms of fetal congenital abnormalities  Specific counseling was provided on the following problems:  1  She states her basal doses were increased between 1 to 2 weeks ago  Goal is 75% of blood sugars in range  She needs further titration in her insulin doses  We will have diabetes education contact her  Follow up recommended:   1  Recommend a follow-up growth scan in 4 weeks      Pre visit time reviewing her records   15 minutes  Face to face time 5 minutes  Post visit time on documentation of note, updating her problem list, adding orders and prescriptions 15 minutes  Procedures that were completed today were charged separately  The level of decision making was moderate complexity      Conchita Martino MD

## 2023-05-12 NOTE — PROGRESS NOTES
Az Loyola  has no complaints today at 23w6d  She reports fetal movements and does not report any vaginal bleeding or signs of labor  Her recently completed fetal testing revealed a normal NIPT and MSAFP  She is here today for a fetal echo  Problem list:  1  Type 1 diabetes currently on Levemir 14 units twice daily and NovoLog with meals through carb counting  She is monitoring her blood sugars through a glucose sensor  Hemoglobin A1c prior to pregnancy was 6 3  Her most recent hemoglobin A1c is 6 5 on 3/26  Review of her Dexcom sensor data shows that 48% of her sugars are in range ( )  2  Prior  with successful   3  History of microalbuminuria    Ultrasound findings: The ultrasound today shows a normal fetal echo  Pregnancy ultrasound has limitations and is unable to detect all forms of fetal congenital abnormalities  Specific counseling was provided on the following problems:  1  She states her basal doses were increased between 1 to 2 weeks ago  Goal is 75% of blood sugars in range  She needs further titration in her insulin doses  We will have diabetes education contact her  Follow up recommended:   1  Recommend a follow-up growth scan in 4 weeks  Pre visit time reviewing her records   15 minutes  Face to face time 5 minutes  Post visit time on documentation of note, updating her problem list, adding orders and prescriptions 15 minutes  Procedures that were completed today were charged separately  The level of decision making was moderate complexity      Oscar Braun MD

## 2023-05-16 ENCOUNTER — TELEPHONE (OUTPATIENT)
Dept: PERINATAL CARE | Facility: OTHER | Age: 33
End: 2023-05-16

## 2023-05-16 RX ORDER — INSULIN ASPART 100 [IU]/ML
4 INJECTION, SOLUTION INTRAVENOUS; SUBCUTANEOUS
Qty: 15 ML | Refills: 0 | Status: SHIPPED | OUTPATIENT
Start: 2023-05-16

## 2023-05-16 NOTE — TELEPHONE ENCOUNTER
Called patient to schedule MFM appointment w/ Abraham Lloyd, based on request      Left voicemail requesting patient to call back and schedule appointment, with office number for return call [phone]     ----- Message from Sunshine Partida sent at 5/16/2023  9:08 AM EDT -----  Regarding: follow up appointment  Hi All,    Please call Arturo Blum and schedule follow up appointment with me ASAP      Thanks,  Avmario

## 2023-05-17 DIAGNOSIS — E10.65 PRE-EXISTING TYPE 1 DIABETES MELLITUS WITH HYPERGLYCEMIA DURING PREGNANCY IN SECOND TRIMESTER (HCC): Primary | ICD-10-CM

## 2023-05-17 DIAGNOSIS — O24.012 PRE-EXISTING TYPE 1 DIABETES MELLITUS WITH HYPERGLYCEMIA DURING PREGNANCY IN SECOND TRIMESTER (HCC): Primary | ICD-10-CM

## 2023-05-17 RX ORDER — PROCHLORPERAZINE 25 MG/1
SUPPOSITORY RECTAL
Qty: 1 EACH | Refills: 12 | Status: SHIPPED | OUTPATIENT
Start: 2023-05-17

## 2023-05-17 RX ORDER — PROCHLORPERAZINE 25 MG/1
SUPPOSITORY RECTAL
Qty: 1 EACH | Refills: 3 | Status: SHIPPED | OUTPATIENT
Start: 2023-05-17

## 2023-05-18 PROBLEM — O99.810 HYPERGLYCEMIA DURING PREGNANCY: Status: ACTIVE | Noted: 2023-05-18

## 2023-05-30 ENCOUNTER — ROUTINE PRENATAL (OUTPATIENT)
Dept: OBGYN CLINIC | Facility: MEDICAL CENTER | Age: 33
End: 2023-05-30

## 2023-05-30 VITALS — BODY MASS INDEX: 25.13 KG/M2 | SYSTOLIC BLOOD PRESSURE: 118 MMHG | WEIGHT: 151 LBS | DIASTOLIC BLOOD PRESSURE: 72 MMHG

## 2023-05-30 DIAGNOSIS — E10.65 PRE-EXISTING TYPE 1 DIABETES MELLITUS WITH HYPERGLYCEMIA DURING PREGNANCY IN SECOND TRIMESTER (HCC): ICD-10-CM

## 2023-05-30 DIAGNOSIS — Z3A.26 26 WEEKS GESTATION OF PREGNANCY: Primary | ICD-10-CM

## 2023-05-30 DIAGNOSIS — O24.012 PRE-EXISTING TYPE 1 DIABETES MELLITUS WITH HYPERGLYCEMIA DURING PREGNANCY IN SECOND TRIMESTER (HCC): ICD-10-CM

## 2023-05-30 DIAGNOSIS — O34.219 PREGNANCY WITH HISTORY OF CESAREAN SECTION, ANTEPARTUM: ICD-10-CM

## 2023-05-30 DIAGNOSIS — Z34.92 SECOND TRIMESTER PREGNANCY: ICD-10-CM

## 2023-05-30 DIAGNOSIS — O99.810 HYPERGLYCEMIA DURING PREGNANCY: ICD-10-CM

## 2023-05-30 NOTE — PROGRESS NOTES
Hill Rivera is a 29y o  year old  at 26w3d for routine prenatal visit    + FM, no vaginal bleeding, contractions, or LOF  Complaints: No   Most recent ultrasound and labs reviewed    Type 1 diabetes - has not been compliant with MFM (diabetic educators) we discussed importance of this specially in pregnancy to optimize her care and outcome as well as that of baby - states she had a lot going on with selling her house and end of school year but will have more time now/ states since increase in levemir her FBS have been good and her pre prandials   Previous C/S with successful   - interested in [address] [place] [address] for cbc and anemia panel given

## 2023-06-06 DIAGNOSIS — O24.011 PRE-EXISTING TYPE 1 DIABETES MELLITUS WITH HYPERGLYCEMIA DURING PREGNANCY IN FIRST TRIMESTER (HCC): ICD-10-CM

## 2023-06-06 DIAGNOSIS — O24.012 PRE-EXISTING TYPE 1 DIABETES MELLITUS WITH HYPERGLYCEMIA DURING PREGNANCY IN SECOND TRIMESTER (HCC): Primary | ICD-10-CM

## 2023-06-06 DIAGNOSIS — E55.9 VITAMIN D DEFICIENCY: ICD-10-CM

## 2023-06-06 DIAGNOSIS — O24.012 PRE-EXISTING TYPE 1 DIABETES MELLITUS DURING PREGNANCY IN SECOND TRIMESTER: ICD-10-CM

## 2023-06-06 DIAGNOSIS — E10.65 PRE-EXISTING TYPE 1 DIABETES MELLITUS WITH HYPERGLYCEMIA DURING PREGNANCY IN FIRST TRIMESTER (HCC): ICD-10-CM

## 2023-06-06 DIAGNOSIS — E10.65 PRE-EXISTING TYPE 1 DIABETES MELLITUS WITH HYPERGLYCEMIA DURING PREGNANCY IN SECOND TRIMESTER (HCC): Primary | ICD-10-CM

## 2023-06-06 DIAGNOSIS — Z3A.10 10 WEEKS GESTATION OF PREGNANCY: ICD-10-CM

## 2023-06-06 RX ORDER — LANCETS 33 GAUGE
EACH MISCELLANEOUS
Qty: 100 EACH | Refills: 3 | Status: SHIPPED | OUTPATIENT
Start: 2023-06-06

## 2023-06-06 RX ORDER — ISOPROPYL ALCOHOL 0.7 ML/ML
SWAB TOPICAL
Qty: 100 EACH | Refills: 1 | Status: SHIPPED | OUTPATIENT
Start: 2023-06-06

## 2023-06-06 RX ORDER — INSULIN ASPART 100 [IU]/ML
4 INJECTION, SOLUTION INTRAVENOUS; SUBCUTANEOUS
Qty: 15 ML | Refills: 0 | Status: SHIPPED | OUTPATIENT
Start: 2023-06-06

## 2023-06-11 ENCOUNTER — APPOINTMENT (OUTPATIENT)
Dept: LAB | Facility: HOSPITAL | Age: 33
End: 2023-06-11
Payer: COMMERCIAL

## 2023-06-11 DIAGNOSIS — Z34.92 SECOND TRIMESTER PREGNANCY: ICD-10-CM

## 2023-06-11 LAB
BASOPHILS # BLD AUTO: 0.04 THOUSANDS/ÂΜL (ref 0–0.1)
BASOPHILS NFR BLD AUTO: 0 % (ref 0–1)
EOSINOPHIL # BLD AUTO: 0.12 THOUSAND/ÂΜL (ref 0–0.61)
EOSINOPHIL NFR BLD AUTO: 1 % (ref 0–6)
ERYTHROCYTE [DISTWIDTH] IN BLOOD BY AUTOMATED COUNT: 11.9 % (ref 11.6–15.1)
HCT VFR BLD AUTO: 34.3 % (ref 34.8–46.1)
HGB BLD-MCNC: 11.7 G/DL (ref 11.5–15.4)
IMM GRANULOCYTES # BLD AUTO: 0.05 THOUSAND/UL (ref 0–0.2)
IMM GRANULOCYTES NFR BLD AUTO: 1 % (ref 0–2)
LYMPHOCYTES # BLD AUTO: 1.65 THOUSANDS/ÂΜL (ref 0.6–4.47)
LYMPHOCYTES NFR BLD AUTO: 16 % (ref 14–44)
MCH RBC QN AUTO: 32.1 PG (ref 26.8–34.3)
MCHC RBC AUTO-ENTMCNC: 34.1 G/DL (ref 31.4–37.4)
MCV RBC AUTO: 94 FL (ref 82–98)
MONOCYTES # BLD AUTO: 0.69 THOUSAND/ÂΜL (ref 0.17–1.22)
MONOCYTES NFR BLD AUTO: 7 % (ref 4–12)
NEUTROPHILS # BLD AUTO: 7.61 THOUSANDS/ÂΜL (ref 1.85–7.62)
NEUTS SEG NFR BLD AUTO: 75 % (ref 43–75)
NRBC BLD AUTO-RTO: 0 /100 WBCS
PLATELET # BLD AUTO: 178 THOUSANDS/UL (ref 149–390)
PMV BLD AUTO: 11.9 FL (ref 8.9–12.7)
RBC # BLD AUTO: 3.65 MILLION/UL (ref 3.81–5.12)
WBC # BLD AUTO: 10.16 THOUSAND/UL (ref 4.31–10.16)

## 2023-06-11 PROCEDURE — 85025 COMPLETE CBC W/AUTO DIFF WBC: CPT

## 2023-06-11 PROCEDURE — 36415 COLL VENOUS BLD VENIPUNCTURE: CPT

## 2023-06-12 PROBLEM — Z3A.28 28 WEEKS GESTATION OF PREGNANCY: Status: ACTIVE | Noted: 2023-02-10

## 2023-06-14 ENCOUNTER — TELEMEDICINE (OUTPATIENT)
Facility: HOSPITAL | Age: 33
End: 2023-06-14
Payer: COMMERCIAL

## 2023-06-14 DIAGNOSIS — E10.65 PRE-EXISTING TYPE 1 DIABETES MELLITUS WITH HYPERGLYCEMIA DURING PREGNANCY IN THIRD TRIMESTER (HCC): Primary | ICD-10-CM

## 2023-06-14 DIAGNOSIS — Z3A.28 28 WEEKS GESTATION OF PREGNANCY: ICD-10-CM

## 2023-06-14 DIAGNOSIS — O24.013 PRE-EXISTING TYPE 1 DIABETES MELLITUS WITH HYPERGLYCEMIA DURING PREGNANCY IN THIRD TRIMESTER (HCC): Primary | ICD-10-CM

## 2023-06-14 PROCEDURE — 99215 OFFICE O/P EST HI 40 MIN: CPT | Performed by: NURSE PRACTITIONER

## 2023-06-14 NOTE — PROGRESS NOTES
Virtual Regular Visit    Verification of patient location:[place]    Patient is located at Home in the following state in which I hold an active license [place]      Assessment/Plan:    Problem List Items Addressed This Visit        Endocrine    Pre-existing type 1 diabetes mellitus with hyperglycemia during pregnancy in third trimester ([facility] [address] ) - Primary     -Last A1c 6 8% not at goal  Aim for less than 6% with minimal hypoglycemia  -Repeat A1c, CMP and urine protein creatinine ratio  -Due to hyperglycemia, increase Levemir to 19 units at 7 to 8 AM and to 19 units at 7 to 8 PM    -Novolog via InPen before meals and bedtime snack; due to hyperglycemia carb ratio decreased to 6; correction factor to 25  BG target 90; Active Insulin Time 4 hours    -Please have a bedtime snack with at least 15 grams of carbohydrates and 2 to 3 ounces of protein  -Try to eat at least every 3 5 hours during the day and no more than 8 to 10 hours of fasting overnight  -SMBG fasting, 2 hours post start of meals and with hypoglycemia   -Okay to use Dexcom for pre-meal reading   -Report glucose readings via flowsheet every week  -Always have glucose available to treat hypoglycemia, use 15 by 15 rule  -Glucose goals fasting 60-90; 2 hours post meals 120 or less; before meals/overnight     -Follow up with OB and MFM  -Stay in close contact with diabetes team    -Fetal growth ultrasound as scheduled then every 4 to 6 weeks gestation   -Fetal echo completed  -Starting at 32 weeks gestation, NST twice a week and DOMENICA weekly    -Schedule follow up with endocrinology for Sept 2023     Lab Results   Component Value Date    HGBA1C 6 8 (H) 06/11/2023     Lab Results   Component Value Date    HGBA1C 6 8 (H) 06/11/2023            Relevant Orders    Comprehensive metabolic panel    Protein / creatinine ratio, urine       Other    28 weeks gestation of pregnancy    Relevant Orders    Comprehensive metabolic panel    Protein / creatinine ratio, urine BMI 25 0-25 9,adult     -First visit weight 139 lbs  -Current weight 161 lbs per chart  -TWG 22 lbs  -Recommended weight gain 25 to 35 lbs   -Continue GDM diet  Relevant Orders    Comprehensive metabolic panel    Protein / creatinine ratio, urine            Reason for visit is   Chief Complaint   Patient presents with   • Virtual Regular Visit   • Diabetes Type 1        [facility]os[facility]    Provider located at [facility] [id number]      Recent Visits  Date Type Provider Dept   [facility] [id number] [facility]    Showing recent visits within past 7 days and meeting all other requirements  Future Appointments  No visits were found meeting these conditions  Showing future appointments within next 150 days and meeting all other requirements       The patient was identified by name and date of birth  Oraliafranca Kirby was informed that this is a telemedicine visit and that the visit is being conducted through Telephone  My office door was closed  No one else was in the room  She acknowledged consent and understanding of privacy and security of the video platform  The patient has agreed to participate and understands they can discontinue the visit at any time  Patient is aware this is a billable service  Subjective  Ericka Isabella Mccarty is a 29 y o  female  28 4/7 weeks gestation T1DM on Levemir 16 units at 8 to 9 AM and 16 units at 8 to 9 PM; Novolog via InPen carb ratio 8; ISF 30; BG target 90  Has noticed increase in glucose readings over the last few days and has increased amount of bolus insulin  Encouraged to report glucose readings along with carb ratio for insulin pen to provide bolus recommendation  InPen recommendations have been overrided and blind bolus doses given   Denies current insulin and has not checked urine ketones; encouraged to check  Also to communicate with Ob any changes  Reported eating 3 meals and 3 snacks a day  Positive fetal movement  Has fetal growth ultrasound scheduled for tomorrow  Last A1c 6 8% above goal          Past Medical History:   Diagnosis Date   • Abnormal Pap smear of cervix    • Anxiety    • Diabetes mellitus ([facility] [address] )    • History of  delivery, antepartum 2021   • HPV (human papilloma virus) infection    • Microalbuminuria due to type 1 diabetes mellitus ([facility] [address] ) 2021   • Varicella     vaccinated as child       Past Surgical History:   Procedure Laterality Date   • INNER EAR SURGERY Left    • MO  DELIVERY ONLY N/A 2019    Procedure:  SECTION (); Surgeon: Enedelia Lund MD;  [facility]: [facility];  Service: Obstetrics   • WISDOM TOOTH EXTRACTION         Current Outpatient Medications   Medication Sig Dispense Refill   • Alcohol Swabs (Pharmacist Choice Alcohol) PADS USE AS DIRECTED 100 each 1   • aspirin 81 mg chewable tablet Chew 2 tablets (162 mg total) daily 180 tablet 1   • Blood Glucose Monitoring Suppl (FreeStyle Lite) DON by Does not apply route     • Cholecalciferol (Vitamin D-3) 25 MCG (1000 UT) CAPS Take 1 capsule by mouth     • Continuous Blood Gluc  (Dexcom G6 ) DON USED TO MONITOR BLOOD SUGAR LEVELS DAILY     • Continuous Blood Gluc Sensor (Dexcom G6 Sensor) MISC 1 box=1 month supply or 3 sensors, use 1 sensor every 10 days  1 each 12   • Continuous Blood Gluc Transmit (Dexcom G6 Transmitter) MISC Transmitter change every 90 days  1 each 3   • FREESTYLE LITE test strip Test up to 8 times a day and as instructed 200 each 3   • Injection Device for Insulin (InPen 100-Grey-Juan) DON Use up to 3 times a day with Novolog cartridge  2 each 0   • Insulin Aspart (NovoLOG PenFill) 100 UNITS/ML cartridge for injection INJECT 4 UNITS UNDER THE SKIN 3 (THREE) TIMES A DAY WITH MEALS TO BE TITRATED UP TO 50 UNITS A DAY  TO REPLACE Mayi Dessert   15 mL 0 • insulin detemir (Levemir FlexTouch) 100 Units/mL injection pen Inject 28 Units under the skin daily 14 units in am and 14 units at bedtime     • Insulin Pen Needle (Comfort EZ Pen Needles) 32G X 4 MM MISC USE AS DIRECTED 100 each 3   • Lancets MISC by Does not apply route     • Prenatal Vit-Fe Fumarate-FA (PRENATAL 1+1 PO) Take 1 tablet by mouth daily       No current facility-administered medications for this visit  No Known Allergies    Review of Systems   Constitutional: Negative for fatigue  Eyes: Negative for visual disturbance  Gastrointestinal: Negative for diarrhea and vomiting  Endocrine: Negative for polydipsia, polyphagia and polyuria  Neurological: Negative for headaches  Video Exam  Refer to attached file InPen report where hyperglycemia is noted and adjustments made during visit  There were no vitals filed for this visit  Physical Exam   It was my intent to perform this visit via video technology but the patient was not able to do a video connection so the visit was completed via audio telephone only  Visit Time  Total Visit Duration: 40 minutes

## 2023-06-15 ENCOUNTER — ULTRASOUND (OUTPATIENT)
Dept: PERINATAL CARE | Facility: CLINIC | Age: 33
End: 2023-06-15
Payer: COMMERCIAL

## 2023-06-15 VITALS
BODY MASS INDEX: 26.86 KG/M2 | HEIGHT: 65 IN | HEART RATE: 91 BPM | SYSTOLIC BLOOD PRESSURE: 106 MMHG | WEIGHT: 161.2 LBS | DIASTOLIC BLOOD PRESSURE: 58 MMHG

## 2023-06-15 DIAGNOSIS — E10.9 DIABETES MELLITUS TYPE 1, CONTROLLED, WITHOUT COMPLICATIONS (HCC): Primary | ICD-10-CM

## 2023-06-15 DIAGNOSIS — R80.9 MICROALBUMINURIA DUE TO TYPE 1 DIABETES MELLITUS (HCC): ICD-10-CM

## 2023-06-15 DIAGNOSIS — O34.219 PREGNANCY WITH HISTORY OF CESAREAN SECTION, ANTEPARTUM: ICD-10-CM

## 2023-06-15 DIAGNOSIS — Z3A.28 28 WEEKS GESTATION OF PREGNANCY: ICD-10-CM

## 2023-06-15 DIAGNOSIS — E10.29 MICROALBUMINURIA DUE TO TYPE 1 DIABETES MELLITUS (HCC): ICD-10-CM

## 2023-06-15 PROCEDURE — 76816 OB US FOLLOW-UP PER FETUS: CPT | Performed by: OBSTETRICS & GYNECOLOGY

## 2023-06-15 PROCEDURE — 99214 OFFICE O/P EST MOD 30 MIN: CPT | Performed by: OBSTETRICS & GYNECOLOGY

## 2023-06-15 NOTE — PROGRESS NOTES
A fetal ultrasound was completed  See Ob procedures in Epic for an interpretation and recommendations  Do not hesitate to contact us in [facility] if you have questions  Bess Zeng MD, [address] [facility]  Maternal Fetal Medicine

## 2023-06-15 NOTE — LETTER
Eloina 15, 2023     Shimon Pool MD  [address]    Patient: Fabrizio Young   YOB: 1990   Date of Visit: 6/15/2023       Dear Dr Ewa Chavarria: Thank you for referring Edna Alston to me for evaluation  Below are my notes for this consultation  If you have questions, please do not hesitate to call me  I look forward to following your patient along with you  Sincerely,         [facility]        CC: No Recipients    Krish Huerta MD  6/15/2023 10:28 AM  Sign when Signing Visit  A fetal ultrasound was completed  See Ob procedures in Epic for an interpretation and recommendations  Do not hesitate to contact us in [facility] if you have questions  Fleta Shi Leopold Marvel MD, [facility]  Maternal Fetal Medicine

## 2023-06-19 NOTE — ASSESSMENT & PLAN NOTE
-First visit weight 139 lbs  -Current weight 161 lbs per chart  -TWG 22 lbs  -Recommended weight gain 25 to 35 lbs   -Continue GDM diet

## 2023-06-19 NOTE — ASSESSMENT & PLAN NOTE
-Last A1c 6 8% not at goal  Aim for less than 6% with minimal hypoglycemia  -Repeat A1c, CMP and urine protein creatinine ratio  -Due to hyperglycemia, increase Levemir to 19 units at 7 to 8 AM and to 19 units at 7 to 8 PM    -Novolog via InPen before meals and bedtime snack; due to hyperglycemia carb ratio decreased to 6; correction factor to 25  BG target 90; Active Insulin Time 4 hours    -Please have a bedtime snack with at least 15 grams of carbohydrates and 2 to 3 ounces of protein  -Try to eat at least every 3 5 hours during the day and no more than 8 to 10 hours of fasting overnight  -SMBG fasting, 2 hours post start of meals and with hypoglycemia   -Okay to use Dexcom for pre-meal reading   -Report glucose readings via flowsheet every week  -Always have glucose available to treat hypoglycemia, use 15 by 15 rule  -Glucose goals fasting 60-90; 2 hours post meals 120 or less; before meals/overnight     -Follow up with OB and MFM  -Stay in close contact with diabetes team    -Fetal growth ultrasound as scheduled then every 4 to 6 weeks gestation   -Fetal echo completed  -Starting at 32 weeks gestation, NST twice a week and DOMENICA weekly    -Schedule follow up with endocrinology for Sept 2023     Lab Results   Component Value Date    HGBA1C 6 8 (H) 06/11/2023     Lab Results   Component Value Date    HGBA1C 6 8 (H) 06/11/2023

## 2023-06-27 PROBLEM — Z3A.30 30 WEEKS GESTATION OF PREGNANCY: Status: ACTIVE | Noted: 2023-02-10

## 2023-06-27 NOTE — PROGRESS NOTES
Routine Prenatal Visit  [facility] [address] [place] [address]    Assessment/Plan:  Levar Holcomb is a 29y o  year old [id number] at 30w3d who presents for routine prenatal visit  1  30 weeks gestation of pregnancy    2  Pre-existing type 1 diabetes mellitus with hyperglycemia during pregnancy in third trimester [facility])  Assessment & Plan:  Management with MFM   They have been increasing her insulin as needed    Fetal echo- completed  Will need growth q 4 weeks  Also NST 2 x week at 32 weeks      Lab Results   Component Value Date    HGBA1C 6 8 (H) 2023         3  Pregnancy with history of  section, antepartum  Assessment & Plan:  c section 2019- fetal intolerance  Followed by     Wishes for   We discussed that she will get her salpingectomy if has a c section but if vaginal  will get vasectomy   Urology referral placed for him             Subjective:     CC: Prenatal care    Linda Franks is a 29 y o  [id number] female who presents for routine prenatal care at 30w3d  Pregnancy ROS: no leakage of fluid, pelvic pain, or vaginal bleeding  yes fetal movement      The following portions of the patient's history were reviewed and updated as appropriate: allergies, current medications, past family history, past medical history, obstetric history, gynecologic history, past social history, past surgical history and problem list       Objective:  /70   Wt 69 2 kg (152 lb 9 6 oz)   LMP 2022 (Exact Date)   BMI 25 39 kg/m²   Pregravid Weight/BMI: 61 7 kg (136 lb) (BMI 22 63)  Current Weight: 69 2 kg (152 lb 9 6 oz)   Total Weight Gain: 7 53 kg (16 lb 9 6 oz)   Pre-Jaida Vitals    Flowsheet Row Most Recent Value   Prenatal Assessment    Fetal Heart Rate 130   Movement Present   Prenatal Vitals    Blood Pressure 110/70   Weight - Scale 69 2 kg (152 lb 9 6 oz)   Urine Albumin/Glucose    Dilation/Effacement/Station    Vaginal Drainage    Draining Fluid No   Edema    RLE Edema None           General: Well appearing, no distress  Respiratory: Unlabored breathing  Cardiovascular: Regular rate  Abdomen: Soft, gravid, nontender  Fundal Height: Appropriate for gestational age  Extremities: Warm and well perfused  Non tender

## 2023-06-27 NOTE — ASSESSMENT & PLAN NOTE
Management with MFM   They have been increasing her insulin as needed    Fetal echo- completed  Will need growth q 4 weeks  Also NST 2 x week at 32 weeks      Lab Results   Component Value Date    HGBA1C 6 8 (H) 06/11/2023

## 2023-06-27 NOTE — ASSESSMENT & PLAN NOTE
c section 2019- fetal intolerance  Followed by     Wishes for   We discussed that she will get her salpingectomy if has a c section but if vaginal  will get vasectomy   Urology referral placed for him

## 2023-06-28 ENCOUNTER — ROUTINE PRENATAL (OUTPATIENT)
Dept: OBGYN CLINIC | Facility: MEDICAL CENTER | Age: 33
End: 2023-06-28

## 2023-06-28 VITALS — WEIGHT: 152.6 LBS | SYSTOLIC BLOOD PRESSURE: 110 MMHG | DIASTOLIC BLOOD PRESSURE: 70 MMHG | BODY MASS INDEX: 25.39 KG/M2

## 2023-06-28 DIAGNOSIS — Z3A.30 30 WEEKS GESTATION OF PREGNANCY: Primary | ICD-10-CM

## 2023-06-28 DIAGNOSIS — E10.65 PRE-EXISTING TYPE 1 DIABETES MELLITUS WITH HYPERGLYCEMIA DURING PREGNANCY IN THIRD TRIMESTER (HCC): ICD-10-CM

## 2023-06-28 DIAGNOSIS — O24.013 PRE-EXISTING TYPE 1 DIABETES MELLITUS WITH HYPERGLYCEMIA DURING PREGNANCY IN THIRD TRIMESTER (HCC): ICD-10-CM

## 2023-06-28 DIAGNOSIS — O34.219 PREGNANCY WITH HISTORY OF CESAREAN SECTION, ANTEPARTUM: ICD-10-CM

## 2023-06-28 PROCEDURE — PNV: Performed by: OBSTETRICS & GYNECOLOGY

## 2023-06-30 ENCOUNTER — TELEPHONE (OUTPATIENT)
Dept: PERINATAL CARE | Facility: CLINIC | Age: 33
End: 2023-06-30

## 2023-06-30 NOTE — TELEPHONE ENCOUNTER
"Reason for Call: Hyperglycemia (Per Dexcom Report (6/28/23 - 6/30/23); Long-Acting Insulin Not Documented; Carine Hodge sent message to patient 6/29/23 [facility] [place] you been skipping any Levemir insulin doses? Is it time for another Levemir increase  Please communicate with diabetes team \") and Diabetes Type 1 (Currently Pregnant; 30w6d)    Outcome: No answer  Left VM  Asked pt to confirm if she missed her Levemir doses due her blood sugars being elevated  Must confirm before providing new recommendations  Encouraged to call [phone] or message Carinehailey Hodge back via [address] at her earliest convenience      Dexcom Report: Discussed with Dr Ander Montiel, RD  Diabetes Educator   [facility]  Diabetes and Pregnancy Program    "

## 2023-07-03 ENCOUNTER — TELEPHONE (OUTPATIENT)
Dept: PERINATAL CARE | Facility: CLINIC | Age: 33
End: 2023-07-03

## 2023-07-03 NOTE — TELEPHONE ENCOUNTER
Reason for Call: Hyperglycemia (Consistently Elevated Blood Sugars per Dexcom Report; Discussed with Dr. Jhonathan Goel; Suspect pt may not be taking long-acting insulin; Concern for DKA and recommend evaluation at hospital per Dr. Jhonathan Goel) and Diabetes Type 1 (Currently Pregnant; 31w2d)     Outcome: No Answer; Left VM. Blood sugars remain consistently elevated. Concern for DKA. Recommend that pt go to hospital to evaluated for DKA. Instructed pt to call us at [phone] at earliest convenience and before heading to hospital so that we are able to notify the appropriate staff.        Dexcom Report:         Jag Perez RD  Diabetes Educator   [facility]  Diabetes and Pregnancy Program

## 2023-07-08 ENCOUNTER — HOSPITAL ENCOUNTER (OUTPATIENT)
Facility: HOSPITAL | Age: 33
Setting detail: OBSERVATION
Discharge: HOME | End: 2023-07-09
Attending: STUDENT IN AN ORGANIZED HEALTH CARE EDUCATION/TRAINING PROGRAM | Admitting: STUDENT IN AN ORGANIZED HEALTH CARE EDUCATION/TRAINING PROGRAM
Payer: COMMERCIAL

## 2023-07-08 ENCOUNTER — HOSPITAL ENCOUNTER (EMERGENCY)
Facility: HOSPITAL | Age: 33
Discharge: STILL ADMITTED OR ADMINISTRATIVE | End: 2023-07-08
Attending: EMERGENCY MEDICINE
Payer: COMMERCIAL

## 2023-07-08 VITALS
SYSTOLIC BLOOD PRESSURE: 121 MMHG | BODY MASS INDEX: 26.67 KG/M2 | OXYGEN SATURATION: 99 % | HEART RATE: 79 BPM | RESPIRATION RATE: 16 BRPM | WEIGHT: 160.27 LBS | DIASTOLIC BLOOD PRESSURE: 85 MMHG

## 2023-07-08 DIAGNOSIS — R51.9 HEADACHE: ICD-10-CM

## 2023-07-08 DIAGNOSIS — O24.013 TYPE 1 DIABETES MELLITUS DURING PREGNANCY IN THIRD TRIMESTER: ICD-10-CM

## 2023-07-08 DIAGNOSIS — R11.2 NAUSEA & VOMITING: ICD-10-CM

## 2023-07-08 DIAGNOSIS — Z3A.32 32 WEEKS GESTATION OF PREGNANCY: ICD-10-CM

## 2023-07-08 DIAGNOSIS — O34.219 PREGNANCY WITH HISTORY OF CESAREAN SECTION, ANTEPARTUM: ICD-10-CM

## 2023-07-08 DIAGNOSIS — E10.10: ICD-10-CM

## 2023-07-08 DIAGNOSIS — E10.10 DIABETIC KETOACIDOSIS WITHOUT COMA ASSOCIATED WITH TYPE 1 DIABETES MELLITUS (HCC): ICD-10-CM

## 2023-07-08 DIAGNOSIS — O99.810 HYPERGLYCEMIA DURING PREGNANCY: Primary | ICD-10-CM

## 2023-07-08 LAB
ABO GROUP BLD: NORMAL
ALBUMIN SERPL BCP-MCNC: 2.6 G/DL (ref 3.5–5)
ALBUMIN SERPL BCP-MCNC: 3.1 G/DL (ref 3.5–5)
ALBUMIN SERPL BCP-MCNC: 3.6 G/DL (ref 3.5–5)
ALP SERPL-CCNC: 70 U/L (ref 34–104)
ALP SERPL-CCNC: 75 U/L (ref 34–104)
ALP SERPL-CCNC: 85 U/L (ref 34–104)
ALT SERPL W P-5'-P-CCNC: 14 U/L (ref 7–52)
ALT SERPL W P-5'-P-CCNC: 15 U/L (ref 7–52)
ALT SERPL W P-5'-P-CCNC: 19 U/L (ref 7–52)
ANION GAP SERPL CALCULATED.3IONS-SCNC: 7 MMOL/L
ANION GAP SERPL CALCULATED.3IONS-SCNC: 8 MMOL/L
ANION GAP SERPL CALCULATED.3IONS-SCNC: 9 MMOL/L
AST SERPL W P-5'-P-CCNC: 11 U/L (ref 13–39)
AST SERPL W P-5'-P-CCNC: 11 U/L (ref 13–39)
AST SERPL W P-5'-P-CCNC: 22 U/L (ref 13–39)
BACTERIA UR QL AUTO: ABNORMAL /HPF
BACTERIA UR QL AUTO: NORMAL /HPF
BASE EX.OXY STD BLDV CALC-SCNC: 97 % (ref 60–80)
BASE EXCESS BLDV CALC-SCNC: -2.2 MMOL/L
BASOPHILS # BLD AUTO: 0.03 THOUSANDS/ÂΜL (ref 0–0.1)
BASOPHILS NFR BLD AUTO: 0 % (ref 0–1)
BETA-HYDROXYBUTYRATE: 0.7 MMOL/L
BETA-HYDROXYBUTYRATE: 1.1 MMOL/L
BETA-HYDROXYBUTYRATE: 1.4 MMOL/L
BILIRUB SERPL-MCNC: 0.46 MG/DL (ref 0.2–1)
BILIRUB SERPL-MCNC: 0.47 MG/DL (ref 0.2–1)
BILIRUB SERPL-MCNC: 0.47 MG/DL (ref 0.2–1)
BILIRUB UR QL STRIP: NEGATIVE
BILIRUB UR QL STRIP: NEGATIVE
BLD GP AB SCN SERPL QL: NEGATIVE
BUN SERPL-MCNC: 7 MG/DL (ref 5–25)
BUN SERPL-MCNC: 8 MG/DL (ref 5–25)
BUN SERPL-MCNC: 8 MG/DL (ref 5–25)
CALCIUM ALBUM COR SERPL-MCNC: 8.8 MG/DL (ref 8.3–10.1)
CALCIUM ALBUM COR SERPL-MCNC: 9.1 MG/DL (ref 8.3–10.1)
CALCIUM SERPL-MCNC: 8 MG/DL (ref 8.4–10.2)
CALCIUM SERPL-MCNC: 8.1 MG/DL (ref 8.4–10.2)
CALCIUM SERPL-MCNC: 8.7 MG/DL (ref 8.4–10.2)
CHLORIDE SERPL-SCNC: 102 MMOL/L (ref 96–108)
CHLORIDE SERPL-SCNC: 104 MMOL/L (ref 96–108)
CHLORIDE SERPL-SCNC: 105 MMOL/L (ref 96–108)
CLARITY UR: ABNORMAL
CLARITY UR: CLEAR
CO2 SERPL-SCNC: 21 MMOL/L (ref 21–32)
CO2 SERPL-SCNC: 21 MMOL/L (ref 21–32)
CO2 SERPL-SCNC: 22 MMOL/L (ref 21–32)
COLOR UR: ABNORMAL
COLOR UR: ABNORMAL
CREAT SERPL-MCNC: 0.53 MG/DL (ref 0.6–1.3)
CREAT SERPL-MCNC: 0.53 MG/DL (ref 0.6–1.3)
CREAT SERPL-MCNC: 0.54 MG/DL (ref 0.6–1.3)
EOSINOPHIL # BLD AUTO: 0.02 THOUSAND/ÂΜL (ref 0–0.61)
EOSINOPHIL NFR BLD AUTO: 0 % (ref 0–6)
ERYTHROCYTE [DISTWIDTH] IN BLOOD BY AUTOMATED COUNT: 12.2 % (ref 11.6–15.1)
GFR SERPL CREATININE-BSD FRML MDRD: 127 ML/MIN/1.73SQ M
GLUCOSE SERPL-MCNC: 107 MG/DL (ref 65–140)
GLUCOSE SERPL-MCNC: 108 MG/DL (ref 65–140)
GLUCOSE SERPL-MCNC: 111 MG/DL (ref 65–140)
GLUCOSE SERPL-MCNC: 138 MG/DL (ref 65–140)
GLUCOSE SERPL-MCNC: 143 MG/DL (ref 65–140)
GLUCOSE SERPL-MCNC: 158 MG/DL (ref 65–140)
GLUCOSE SERPL-MCNC: 184 MG/DL (ref 65–140)
GLUCOSE SERPL-MCNC: 92 MG/DL (ref 65–140)
GLUCOSE SERPL-MCNC: 98 MG/DL (ref 65–140)
GLUCOSE SERPL-MCNC: 99 MG/DL (ref 65–140)
GLUCOSE UR STRIP-MCNC: ABNORMAL MG/DL
GLUCOSE UR STRIP-MCNC: ABNORMAL MG/DL
HCO3 BLDV-SCNC: 20.4 MMOL/L (ref 24–30)
HCT VFR BLD AUTO: 38.8 % (ref 34.8–46.1)
HGB BLD-MCNC: 12.8 G/DL (ref 11.5–15.4)
HGB UR QL STRIP.AUTO: NEGATIVE
HGB UR QL STRIP.AUTO: NEGATIVE
IMM GRANULOCYTES # BLD AUTO: 0.1 THOUSAND/UL (ref 0–0.2)
IMM GRANULOCYTES NFR BLD AUTO: 1 % (ref 0–2)
KETONES UR STRIP-MCNC: ABNORMAL MG/DL
KETONES UR STRIP-MCNC: ABNORMAL MG/DL
LEUKOCYTE ESTERASE UR QL STRIP: ABNORMAL
LEUKOCYTE ESTERASE UR QL STRIP: ABNORMAL
LYMPHOCYTES # BLD AUTO: 1.39 THOUSANDS/ÂΜL (ref 0.6–4.47)
LYMPHOCYTES NFR BLD AUTO: 9 % (ref 14–44)
MCH RBC QN AUTO: 31.1 PG (ref 26.8–34.3)
MCHC RBC AUTO-ENTMCNC: 33 G/DL (ref 31.4–37.4)
MCV RBC AUTO: 94 FL (ref 82–98)
MONOCYTES # BLD AUTO: 0.49 THOUSAND/ÂΜL (ref 0.17–1.22)
MONOCYTES NFR BLD AUTO: 3 % (ref 4–12)
MUCOUS THREADS UR QL AUTO: ABNORMAL
NEUTROPHILS # BLD AUTO: 13.43 THOUSANDS/ÂΜL (ref 1.85–7.62)
NEUTS SEG NFR BLD AUTO: 87 % (ref 43–75)
NITRITE UR QL STRIP: NEGATIVE
NITRITE UR QL STRIP: NEGATIVE
NON-SQ EPI CELLS URNS QL MICRO: ABNORMAL /HPF
NON-SQ EPI CELLS URNS QL MICRO: NORMAL /HPF
NRBC BLD AUTO-RTO: 0 /100 WBCS
O2 CT BLDV-SCNC: 15.6 ML/DL
PCO2 BLDV: 28.3 MM HG (ref 42–50)
PH BLDV: 7.47 [PH] (ref 7.3–7.4)
PH UR STRIP.AUTO: 6.5 [PH]
PH UR STRIP.AUTO: 7 [PH]
PHOSPHATE SERPL-MCNC: 2.9 MG/DL (ref 2.7–4.5)
PLATELET # BLD AUTO: 180 THOUSANDS/UL (ref 149–390)
PMV BLD AUTO: 13.2 FL (ref 8.9–12.7)
PO2 BLDV: 129.5 MM HG (ref 35–45)
POTASSIUM SERPL-SCNC: 3.6 MMOL/L (ref 3.5–5.3)
POTASSIUM SERPL-SCNC: 3.6 MMOL/L (ref 3.5–5.3)
POTASSIUM SERPL-SCNC: 4 MMOL/L (ref 3.5–5.3)
PROT SERPL-MCNC: 5.3 G/DL (ref 6.4–8.4)
PROT SERPL-MCNC: 5.7 G/DL (ref 6.4–8.4)
PROT SERPL-MCNC: 6.7 G/DL (ref 6.4–8.4)
PROT UR STRIP-MCNC: ABNORMAL MG/DL
PROT UR STRIP-MCNC: ABNORMAL MG/DL
RBC # BLD AUTO: 4.11 MILLION/UL (ref 3.81–5.12)
RBC #/AREA URNS AUTO: ABNORMAL /HPF
RBC #/AREA URNS AUTO: NORMAL /HPF
RH BLD: POSITIVE
SODIUM SERPL-SCNC: 131 MMOL/L (ref 135–147)
SODIUM SERPL-SCNC: 133 MMOL/L (ref 135–147)
SODIUM SERPL-SCNC: 135 MMOL/L (ref 135–147)
SP GR UR STRIP.AUTO: 1.02 (ref 1–1.03)
SP GR UR STRIP.AUTO: 1.03 (ref 1–1.03)
SPECIMEN EXPIRATION DATE: NORMAL
UROBILINOGEN UR STRIP-ACNC: <2 MG/DL
UROBILINOGEN UR STRIP-ACNC: <2 MG/DL
WBC # BLD AUTO: 15.46 THOUSAND/UL (ref 4.31–10.16)
WBC #/AREA URNS AUTO: ABNORMAL /HPF
WBC #/AREA URNS AUTO: NORMAL /HPF

## 2023-07-08 PROCEDURE — 85025 COMPLETE CBC W/AUTO DIFF WBC: CPT

## 2023-07-08 PROCEDURE — G0379 DIRECT REFER HOSPITAL OBSERV: HCPCS

## 2023-07-08 PROCEDURE — 82010 KETONE BODYS QUAN: CPT

## 2023-07-08 PROCEDURE — 86900 BLOOD TYPING SEROLOGIC ABO: CPT

## 2023-07-08 PROCEDURE — 82805 BLOOD GASES W/O2 SATURATION: CPT | Performed by: STUDENT IN AN ORGANIZED HEALTH CARE EDUCATION/TRAINING PROGRAM

## 2023-07-08 PROCEDURE — 81001 URINALYSIS AUTO W/SCOPE: CPT

## 2023-07-08 PROCEDURE — 86850 RBC ANTIBODY SCREEN: CPT

## 2023-07-08 PROCEDURE — 84100 ASSAY OF PHOSPHORUS: CPT | Performed by: STUDENT IN AN ORGANIZED HEALTH CARE EDUCATION/TRAINING PROGRAM

## 2023-07-08 PROCEDURE — 82948 REAGENT STRIP/BLOOD GLUCOSE: CPT

## 2023-07-08 PROCEDURE — 80053 COMPREHEN METABOLIC PANEL: CPT

## 2023-07-08 PROCEDURE — NC001 PR NO CHARGE: Performed by: OBSTETRICS & GYNECOLOGY

## 2023-07-08 PROCEDURE — 87086 URINE CULTURE/COLONY COUNT: CPT

## 2023-07-08 PROCEDURE — 86901 BLOOD TYPING SEROLOGIC RH(D): CPT

## 2023-07-08 RX ORDER — CAFFEINE 200 MG
200 TABLET ORAL ONCE
Status: COMPLETED | OUTPATIENT
Start: 2023-07-08 | End: 2023-07-08

## 2023-07-08 RX ORDER — ACETAMINOPHEN 325 MG/1
975 TABLET ORAL ONCE
Status: COMPLETED | OUTPATIENT
Start: 2023-07-08 | End: 2023-07-08

## 2023-07-08 RX ORDER — METOCLOPRAMIDE HYDROCHLORIDE 5 MG/ML
10 INJECTION INTRAMUSCULAR; INTRAVENOUS EVERY 6 HOURS PRN
Status: DISCONTINUED | OUTPATIENT
Start: 2023-07-08 | End: 2023-07-09 | Stop reason: HOSPADM

## 2023-07-08 RX ORDER — DEXTROSE AND SODIUM CHLORIDE 5; .9 G/100ML; G/100ML
100 INJECTION, SOLUTION INTRAVENOUS CONTINUOUS
Status: DISCONTINUED | OUTPATIENT
Start: 2023-07-08 | End: 2023-07-09

## 2023-07-08 RX ORDER — PANTOPRAZOLE SODIUM 20 MG/1
40 TABLET, DELAYED RELEASE ORAL
Status: DISCONTINUED | OUTPATIENT
Start: 2023-07-08 | End: 2023-07-09 | Stop reason: HOSPADM

## 2023-07-08 RX ORDER — ONDANSETRON 2 MG/ML
4 INJECTION INTRAMUSCULAR; INTRAVENOUS EVERY 6 HOURS PRN
Status: DISCONTINUED | OUTPATIENT
Start: 2023-07-08 | End: 2023-07-09 | Stop reason: HOSPADM

## 2023-07-08 RX ORDER — SODIUM CHLORIDE 9 MG/ML
125 INJECTION, SOLUTION INTRAVENOUS CONTINUOUS
Status: DISCONTINUED | OUTPATIENT
Start: 2023-07-08 | End: 2023-07-09

## 2023-07-08 RX ORDER — DIPHENHYDRAMINE HYDROCHLORIDE 50 MG/ML
25 INJECTION INTRAMUSCULAR; INTRAVENOUS EVERY 6 HOURS PRN
Status: DISCONTINUED | OUTPATIENT
Start: 2023-07-08 | End: 2023-07-09 | Stop reason: HOSPADM

## 2023-07-08 RX ORDER — METOCLOPRAMIDE HYDROCHLORIDE 5 MG/ML
10 INJECTION INTRAMUSCULAR; INTRAVENOUS ONCE
Status: COMPLETED | OUTPATIENT
Start: 2023-07-08 | End: 2023-07-08

## 2023-07-08 RX ORDER — DEXTROSE AND SODIUM CHLORIDE 5; .9 G/100ML; G/100ML
100 INJECTION, SOLUTION INTRAVENOUS CONTINUOUS
Status: DISCONTINUED | OUTPATIENT
Start: 2023-07-08 | End: 2023-07-08

## 2023-07-08 RX ORDER — POTASSIUM CHLORIDE 14.9 MG/ML
20 INJECTION INTRAVENOUS ONCE
Status: COMPLETED | OUTPATIENT
Start: 2023-07-08 | End: 2023-07-08

## 2023-07-08 RX ORDER — MAGNESIUM SULFATE HEPTAHYDRATE 40 MG/ML
2 INJECTION, SOLUTION INTRAVENOUS ONCE
Status: COMPLETED | OUTPATIENT
Start: 2023-07-08 | End: 2023-07-08

## 2023-07-08 RX ORDER — SIMETHICONE 80 MG
80 TABLET,CHEWABLE ORAL 4 TIMES DAILY PRN
Status: DISCONTINUED | OUTPATIENT
Start: 2023-07-08 | End: 2023-07-09 | Stop reason: HOSPADM

## 2023-07-08 RX ORDER — CALCIUM CARBONATE 500 MG/1
1000 TABLET, CHEWABLE ORAL DAILY PRN
Status: DISCONTINUED | OUTPATIENT
Start: 2023-07-08 | End: 2023-07-09 | Stop reason: HOSPADM

## 2023-07-08 RX ORDER — ACETAMINOPHEN 325 MG/1
650 TABLET ORAL EVERY 4 HOURS PRN
Status: DISCONTINUED | OUTPATIENT
Start: 2023-07-08 | End: 2023-07-09 | Stop reason: HOSPADM

## 2023-07-08 RX ADMIN — POTASSIUM CHLORIDE 20 MEQ: 14.9 INJECTION, SOLUTION INTRAVENOUS at 17:36

## 2023-07-08 RX ADMIN — SODIUM CHLORIDE 125 ML/HR: 0.9 INJECTION, SOLUTION INTRAVENOUS at 19:48

## 2023-07-08 RX ADMIN — METOCLOPRAMIDE 10 MG: 5 INJECTION, SOLUTION INTRAMUSCULAR; INTRAVENOUS at 17:09

## 2023-07-08 RX ADMIN — CAFFEINE 200 MG: 200 TABLET ORAL at 21:33

## 2023-07-08 RX ADMIN — SODIUM CHLORIDE 1 UNITS/HR: 9 INJECTION, SOLUTION INTRAVENOUS at 22:54

## 2023-07-08 RX ADMIN — ONDANSETRON 4 MG: 2 INJECTION INTRAMUSCULAR; INTRAVENOUS at 11:57

## 2023-07-08 RX ADMIN — SODIUM CHLORIDE 1000 ML: 0.9 INJECTION, SOLUTION INTRAVENOUS at 17:36

## 2023-07-08 RX ADMIN — MAGNESIUM SULFATE HEPTAHYDRATE 2 G: 40 INJECTION, SOLUTION INTRAVENOUS at 12:54

## 2023-07-08 RX ADMIN — ACETAMINOPHEN 325MG 975 MG: 325 TABLET ORAL at 21:32

## 2023-07-08 RX ADMIN — ACETAMINOPHEN 325MG 975 MG: 325 TABLET ORAL at 11:54

## 2023-07-08 RX ADMIN — DEXTROSE AND SODIUM CHLORIDE 100 ML/HR: 5; .9 INJECTION, SOLUTION INTRAVENOUS at 21:33

## 2023-07-08 NOTE — PLAN OF CARE
Problem: PAIN - ADULT  Goal: Verbalizes/displays adequate comfort level or baseline comfort level  Description: Interventions:  - Encourage patient to monitor pain and request assistance  - Assess pain using appropriate pain scale  - Administer analgesics based on type and severity of pain and evaluate response  - Implement non-pharmacological measures as appropriate and evaluate response  - Consider cultural and social influences on pain and pain management  - Notify physician/advanced practitioner if interventions unsuccessful or patient reports new pain  Outcome: Progressing     Problem: INFECTION - ADULT  Goal: Absence or prevention of progression during hospitalization  Description: INTERVENTIONS:  - Assess and monitor for signs and symptoms of infection  - Monitor lab/diagnostic results  - Monitor all insertion sites, i.e. indwelling lines, tubes, and drains  - Monitor endotracheal if appropriate and nasal secretions for changes in amount and color  - [place] appropriate cooling/warming therapies per order  - Administer medications as ordered  - Instruct and encourage patient and family to use good hand hygiene technique  - Identify and instruct in appropriate isolation precautions for identified infection/condition  Outcome: Progressing  Goal: Absence of fever/infection during neutropenic period  Description: INTERVENTIONS:  - Monitor WBC    Outcome: Progressing     Problem: SAFETY ADULT  Goal: Patient will remain free of falls  Description: INTERVENTIONS:  - Educate patient/family on patient safety including physical limitations  - Instruct patient to call for assistance with activity   - Consult OT/PT to assist with strengthening/mobility   - Keep Call bell within reach  - Keep bed low and locked with side rails adjusted as appropriate  - Keep care items and personal belongings within reach  - Initiate and maintain comfort rounds  - Make Fall Risk Sign visible to staff  - Offer Toileting every    Hours, in advance of need  - Initiate/Maintain   alarm  - Obtain necessary fall risk management equipment:     - Apply yellow socks and bracelet for high fall risk patients  - Consider moving patient to room near nurses station  Outcome: Progressing  Goal: Maintain or return to baseline ADL function  Description: INTERVENTIONS:  -  Assess patient's ability to carry out ADLs; assess patient's baseline for ADL function and identify physical deficits which impact ability to perform ADLs (bathing, care of mouth/teeth, toileting, grooming, dressing, etc.)  - Assess/evaluate cause of self-care deficits   - Assess range of motion  - Assess patient's mobility; develop plan if impaired  - Assess patient's need for assistive devices and provide as appropriate  - Encourage maximum independence but intervene and supervise when necessary  - Involve family in performance of ADLs  - Assess for home care needs following discharge   - Consider OT consult to assist with ADL evaluation and planning for discharge  - Provide patient education as appropriate  Outcome: Progressing  Goal: Maintains/Returns to pre admission functional level  Description: INTERVENTIONS:  - Perform BMAT or MOVE assessment daily.   - Set and communicate daily mobility goal to care team and patient/family/caregiver. - Collaborate with rehabilitation services on mobility goals if consulted  - Perform Range of Motion    times a day. - Reposition patient every    hours.   - Dangle patient    times a day  - Stand patient    times a day  - Ambulate patient    times a day  - Out of bed to chair    times a day   - Out of bed for meals    times a day  - Out of bed for toileting  - Record patient progress and toleration of activity level   Outcome: Progressing     Problem: Knowledge Deficit  Goal: Patient/family/caregiver demonstrates understanding of disease process, treatment plan, medications, and discharge instructions  Description: Complete learning assessment and assess knowledge base.   Interventions:  - Provide teaching at level of understanding  - Provide teaching via preferred learning methods  Outcome: Progressing     Problem: DISCHARGE PLANNING  Goal: Discharge to home or other facility with appropriate resources  Description: INTERVENTIONS:  - Identify barriers to discharge w/patient and caregiver  - Arrange for needed discharge resources and transportation as appropriate  - Identify discharge learning needs (meds, wound care, etc.)  - Arrange for interpretive services to assist at discharge as needed  - Refer to Case Management Department for coordinating discharge planning if the patient needs post-hospital services based on physician/advanced practitioner order or complex needs related to functional status, cognitive ability, or social support system  Outcome: Progressing     Problem: PAIN - ADULT  Goal: Verbalizes/displays adequate comfort level or baseline comfort level  Description: Interventions:  - Encourage patient to monitor pain and request assistance  - Assess pain using appropriate pain scale  - Administer analgesics based on type and severity of pain and evaluate response  - Implement non-pharmacological measures as appropriate and evaluate response  - Consider cultural and social influences on pain and pain management  - Notify physician/advanced practitioner if interventions unsuccessful or patient reports new pain  Outcome: Progressing     Problem: INFECTION - ADULT  Goal: Absence or prevention of progression during hospitalization  Description: INTERVENTIONS:  - Assess and monitor for signs and symptoms of infection  - Monitor lab/diagnostic results  - Monitor all insertion sites, i.e. indwelling lines, tubes, and drains  - Monitor endotracheal if appropriate and nasal secretions for changes in amount and color  - [place] appropriate cooling/warming therapies per order  - Administer medications as ordered  - Instruct and encourage patient and family to use good hand hygiene technique  - Identify and instruct in appropriate isolation precautions for identified infection/condition  Outcome: Progressing  Goal: Absence of fever/infection during neutropenic period  Description: INTERVENTIONS:  - Monitor WBC    Outcome: Progressing     Problem: SAFETY ADULT  Goal: Patient will remain free of falls  Description: INTERVENTIONS:  - Educate patient/family on patient safety including physical limitations  - Instruct patient to call for assistance with activity   - Consult OT/PT to assist with strengthening/mobility   - Keep Call bell within reach  - Keep bed low and locked with side rails adjusted as appropriate  - Keep care items and personal belongings within reach  - Initiate and maintain comfort rounds  - Make Fall Risk Sign visible to staff  - Offer Toileting every Hours, in advance of need  - Initiate/Maintain   alarm  - Obtain necessary fall risk management equipment:     - Apply yellow socks and bracelet for high fall risk patients  - Consider moving patient to room near nurses station  Outcome: Progressing  Goal: Maintain or return to baseline ADL function  Description: INTERVENTIONS:  -  Assess patient's ability to carry out ADLs; assess patient's baseline for ADL function and identify physical deficits which impact ability to perform ADLs (bathing, care of mouth/teeth, toileting, grooming, dressing, etc.)  - Assess/evaluate cause of self-care deficits   - Assess range of motion  - Assess patient's mobility; develop plan if impaired  - Assess patient's need for assistive devices and provide as appropriate  - Encourage maximum independence but intervene and supervise when necessary  - Involve family in performance of ADLs  - Assess for home care needs following discharge   - Consider OT consult to assist with ADL evaluation and planning for discharge  - Provide patient education as appropriate  Outcome: Progressing  Goal: Maintains/Returns to pre admission functional level  Description: INTERVENTIONS:  - Perform BMAT or MOVE assessment daily.   - Set and communicate daily mobility goal to care team and patient/family/caregiver. - Collaborate with rehabilitation services on mobility goals if consulted  - Perform Range of Motion    times a day. - Reposition patient every    hours. - Dangle patient    times a day  - Stand patient    times a day  - Ambulate patient    times a day  - Out of bed to chair    times a day   - Out of bed for meals    times a day  - Out of bed for toileting  - Record patient progress and toleration of activity level   Outcome: Progressing     Problem: Knowledge Deficit  Goal: Patient/family/caregiver demonstrates understanding of disease process, treatment plan, medications, and discharge instructions  Description: Complete learning assessment and assess knowledge base.   Interventions:  - Provide teaching at level of understanding  - Provide teaching via preferred learning methods  Outcome: Progressing     Problem: DISCHARGE PLANNING  Goal: Discharge to home or other facility with appropriate resources  Description: INTERVENTIONS:  - Identify barriers to discharge w/patient and caregiver  - Arrange for needed discharge resources and transportation as appropriate  - Identify discharge learning needs (meds, wound care, etc.)  - Arrange for interpretive services to assist at discharge as needed  - Refer to Case Management Department for coordinating discharge planning if the patient needs post-hospital services based on physician/advanced practitioner order or complex needs related to functional status, cognitive ability, or social support system  Outcome: Progressing     Problem: PAIN - ADULT  Goal: Verbalizes/displays adequate comfort level or baseline comfort level  Description: Interventions:  - Encourage patient to monitor pain and request assistance  - Assess pain using appropriate pain scale  - Administer analgesics based on type and severity of pain and evaluate response  - Implement non-pharmacological measures as appropriate and evaluate response  - Consider cultural and social influences on pain and pain management  - Notify physician/advanced practitioner if interventions unsuccessful or patient reports new pain  Outcome: Progressing     Problem: INFECTION - ADULT  Goal: Absence or prevention of progression during hospitalization  Description: INTERVENTIONS:  - Assess and monitor for signs and symptoms of infection  - Monitor lab/diagnostic results  - Monitor all insertion sites, i.e. indwelling lines, tubes, and drains  - Monitor endotracheal if appropriate and nasal secretions for changes in amount and color  - [place] appropriate cooling/warming therapies per order  - Administer medications as ordered  - Instruct and encourage patient and family to use good hand hygiene technique  - Identify and instruct in appropriate isolation precautions for identified infection/condition  Outcome: Progressing  Goal: Absence of fever/infection during neutropenic period  Description: INTERVENTIONS:  - Monitor WBC    Outcome: Progressing     Problem: SAFETY ADULT  Goal: Patient will remain free of falls  Description: INTERVENTIONS:  - Educate patient/family on patient safety including physical limitations  - Instruct patient to call for assistance with activity   - Consult OT/PT to assist with strengthening/mobility   - Keep Call bell within reach  - Keep bed low and locked with side rails adjusted as appropriate  - Keep care items and personal belongings within reach  - Initiate and maintain comfort rounds  - Make Fall Risk Sign visible to staff  - Offer Toileting every  Hours, in advance of need  - Initiate/Maintain   alarm  - Obtain necessary fall risk management equipment:     - Apply yellow socks and bracelet for high fall risk patients  - Consider moving patient to room near nurses station  Outcome: Progressing  Goal: Maintain or return to baseline ADL function  Description: INTERVENTIONS:  -  Assess patient's ability to carry out ADLs; assess patient's baseline for ADL function and identify physical deficits which impact ability to perform ADLs (bathing, care of mouth/teeth, toileting, grooming, dressing, etc.)  - Assess/evaluate cause of self-care deficits   - Assess range of motion  - Assess patient's mobility; develop plan if impaired  - Assess patient's need for assistive devices and provide as appropriate  - Encourage maximum independence but intervene and supervise when necessary  - Involve family in performance of ADLs  - Assess for home care needs following discharge   - Consider OT consult to assist with ADL evaluation and planning for discharge  - Provide patient education as appropriate  Outcome: Progressing  Goal: Maintains/Returns to pre admission functional level  Description: INTERVENTIONS:  - Perform BMAT or MOVE assessment daily.   - Set and communicate daily mobility goal to care team and patient/family/caregiver. - Collaborate with rehabilitation services on mobility goals if consulted  - Perform Range of Motion    times a day. - Reposition patient every    hours. - Dangle patient    times a day  - Stand patient    times a day  - Ambulate patient    times a day  - Out of bed to chair    times a day   - Out of bed for meals    times a day  - Out of bed for toileting  - Record patient progress and toleration of activity level   Outcome: Progressing     Problem: Knowledge Deficit  Goal: Patient/family/caregiver demonstrates understanding of disease process, treatment plan, medications, and discharge instructions  Description: Complete learning assessment and assess knowledge base.   Interventions:  - Provide teaching at level of understanding  - Provide teaching via preferred learning methods  Outcome: Progressing     Problem: DISCHARGE PLANNING  Goal: Discharge to home or other facility with appropriate resources  Description: INTERVENTIONS:  - Identify barriers to discharge w/patient and caregiver  - Arrange for needed discharge resources and transportation as appropriate  - Identify discharge learning needs (meds, wound care, etc.)  - Arrange for interpretive services to assist at discharge as needed  - Refer to Case Management Department for coordinating discharge planning if the patient needs post-hospital services based on physician/advanced practitioner order or complex needs related to functional status, cognitive ability, or social support system  Outcome: Progressing     Problem: PAIN - ADULT  Goal: Verbalizes/displays adequate comfort level or baseline comfort level  Description: Interventions:  - Encourage patient to monitor pain and request assistance  - Assess pain using appropriate pain scale  - Administer analgesics based on type and severity of pain and evaluate response  - Implement non-pharmacological measures as appropriate and evaluate response  - Consider cultural and social influences on pain and pain management  - Notify physician/advanced practitioner if interventions unsuccessful or patient reports new pain  Outcome: Progressing     Problem: INFECTION - ADULT  Goal: Absence or prevention of progression during hospitalization  Description: INTERVENTIONS:  - Assess and monitor for signs and symptoms of infection  - Monitor lab/diagnostic results  - Monitor all insertion sites, i.e. indwelling lines, tubes, and drains  - Monitor endotracheal if appropriate and nasal secretions for changes in amount and color  - [place] appropriate cooling/warming therapies per order  - Administer medications as ordered  - Instruct and encourage patient and family to use good hand hygiene technique  - Identify and instruct in appropriate isolation precautions for identified infection/condition  Outcome: Progressing  Goal: Absence of fever/infection during neutropenic period  Description: INTERVENTIONS:  - Monitor WBC    Outcome: Progressing     Problem: SAFETY ADULT  Goal: Patient will remain free of falls  Description: INTERVENTIONS:  - Educate patient/family on patient safety including physical limitations  - Instruct patient to call for assistance with activity   - Consult OT/PT to assist with strengthening/mobility   - Keep Call bell within reach  - Keep bed low and locked with side rails adjusted as appropriate  - Keep care items and personal belongings within reach  - Initiate and maintain comfort rounds  - Make Fall Risk Sign visible to staff  - Offer Toileting every  Hours, in advance of need  - Initiate/Maintain alarm  - Obtain necessary fall risk management equipment:   - Apply yellow socks and bracelet for high fall risk patients  - Consider moving patient to room near nurses station  Outcome: Progressing  Goal: Maintain or return to baseline ADL function  Description: INTERVENTIONS:  -  Assess patient's ability to carry out ADLs; assess patient's baseline for ADL function and identify physical deficits which impact ability to perform ADLs (bathing, care of mouth/teeth, toileting, grooming, dressing, etc.)  - Assess/evaluate cause of self-care deficits   - Assess range of motion  - Assess patient's mobility; develop plan if impaired  - Assess patient's need for assistive devices and provide as appropriate  - Encourage maximum independence but intervene and supervise when necessary  - Involve family in performance of ADLs  - Assess for home care needs following discharge   - Consider OT consult to assist with ADL evaluation and planning for discharge  - Provide patient education as appropriate  Outcome: Progressing  Goal: Maintains/Returns to pre admission functional level  Description: INTERVENTIONS:  - Perform BMAT or MOVE assessment daily.   - Set and communicate daily mobility goal to care team and patient/family/caregiver.    - Collaborate with rehabilitation services on mobility goals if consulted  - Perform Range of Motion times a day. - Reposition patient every  hours. - Dangle patient  times a day  - Stand patient  times a day  - Ambulate patient  times a day  - Out of bed to chair  times a day   - Out of bed for meals  times a day  - Out of bed for toileting  - Record patient progress and toleration of activity level   Outcome: Progressing     Problem: Knowledge Deficit  Goal: Patient/family/caregiver demonstrates understanding of disease process, treatment plan, medications, and discharge instructions  Description: Complete learning assessment and assess knowledge base.   Interventions:  - Provide teaching at level of understanding  - Provide teaching via preferred learning methods  Outcome: Progressing     Problem: DISCHARGE PLANNING  Goal: Discharge to home or other facility with appropriate resources  Description: INTERVENTIONS:  - Identify barriers to discharge w/patient and caregiver  - Arrange for needed discharge resources and transportation as appropriate  - Identify discharge learning needs (meds, wound care, etc.)  - Arrange for interpretive services to assist at discharge as needed  - Refer to Case Management Department for coordinating discharge planning if the patient needs post-hospital services based on physician/advanced practitioner order or complex needs related to functional status, cognitive ability, or social support system  Outcome: Progressing     Problem: PAIN - ADULT  Goal: Verbalizes/displays adequate comfort level or baseline comfort level  Description: Interventions:  - Encourage patient to monitor pain and request assistance  - Assess pain using appropriate pain scale  - Administer analgesics based on type and severity of pain and evaluate response  - Implement non-pharmacological measures as appropriate and evaluate response  - Consider cultural and social influences on pain and pain management  - Notify physician/advanced practitioner if interventions unsuccessful or patient reports new pain  Outcome: Progressing     Problem: INFECTION - ADULT  Goal: Absence or prevention of progression during hospitalization  Description: INTERVENTIONS:  - Assess and monitor for signs and symptoms of infection  - Monitor lab/diagnostic results  - Monitor all insertion sites, i.e. indwelling lines, tubes, and drains  - Monitor endotracheal if appropriate and nasal secretions for changes in amount and color  - [place] appropriate cooling/warming therapies per order  - Administer medications as ordered  - Instruct and encourage patient and family to use good hand hygiene technique  - Identify and instruct in appropriate isolation precautions for identified infection/condition  Outcome: Progressing  Goal: Absence of fever/infection during neutropenic period  Description: INTERVENTIONS:  - Monitor WBC    Outcome: Progressing     Problem: SAFETY ADULT  Goal: Patient will remain free of falls  Description: INTERVENTIONS:  - Educate patient/family on patient safety including physical limitations  - Instruct patient to call for assistance with activity   - Consult OT/PT to assist with strengthening/mobility   - Keep Call bell within reach  - Keep bed low and locked with side rails adjusted as appropriate  - Keep care items and personal belongings within reach  - Initiate and maintain comfort rounds  - Make Fall Risk Sign visible to staff  - Offer Toileting every  Hours, in advance of need  - Initiate/Maintain alarm  - Obtain necessary fall risk management equipment:   - Apply yellow socks and bracelet for high fall risk patients  - Consider moving patient to room near nurses station  Outcome: Progressing  Goal: Maintain or return to baseline ADL function  Description: INTERVENTIONS:  -  Assess patient's ability to carry out ADLs; assess patient's baseline for ADL function and identify physical deficits which impact ability to perform ADLs (bathing, care of mouth/teeth, toileting, grooming, dressing, etc.)  - Assess/evaluate cause of self-care deficits   - Assess range of motion  - Assess patient's mobility; develop plan if impaired  - Assess patient's need for assistive devices and provide as appropriate  - Encourage maximum independence but intervene and supervise when necessary  - Involve family in performance of ADLs  - Assess for home care needs following discharge   - Consider OT consult to assist with ADL evaluation and planning for discharge  - Provide patient education as appropriate  Outcome: Progressing  Goal: Maintains/Returns to pre admission functional level  Description: INTERVENTIONS:  - Perform BMAT or MOVE assessment daily.   - Set and communicate daily mobility goal to care team and patient/family/caregiver. - Collaborate with rehabilitation services on mobility goals if consulted  - Perform Range of Motion  times a day. - Reposition patient every  hours. - Dangle patient  times a day  - Stand patient  times a day  - Ambulate patient  times a day  - Out of bed to chair  times a day   - Out of bed for meals  times a day  - Out of bed for toileting  - Record patient progress and toleration of activity level   Outcome: Progressing     Problem: Knowledge Deficit  Goal: Patient/family/caregiver demonstrates understanding of disease process, treatment plan, medications, and discharge instructions  Description: Complete learning assessment and assess knowledge base.   Interventions:  - Provide teaching at level of understanding  - Provide teaching via preferred learning methods  Outcome: Progressing     Problem: DISCHARGE PLANNING  Goal: Discharge to home or other facility with appropriate resources  Description: INTERVENTIONS:  - Identify barriers to discharge w/patient and caregiver  - Arrange for needed discharge resources and transportation as appropriate  - Identify discharge learning needs (meds, wound care, etc.)  - Arrange for interpretive services to assist at discharge as needed  - Refer to Case Management Department for coordinating discharge planning if the patient needs post-hospital services based on physician/advanced practitioner order or complex needs related to functional status, cognitive ability, or social support system  Outcome: Progressing

## 2023-07-09 ENCOUNTER — APPOINTMENT (OUTPATIENT)
Dept: MRI IMAGING | Facility: HOSPITAL | Age: 33
End: 2023-07-09
Payer: COMMERCIAL

## 2023-07-09 ENCOUNTER — APPOINTMENT (OUTPATIENT)
Dept: CT IMAGING | Facility: HOSPITAL | Age: 33
End: 2023-07-09
Payer: COMMERCIAL

## 2023-07-09 VITALS
HEART RATE: 78 BPM | TEMPERATURE: 98.4 F | SYSTOLIC BLOOD PRESSURE: 112 MMHG | DIASTOLIC BLOOD PRESSURE: 64 MMHG | RESPIRATION RATE: 18 BRPM

## 2023-07-09 DIAGNOSIS — E55.9 VITAMIN D DEFICIENCY: ICD-10-CM

## 2023-07-09 DIAGNOSIS — Z3A.29 29 WEEKS GESTATION OF PREGNANCY: ICD-10-CM

## 2023-07-09 DIAGNOSIS — E10.9 DIABETES MELLITUS TYPE 1, CONTROLLED, WITHOUT COMPLICATIONS (HCC): ICD-10-CM

## 2023-07-09 DIAGNOSIS — O24.013 PRE-EXISTING TYPE 1 DIABETES MELLITUS DURING PREGNANCY IN THIRD TRIMESTER: ICD-10-CM

## 2023-07-09 DIAGNOSIS — E10.65 PRE-EXISTING TYPE 1 DIABETES MELLITUS WITH HYPERGLYCEMIA DURING PREGNANCY IN THIRD TRIMESTER (HCC): ICD-10-CM

## 2023-07-09 DIAGNOSIS — O24.013 PRE-EXISTING TYPE 1 DIABETES MELLITUS WITH HYPERGLYCEMIA DURING PREGNANCY IN THIRD TRIMESTER (HCC): ICD-10-CM

## 2023-07-09 DIAGNOSIS — Z3A.32 32 WEEKS GESTATION OF PREGNANCY: ICD-10-CM

## 2023-07-09 PROBLEM — E10.10 DIABETIC KETOACIDOSIS ASSOCIATED WITH TYPE 1 DIABETES MELLITUS (HCC): Status: ACTIVE | Noted: 2023-07-09

## 2023-07-09 PROBLEM — R51.9 HEADACHE: Status: ACTIVE | Noted: 2023-07-09

## 2023-07-09 LAB
ALBUMIN SERPL BCP-MCNC: 2.9 G/DL (ref 3.5–5)
ALBUMIN SERPL BCP-MCNC: 3 G/DL (ref 3.5–5)
ALBUMIN SERPL BCP-MCNC: 3.1 G/DL (ref 3.5–5)
ALP SERPL-CCNC: 69 U/L (ref 34–104)
ALP SERPL-CCNC: 69 U/L (ref 34–104)
ALP SERPL-CCNC: 70 U/L (ref 34–104)
ALP SERPL-CCNC: 70 U/L (ref 34–104)
ALP SERPL-CCNC: 71 U/L (ref 34–104)
ALP SERPL-CCNC: 74 U/L (ref 34–104)
ALP SERPL-CCNC: 75 U/L (ref 34–104)
ALT SERPL W P-5'-P-CCNC: 14 U/L (ref 7–52)
ALT SERPL W P-5'-P-CCNC: 14 U/L (ref 7–52)
ALT SERPL W P-5'-P-CCNC: 15 U/L (ref 7–52)
ALT SERPL W P-5'-P-CCNC: 20 U/L (ref 7–52)
ANION GAP SERPL CALCULATED.3IONS-SCNC: 5 MMOL/L
ANION GAP SERPL CALCULATED.3IONS-SCNC: 6 MMOL/L
ANION GAP SERPL CALCULATED.3IONS-SCNC: 7 MMOL/L
ANION GAP SERPL CALCULATED.3IONS-SCNC: 7 MMOL/L
ANION GAP SERPL CALCULATED.3IONS-SCNC: 8 MMOL/L
AST SERPL W P-5'-P-CCNC: 11 U/L (ref 13–39)
AST SERPL W P-5'-P-CCNC: 11 U/L (ref 13–39)
AST SERPL W P-5'-P-CCNC: 12 U/L (ref 13–39)
AST SERPL W P-5'-P-CCNC: 13 U/L (ref 13–39)
BACTERIA UR QL AUTO: ABNORMAL /HPF
BETA-HYDROXYBUTYRATE: 0.3 MMOL/L
BETA-HYDROXYBUTYRATE: 0.3 MMOL/L
BETA-HYDROXYBUTYRATE: 0.7 MMOL/L
BETA-HYDROXYBUTYRATE: 0.8 MMOL/L
BETA-HYDROXYBUTYRATE: 0.9 MMOL/L
BETA-HYDROXYBUTYRATE: 0.9 MMOL/L
BETA-HYDROXYBUTYRATE: 1 MMOL/L
BILIRUB SERPL-MCNC: 0.33 MG/DL (ref 0.2–1)
BILIRUB SERPL-MCNC: 0.35 MG/DL (ref 0.2–1)
BILIRUB SERPL-MCNC: 0.36 MG/DL (ref 0.2–1)
BILIRUB SERPL-MCNC: 0.36 MG/DL (ref 0.2–1)
BILIRUB SERPL-MCNC: 0.38 MG/DL (ref 0.2–1)
BILIRUB SERPL-MCNC: 0.39 MG/DL (ref 0.2–1)
BILIRUB SERPL-MCNC: 0.4 MG/DL (ref 0.2–1)
BILIRUB UR QL STRIP: NEGATIVE
BUN SERPL-MCNC: 7 MG/DL (ref 5–25)
BUN SERPL-MCNC: 8 MG/DL (ref 5–25)
BUN SERPL-MCNC: 9 MG/DL (ref 5–25)
BUN SERPL-MCNC: 9 MG/DL (ref 5–25)
CALCIUM ALBUM COR SERPL-MCNC: 8.6 MG/DL (ref 8.3–10.1)
CALCIUM ALBUM COR SERPL-MCNC: 8.8 MG/DL (ref 8.3–10.1)
CALCIUM ALBUM COR SERPL-MCNC: 8.9 MG/DL (ref 8.3–10.1)
CALCIUM ALBUM COR SERPL-MCNC: 9 MG/DL (ref 8.3–10.1)
CALCIUM ALBUM COR SERPL-MCNC: 9.1 MG/DL (ref 8.3–10.1)
CALCIUM SERPL-MCNC: 7.7 MG/DL (ref 8.4–10.2)
CALCIUM SERPL-MCNC: 7.9 MG/DL (ref 8.4–10.2)
CALCIUM SERPL-MCNC: 8 MG/DL (ref 8.4–10.2)
CALCIUM SERPL-MCNC: 8.1 MG/DL (ref 8.4–10.2)
CALCIUM SERPL-MCNC: 8.1 MG/DL (ref 8.4–10.2)
CALCIUM SERPL-MCNC: 8.2 MG/DL (ref 8.4–10.2)
CALCIUM SERPL-MCNC: 8.4 MG/DL (ref 8.4–10.2)
CHLORIDE SERPL-SCNC: 104 MMOL/L (ref 96–108)
CHLORIDE SERPL-SCNC: 104 MMOL/L (ref 96–108)
CHLORIDE SERPL-SCNC: 105 MMOL/L (ref 96–108)
CHLORIDE SERPL-SCNC: 105 MMOL/L (ref 96–108)
CHLORIDE SERPL-SCNC: 106 MMOL/L (ref 96–108)
CLARITY UR: CLEAR
CO2 SERPL-SCNC: 19 MMOL/L (ref 21–32)
CO2 SERPL-SCNC: 20 MMOL/L (ref 21–32)
CO2 SERPL-SCNC: 20 MMOL/L (ref 21–32)
CO2 SERPL-SCNC: 21 MMOL/L (ref 21–32)
CO2 SERPL-SCNC: 22 MMOL/L (ref 21–32)
CO2 SERPL-SCNC: 22 MMOL/L (ref 21–32)
CO2 SERPL-SCNC: 23 MMOL/L (ref 21–32)
COLOR UR: ABNORMAL
COLOR UR: COLORLESS
COLOR UR: COLORLESS
CREAT SERPL-MCNC: 0.5 MG/DL (ref 0.6–1.3)
CREAT SERPL-MCNC: 0.53 MG/DL (ref 0.6–1.3)
CREAT SERPL-MCNC: 0.55 MG/DL (ref 0.6–1.3)
CREAT SERPL-MCNC: 0.56 MG/DL (ref 0.6–1.3)
CREAT SERPL-MCNC: 0.57 MG/DL (ref 0.6–1.3)
CREAT SERPL-MCNC: 0.6 MG/DL (ref 0.6–1.3)
CREAT SERPL-MCNC: 0.69 MG/DL (ref 0.6–1.3)
GFR SERPL CREATININE-BSD FRML MDRD: 117 ML/MIN/1.73SQ M
GFR SERPL CREATININE-BSD FRML MDRD: 122 ML/MIN/1.73SQ M
GFR SERPL CREATININE-BSD FRML MDRD: 124 ML/MIN/1.73SQ M
GFR SERPL CREATININE-BSD FRML MDRD: 125 ML/MIN/1.73SQ M
GFR SERPL CREATININE-BSD FRML MDRD: 126 ML/MIN/1.73SQ M
GFR SERPL CREATININE-BSD FRML MDRD: 127 ML/MIN/1.73SQ M
GFR SERPL CREATININE-BSD FRML MDRD: 130 ML/MIN/1.73SQ M
GLUCOSE P FAST SERPL-MCNC: 181 MG/DL (ref 65–99)
GLUCOSE SERPL-MCNC: 134 MG/DL (ref 65–140)
GLUCOSE SERPL-MCNC: 136 MG/DL (ref 65–140)
GLUCOSE SERPL-MCNC: 139 MG/DL (ref 65–140)
GLUCOSE SERPL-MCNC: 156 MG/DL (ref 65–140)
GLUCOSE SERPL-MCNC: 158 MG/DL (ref 65–140)
GLUCOSE SERPL-MCNC: 160 MG/DL (ref 65–140)
GLUCOSE SERPL-MCNC: 168 MG/DL (ref 65–140)
GLUCOSE SERPL-MCNC: 169 MG/DL (ref 65–140)
GLUCOSE SERPL-MCNC: 172 MG/DL (ref 65–140)
GLUCOSE SERPL-MCNC: 172 MG/DL (ref 65–140)
GLUCOSE SERPL-MCNC: 178 MG/DL (ref 65–140)
GLUCOSE SERPL-MCNC: 178 MG/DL (ref 65–140)
GLUCOSE SERPL-MCNC: 181 MG/DL (ref 65–140)
GLUCOSE SERPL-MCNC: 181 MG/DL (ref 65–140)
GLUCOSE SERPL-MCNC: 185 MG/DL (ref 65–140)
GLUCOSE SERPL-MCNC: 187 MG/DL (ref 65–140)
GLUCOSE SERPL-MCNC: 192 MG/DL (ref 65–140)
GLUCOSE SERPL-MCNC: 208 MG/DL (ref 65–140)
GLUCOSE SERPL-MCNC: 211 MG/DL (ref 65–140)
GLUCOSE SERPL-MCNC: 212 MG/DL (ref 65–140)
GLUCOSE SERPL-MCNC: 214 MG/DL (ref 65–140)
GLUCOSE SERPL-MCNC: 234 MG/DL (ref 65–140)
GLUCOSE UR STRIP-MCNC: ABNORMAL MG/DL
HCV AB SER QL: NORMAL
HGB UR QL STRIP.AUTO: NEGATIVE
KETONES UR STRIP-MCNC: ABNORMAL MG/DL
LEUKOCYTE ESTERASE UR QL STRIP: ABNORMAL
MUCOUS THREADS UR QL AUTO: ABNORMAL
MUCOUS THREADS UR QL AUTO: ABNORMAL
NITRITE UR QL STRIP: NEGATIVE
NON-SQ EPI CELLS URNS QL MICRO: ABNORMAL /HPF
PH UR STRIP.AUTO: 6.5 [PH]
PH UR STRIP.AUTO: 7 [PH]
PH UR STRIP.AUTO: 7 [PH]
POTASSIUM SERPL-SCNC: 3.4 MMOL/L (ref 3.5–5.3)
POTASSIUM SERPL-SCNC: 3.4 MMOL/L (ref 3.5–5.3)
POTASSIUM SERPL-SCNC: 3.5 MMOL/L (ref 3.5–5.3)
POTASSIUM SERPL-SCNC: 3.6 MMOL/L (ref 3.5–5.3)
POTASSIUM SERPL-SCNC: 3.7 MMOL/L (ref 3.5–5.3)
PROT SERPL-MCNC: 5.2 G/DL (ref 6.4–8.4)
PROT SERPL-MCNC: 5.2 G/DL (ref 6.4–8.4)
PROT SERPL-MCNC: 5.4 G/DL (ref 6.4–8.4)
PROT SERPL-MCNC: 5.4 G/DL (ref 6.4–8.4)
PROT SERPL-MCNC: 5.6 G/DL (ref 6.4–8.4)
PROT SERPL-MCNC: 5.7 G/DL (ref 6.4–8.4)
PROT SERPL-MCNC: 5.7 G/DL (ref 6.4–8.4)
PROT UR STRIP-MCNC: NEGATIVE MG/DL
RBC #/AREA URNS AUTO: ABNORMAL /HPF
SODIUM SERPL-SCNC: 132 MMOL/L (ref 135–147)
SODIUM SERPL-SCNC: 133 MMOL/L (ref 135–147)
SODIUM SERPL-SCNC: 134 MMOL/L (ref 135–147)
SODIUM SERPL-SCNC: 134 MMOL/L (ref 135–147)
SP GR UR STRIP.AUTO: 1.01 (ref 1–1.03)
SP GR UR STRIP.AUTO: 1.01 (ref 1–1.03)
SP GR UR STRIP.AUTO: 1.02 (ref 1–1.03)
SP GR UR STRIP.AUTO: 1.02 (ref 1–1.03)
SP GR UR STRIP.AUTO: 1.03 (ref 1–1.03)
SP GR UR STRIP.AUTO: 1.03 (ref 1–1.03)
UROBILINOGEN UR STRIP-ACNC: <2 MG/DL
WBC #/AREA URNS AUTO: ABNORMAL /HPF

## 2023-07-09 PROCEDURE — 87150 DNA/RNA AMPLIFIED PROBE: CPT

## 2023-07-09 PROCEDURE — G0463 HOSPITAL OUTPT CLINIC VISIT: HCPCS

## 2023-07-09 PROCEDURE — 99205 OFFICE O/P NEW HI 60 MIN: CPT

## 2023-07-09 PROCEDURE — 70496 CT ANGIOGRAPHY HEAD: CPT

## 2023-07-09 PROCEDURE — 99223 1ST HOSP IP/OBS HIGH 75: CPT | Performed by: OBSTETRICS & GYNECOLOGY

## 2023-07-09 PROCEDURE — G1004 CDSM NDSC: HCPCS

## 2023-07-09 PROCEDURE — 80053 COMPREHEN METABOLIC PANEL: CPT

## 2023-07-09 PROCEDURE — NC001 PR NO CHARGE: Performed by: OBSTETRICS & GYNECOLOGY

## 2023-07-09 PROCEDURE — 70544 MR ANGIOGRAPHY HEAD W/O DYE: CPT

## 2023-07-09 PROCEDURE — 82010 KETONE BODYS QUAN: CPT

## 2023-07-09 PROCEDURE — 82948 REAGENT STRIP/BLOOD GLUCOSE: CPT

## 2023-07-09 PROCEDURE — 81001 URINALYSIS AUTO W/SCOPE: CPT

## 2023-07-09 PROCEDURE — 86803 HEPATITIS C AB TEST: CPT

## 2023-07-09 PROCEDURE — 83036 HEMOGLOBIN GLYCOSYLATED A1C: CPT | Performed by: OBSTETRICS & GYNECOLOGY

## 2023-07-09 PROCEDURE — 76817 TRANSVAGINAL US OBSTETRIC: CPT

## 2023-07-09 RX ORDER — INSULIN DETEMIR 100 [IU]/ML
48 INJECTION, SOLUTION SUBCUTANEOUS DAILY
Qty: 15 ML | Refills: 2 | Status: SHIPPED | OUTPATIENT
Start: 2023-07-09

## 2023-07-09 RX ORDER — ACETAMINOPHEN 325 MG/1
975 TABLET ORAL EVERY 8 HOURS PRN
Status: DISCONTINUED | OUTPATIENT
Start: 2023-07-09 | End: 2023-07-09 | Stop reason: HOSPADM

## 2023-07-09 RX ORDER — CAFFEINE 200 MG
200 TABLET ORAL ONCE
Status: COMPLETED | OUTPATIENT
Start: 2023-07-09 | End: 2023-07-09

## 2023-07-09 RX ORDER — INSULIN LISPRO 100 [IU]/ML
3 INJECTION, SOLUTION INTRAVENOUS; SUBCUTANEOUS ONCE
Status: DISCONTINUED | OUTPATIENT
Start: 2023-07-09 | End: 2023-07-09

## 2023-07-09 RX ORDER — ACETAMINOPHEN 325 MG/1
650 TABLET ORAL EVERY 4 HOURS PRN
Refills: 0
Start: 2023-07-09

## 2023-07-09 RX ORDER — INSULIN LISPRO 100 [IU]/ML
11 INJECTION, SOLUTION INTRAVENOUS; SUBCUTANEOUS ONCE
Status: COMPLETED | OUTPATIENT
Start: 2023-07-09 | End: 2023-07-09

## 2023-07-09 RX ORDER — INSULIN LISPRO 100 [IU]/ML
5 INJECTION, SOLUTION INTRAVENOUS; SUBCUTANEOUS ONCE
Status: COMPLETED | OUTPATIENT
Start: 2023-07-09 | End: 2023-07-09

## 2023-07-09 RX ORDER — LORAZEPAM 2 MG/ML
0.5 INJECTION INTRAMUSCULAR ONCE
Status: COMPLETED | OUTPATIENT
Start: 2023-07-09 | End: 2023-07-09

## 2023-07-09 RX ADMIN — LORAZEPAM 0.5 MG: 2 INJECTION INTRAMUSCULAR; INTRAVENOUS at 09:49

## 2023-07-09 RX ADMIN — CAFFEINE 200 MG: 200 TABLET ORAL at 06:08

## 2023-07-09 RX ADMIN — PANTOPRAZOLE SODIUM 40 MG: 20 TABLET, DELAYED RELEASE ORAL at 01:43

## 2023-07-09 RX ADMIN — ACETAMINOPHEN 325MG 650 MG: 325 TABLET ORAL at 14:23

## 2023-07-09 RX ADMIN — METOCLOPRAMIDE 10 MG: 5 INJECTION, SOLUTION INTRAMUSCULAR; INTRAVENOUS at 07:46

## 2023-07-09 RX ADMIN — ACETAMINOPHEN 325MG 975 MG: 325 TABLET ORAL at 06:08

## 2023-07-09 RX ADMIN — DEXTROSE AND SODIUM CHLORIDE 100 ML/HR: 5; .9 INJECTION, SOLUTION INTRAVENOUS at 07:12

## 2023-07-09 RX ADMIN — INSULIN LISPRO 5 UNITS: 100 INJECTION, SOLUTION INTRAVENOUS; SUBCUTANEOUS at 14:50

## 2023-07-09 RX ADMIN — INSULIN DETEMIR 24 UNITS: 100 INJECTION, SOLUTION SUBCUTANEOUS at 11:13

## 2023-07-09 RX ADMIN — IOHEXOL 85 ML: 350 INJECTION, SOLUTION INTRAVENOUS at 11:42

## 2023-07-09 RX ADMIN — INSULIN LISPRO 11 UNITS: 100 INJECTION, SOLUTION INTRAVENOUS; SUBCUTANEOUS at 11:15

## 2023-07-09 NOTE — PROCEDURES
Josseline Ignacio, a [id number] at [address] [employer] with an JOHN of 9/2/2023, by Last Menstrual Period, was seen at [address] for the following procedure(s): $Procedure Type: US - Transvaginal]                   Ultrasound Other  Fetal Presentation: Vertex  Cervical Length: 4.56  Funnel: No  Placenta Previa: No  Vasa Previa: No         Ultrasound Probe Disinfection    A transvaginal ultrasound was performed.    Prior to use, disinfection was performed with High Level Disinfection Process (Trophon)    Jeanne Ramirez MD  07/09/23  7:16 AM

## 2023-07-09 NOTE — ASSESSMENT & PLAN NOTE
Admit for observation  Follow up CBC, RPR, Blood Type, Hep C antibodies  VTX by transabdominal ultrasound   GBS collected   FHT reactive with contractions on presentation, resolved now  CL: 4.56 cm  Wet prep/KOH neg  SVE: 1/0/-3  Diabetic diet

## 2023-07-09 NOTE — H&P
H&P Exam - Obstetrics   Vanna Freeman 29 y.o. female MRN: [id number]  Unit/Bed#: L&D 328-01 Encounter: [id number]      ASSESSMENT:  28 yo  at 32w0d weeks gestation with PMHx of T1DM who is being admitted with nausea and vomiting, with suspicion for evolving DKA. PLAN:  Diabetic ketoacidosis associated with type 1 diabetes mellitus [facility])  Assessment & Plan  Lab Results   Component Value Date    HGBA1C 6.8 (H) 2023       Recent Labs     23  1951 238 23  2345   POCGLU 108 111 138 158*       Blood Sugar Average: Last 72 hrs:  (P) 117.5     Admission CMP with Glucose 184  UA with 3+ ketones and large glucose  Nausea, vomiting, inability to tolerate PO  Recent elevated Dexcom readings     Plan:   - Beta-Hydroxybuterate ordered  - Will start D5 NS and insulin gtt  - Consult MFM for further management      32 weeks gestation of pregnancy  Assessment & Plan  Admit for observation  Follow up CBC, RPR, Blood Type, Hep C antibodies  VTX by transabdominal ultrasound   GBS collected   FHT reactive with contractions on presentation, resolved now  CL: 4.56 cm  Wet prep/KOH neg  SVE: 1/0/-3  Diabetic diet    CALLI (generalized anxiety disorder)  Assessment & Plan  No current medications    * Headache  Assessment & Plan  Status post Tylenol at home with minimal relief  Status post Reglan, magnesium, Tylenol, Caffeine, in triage with improvement in headache      Discussed with Dr. Eugene Orosco    This patient will be an OBSERVATION and I certify the anticipated length of stay is <2 Midnights. History of Present Illness     Chief Complaint: Observation    HPI:  Vanna Freeman is a 29 y.o. [id number] female with previous medical history of type 1 diabetes an JOHN of 2023, by Last Menstrual Period at 32w0d weeks gestation who is being admitted for headache that started yesterday and nausea and vomiting since last night.   She has type 1 diabetes for which she takes Levemir 14u BID. She ports she has been taking it as prescribed. She has had no sick contacts, fever, chills, chest pain, shortness of breath, changes in vision, right upper quadrant or epigastric pain, right headedness, weakness, dizziness new onset swelling, vaginal itching, vaginal burning, hematuria, dysuria, uterine contractions, loss of fluid. She reports she has never been in DKA before. On chart review, patient's diabetes manager reached out to her on MyChart with concern for elevated blood sugars on her Dexcom report, recommending patient to go into the hospital for evaluation for concern for DKA. Patient did not respond to this message. She is an [facility] patient. OB History    Para Term  AB Living   5 2 2   2 2   SAB IAB Ectopic Multiple Live Births   1 1   0 2      # Outcome Date GA Lbr Star/2nd Weight Sex Delivery Anes PTL Lv   5 Current            4 Term 21 39w0d / 02:11 3515 g (7 lb 12 oz) F Vag-Spont EPI N PARI   3 SAB 10/2020              Birth Comments: 8 week loss   2 Term 19 38w3d  3290 g (7 lb 4.1 oz) M CS-LTranv EPI N PARI      Complications: Fetal Intolerance   1 IAB 10/2016              Birth Comments: 7 week      Obstetric Comments   : about 7640       Baby complications/comments: none    Review of Systems   Constitutional: Positive for appetite change. Negative for chills and fever. Eyes: Negative for visual disturbance. Respiratory: Negative for chest tightness and shortness of breath. Cardiovascular: Negative for chest pain and palpitations. Gastrointestinal: Positive for nausea and vomiting. Negative for abdominal pain. Endocrine: Negative for polydipsia and polyuria. Genitourinary: Negative for flank pain, frequency, vaginal bleeding, vaginal discharge and vaginal pain. Musculoskeletal: Negative for back pain. Neurological: Positive for headaches.      Historical Information   Past Medical History:   Diagnosis Date   • Abnormal Pap smear of cervix    • Anxiety    • Diabetes mellitus ([facility])    • History of  delivery, antepartum 2021   • HPV (human papilloma virus) infection    • Microalbuminuria due to type 1 diabetes mellitus ([address]) 2021   • Varicella     vaccinated as child     Past Surgical History:   Procedure Laterality Date   • INNER EAR SURGERY Left    • [place]  DELIVERY ONLY N/A 2019    Procedure:  SECTION (); Surgeon: Camille Temple MD;  Location: [facility];  Service: Obstetrics   • WISDOM TOOTH EXTRACTION       Social History   Social History     Substance and Sexual Activity   Alcohol Use Not Currently    Comment: occ. Social History     Substance and Sexual Activity   Drug Use No     Social History     Tobacco Use   Smoking Status Never   Smokeless Tobacco Never     Family History: non-contributory    Meds/Allergies      Medications Prior to Admission   Medication   • Cholecalciferol (Vitamin D-3) 25 MCG (1000 UT) CAPS   • Insulin Aspart (NovoLOG PenFill) 100 UNITS/ML cartridge for injection   • insulin detemir (Levemir FlexTouch) 100 Units/mL injection pen   • Prenatal Vit-Fe Fumarate-FA (PRENATAL 1+1 PO)   • Alcohol Swabs (Pharmacist Choice Alcohol) PADS   • aspirin 81 mg chewable tablet   • Blood Glucose Monitoring Suppl (FreeStyle Lite) DON   • Continuous Blood Gluc  (Dexcom G6 ) DON   • Continuous Blood Gluc Sensor (Dexcom G6 Sensor) MISC   • Continuous Blood Gluc Transmit (Dexcom G6 Transmitter) MISC   • FREESTYLE LITE test strip   • Injection Device for Insulin (InPen 100-Grey-Juan) DON   • Insulin Pen Needle (Comfort EZ Pen Needles) 32G X 4 MM MISC   • Lancets MISC      No Known Allergies    OBJECTIVE:    Vitals: Blood pressure 105/52, pulse 81, temperature 98.8 °F (37.1 °C), temperature source Oral, resp. rate 17, last menstrual period 2022, not currently breastfeeding. There is no height or weight on file to calculate BMI.     Physical Exam  HENT:      Head: Normocephalic. Eyes:      Conjunctiva/sclera: Conjunctivae normal.   Cardiovascular:      Rate and Rhythm: Normal rate. Pulses: Normal pulses. Pulmonary:      Effort: Pulmonary effort is normal.   Abdominal:      Palpations: Abdomen is soft. Tenderness: There is no abdominal tenderness. Genitourinary:     General: Normal vulva. Comments: Speculum exam: Normal vaginal and cervical mucosa. Normal vaginal discharge  Skin:     General: Skin is warm. Neurological:      Mental Status: She is alert and oriented to person, place, and time.    Psychiatric:         Mood and Affect: Mood normal.        Cervix:  1/0/-3    Fetal heart rate:   Baseline Rate: 130 bpm  Variability: Moderate 6-25 bpm  Accelerations: 15 x 15 or greater  Decelerations: None    [place]:   Contraction Frequency (minutes): 4-5  Contraction Duration (seconds): 30-50  Contraction Quality: Mild    Prenatal Labs:   Blood Type:   Lab Results   Component Value Date/Time    ABO Grouping O 07/08/2023 05:20 PM     , D (Rh type):   Lab Results   Component Value Date/Time    Rh Factor Positive 07/08/2023 05:20 PM     , Antibody Screen: negative   , HCT/HGB:   Lab Results   Component Value Date/Time    Hematocrit 38.8 07/08/2023 11:46 AM    Hemoglobin 12.8 07/08/2023 11:46 AM      , MCV:   Lab Results   Component Value Date/Time    MCV 94 07/08/2023 11:46 AM      , Platelets:   Lab Results   Component Value Date/Time    Platelets 230 91/79/4461 11:46 AM   , Rubella:   Lab Results   Component Value Date/Time    Rubella IgG Quant >175.0 03/26/2023 08:20 AM        , VDRL/RPR:   Lab Results   Component Value Date/Time    RPR Non-Reactive 11/19/2021 12:10 AM      , Hep B:   Lab Results   Component Value Date/Time    Hepatitis B Surface Ag Non-reactive 03/26/2023 08:20 AM     , Hep C: ordered    , HIV:   Lab Results   Component Value Date/Time    HIV-1/HIV-2 Ab Non-Reactive 04/20/2021 08:05 AM     , Chlamydia: negative  , Gonorrhea:   Lab Results   Component Value Date/Time    N gonorrhoeae, DNA Probe Negative 03/07/2023 06:25 PM     , Group B Strep:    Lab Results   Component Value Date/Time    Strep Grp B PCR (A) 09/23/2019 07:28 PM     Positive for Beta Hemolytic Strep Grp B by PCR, Sensitivities to follow.     Strep Grp B PCR Beta Hemolytic Streptococcus Group B (A) 09/23/2019 07:28 PM          Invasive Devices     Peripheral Intravenous Line  Duration           Peripheral IV 07/08/23 Left;Ventral (anterior) Forearm <1 day    Peripheral IV 07/08/23 Right;Dorsal (posterior) Hand <1 day                Elder Noel MD  PGY-3  7/9/2023  4:08 AM

## 2023-07-09 NOTE — PROGRESS NOTES
Antepartum Progress Note - OB/GYN   Silver Kidney 29 y.o. female MRN: [id number]  Unit/Bed#: L&D 328-01 Encounter: [id number]    Assessment:  Ashley An is a 52A [id number] at 32w1d who with PMHx of T1DM admitted with nausea, vomiting, headache, and ketosis. Presented with headache this admission, rated 4/10 in intensity at this time. Plan:  Type 1 diabetes mellitus with ketosis [facility])  Assessment & Plan  Lab Results   Component Value Date    HGBA1C 6.8 (H) 06/11/2023       Recent Labs     07/09/23  0552 07/09/23  0644 07/09/23  0749 07/09/23  0904   POCGLU 178* 169* 185* 172*       Blood Sugar Average: Last 72 hrs:  (P) [id number]     Admission CMP with Glucose 184  UA with 3+ ketones and large glucose  Nausea, vomiting, inability to tolerate PO  Recent elevated Dexcom readings   Elevated Beta-hydroxybuterate    Plan:   - D/c D5 NS now and d/c insulin gtt in 1hr; hourly blood sugar check  - D5 NS, KDur 20 mEq given  - Beta-hydroxybuterate, Ua, and CMP ordered q2h   - Urine culture  - Omeprazole ordered for reflux  - Replete electrolytes PRN  -Continue monitoring closely    32 weeks gestation of pregnancy  Assessment & Plan  Admit for observation  Follow up CBC, RPR, Blood Type, Hep C antibodies  VTX by transabdominal ultrasound   GBS collected   FHT reactive with contractions on presentation, resolved now  CL: 4.56 cm  Wet prep/KOH neg  SVE: 1/0/-3  Diabetic diet    CALLI (generalized anxiety disorder)  Assessment & Plan  No current medications    * Headache  Assessment & Plan  Status post Reglan, magnesium, Tylenol, Caffeine, in triage with improvement in headache  S/p caffeine and Tylenol with improvement of headache from 8 to 4/10 in intensity; feels headache has moved from entire head to behind right eye. This morning reported headache as the worst headache of her life. Denies h/o headaches.     Patient unable to tolerate MRI due to claustrophobia, even with offered Ativan  Neuro consulted: consider ordering CT, CTA, CTV          Subjective/Objective   Chief Complaint:   Chief Complaint   Patient presents with   • Headache       Subjective:   Thedore Niece is a 36E [id number] at 32w1d who with PMHx of T1DM admitted for suspicion for evolving DKA. This morning, she has complaints of headache that she currently rated 4/10 in intensity. She feels it is slightly improved with tylenol and caffeine. She was brought to get MRI but unable to tolerate d/t claustrophobia. She was offered Ativan, but still refused and doesn't think she would be able to try again. She is open to CT. She denies nausea/vomiting. She has been tolerating sips of liquids with crackers. She denies abdominal pain, vision changes, vomiting, SOB, chest pain. Objective:   Vitals:  Vitals:    07/08/23 2100 07/09/23 0200 07/09/23 0423 07/09/23 0700   BP:  105/52 108/55 114/64   BP Location:    Right arm   Pulse:  81 72 71   Resp:  17  16   Temp: 98.5 °F (36.9 °C) 98.8 °F (37.1 °C) 97.8 °F (36.6 °C) 98.1 °F (36.7 °C)   TempSrc: Oral Oral Oral Oral       Physical Exam:    General:  NAD, AAOx3    Heart:  non-tachycardic   Chest:  non-labored breathing, symmetric chest rise   Abdomen:  soft, non-distended, gravid, non-tender to palpation   Extremity:   warm and well-perfused, calves non-tender to palpation    Neuro:  grossly normal   Skin:  no rash noted    EFM: baseline 125bpm, moderate variability, 15x15 accels, no decels  [place]: contractions irregular, mild    Lab, Imaging and other studies: I have personally reviewed pertinent reports.       Lab Results   Component Value Date    WBC 15.46 (H) 07/08/2023    HGB 12.8 07/08/2023    HCT 38.8 07/08/2023    MCV 94 07/08/2023     07/08/2023           Medardo Maldonado MD  07/09/23

## 2023-07-09 NOTE — ASSESSMENT & PLAN NOTE
Status post Reglan, magnesium, Tylenol, Caffeine, in triage with improvement in headache  S/p caffeine and Tylenol with improvement of headache from 8 to 4/10 in intensity; feels headache has moved from entire head to behind right eye. This morning reported headache as the worst headache of her life. Denies h/o headaches.     Patient unable to tolerate MRI due to claustrophobia, even with offered Ativan  Neuro consulted: consider ordering CT, CTA, CTV

## 2023-07-09 NOTE — PROGRESS NOTES
Spoke with Dr Bairon Noble about this patient who is a type 1 IDDM with persistent nausea and vomiting and 3+ ketonuria and elevated hydroxybutyrate level of 7 which raises a concern for possible diabetic ketoacidosis. The fact that she had a normal anion gap and no evidence of overt dehydration with no sign of tachycardia. Recommend giving a liter normal saline with 20 mill equivalents of potassium while running an abg to see if she has signs of acidosis. Despite her n/v patient reportedly had taken her normal daily dose of levemir which is also reassuring. Reviewed her epic record and it appears her sugars are not well controlled by viewing her dexcom and diabetes education was attempting to reach her without success on 6/30 amd 7/3.      Paresh Baird MD

## 2023-07-09 NOTE — H&P
H&P Exam - Obstetrics   Jacey Overton 29 y.o. female MRN: [id number]  Unit/Bed#: L&D 328-01 Encounter: [id number]      ASSESSMENT:  28 yo  at 32w0d weeks gestation with PMHx of T1DM who is being admitted with nausea and vomiting, with suspicion for evolving DKA. PLAN:  Diabetic ketoacidosis associated with type 1 diabetes mellitus [facility])  Assessment & Plan  Lab Results   Component Value Date    HGBA1C 6.8 (H) 2023       Recent Labs     23  1951 238 23  2345   POCGLU 108 111 138 158*       Blood Sugar Average: Last 72 hrs:  (P) 117.5     Admission CMP with Glucose 184  UA with 3+ ketones and large glucose  Nausea, vomiting, inability to tolerate PO  Recent elevated Dexcom readings     Plan:   - Beta-Hydroxybuterate ordered  - Will start D5 NS and insulin gtt  - Consult MFM for further management      32 weeks gestation of pregnancy  Assessment & Plan  Admit for observation  Follow up CBC, RPR, Blood Type, Hep C antibodies  VTX by transabdominal ultrasound   GBS collected   FHT reactive with contractions on presentation, resolved now  CL: 4.56 cm  Wet prep/KOH neg  SVE: 1/0/-3  Diabetic diet    CALLI (generalized anxiety disorder)  Assessment & Plan  No current medications    * Headache  Assessment & Plan  Status post Tylenol at home with minimal relief  Status post Reglan, magnesium, Tylenol, Caffeine, in triage with improvement in headache      Discussed with Dr. Greg De La Torre    This patient will be an OBSERVATION and I certify the anticipated length of stay is <2 Midnights. History of Present Illness     Chief Complaint: Observation    HPI:  Jacey Overton is a 29 y.o. [id number] female with previous medical history of type 1 diabetes an JOHN of 2023, by Last Menstrual Period at 32w0d weeks gestation who is being admitted for headache that started yesterday and nausea and vomiting since last night.   She has type 1 diabetes for which she takes Levemir 14u BID. She ports she has been taking it as prescribed. She has had no sick contacts, fever, chills, chest pain, shortness of breath, changes in vision, right upper quadrant or epigastric pain, right headedness, weakness, dizziness new onset swelling, vaginal itching, vaginal burning, hematuria, dysuria, uterine contractions, loss of fluid. She reports she has never been in DKA before. On chart review, patient's diabetes manager reached out to her on MyChart with concern for elevated blood sugars on her Dexcom report, recommending patient to go into the hospital for evaluation for concern for DKA. Patient did not respond to this message. She is an [facility] patient. OB History    Para Term  AB Living   5 2 2   2 2   SAB IAB Ectopic Multiple Live Births   1 1   0 2      # Outcome Date GA Lbr Star/2nd Weight Sex Delivery Anes PTL Lv   5 Current            4 Term 21 39w0d / 02:11 3515 g (7 lb 12 oz) F Vag-Spont EPI N PARI   3 SAB 10/2020              Birth Comments: 8 week loss   2 Term 19 38w3d  3290 g (7 lb 4.1 oz) M CS-LTranv EPI N PARI      Complications: Fetal Intolerance   1 IAB 10/2016              Birth Comments: 7 week      Obstetric Comments   : about 8741       Baby complications/comments: none    Review of Systems   Constitutional: Positive for appetite change. Negative for chills and fever. Eyes: Negative for visual disturbance. Respiratory: Negative for chest tightness and shortness of breath. Cardiovascular: Negative for chest pain and palpitations. Gastrointestinal: Positive for nausea and vomiting. Negative for abdominal pain. Endocrine: Negative for polydipsia and polyuria. Genitourinary: Negative for flank pain, frequency, vaginal bleeding, vaginal discharge and vaginal pain. Musculoskeletal: Negative for back pain. Neurological: Positive for headaches.      Historical Information   Past Medical History:   Diagnosis Date   • Abnormal Pap smear of cervix    • Anxiety    • Diabetes mellitus ([facility])    • History of  delivery, antepartum 2021   • HPV (human papilloma virus) infection    • Microalbuminuria due to type 1 diabetes mellitus ([address]) 2021   • Varicella     vaccinated as child     Past Surgical History:   Procedure Laterality Date   • INNER EAR SURGERY Left    • [place]  DELIVERY ONLY N/A 2019    Procedure:  SECTION (); Surgeon: Stephanie Mcgowan MD;  Location[facility];  Service: Obstetrics   • WISDOM TOOTH EXTRACTION       Social History   Social History     Substance and Sexual Activity   Alcohol Use Not Currently    Comment: occ. Social History     Substance and Sexual Activity   Drug Use No     Social History     Tobacco Use   Smoking Status Never   Smokeless Tobacco Never     Family History: non-contributory    Meds/Allergies      Medications Prior to Admission   Medication   • Cholecalciferol (Vitamin D-3) 25 MCG (1000 UT) CAPS   • Insulin Aspart (NovoLOG PenFill) 100 UNITS/ML cartridge for injection   • insulin detemir (Levemir FlexTouch) 100 Units/mL injection pen   • Prenatal Vit-Fe Fumarate-FA (PRENATAL 1+1 PO)   • Alcohol Swabs (Pharmacist Choice Alcohol) PADS   • aspirin 81 mg chewable tablet   • Blood Glucose Monitoring Suppl (FreeStyle Lite) DON   • Continuous Blood Gluc  (Dexcom G6 ) DON   • Continuous Blood Gluc Sensor (Dexcom G6 Sensor) MISC   • Continuous Blood Gluc Transmit (Dexcom G6 Transmitter) MISC   • FREESTYLE LITE test strip   • Injection Device for Insulin (InPen 100-Grey-Juan) DNO   • Insulin Pen Needle (Comfort EZ Pen Needles) 32G X 4 MM MISC   • Lancets MISC      No Known Allergies    OBJECTIVE:    Vitals: Blood pressure 105/52, pulse 81, temperature 98.8 °F (37.1 °C), temperature source Oral, resp. rate 17, last menstrual period 2022, not currently breastfeeding. There is no height or weight on file to calculate BMI.     Physical Exam  HENT:      Head: Normocephalic. Eyes:      Conjunctiva/sclera: Conjunctivae normal.   Cardiovascular:      Rate and Rhythm: Normal rate. Pulses: Normal pulses. Pulmonary:      Effort: Pulmonary effort is normal.   Abdominal:      Palpations: Abdomen is soft. Tenderness: There is no abdominal tenderness. Genitourinary:     General: Normal vulva. Comments: Speculum exam: Normal vaginal and cervical mucosa. Normal vaginal discharge  Skin:     General: Skin is warm. Neurological:      Mental Status: She is alert and oriented to person, place, and time.    Psychiatric:         Mood and Affect: Mood normal.        Cervix:  1/0/-3    Fetal heart rate:   Baseline Rate: 130 bpm  Variability: Moderate 6-25 bpm  Accelerations: 15 x 15 or greater  Decelerations: None    [place]:   Contraction Frequency (minutes): 4-5  Contraction Duration (seconds): 30-50  Contraction Quality: Mild    Prenatal Labs:   Blood Type:   Lab Results   Component Value Date/Time    ABO Grouping O 07/08/2023 05:20 PM     , D (Rh type):   Lab Results   Component Value Date/Time    Rh Factor Positive 07/08/2023 05:20 PM     , Antibody Screen: negative   , HCT/HGB:   Lab Results   Component Value Date/Time    Hematocrit 38.8 07/08/2023 11:46 AM    Hemoglobin 12.8 07/08/2023 11:46 AM      , MCV:   Lab Results   Component Value Date/Time    MCV 94 07/08/2023 11:46 AM      , Platelets:   Lab Results   Component Value Date/Time    Platelets 919 79/89/0677 11:46 AM   , Rubella:   Lab Results   Component Value Date/Time    Rubella IgG Quant >175.0 03/26/2023 08:20 AM        , VDRL/RPR:   Lab Results   Component Value Date/Time    RPR Non-Reactive 11/19/2021 12:10 AM      , Hep B:   Lab Results   Component Value Date/Time    Hepatitis B Surface Ag Non-reactive 03/26/2023 08:20 AM     , Hep C: ordered    , HIV:   Lab Results   Component Value Date/Time    HIV-1/HIV-2 Ab Non-Reactive 04/20/2021 08:05 AM     , Chlamydia: negative  , Gonorrhea:   Lab Results   Component Value Date/Time    N gonorrhoeae, DNA Probe Negative 03/07/2023 06:25 PM     , Group B Strep:    Lab Results   Component Value Date/Time    Strep Grp B PCR (A) 09/23/2019 07:28 PM     Positive for Beta Hemolytic Strep Grp B by PCR, Sensitivities to follow.     Strep Grp B PCR Beta Hemolytic Streptococcus Group B (A) 09/23/2019 07:28 PM          Invasive Devices     Peripheral Intravenous Line  Duration           Peripheral IV 07/08/23 Left;Ventral (anterior) Forearm <1 day    Peripheral IV 07/08/23 Right;Dorsal (posterior) Hand <1 day                Redd Ovalle MD  PGY-3  7/9/2023  4:07 AM

## 2023-07-09 NOTE — CONSULTS
Consultation - Maternal Fetal Medicine   Bettina Matamoros 29 y.o. female MRN: [id number]  Unit/Bed#: L&D 328-01 Encounter: [id number]  Admit date: 2023. Today's date: 23    Assessment/Plan   Ms. Thee Larry is a 29y.o. year-old  at 1 [facility] Day: 2, admitted for DKA. By issue:    Diabetic ketoacidosis associated with type 1 diabetes mellitus [facility])  Assessment & Plan  Lab Results   Component Value Date    HGBA1C 6.8 (H) 2023       Recent Labs     23  1949 238 23  2345   POCGLU 108 111 138 158*       Blood Sugar Average: Last 72 hrs:  (P) 117.5     Admission CMP with Glucose 184  UA with 3+ ketones and large glucose  Nausea, vomiting, inability to tolerate PO  Recent elevated Dexcom readings   Elevated Beta-hydroxybuterate    Plan:   - Start D5 NS and insulin gtt with hourly blood sugar check  - D5 NS, KDur 20 mEq given  - Beta-hydroxybuterate, Ua, and CMP ordered q2h   - Urine culture  - Omeprazole ordered for reflux  - Replete electrolytes PRN  -Continue monitoring closely    32 weeks gestation of pregnancy  Assessment & Plan  Admit for observation  Follow up CBC, RPR, Blood Type, Hep C antibodies  VTX by transabdominal ultrasound   GBS collected   FHT reactive with contractions on presentation, resolved now  CL: 4.56 cm  Wet prep/KOH neg  SVE: 1/0/-3  Diabetic diet    CALLI (generalized anxiety disorder)  Assessment & Plan  No current medications    * Headache  Assessment & Plan  Status post Tylenol at home with minimal relief  Status post Reglan, magnesium, Tylenol, Caffeine, in triage with improvement in headache           Chief Complaint   Patient presents with   • Headache       Physician Requesting Consult: Tammy Pryor MD  Reason for Consult / Principal Problem: DKA  Subspeciality: Perinatology    HPI: Ms. Thee Larry is a 29y.o. year-old Diane May with an JOHN of Estimated Date of Delivery: 23 at [address] [facility] Day: 2, admitted for DKA .  Her current pregnancy is significant for T1DM. Her obstetrical history is significant for one cesaren section followed by 1 vaginal delivery. Please refer to HPI note for further details    Other obstetric review of symptoms:  Contractions: Intensity: mild. Leakage of fluid: None. Bleeding: None. Fetal movement: present. Review of Systems   Constitutional: Negative for chills and fever. Eyes: Negative for visual disturbance. Respiratory: Negative for chest tightness and shortness of breath. Cardiovascular: Negative for chest pain and palpitations. Gastrointestinal: Positive for nausea and vomiting. Genitourinary: Negative for decreased urine volume, dysuria, hematuria, vaginal bleeding, vaginal discharge and vaginal pain. Musculoskeletal: Positive for arthralgias. Neurological: Positive for headaches. Other history is as follows:    Historical Information   OB History    Para Term  AB Living   5 2 2   2 2   SAB IAB Ectopic Multiple Live Births   1 1   0 2      # Outcome Date GA Lbr Star/2nd Weight Sex Delivery Anes PTL Lv   5 Current            4 Term 21 39w0d / 02:11 3515 g (7 lb 12 oz) F Vag-Spont EPI N PARI   3 SAB 10/2020              Birth Comments: 8 week loss   2 Term 19 38w3d  3290 g (7 lb 4.1 oz) M CS-LTranv EPI N PARI      Complications: Fetal Intolerance   1 IAB 10/2016              Birth Comments: 7 week      Obstetric Comments   : about      Gynecologic history: Patient's last menstrual period was 2022 (exact date).      Past Medical History:   Diagnosis Date   • Abnormal Pap smear of cervix    • Anxiety    • Diabetes mellitus ([address])    • History of  delivery, antepartum 2021   • HPV (human papilloma virus) infection    • Microalbuminuria due to type 1 diabetes mellitus ([address]) 2021   • Varicella     vaccinated as child     Past Surgical History:   Procedure Laterality Date   • INNER EAR SURGERY Left  • [place]  DELIVERY ONLY N/A 2019    Procedure:  SECTION (); Surgeon: Major Sinclair MD;  [facility]: [facility];  Service: Obstetrics   • WISDOM TOOTH EXTRACTION       Social History   Social History     Substance and Sexual Activity   Alcohol Use Not Currently    Comment: occ. Social History     Substance and Sexual Activity   Drug Use No     Social History     Tobacco Use   Smoking Status Never   Smokeless Tobacco Never     Family History: non-contributory    Meds/Allergies   Prior to Admission Medications   Prescriptions Last Dose Informant Patient Reported? Taking? Alcohol Swabs (Pharmacist Choice Alcohol) PADS  Self No No   Sig: USE AS DIRECTED   Blood Glucose Monitoring Suppl (FreeStyle Lite) DON  Self Yes No   Sig: by Does not apply route   Cholecalciferol (Vitamin D-3) 25 MCG (1000 UT) CAPS 2023 Self Yes Yes   Sig: Take 1 capsule by mouth   Continuous Blood Gluc  (Dexcom G6 ) DON  Self Yes No   Sig: USED TO MONITOR BLOOD SUGAR LEVELS DAILY   Continuous Blood Gluc Sensor (Dexcom G6 Sensor) MISC  Self No No   Si box=1 month supply or 3 sensors, use 1 sensor every 10 days. Continuous Blood Gluc Transmit (Dexcom G6 Transmitter) MISC  Self No No   Sig: Transmitter change every 90 days. FREESTYLE LITE test strip  Self No No   Sig: Test up to 8 times a day and as instructed   Injection Device for Insulin (InPen 100-Grey-Juan) DON  Self No No   Sig: Use up to 3 times a day with Novolog cartridge. Insulin Aspart (NovoLOG PenFill) 100 UNITS/ML cartridge for injection 2023 at 2000 Self No Yes   Sig: INJECT 4 UNITS UNDER THE SKIN 3 (THREE) TIMES A DAY WITH MEALS TO BE TITRATED UP TO 50 UNITS A DAY. TO REPLACE Conchis C[place].    Insulin Pen Needle (Comfort EZ Pen Needles) 32G X 4 MM MISC  Self No No   Sig: USE AS DIRECTED   Lancets MISC  Self Yes No   Sig: by Does not apply route   Prenatal Vit-Fe Fumarate-FA (PRENATAL 1+1 PO) 2023 Self Yes Yes   Sig: Take 1 tablet by mouth daily   aspirin 81 mg chewable tablet  Self No No   Sig: Chew 2 tablets (162 mg total) daily   insulin detemir (Levemir FlexTouch) 100 Units/mL injection pen 7/8/2023 at 1200 Self No Yes   Sig: Inject 28 Units under the skin daily 14 units in am and 14 units at bedtime      Facility-Administered Medications: None     Medication Administration - last 24 hours from 07/08/2023 0416 to 07/09/2023 0416       Date/Time Order Dose Route Action Action by     07/08/2023 1157 EDT ondansetron (ZOFRAN) injection 4 mg 4 mg Intravenous Given Isaias Perry RN     07/08/2023 1154 EDT acetaminophen (TYLENOL) tablet 975 mg 975 mg Oral Given Isaias Perry RN     07/08/2023 1435 EDT magnesium sulfate 2 g/50 mL IVPB (premix) 2 g 0 g Intravenous Stopped Isaias Perry RN     07/08/2023 1254 EDT magnesium sulfate 2 g/50 mL IVPB (premix) 2 g 2 g Intravenous [address] [facility] Nirmal Adler, [place]     07/08/2023 1709 EDT metoclopramide (REGLAN) injection 10 mg 10 mg Intravenous Given Theopolis Ramus, [place]     07/08/2023 1736 EDT sodium chloride 0.9 % bolus 1,000 mL 1,000 mL Intravenous [address] [facility] [facility] Guru, [place]     07/08/2023 1736 EDT potassium chloride 20 mEq IVPB (premix) 20 mEq Intravenous [address] [facility] [facility] Guru, [place]     07/08/2023 1948 EDT sodium chloride 0.9 % infusion 125 mL/hr Intravenous [address] [place] [address] [place]     07/08/2023 2133 EDT dextrose 5 % and sodium chloride 0.9 % infusion 100 mL/hr Intravenous [address] [facility] Lnady Valdes RN     07/09/2023 0401 EDT insulin regular (HumuLIN R,NovoLIN R) 1 Units/mL in sodium chloride 0.9 % 100 mL infusion 2 Units/hr Intravenous Rate/Dose Change Landy Valdes RN     07/09/2023 0148 EDT insulin regular (HumuLIN R,NovoLIN R) 1 Units/mL in sodium chloride 0.9 % 100 mL infusion 1.5 Units/hr Intravenous Rate/Dose Kell Valdes RN     07/09/2023 0049 EDT insulin regular (HumuLIN R,NovoLIN R) 1 Units/mL in sodium chloride 0.9 % 100 mL infusion 1.5 Units/hr Intravenous Rate/Dose Verify Vonnie Ulloa, RN     07/08/2023 2346 EDT insulin regular (HumuLIN R,NovoLIN R) 1 Units/mL in sodium chloride 0.9 % 100 mL infusion 1.5 Units/hr Intravenous Rate/Dose Change Vonnie Ulloa, RN     07/08/2023 2254 EDT insulin regular (HumuLIN R,NovoLIN R) 1 Units/mL in sodium chloride 0.9 % 100 mL infusion 1 Units/hr Intravenous [address] Vonnie Ulloa, RN     07/08/2023 2132 EDT acetaminophen (TYLENOL) tablet 975 mg 975 mg Oral Given Vonnie Holiday, RN     07/08/2023 2133 EDT caffeine tablet 200 mg 200 mg Oral Given Vonnie Holiday, RN     07/09/2023 0143 EDT pantoprazole (PROTONIX) EC tablet 40 mg 40 mg Oral Given Vonnie Holiday, RN        Current Facility-Administered Medications   Medication Dose Route Frequency   • acetaminophen (TYLENOL) tablet 650 mg  650 mg Oral Q4H PRN   • calcium carbonate (TUMS) chewable tablet 1,000 mg  1,000 mg Oral Daily PRN   • dextrose 5 % and sodium chloride 0.9 % infusion  100 mL/hr Intravenous Continuous   • diphenhydrAMINE (BENADRYL) injection 25 mg  25 mg Intravenous Q6H PRN   • insulin regular (HumuLIN R,NovoLIN R) 1 Units/mL in sodium chloride 0.9 % 100 mL infusion  0.2-4 Units/hr Intravenous Continuous   • metoclopramide (REGLAN) injection 10 mg  10 mg Intravenous Q6H PRN   • ondansetron (ZOFRAN) injection 4 mg  4 mg Intravenous Q6H PRN   • pantoprazole (PROTONIX) EC tablet 40 mg  40 mg Oral Early Morning   • simethicone (MYLICON) chewable tablet 80 mg  80 mg Oral 4x Daily PRN   • sodium chloride 0.9 % infusion  125 mL/hr Intravenous Continuous     No Known Allergies    Objective    Patient Vitals for the past 24 hrs:   BP Temp Temp src Pulse Resp   07/09/23 0200 105/52 98.8 °F (37.1 °C) Oral 81 17   07/08/23 2100 -- 98.5 °F (36.9 °C) Oral -- --   07/08/23 1833 109/62 -- -- 81 --   07/08/23 1154 113/62 98.9 °F (37.2 °C) Oral 81 16     Vitals: Blood pressure 105/52, pulse 81, temperature 98.8 °F (37.1 °C), temperature source Oral, resp.  rate 17, last menstrual period 11/26/2022, not currently breastfeeding. There is no height or weight on file to calculate BMI. Physical Exam  Constitutional:       Appearance: She is normal weight. HENT:      Head: Normocephalic. Nose: Nose normal.   Eyes:      Conjunctiva/sclera: Conjunctivae normal.   Cardiovascular:      Rate and Rhythm: Normal rate. Pulses: Normal pulses. Pulmonary:      Effort: Pulmonary effort is normal.   Abdominal:      Palpations: Abdomen is soft. Tenderness: There is no abdominal tenderness. Musculoskeletal:         General: Normal range of motion. Skin:     General: Skin is warm. Capillary Refill: Capillary refill takes less than 2 seconds. Neurological:      Mental Status: She is alert and oriented to person, place, and time. Psychiatric:         Mood and Affect: Mood normal.         Prenatal Labs: I have personally reviewed pertinent reports.       Results from last 7 days   Lab Units 07/08/23  1146   WBC Thousand/uL 15.46*   NEUTROS ABS Thousands/µL 13.43*   HEMOGLOBIN g/dL 12.8   MCV fL 94   PLATELETS Thousands/uL 180     Results from last 7 days   Lab Units 07/08/23  1146   NEUTROS PCT % 87*   MONOS PCT % 3*   EOS PCT % 0     Results from last 7 days   Lab Units 07/09/23  0153 07/08/23 2258 07/08/23 1954 07/08/23  1146   POTASSIUM mmol/L 3.6 3.6 3.6 4.0   CHLORIDE mmol/L 106 105 104 102   CO2 mmol/L 20* 21 22 21   BUN mg/dL 9 8 7 8   CREATININE mg/dL 0.50* 0.53* 0.53* 0.54*   EGFR ml/min/1.73sq m 130 127 127 127   PHOSPHORUS mg/dL  --   --  2.9  --      Results from last 7 days   Lab Units 07/09/23  0153 07/08/23  2258 07/08/23 1954 07/08/23  1146   AST U/L 11* 11* 11* 22   ALT U/L 14 14 15 19   ALK PHOS U/L 69 70 75 85     Results from last 7 days   Lab Units 07/08/23  1146   PLATELETS Thousands/uL 180     Results from last 7 days   Lab Units 07/09/23  0358 07/09/23  0247 07/09/23  0148 07/09/23  0049 07/08/23  2345 07/08/23  2238 07/08/23 2051 07/08/23  1951 07/08/23  1844 07/08/23  1800 07/08/23  1637   POC GLUCOSE mg/dl 212* 158* 160* 168* 158* 138 111 108 92 98 99     Fetal data:   130 bpm with moderate variability, accelerations, no decelerations, columba every 5 minutes    Karin Varma MD  7/9/2023  4:16 AM

## 2023-07-09 NOTE — DISCHARGE SUMMARY
Discharge Summary - Antepartum  Bruno Mata 29 y.o. female MRN: [id number]  Unit/Bed#: L&D 328-01 Encounter: [id number]    Admission Date: 7/8/2023   Discharge Date: 07/09/23    Attending Physician: Dr. Maxine Hendricks Physician(s):   MARIAH  Neuro    Admitting Diagnosis:   T1DM with Ketosis  32 weeks gestation  Headache    Procedures Performed: CTA, CTV    Hospital Course: Gilmer Baker is a 32y [id number] at 32w1d with T1DM admitted with nausea, vomiting, headache, and ketosis. Her labs on admission showed worsening ketones and hydroxybutyrate levels. She was administered D5 NS and insulin drip. Labs were monitored throughout the day, which demonstrated improvement in beta hydroxybutyrate and ketones. She reported a headache that was persistent even with medication. Neurology was consulted and MRI were ordered. MRI was not able to be completed because patient had panic attack and refused imaging. CTA/CTV were then ordered. Headache and imaging were discussed with neurology throughout the day, and they felt confident with negative imaging and improving headache that they did not object to her being discharged before being seen. Fluids and insulin were discontinued once patient resumed regular diet, and her home insulin regimen was started. At time of discharged, patient reports headache much improved. She denies nausea, vomiting, abdominal pain. Blood pressures normotensive and blood glucose at an appropriate level. She is stable for discharge at this time. Lab Results:   Lab Results   Component Value Date    WBC 15.46 (H) 07/08/2023    HGB 12.8 07/08/2023    HCT 38.8 07/08/2023    MCV 94 07/08/2023     07/08/2023     Lab Results   Component Value Date    CALCIUM 8.4 07/09/2023    K 3.5 07/09/2023    CO2 22 07/09/2023     07/09/2023    BUN 7 07/09/2023    CREATININE 0.60 07/09/2023       Imaging:   CTA/CTV head 7/9:  1. No acute intracranial abnormality appreciated.   2. Unremarkable CTA of the head with no evidence for vascular occlusion, hemodynamically significant stenosis, dissection, or aneurysm. 3. Patent cerebral venous sinuses.       Complications: None apparent    Condition at Discharge: good     Discharge instructions/Information to patient and family:   See after visit summary for information provided to patient and family. Provisions for Follow-Up Care:  See after visit summary for information related to follow-up care and any pertinent home health orders. Disposition: Home    Planned Readmission: No    Discharge Medications:  See after visit summary for reconciled discharge medications provided to patient and family.       Jessica Ba MD  Obstetrics & Gynecology, PGY2  7/9/2023 3:43 PM

## 2023-07-09 NOTE — PROGRESS NOTES
Informed by nursing that patient has persistent headache and newly burst blood vessels in R eye. At bedside, patient reports that her headache is persistent, and tylenol and caffeine just take the edge off. Her headache is now predominantly behind her R eye, and she feels like this is the worst headache she has ever had. Last received Tylenol and Caffeine at 0608. Vitals:    07/09/23 0700   BP: 114/64   Pulse: 71   Resp: 16   Temp: 98.1 °F (36.7 °C)       Physical Exam  Constitutional:       General: She is not in acute distress. Appearance: Normal appearance. She is not ill-appearing. Eyes:      General:         Right eye: No discharge. Left eye: No discharge. Extraocular Movements: Extraocular movements intact. Pupils: Pupils are equal, round, and reactive to light. Comments: Burst blood vessels present in right lower aspect of eye   Cardiovascular:      Rate and Rhythm: Normal rate. Pulmonary:      Effort: Pulmonary effort is normal. No respiratory distress. Musculoskeletal:      Right lower leg: No edema. Left lower leg: No edema. Neurological:      General: No focal deficit present. Mental Status: She is alert and oriented to person, place, and time. Sensory: No sensory deficit. Motor: No weakness. Skin:     General: Skin is warm and dry.        MRI head, MRA/MRV ordered- D/w Dr. Santiago Pena (radiology)    D/w Dr. Eileen Pete MD  Obstetrics & Gynecology, PGY2

## 2023-07-09 NOTE — PROGRESS NOTES
Patient went down to MRI and refused due to claustrophobia. Patient was offered anxiety medication but still refused. Patient coming back to L&D and neurology consulted.      Mary Morales MD  Obstetrics & Gynecology, PGY2

## 2023-07-09 NOTE — PROGRESS NOTES
Reviewed her repeat labs showing worsening ketones and hydroxybutyrate level. Venous gas pending. Patient not eating but did take her Levemir dose which accounts for why her blood sugars were improving as she is not currently on an insulin drip. Recommend supplementation of glucose by changing her IVs to D5 normal saline. Would continue to check her urinalysis and hydroxybutyrate levels, CMP, every 2 hours and blood sugars hourly. Recommend starting an insulin drip. 9:19 venous gas returned with ph 7.45, pco2 28.3, po2 129.5, hco3 20.4, be 2.2 and 02hgb venous 97. Question if actually ABG. Either way no sign of acidosis. 9:42-10:10 pm Reviewed results with endocrine on call Dr Minal Juarez. No sign of diabetic ketoacidosis. Does have diabetic ketosis and thus needs glucose which we are giving through IV. Discussed with  Dr Dianne Leroy and Dr Gregorio Lopez. Will continue plan as above. Recommend omprezole to help with nausea in case this is stemming from reflux.      Angelia Higginbotham MD

## 2023-07-10 LAB
BACTERIA UR CULT: ABNORMAL
BACTERIA UR CULT: ABNORMAL
GP B STREP DNA SPEC QL NAA+PROBE: POSITIVE

## 2023-07-11 LAB
EST. AVERAGE GLUCOSE BLD GHB EST-MCNC: 154 MG/DL
HBA1C MFR BLD: 7 %

## 2023-07-11 NOTE — RESULT ENCOUNTER NOTE
Cheri Bloch hgba1c is not at goal. I shared this our diabetes team who will help adjust your Levemir and Novolog as needed. Goal is hgba1c < 6 with  fbs < 90 and 2hr pp < 120. You seem to be calibrating too often and this may affect accuracy of the dexcom. You probably already know this but generally the first day of the sensor being placed will be less accurate and will improve after 24 hrs. Values should be within 20% of the value on your glucometer.      Maximino Montgomery MD

## 2023-07-12 ENCOUNTER — ROUTINE PRENATAL (OUTPATIENT)
Dept: OBGYN CLINIC | Facility: MEDICAL CENTER | Age: 33
End: 2023-07-12
Payer: COMMERCIAL

## 2023-07-12 VITALS
BODY MASS INDEX: 26.33 KG/M2 | HEIGHT: 65 IN | DIASTOLIC BLOOD PRESSURE: 70 MMHG | SYSTOLIC BLOOD PRESSURE: 114 MMHG | WEIGHT: 158 LBS

## 2023-07-12 DIAGNOSIS — Z3A.32 32 WEEKS GESTATION OF PREGNANCY: ICD-10-CM

## 2023-07-12 DIAGNOSIS — E10.65 PRE-EXISTING TYPE 1 DIABETES MELLITUS WITH HYPERGLYCEMIA DURING PREGNANCY IN FIRST TRIMESTER (HCC): ICD-10-CM

## 2023-07-12 DIAGNOSIS — O34.219 PREGNANCY WITH HISTORY OF CESAREAN SECTION, ANTEPARTUM: ICD-10-CM

## 2023-07-12 DIAGNOSIS — E10.65 PRE-EXISTING TYPE 1 DIABETES MELLITUS WITH HYPERGLYCEMIA DURING PREGNANCY IN THIRD TRIMESTER (HCC): Primary | ICD-10-CM

## 2023-07-12 DIAGNOSIS — O24.013 PRE-EXISTING TYPE 1 DIABETES MELLITUS WITH HYPERGLYCEMIA DURING PREGNANCY IN THIRD TRIMESTER (HCC): Primary | ICD-10-CM

## 2023-07-12 DIAGNOSIS — O24.011 PRE-EXISTING TYPE 1 DIABETES MELLITUS WITH HYPERGLYCEMIA DURING PREGNANCY IN FIRST TRIMESTER (HCC): ICD-10-CM

## 2023-07-12 DIAGNOSIS — E55.9 VITAMIN D DEFICIENCY: ICD-10-CM

## 2023-07-12 DIAGNOSIS — Z3A.10 10 WEEKS GESTATION OF PREGNANCY: ICD-10-CM

## 2023-07-12 PROCEDURE — 90715 TDAP VACCINE 7 YRS/> IM: CPT | Performed by: NURSE PRACTITIONER

## 2023-07-12 PROCEDURE — 90471 IMMUNIZATION ADMIN: CPT | Performed by: NURSE PRACTITIONER

## 2023-07-12 PROCEDURE — PNV: Performed by: NURSE PRACTITIONER

## 2023-07-12 RX ORDER — PEN NEEDLE, DIABETIC 32GX 5/32"
NEEDLE, DISPOSABLE MISCELLANEOUS
Qty: 100 EACH | Refills: 1 | Status: SHIPPED | OUTPATIENT
Start: 2023-07-12 | End: 2023-09-05

## 2023-07-12 NOTE — PROGRESS NOTES
Denies loss of fluid, vaginal bleeding and abdominal pain. Confirms frequent fetal movement. Tolerating prenatal vitamin, low-dose aspirin vitamin D and insulin well. Was recently in emergency room presented with headache which then evolved into nausea and vomiting was diagnosed with DKA. Levemir has been increased to 24 units twice daily. Since that time blood sugars have been fasting  and 2-hour postprandials around 120s. Reviewed recommendation for Tdap vaccine, patient is agreeable to administration at today's visit. Denies other questions or concerns at today's visit  BP: 114/70 weight: +22lbs  Plan  -Continue prenatal vitamins and low-dose aspirin daily  -Continue fetal kick counts daily  -Type 1 diabetes encouraged to continue insulin as ordered, following diet and close contact with diabetes and pregnancy program. ANFS to begin this week   -Tdap vaccine today  -Follow-up with  center scheduled for 23  -Common discomforts of pregnancy and precautions including  labor reviewed. Signs and symptoms to report reviewed.    RTO 1 week for weekly NSTs

## 2023-07-13 ENCOUNTER — ULTRASOUND (OUTPATIENT)
Dept: PERINATAL CARE | Facility: OTHER | Age: 33
End: 2023-07-13
Payer: COMMERCIAL

## 2023-07-13 VITALS
HEIGHT: 65 IN | WEIGHT: 157 LBS | SYSTOLIC BLOOD PRESSURE: 112 MMHG | HEART RATE: 86 BPM | BODY MASS INDEX: 26.16 KG/M2 | DIASTOLIC BLOOD PRESSURE: 62 MMHG

## 2023-07-13 DIAGNOSIS — O24.013 PRE-EXISTING TYPE 1 DIABETES MELLITUS WITH HYPERGLYCEMIA DURING PREGNANCY IN THIRD TRIMESTER (HCC): Primary | ICD-10-CM

## 2023-07-13 DIAGNOSIS — O34.219 PREGNANCY WITH HISTORY OF CESAREAN SECTION, ANTEPARTUM: ICD-10-CM

## 2023-07-13 DIAGNOSIS — E10.65 PRE-EXISTING TYPE 1 DIABETES MELLITUS WITH HYPERGLYCEMIA DURING PREGNANCY IN THIRD TRIMESTER (HCC): Primary | ICD-10-CM

## 2023-07-13 DIAGNOSIS — O28.8 NON-STRESS TEST NONREACTIVE: ICD-10-CM

## 2023-07-13 DIAGNOSIS — Z3A.32 32 WEEKS GESTATION OF PREGNANCY: ICD-10-CM

## 2023-07-13 DIAGNOSIS — O47.9 UTERINE CONTRACTIONS AT GREATER THAN 20 WEEKS OF GESTATION: ICD-10-CM

## 2023-07-13 PROCEDURE — 76818 FETAL BIOPHYS PROFILE W/NST: CPT | Performed by: OBSTETRICS & GYNECOLOGY

## 2023-07-13 PROCEDURE — 76816 OB US FOLLOW-UP PER FETUS: CPT | Performed by: OBSTETRICS & GYNECOLOGY

## 2023-07-13 PROCEDURE — 99214 OFFICE O/P EST MOD 30 MIN: CPT | Performed by: OBSTETRICS & GYNECOLOGY

## 2023-07-13 PROCEDURE — 76817 TRANSVAGINAL US OBSTETRIC: CPT | Performed by: OBSTETRICS & GYNECOLOGY

## 2023-07-13 NOTE — PROGRESS NOTES
Alessandra Garcia has no complaints today. She reports regular fetal movements and does not report any problems. She is here today at 32w5d for an ultrasound for growth and NST. Problem list:  1. Patient admitted on  for nausea vomiting and headache and was found to have hyperglycemia and ketosis and an elevated beta hydroxybutyrate but no acidosis  Her insulin doses were changed to 24 units of Levemir at 9 and 9 and she was continued on a carb ratio of 1:6 with meals and sensitivity factor of 25 and a goal of 90. She uses the [facility] sensor and the [employer]. Her blood sugars today appear to be under better control with a fasting in the 90s and a 2-hour postprandial of 140 after breakfast today. During that admission she was worked up for frequent contractions and had no sign of  labor. Urine culture was negative. 2. History of a prior  and a successful   3. Type 1 diabetes  4. Microalbuminuria in the past per patient report but not found in pregnancy. Ultrasound findings: The ultrasound today shows a macrosomic fetus with a large abdomen and normal fluid. NST showed a baseline of 120s but was not strictly reactive with 15 beat accelerations. A BPP was reassuring at 8/10. Her NST also showed regular contractions q 2-3 minutes. [facility] Quick did not appear in any discomfort and feels only intermittent tightening. Transvaginal scan for cervical length showed a length of 4.13 cms    Pregnancy ultrasound has limitations and is unable to detect all forms of fetal congenital abnormalities. The inaccuracy in the EFW can be off by 1 lb either way in the third trimester. Specific counseling was provided on the following problems:  1. We discussed fetal macrosomia. She denies family history of large birth weight babies.  Definitions for fetal macrosomia are not widely agreed upon, but thresholds commonly used in the literature include estimated fetal weight >90th or >95th percentile for gestational age, or absolute weight above 4000 or 4500g at delivery. While macrosomia is inconsistently defined, birthweight of 4,026-4,186O is associated with increased risk of labor abnormalities and  complications (ACOG practice bulletin #173 on macrosomia). The most serious complication of fetal macrosomia is shoulder dystocia but risk of occurrence is low, complicating only 4.9-4% of all vaginal deliveries. While macrosomia increases the risk, most shoulder dystocias occur unpredictably among infants of normal birthweight. We discussed today the implications of the sonographic estimate of fetal weight. We also discussed the limitations of ultrasound in prediction of fetal weight with advancing gestation. Although the diagnosis of fetal macrosomia is imprecise, prophylactic  delivery may be considered for suspected fetal macrosomia with significant discordance between the head and abdomen circumference. However, planned   delivery for suspected fetal macrosomia is controversial.   delivery reduces but does not eliminate the risk of birth trauma and brachial plexus injury associated with fetal macrosomia. The maternal risks of  delivery, which include but are not limited to, wound infection, venous thromboembolism, bleeding, increased pain and recovery time, adhesion formation, and placenta accreta spectrum disorders in future pregnancies, must be considered as well. We discussed that suspected fetal macrosomia is not an indication for delivery prior to 39 weeks gestation in the absence of additional obstetric indications. Follow up recommended:   Growth scan in 4 weeks  Recommend continuing twice weekly nst and weekly apple till delivery. Due to her frequent contractions without cervical change noted she has the diagnosis of uterine irritability. This may be due to her uterus being large for gestational age due to the fetal size.  This could put her at risk for a  delivery. We discussed the option of being seen in L/D for a follow up PE in 2 hrs to prove no cervical progression or a tvs in my office tomorrow. Since she was just in labor and delivery for a prolonged stay on  she would rather follow up as an outpatient. The only option for a TVS opening is at our [facility] office tomorrow which is our furthest office which she also declined. I called Dr Sayda Carlos from her ob office and she will try to arrange a pelvic exam instead tomorrow in the office. Pre visit time reviewing her records and her dexcom sensor data   15 minutes  Face to face time 20 minutes  Post visit time on documentation of note, updating her problem list, calling her ob provider, adding orders and prescriptions 20 minutes. Procedures that were completed today were charged separately. The level of decision making was moderate complexity.     Mannie Lora MD

## 2023-07-13 NOTE — PROGRESS NOTES
Anesthesia Pre Eval Note    Anesthesia ROS/Med Hx    Overall Review:  EKG was reviewed       Pulmonary Review:    Positive for sleep apnea - CPAP    Neuro/Psych Review:    Positive for psychiatric history - Anxiety and Depression    GI/HEPATIC/RENAL Review:    Positive for GERD    End/Other Review:    Positive for obesity     Overall Review of Systems Comments:  -  GERD, JONH-CPAP, MO, Anxiety, Depression, QTob'20-20py    Hct-39        Relevant Problems   Anesthesia Problems   (+) JONH on CPAP       Physical Exam     Airway   Mallampati: III  TM Distance: >3 FB  Neck ROM: Full  TMJ Mobility: Good    Cardiovascular  Cardiovascular exam normal  Cardio Rhythm: Regular  Cardio Rate: Normal    Head Assessment  Head assessment: Normocephalic    General Assessment  General Assessment: Alert and oriented    Dental Exam  Dental exam normal    Pulmonary Exam  Pulmonary exam normal    Abdominal Exam    Patient Demonstrates:  Obese      Anesthesia Plan:    ASA Status: 2  Anesthesia Type: MAC    Induction: Intravenous  Checklist  Reviewed: Lab Results, Nursing Notes, Consultations, DNR Status, Patient Summary, Beta Blocker Status, Care Everywhere, Outside Records, Allergies, Medications, Problem list, EKG, Past Med History, Chest X-Rays and NPO Status  Consent/Risks Discussed Statement:  The proposed anesthetic plan, including its risks and benefits, have been discussed with the Patient along with the risks and benefits of alternatives. Questions were encouraged and answered and the patient and/or representative understands and agrees to proceed.        I discussed with the patient (and/or patient's legal representative) the risks and benefits of the proposed anesthesia plan, MAC, which may include services performed by other anesthesia providers.    Alternative anesthesia plans, if available, were reviewed with the patient (and/or patient's legal representative). Discussion has been held with the patient (and/or patient's legal  Repeat Non-Stress Testinst with MFM     Patient verbalizes +FM. Pt denies ALL:               Leaking of fluid   Contractions   Vaginal bleeding   Decreased fetal movement    Patient is performing daily kick counts. Patient has no questions or concerns. NST strip reviewed by Dr. Wendy Langston.  BPP done representative) regarding risks of anesthesia, which include emergent situations that may require change in anesthesia plan.  The patient (and/or patient's legal representative) has indicated understanding, his/her questions have been answered, and he/she wishes to proceed with the planned anesthetic.    Blood Products: Not Anticipated

## 2023-07-13 NOTE — LETTER
2023     Faustino Hurtado, [address] [place] [address],[address]  [place]    Patient: Mirtha Helton   YOB: 1990   Date of Visit: 2023       Dear Dr. Erasmo Garrido: Thank you for referring Sade Munoz to me for evaluation. Below are my notes for this consultation. If you have questions, please do not hesitate to call me. I look forward to following your patient along with you. Sincerely,        Binh Arenas MD        CC: Stone Ascencio. Sherryle Farr, MD Timmy Czech, MD  2023  5:40 PM  Sign when Signing Visit  Mirtha Helton has no complaints today. She reports regular fetal movements and does not report any problems. She is here today at 32w5d for an ultrasound for growth and NST. Problem list:  1. Patient admitted on  for nausea vomiting and headache and was found to have hyperglycemia and ketosis and an elevated beta hydroxybutyrate but no acidosis  Her insulin doses were changed to 24 units of Levemir at 9 and 9 and she was continued on a carb ratio of 1:6 with meals and sensitivity factor of 25 and a goal of 90. She uses the [facility] sensor and the [employer]. Her blood sugars today appear to be under better control with a fasting in the 90s and a 2-hour postprandial of 140 after breakfast today. During that admission she was worked up for frequent contractions and had no sign of  labor. Urine culture was negative. 2. History of a prior  and a successful   3. Type 1 diabetes  4. Microalbuminuria in the past per patient report but not found in pregnancy. Ultrasound findings: The ultrasound today shows a macrosomic fetus with a large abdomen and normal fluid. NST showed a baseline of 120s but was not strictly reactive with 15 beat accelerations. A BPP was reassuring at 8/10. Her NST also showed regular contractions q 2-3 minutes.   Marget Meckel did not appear in any discomfort and feels only intermittent tightening. Transvaginal scan for cervical length showed a length of 4.13 cms    Pregnancy ultrasound has limitations and is unable to detect all forms of fetal congenital abnormalities. The inaccuracy in the EFW can be off by 1 lb either way in the third trimester. Specific counseling was provided on the following problems:  1. We discussed fetal macrosomia. She denies family history of large birth weight babies. Definitions for fetal macrosomia are not widely agreed upon, but thresholds commonly used in the literature include estimated fetal weight >90th or >95th percentile for gestational age, or absolute weight above 4000 or 4500g at delivery. While macrosomia is inconsistently defined, birthweight of 8,983-1,753L is associated with increased risk of labor abnormalities and  complications (ACOG practice bulletin #173 on macrosomia). The most serious complication of fetal macrosomia is shoulder dystocia but risk of occurrence is low, complicating only 6.8-9% of all vaginal deliveries. While macrosomia increases the risk, most shoulder dystocias occur unpredictably among infants of normal birthweight. We discussed today the implications of the sonographic estimate of fetal weight. We also discussed the limitations of ultrasound in prediction of fetal weight with advancing gestation. Although the diagnosis of fetal macrosomia is imprecise, prophylactic  delivery may be considered for suspected fetal macrosomia with significant discordance between the head and abdomen circumference. However, planned   delivery for suspected fetal macrosomia is controversial.   delivery reduces but does not eliminate the risk of birth trauma and brachial plexus injury associated with fetal macrosomia.   The maternal risks of  delivery, which include but are not limited to, wound infection, venous thromboembolism, bleeding, increased pain and recovery time, adhesion formation, and placenta accreta spectrum disorders in future pregnancies, must be considered as well. We discussed that suspected fetal macrosomia is not an indication for delivery prior to 39 weeks gestation in the absence of additional obstetric indications. Follow up recommended:   Growth scan in 4 weeks  Recommend continuing twice weekly nst and weekly apple till delivery. Due to her frequent contractions without cervical change noted she has the diagnosis of uterine irritability. This may be due to her uterus being large for gestational age due to the fetal size. This could put her at risk for a  delivery. We discussed the option of being seen in L/D for a follow up PE in 2 hrs to prove no cervical progression or a tvs in my office tomorrow. Since she was just in labor and delivery for a prolonged stay on  she would rather follow up as an outpatient. The only option for a TVS opening is at our [employer] office tomorrow which is our furthest office which she also declined. I called Dr Ignacio French from her ob office and she will try to arrange a pelvic exam instead tomorrow in the office. Pre visit time reviewing her records and her dexcom sensor data   15 minutes  Face to face time 20 minutes  Post visit time on documentation of note, updating her problem list, calling her ob provider, adding orders and prescriptions 20 minutes. Procedures that were completed today were charged separately. The level of decision making was moderate complexity.     Israel Yadav MD

## 2023-07-14 ENCOUNTER — ROUTINE PRENATAL (OUTPATIENT)
Dept: OBGYN CLINIC | Facility: MEDICAL CENTER | Age: 33
End: 2023-07-14

## 2023-07-14 VITALS — DIASTOLIC BLOOD PRESSURE: 64 MMHG | BODY MASS INDEX: 26.13 KG/M2 | WEIGHT: 157 LBS | SYSTOLIC BLOOD PRESSURE: 110 MMHG

## 2023-07-14 DIAGNOSIS — E10.9 DIABETES MELLITUS TYPE 1, CONTROLLED, WITHOUT COMPLICATIONS (HCC): Primary | ICD-10-CM

## 2023-07-14 DIAGNOSIS — O47.9 UTERINE CONTRACTIONS AT GREATER THAN 20 WEEKS OF GESTATION: ICD-10-CM

## 2023-07-14 DIAGNOSIS — O34.219 PREGNANCY WITH HISTORY OF CESAREAN SECTION, ANTEPARTUM: ICD-10-CM

## 2023-07-14 PROCEDURE — PNV: Performed by: STUDENT IN AN ORGANIZED HEALTH CARE EDUCATION/TRAINING PROGRAM

## 2023-07-14 NOTE — PROGRESS NOTES
Routine Prenatal Visit  [facility] [address] [place] [address]    Assessment/Plan:  Ethel Todd is a 29y.o. year old [id number] at [address] [place] [address] who presents for routine prenatal visit. 1. Diabetes mellitus type 1, controlled, without complications ([address])    2. Pregnancy with history of  section, antepartum    3. Uterine contractions at greater than 20 weeks of gestation  Assessment & Plan:  - SVE - cervix is closed  - TVCL - 3.85 cm - 4.54 cm by US today          Subjective:     CC: Prenatal care    Andrea Tam is a 29 y.o. [id number] female who presents for routine prenatal care at [address] [place] [address]. Pregnancy ROS: Denies leakage of fluid, pelvic pain, or vaginal bleeding. Reports normal fetal movement. The following portions of the patient's history were reviewed and updated as appropriate: allergies, current medications, past family history, past medical history, obstetric history, gynecologic history, past social history, past surgical history and problem list.      Objective:  /64   Wt 71.2 kg (157 lb)   LMP 2022 (Exact Date)   BMI 26.13 kg/m²   Pregravid Weight/BMI: 61.7 kg (136 lb) (BMI 22.63)  Current Weight: 71.2 kg (157 lb)   Total Weight Gain: 9.526 kg (21 lb)   Pre- Vitals    Flowsheet Row Most Recent Value   Prenatal Assessment    Fetal Heart Rate 140   Fundal Height (cm) 32 cm   Movement Present   Presentation Vertex   Prenatal Vitals    Blood Pressure 110/64   Weight - Scale 71.2 kg (157 lb)   Urine Albumin/Glucose    Dilation/Effacement/Station    Cervical Dilation 0   Vaginal Drainage    Draining Fluid No   Edema    LLE Edema None   RLE Edema None   Facial Edema None           General: Well appearing, no distress  Respiratory: Unlabored breathing  Cardiovascular: Regular rate. Abdomen: Soft, gravid, nontender  Fundal Height: Appropriate for gestational age. Extremities: Warm and well perfused. Non tender.     Transvaginal Ultrasound  TVCL 3. 85-4.54 cm    Carine Quigley MD  [id number] [facility]  7/14/2023 4:24 PM

## 2023-07-17 PROBLEM — O36.63X0 MACROSOMIA AFFECTING MANAGEMENT OF MOTHER IN THIRD TRIMESTER: Status: ACTIVE | Noted: 2023-07-17

## 2023-07-19 ENCOUNTER — ULTRASOUND (OUTPATIENT)
Age: 33
End: 2023-07-19
Payer: COMMERCIAL

## 2023-07-19 VITALS
WEIGHT: 156 LBS | BODY MASS INDEX: 25.99 KG/M2 | HEART RATE: 102 BPM | HEIGHT: 65 IN | SYSTOLIC BLOOD PRESSURE: 120 MMHG | DIASTOLIC BLOOD PRESSURE: 72 MMHG

## 2023-07-19 DIAGNOSIS — E10.9 DIABETES MELLITUS TYPE 1, CONTROLLED, WITHOUT COMPLICATIONS (HCC): Primary | ICD-10-CM

## 2023-07-19 DIAGNOSIS — Z3A.33 33 WEEKS GESTATION OF PREGNANCY: ICD-10-CM

## 2023-07-19 PROCEDURE — 59025 FETAL NON-STRESS TEST: CPT | Performed by: NURSE PRACTITIONER

## 2023-07-19 PROCEDURE — 76815 OB US LIMITED FETUS(S): CPT | Performed by: NURSE PRACTITIONER

## 2023-07-19 NOTE — PROGRESS NOTES
Repeat Non-Stress Testing:    Patient verbalizes +FM. Pt denies ALL:               Leaking of fluid   Contractions   Vaginal bleeding   Decreased fetal movement    Patient is performing daily kick counts. Patient has no questions or concerns. NST strip reviewed by Agnes Shrestha.

## 2023-07-19 NOTE — PROGRESS NOTES
[address] [place] [address] Dr: MsRambo Lurena Mcburney was seen today at 33w4d for NST (found under the pregnancy episode) which I reviewed the RN assessment and agree, and DOMENICA (see ultrasound report under OB procedures tab). See ultrasound report under "OB Procedures" tab. She reports good fetal movement. She maintains close contact with our diabetic team. Please don't hesitate to contact our office with any concerns or questions.  -STU Victoria    . I spent 10 minutes devoted to patient care (3 min chart preparation, 4 minutes face to face and 3 minutes documenting).

## 2023-07-21 DIAGNOSIS — O24.012 PRE-EXISTING TYPE 1 DIABETES MELLITUS DURING PREGNANCY IN SECOND TRIMESTER: ICD-10-CM

## 2023-07-21 DIAGNOSIS — O24.012 PRE-EXISTING TYPE 1 DIABETES MELLITUS WITH HYPERGLYCEMIA DURING PREGNANCY IN SECOND TRIMESTER (HCC): ICD-10-CM

## 2023-07-21 DIAGNOSIS — E10.65 PRE-EXISTING TYPE 1 DIABETES MELLITUS WITH HYPERGLYCEMIA DURING PREGNANCY IN SECOND TRIMESTER (HCC): ICD-10-CM

## 2023-07-24 PROBLEM — Z3A.34 34 WEEKS GESTATION OF PREGNANCY: Status: ACTIVE | Noted: 2023-02-10

## 2023-07-24 RX ORDER — INSULIN ASPART 100 [IU]/ML
4 INJECTION, SOLUTION INTRAVENOUS; SUBCUTANEOUS
Qty: 15 ML | Refills: 0 | OUTPATIENT
Start: 2023-07-24

## 2023-07-24 RX ORDER — ISOPROPYL ALCOHOL 0.7 ML/ML
SWAB TOPICAL
Qty: 100 EACH | Refills: 1 | Status: ON HOLD | OUTPATIENT
Start: 2023-07-24 | End: 2023-08-16

## 2023-07-24 RX ORDER — INSULIN ASPART 100 [IU]/ML
10 INJECTION, SOLUTION INTRAVENOUS; SUBCUTANEOUS
Qty: 15 ML | Refills: 0 | Status: ON HOLD | OUTPATIENT
Start: 2023-07-24 | End: 2023-08-16

## 2023-07-27 ENCOUNTER — TELEPHONE (OUTPATIENT)
Dept: OBGYN CLINIC | Facility: MEDICAL CENTER | Age: 33
End: 2023-07-27

## 2023-07-27 NOTE — TELEPHONE ENCOUNTER
Called pt about her missing appt with Marissa langley to call back if she have any  Concerns or questions, also remind her about her appt next week.

## 2023-07-28 ENCOUNTER — TELEPHONE (OUTPATIENT)
Facility: HOSPITAL | Age: 33
End: 2023-07-28

## 2023-07-28 NOTE — TELEPHONE ENCOUNTER
Called patient and left a voicemail asking for her to return call at [phone] or send a [employer] message. Called regarding patient's blood sugars noted on Dexcom and inpen report. Asked patient to provide insight as soon as possible regarding blood sugars so adjustments can be made to regimen where needed.

## 2023-07-31 ENCOUNTER — ULTRASOUND (OUTPATIENT)
Dept: PERINATAL CARE | Facility: OTHER | Age: 33
End: 2023-07-31
Payer: COMMERCIAL

## 2023-07-31 VITALS
DIASTOLIC BLOOD PRESSURE: 70 MMHG | BODY MASS INDEX: 26.69 KG/M2 | HEART RATE: 89 BPM | WEIGHT: 160.2 LBS | SYSTOLIC BLOOD PRESSURE: 108 MMHG | HEIGHT: 65 IN

## 2023-07-31 DIAGNOSIS — E10.9 DIABETES MELLITUS TYPE 1, CONTROLLED, WITHOUT COMPLICATIONS (HCC): Primary | ICD-10-CM

## 2023-07-31 DIAGNOSIS — Z3A.35 35 WEEKS GESTATION OF PREGNANCY: ICD-10-CM

## 2023-07-31 PROCEDURE — 76815 OB US LIMITED FETUS(S): CPT | Performed by: STUDENT IN AN ORGANIZED HEALTH CARE EDUCATION/TRAINING PROGRAM

## 2023-07-31 PROCEDURE — 59025 FETAL NON-STRESS TEST: CPT | Performed by: STUDENT IN AN ORGANIZED HEALTH CARE EDUCATION/TRAINING PROGRAM

## 2023-07-31 NOTE — PROGRESS NOTES
[facility]: Ms. Juanito Lopez was seen today for NST (found under the pregnancy episode) which I reviewed the RN assessment and agree, and DOMENICA (see ultrasound report under OB procedures tab). Please don't hesitate to contact our office with any concerns or questions.   -Ramsey Giraldo MD

## 2023-07-31 NOTE — PROGRESS NOTES
Repeat Non-Stress Testing:    Patient verbalizes +FM. Pt denies ALL:               Leaking of fluid   Contractions   Vaginal bleeding   Decreased fetal movement    Patient is performing daily kick counts. Patient has no questions or concerns. NST strip reviewed by Dr. Andrew Parker.

## 2023-08-01 PROBLEM — Z3A.35 35 WEEKS GESTATION OF PREGNANCY: Status: ACTIVE | Noted: 2023-02-10

## 2023-08-02 ENCOUNTER — ROUTINE PRENATAL (OUTPATIENT)
Dept: OBGYN CLINIC | Facility: MEDICAL CENTER | Age: 33
End: 2023-08-02
Payer: COMMERCIAL

## 2023-08-02 VITALS
HEIGHT: 65 IN | WEIGHT: 160.9 LBS | DIASTOLIC BLOOD PRESSURE: 74 MMHG | SYSTOLIC BLOOD PRESSURE: 118 MMHG | BODY MASS INDEX: 26.81 KG/M2

## 2023-08-02 DIAGNOSIS — O34.219 PREGNANCY WITH HISTORY OF CESAREAN SECTION, ANTEPARTUM: ICD-10-CM

## 2023-08-02 DIAGNOSIS — Z3A.35 35 WEEKS GESTATION OF PREGNANCY: ICD-10-CM

## 2023-08-02 DIAGNOSIS — O24.013 PRE-EXISTING TYPE 1 DIABETES MELLITUS WITH HYPERGLYCEMIA DURING PREGNANCY IN THIRD TRIMESTER (HCC): Primary | ICD-10-CM

## 2023-08-02 DIAGNOSIS — O36.63X0 MACROSOMIA OF FETUS AFFECTING MANAGEMENT OF MOTHER IN THIRD TRIMESTER, SINGLE OR UNSPECIFIED FETUS: ICD-10-CM

## 2023-08-02 DIAGNOSIS — E10.65 PRE-EXISTING TYPE 1 DIABETES MELLITUS WITH HYPERGLYCEMIA DURING PREGNANCY IN THIRD TRIMESTER (HCC): Primary | ICD-10-CM

## 2023-08-02 PROCEDURE — PNV: Performed by: NURSE PRACTITIONER

## 2023-08-02 PROCEDURE — 59025 FETAL NON-STRESS TEST: CPT | Performed by: NURSE PRACTITIONER

## 2023-08-02 NOTE — PROGRESS NOTES
Denies loss of fluid, vaginal bleeding and abdominal pain. Confirms frequent fetal movement. Doing fetal kick counts. Tolerating prenatal vitamin and low-dose aspirin well. NovoLog on a sliding scale and Levemir 24 units in the morning and 24 units at night. Fasting blood sugars 120-130s and 2-hour postprandials 170-180s. Denies questions or concerns at today's visit  BP: 118/74 weight: +24lbs  Plan  - continue prenatal vitamin daily. Continue low-dose aspirin until 36 weeks  -Continue fetal kick counts  -Continue vaginal/perineal massage 1-4 times per week  -GBS positive will need treatment in labor  -Diabetes encouraged to continue insulin as ordered, following diet and close contact with diabetes and pregnancy program.  Patient has CGM and results download automatically  -Follow-up with  center scheduled for 23  -NST today reactive/reassuring  -Common discomforts of pregnancy and precautions reviewed.   Signs and symptoms to report reviewed   RTO 1 week NST at next visit

## 2023-08-04 ENCOUNTER — DOCUMENTATION (OUTPATIENT)
Facility: HOSPITAL | Age: 33
End: 2023-08-04

## 2023-08-04 ENCOUNTER — TELEPHONE (OUTPATIENT)
Facility: HOSPITAL | Age: 33
End: 2023-08-04

## 2023-08-04 NOTE — PROGRESS NOTES
Discussed with Dr. Ferny Haddad patient's elevated blood sugars. Reviewed Dexcom/ Inpen report blood sugar elevations with him at this time. Plan I will call and message patient at this time to discuss blood sugars, insulin regimen and eating regimen. Unable to get a hold of patient at this time.

## 2023-08-04 NOTE — TELEPHONE ENCOUNTER
Called patient, left voicemail requesting a call back to office ([phone]) or to respond to [facility] message that I will send. Response requested as soon as possible. I explained I am reaching out for insight on patient's blood sugars after reviewing Dexcom and Inpen report. Also would like insight on current insulin regimen. I would like to have this information so her regimen can be adjusted as needed.

## 2023-08-07 ENCOUNTER — ULTRASOUND (OUTPATIENT)
Dept: PERINATAL CARE | Facility: CLINIC | Age: 33
End: 2023-08-07
Payer: COMMERCIAL

## 2023-08-07 VITALS
BODY MASS INDEX: 27.26 KG/M2 | DIASTOLIC BLOOD PRESSURE: 70 MMHG | HEIGHT: 65 IN | HEART RATE: 85 BPM | SYSTOLIC BLOOD PRESSURE: 114 MMHG | WEIGHT: 163.6 LBS

## 2023-08-07 DIAGNOSIS — O24.013 PRE-EXISTING TYPE 1 DIABETES MELLITUS WITH HYPERGLYCEMIA DURING PREGNANCY IN THIRD TRIMESTER (HCC): Primary | ICD-10-CM

## 2023-08-07 DIAGNOSIS — E10.65 PRE-EXISTING TYPE 1 DIABETES MELLITUS WITH HYPERGLYCEMIA DURING PREGNANCY IN THIRD TRIMESTER (HCC): Primary | ICD-10-CM

## 2023-08-07 DIAGNOSIS — Z3A.36 36 WEEKS GESTATION OF PREGNANCY: ICD-10-CM

## 2023-08-07 PROCEDURE — 59025 FETAL NON-STRESS TEST: CPT | Performed by: OBSTETRICS & GYNECOLOGY

## 2023-08-07 PROCEDURE — 76815 OB US LIMITED FETUS(S): CPT | Performed by: OBSTETRICS & GYNECOLOGY

## 2023-08-07 NOTE — PATIENT INSTRUCTIONS
Thank you for choosing us for your  care today. If you have any questions about your ultrasound or care, please do not hesitate to contact us or your primary obstetrician. Some general instructions for your pregnancy are:    Exercise: Aim for 22 minutes per day (150 minutes per week) of regular exercise. Walking is great! Nutrition: Choose healthy sources of calcium, iron, and protein. Learn about Preeclampsia: preeclampsia is a common, serious high blood pressure complication in pregnancy. A blood pressure of 573FFUU (systolic or top number) or 55MLAJ (diastolic or bottom number) is not normal and needs evaluation by your doctor. Aspirin is sometimes prescribed in early pregnancy to prevent preeclampsia in women with risk factors - ask your obstetrician if you should be on this medication. For more resources, visit:  [employer]  If you smoke, try to reduce how many cigarettes you smoke or try to quit completely. Do not vape. Other warning signs to watch out for in pregnancy or postpartum: chest pain, obstructed breathing or shortness of breath, seizures, thoughts of hurting yourself or your baby, bleeding, a painful or swollen leg, fever, or headache (see [employer] POST-BIRTH Warning Signs campaign). If these happen call 911. Itching is also not normal in pregnancy and if you experience this, especially over your hands and feet, potentially worse at night, notify your doctors.

## 2023-08-07 NOTE — PROGRESS NOTES
[id number]  [facility]: Ms. Fernand Canavan was seen today for NST (found under the pregnancy episode) which I reviewed the RN assessment and agree, and DOMENICA (see ultrasound report under OB procedures tab). Please don't hesitate to contact our office with any concerns or questions.   Santy Blackburn MD

## 2023-08-07 NOTE — PROGRESS NOTES
NST was done today. Pt. Reported active fetal movement at home between visit. Daily fetal kick count reviewed and emphasized. Patient verbalized understanding of all and was receptive.

## 2023-08-09 PROBLEM — B95.1 POSITIVE GBS TEST: Status: ACTIVE | Noted: 2023-08-09

## 2023-08-09 PROBLEM — Z3A.36 36 WEEKS GESTATION OF PREGNANCY: Status: ACTIVE | Noted: 2023-02-10

## 2023-08-10 ENCOUNTER — ROUTINE PRENATAL (OUTPATIENT)
Dept: OBGYN CLINIC | Facility: MEDICAL CENTER | Age: 33
End: 2023-08-10
Payer: COMMERCIAL

## 2023-08-10 VITALS — SYSTOLIC BLOOD PRESSURE: 104 MMHG | DIASTOLIC BLOOD PRESSURE: 74 MMHG | BODY MASS INDEX: 27.01 KG/M2 | WEIGHT: 162.3 LBS

## 2023-08-10 DIAGNOSIS — O24.013 PRE-EXISTING TYPE 1 DIABETES MELLITUS WITH HYPERGLYCEMIA DURING PREGNANCY IN THIRD TRIMESTER (HCC): Primary | ICD-10-CM

## 2023-08-10 DIAGNOSIS — O34.219 PREGNANCY WITH HISTORY OF CESAREAN SECTION, ANTEPARTUM: ICD-10-CM

## 2023-08-10 DIAGNOSIS — E10.65 PRE-EXISTING TYPE 1 DIABETES MELLITUS WITH HYPERGLYCEMIA DURING PREGNANCY IN THIRD TRIMESTER (HCC): Primary | ICD-10-CM

## 2023-08-10 DIAGNOSIS — Z3A.36 36 WEEKS GESTATION OF PREGNANCY: ICD-10-CM

## 2023-08-10 DIAGNOSIS — B95.1 POSITIVE GBS TEST: ICD-10-CM

## 2023-08-10 DIAGNOSIS — O36.63X0 MACROSOMIA OF FETUS AFFECTING MANAGEMENT OF MOTHER IN THIRD TRIMESTER, SINGLE OR UNSPECIFIED FETUS: ICD-10-CM

## 2023-08-10 PROCEDURE — PNV: Performed by: ADVANCED PRACTICE MIDWIFE

## 2023-08-10 PROCEDURE — 59025 FETAL NON-STRESS TEST: CPT | Performed by: ADVANCED PRACTICE MIDWIFE

## 2023-08-10 NOTE — PROGRESS NOTES
Routine Prenatal Visit  [facility] [address] [place] [address]    Assessment/Plan:  Roney Boas is a 34y.o. year old [id number] at 44w11d who presents for routine prenatal visit. 1. Pre-existing type 1 diabetes mellitus with hyperglycemia during pregnancy in third trimester ([address])    2. Pregnancy with history of  section, antepartum    3. Macrosomia of fetus affecting management of mother in third trimester, single or unspecified fetus    4. Positive GBS test    5. 36 weeks gestation of pregnancy  Assessment & Plan:  - positive GBS  - reviewed s/s labor, FKC  - weekly DOMENICA, NST 2 times weekly  - followed by MFM  - - Reports sugars to Diabetes in Pregnancy   - next visit 1 week            Subjective:     CC: Prenatal care    Chanda Villavicencio is a 34 y.o. [id number] female who presents for routine prenatal care at 36w5d. Pregnancy ROS: no leakage of fluid, pelvic pain, or vaginal bleeding. Good fetal movement. Notes occasional contractions.      The following portions of the patient's history were reviewed and updated as appropriate: allergies, current medications, past family history, past medical history, obstetric history, gynecologic history, past social history, past surgical history and problem list.      Objective:  /74   Wt 73.6 kg (162 lb 4.8 oz)   LMP 2022 (Exact Date)   BMI 27.01 kg/m²   Pregravid Weight/BMI: 61.7 kg (136 lb) (BMI 22.63)  Current Weight: 73.6 kg (162 lb 4.8 oz)   Total Weight Gain: 11.9 kg (26 lb 4.8 oz)   Pre-Jaida Vitals    Flowsheet Row Most Recent Value   Prenatal Assessment    Fetal Heart Rate 150   Movement Present   Prenatal Vitals    Blood Pressure 104/74   Weight - Scale 73.6 kg (162 lb 4.8 oz)   Urine Albumin/Glucose    Dilation/Effacement/Station    Vaginal Drainage    Edema    LLE Edema None   RLE Edema None           General: Well appearing, no distress  Respiratory: Unlabored breathing  Cardiovascular: Regular rate.  Abdomen: Soft, gravid, nontender  Fundal Height: Appropriate for gestational age. Extremities: Warm and well perfused. Non tender.

## 2023-08-10 NOTE — ASSESSMENT & PLAN NOTE
- positive GBS  - reviewed s/s labor, FKC  - weekly DOMENICA, NST 2 times weekly  - followed by MFM  - - Reports sugars to Diabetes in Pregnancy   - NST- reactive  - next visit 1 week

## 2023-08-11 ENCOUNTER — TELEPHONE (OUTPATIENT)
Dept: OBGYN CLINIC | Facility: CLINIC | Age: 33
End: 2023-08-11

## 2023-08-11 ENCOUNTER — TELEPHONE (OUTPATIENT)
Facility: HOSPITAL | Age: 33
End: 2023-08-11

## 2023-08-11 NOTE — TELEPHONE ENCOUNTER
Called patient, left voicemail to question if she is still currently on Dexcom and Inpen. When reports pulled up this morning last day it showed she was on is from 8/9/23. I asked the patient to call the office back at [phone] or respond via [employer] message. Dr. Danie Campos notified of this at this time.

## 2023-08-11 NOTE — TELEPHONE ENCOUNTER
Joyce Lindsey returned my call and we discussed recommended IOL, between 37-38 weeks. She will be 37 weeks tomorrow. Based on her sugars and developing macrosomia- recommendation for induction made. Will discuss with jun Michaud.

## 2023-08-11 NOTE — TELEPHONE ENCOUNTER
Cherelle Barbour is aware of IOL- 8/15/23 at 8:00 pm. To keep appointment on 8/14/23 with [facility].

## 2023-08-14 ENCOUNTER — ULTRASOUND (OUTPATIENT)
Dept: PERINATAL CARE | Facility: OTHER | Age: 33
End: 2023-08-14
Payer: COMMERCIAL

## 2023-08-14 VITALS
DIASTOLIC BLOOD PRESSURE: 62 MMHG | HEIGHT: 65 IN | SYSTOLIC BLOOD PRESSURE: 124 MMHG | BODY MASS INDEX: 27.19 KG/M2 | WEIGHT: 163.2 LBS | HEART RATE: 94 BPM

## 2023-08-14 DIAGNOSIS — O24.012 PRE-EXISTING TYPE 1 DIABETES MELLITUS DURING PREGNANCY IN SECOND TRIMESTER: ICD-10-CM

## 2023-08-14 DIAGNOSIS — O24.013 PRE-EXISTING TYPE 1 DIABETES MELLITUS WITH HYPERGLYCEMIA DURING PREGNANCY IN THIRD TRIMESTER (HCC): Primary | ICD-10-CM

## 2023-08-14 DIAGNOSIS — O99.810 HYPERGLYCEMIA DURING PREGNANCY: ICD-10-CM

## 2023-08-14 DIAGNOSIS — E10.65 PRE-EXISTING TYPE 1 DIABETES MELLITUS WITH HYPERGLYCEMIA DURING PREGNANCY IN SECOND TRIMESTER (HCC): ICD-10-CM

## 2023-08-14 DIAGNOSIS — O24.012 PRE-EXISTING TYPE 1 DIABETES MELLITUS WITH HYPERGLYCEMIA DURING PREGNANCY IN SECOND TRIMESTER (HCC): ICD-10-CM

## 2023-08-14 DIAGNOSIS — E10.65 PRE-EXISTING TYPE 1 DIABETES MELLITUS WITH HYPERGLYCEMIA DURING PREGNANCY IN THIRD TRIMESTER (HCC): Primary | ICD-10-CM

## 2023-08-14 DIAGNOSIS — Z3A.37 37 WEEKS GESTATION OF PREGNANCY: ICD-10-CM

## 2023-08-14 DIAGNOSIS — O36.63X0 EXCESSIVE FETAL GROWTH AFFECTING MANAGEMENT OF PREGNANCY IN THIRD TRIMESTER, SINGLE OR UNSPECIFIED FETUS: ICD-10-CM

## 2023-08-14 PROCEDURE — 99212 OFFICE O/P EST SF 10 MIN: CPT | Performed by: OBSTETRICS & GYNECOLOGY

## 2023-08-14 PROCEDURE — 76816 OB US FOLLOW-UP PER FETUS: CPT | Performed by: OBSTETRICS & GYNECOLOGY

## 2023-08-14 PROCEDURE — 76818 FETAL BIOPHYS PROFILE W/NST: CPT | Performed by: OBSTETRICS & GYNECOLOGY

## 2023-08-14 NOTE — PROGRESS NOTES
On exam today the patient appears well, in no acute distress, and denies any complaints. Fetal weight is >97th%tile for gestational age with the abdominal sound with greater than the 99th percentile in 6 weeks ahead of gestational age. A large abdominal circumference can sometimes indicate macrosomia and is seen commonly in poorly controlled diabetes. The patient has type 1 diabetes with a recent hemoglobin A1c of 7%. Good fetal movement and tone are seen. The amniotic fluid volume appears consistent with mild polyhydramnios. The patient was informed of today's findings and all of her questions were answered. The limitations of ultrasound were reviewed with the patient. We discussed fetal kick counts. Patient is scheduled for induction of labor tomorrow evening. She has a history of a prior  section with successful vaginal birth after  section of a 7 pound 15 ounce baby boy    Thank you very much for allowing us to participate in the care of this very nice patient. Should you have any questions, please do not hesitate to contact our office.

## 2023-08-14 NOTE — PROGRESS NOTES
Repeat Non-Stress Testing:    Patient verbalizes +FM. Pt denies ALL:               Leaking of fluid   Contractions   Vaginal bleeding   Decreased fetal movement    Patient is performing daily kick counts. Patient has no questions or concerns.    NST strip reviewed by Dr. Tiffany CELIS to be done with U/S.

## 2023-08-15 ENCOUNTER — HOSPITAL ENCOUNTER (INPATIENT)
Facility: HOSPITAL | Age: 33
LOS: 2 days | Discharge: HOME | End: 2023-08-17
Attending: OBSTETRICS & GYNECOLOGY | Admitting: OBSTETRICS & GYNECOLOGY
Payer: COMMERCIAL

## 2023-08-15 ENCOUNTER — TELEPHONE (OUTPATIENT)
Dept: OBGYN CLINIC | Facility: MEDICAL CENTER | Age: 33
End: 2023-08-15

## 2023-08-15 ENCOUNTER — HOSPITAL ENCOUNTER (OUTPATIENT)
Dept: LABOR AND DELIVERY | Facility: HOSPITAL | Age: 33
Discharge: HOME | End: 2023-08-15
Payer: COMMERCIAL

## 2023-08-15 DIAGNOSIS — O24.013 PRE-EXISTING TYPE 1 DIABETES MELLITUS WITH HYPERGLYCEMIA DURING PREGNANCY IN THIRD TRIMESTER (HCC): ICD-10-CM

## 2023-08-15 DIAGNOSIS — Z3A.37 37 WEEKS GESTATION OF PREGNANCY: Primary | ICD-10-CM

## 2023-08-15 DIAGNOSIS — E10.65 PRE-EXISTING TYPE 1 DIABETES MELLITUS WITH HYPERGLYCEMIA DURING PREGNANCY IN THIRD TRIMESTER (HCC): ICD-10-CM

## 2023-08-15 DIAGNOSIS — O34.219 VBAC (VAGINAL BIRTH AFTER CESAREAN): ICD-10-CM

## 2023-08-15 LAB
ABO GROUP BLD: NORMAL
BLD GP AB SCN SERPL QL: NEGATIVE
ERYTHROCYTE [DISTWIDTH] IN BLOOD BY AUTOMATED COUNT: 13.4 % (ref 11.6–15.1)
GLUCOSE SERPL-MCNC: 170 MG/DL (ref 65–140)
GLUCOSE SERPL-MCNC: 191 MG/DL (ref 65–140)
HCT VFR BLD AUTO: 35.5 % (ref 34.8–46.1)
HGB BLD-MCNC: 11.5 G/DL (ref 11.5–15.4)
HOLD SPECIMEN: YES
MCH RBC QN AUTO: 29.5 PG (ref 26.8–34.3)
MCHC RBC AUTO-ENTMCNC: 32.4 G/DL (ref 31.4–37.4)
MCV RBC AUTO: 91 FL (ref 82–98)
PLATELET # BLD AUTO: 155 THOUSANDS/UL (ref 149–390)
PMV BLD AUTO: 13.1 FL (ref 8.9–12.7)
RBC # BLD AUTO: 3.9 MILLION/UL (ref 3.81–5.12)
RH BLD: POSITIVE
SPECIMEN EXPIRATION DATE: NORMAL
WBC # BLD AUTO: 9.28 THOUSAND/UL (ref 4.31–10.16)

## 2023-08-15 PROCEDURE — 86780 TREPONEMA PALLIDUM: CPT

## 2023-08-15 PROCEDURE — 86901 BLOOD TYPING SEROLOGIC RH(D): CPT

## 2023-08-15 PROCEDURE — 86850 RBC ANTIBODY SCREEN: CPT

## 2023-08-15 PROCEDURE — 85027 COMPLETE CBC AUTOMATED: CPT

## 2023-08-15 PROCEDURE — NC001 PR NO CHARGE: Performed by: OBSTETRICS & GYNECOLOGY

## 2023-08-15 PROCEDURE — 82948 REAGENT STRIP/BLOOD GLUCOSE: CPT

## 2023-08-15 PROCEDURE — 86900 BLOOD TYPING SEROLOGIC ABO: CPT

## 2023-08-15 RX ORDER — SODIUM CHLORIDE 9 MG/ML
75 INJECTION, SOLUTION INTRAVENOUS CONTINUOUS
Status: DISCONTINUED | OUTPATIENT
Start: 2023-08-15 | End: 2023-08-16

## 2023-08-15 RX ORDER — DEXTROSE AND SODIUM CHLORIDE 5; .9 G/100ML; G/100ML
50 INJECTION, SOLUTION INTRAVENOUS CONTINUOUS
Status: DISCONTINUED | OUTPATIENT
Start: 2023-08-15 | End: 2023-08-16

## 2023-08-15 RX ORDER — OXYTOCIN/RINGER'S LACTATE 30/500 ML
1-30 PLASTIC BAG, INJECTION (ML) INTRAVENOUS
Status: DISCONTINUED | OUTPATIENT
Start: 2023-08-15 | End: 2023-08-16

## 2023-08-15 RX ORDER — ONDANSETRON 2 MG/ML
4 INJECTION INTRAMUSCULAR; INTRAVENOUS EVERY 6 HOURS PRN
Status: DISCONTINUED | OUTPATIENT
Start: 2023-08-15 | End: 2023-08-16

## 2023-08-15 RX ORDER — BUPIVACAINE HYDROCHLORIDE 2.5 MG/ML
30 INJECTION, SOLUTION EPIDURAL; INFILTRATION; INTRACAUDAL ONCE AS NEEDED
Status: DISCONTINUED | OUTPATIENT
Start: 2023-08-15 | End: 2023-08-16

## 2023-08-15 RX ADMIN — SODIUM CHLORIDE 5 MILLION UNITS: 0.9 INJECTION, SOLUTION INTRAVENOUS at 22:03

## 2023-08-15 RX ADMIN — INSULIN DETEMIR 12 UNITS: 100 INJECTION, SOLUTION SUBCUTANEOUS at 21:54

## 2023-08-15 RX ADMIN — Medication 2 MILLI-UNITS/MIN: at 22:08

## 2023-08-15 RX ADMIN — SODIUM CHLORIDE 125 ML/HR: 0.9 INJECTION, SOLUTION INTRAVENOUS at 21:30

## 2023-08-15 NOTE — TELEPHONE ENCOUNTER
Patient is 37w and is being induced tonight and had some questions about her induction. Please review when you get a chance.  Thank you

## 2023-08-15 NOTE — TELEPHONE ENCOUNTER
Contacted patient to discuss concerns. Patient stated she did call the hospital to discuss her questions regarding IOL tonight. She had no further questions at this time. She will contact the office if she does.

## 2023-08-15 NOTE — LETTER
[address] [place] [facility]  [address] [facility] [address]  Dept: [phone]    August 17, 2023     Patient: Brian Junior   YOB: 1990   Date of Visit: 8/15/2023       To Whom it May Concern:    Alexandru Elliott is under my professional care. She was seen in the hospital from 8/15/2023 to 08/17/23. She She was admitted on 8/15/23 and induced due to medical reasons and delivered on 8/16/23. If you have any questions or concerns, please don't hesitate to call.          Sincerely,          Joann Michaels MD

## 2023-08-16 ENCOUNTER — ANESTHESIA (INPATIENT)
Dept: ANESTHESIOLOGY | Facility: HOSPITAL | Age: 33
End: 2023-08-16
Payer: COMMERCIAL

## 2023-08-16 ENCOUNTER — ANESTHESIA EVENT (INPATIENT)
Dept: ANESTHESIOLOGY | Facility: HOSPITAL | Age: 33
End: 2023-08-16
Payer: COMMERCIAL

## 2023-08-16 PROBLEM — O34.219 VBAC (VAGINAL BIRTH AFTER CESAREAN): Status: ACTIVE | Noted: 2023-02-10

## 2023-08-16 LAB
2HR DELTA HS TROPONIN: -2 NG/L
4HR DELTA HS TROPONIN: -1 NG/L
BASE EXCESS BLDCOA CALC-SCNC: -2.3 MMOL/L (ref 3–11)
BASE EXCESS BLDCOV CALC-SCNC: -3.2 MMOL/L (ref 1–9)
CARDIAC TROPONIN I PNL SERPL HS: 4 NG/L
CARDIAC TROPONIN I PNL SERPL HS: 5 NG/L
CARDIAC TROPONIN I PNL SERPL HS: 6 NG/L
DME PARACHUTE DELIVERY DATE ACTUAL: NORMAL
DME PARACHUTE DELIVERY DATE REQUESTED: NORMAL
DME PARACHUTE ITEM DESCRIPTION: NORMAL
DME PARACHUTE ORDER STATUS: NORMAL
DME PARACHUTE SUPPLIER NAME: NORMAL
DME PARACHUTE SUPPLIER PHONE: NORMAL
GLUCOSE SERPL-MCNC: 111 MG/DL (ref 65–140)
GLUCOSE SERPL-MCNC: 146 MG/DL (ref 65–140)
GLUCOSE SERPL-MCNC: 148 MG/DL (ref 65–140)
GLUCOSE SERPL-MCNC: 190 MG/DL (ref 65–140)
GLUCOSE SERPL-MCNC: 201 MG/DL (ref 65–140)
GLUCOSE SERPL-MCNC: 67 MG/DL (ref 65–140)
GLUCOSE SERPL-MCNC: 79 MG/DL (ref 65–140)
GLUCOSE SERPL-MCNC: 80 MG/DL (ref 65–140)
GLUCOSE SERPL-MCNC: 80 MG/DL (ref 65–140)
HCO3 BLDCOA-SCNC: 25.8 MMOL/L (ref 17.3–27.3)
HCO3 BLDCOV-SCNC: 22 MMOL/L (ref 12.2–28.6)
O2 CT VFR BLDCOA CALC: 9.5 ML/DL
OXYHGB MFR BLDCOA: 38.2 %
OXYHGB MFR BLDCOV: 70.7 %
PCO2 BLDCOA: 56.4 MM[HG] (ref 30–60)
PCO2 BLDCOV: 40.1 MM HG (ref 27–43)
PH BLDCOA: 7.28 [PH] (ref 7.23–7.43)
PH BLDCOV: 7.36 [PH] (ref 7.19–7.49)
PO2 BLDCOA: 18.8 MM HG (ref 5–25)
PO2 BLDCOV: 30.1 MM HG (ref 15–45)
SAO2 % BLDCOV: 18.2 ML/DL
TREPONEMA PALLIDUM IGG+IGM AB [PRESENCE] IN SERUM OR PLASMA BY IMMUNOASSAY: NORMAL

## 2023-08-16 PROCEDURE — 99024 POSTOP FOLLOW-UP VISIT: CPT | Performed by: OBSTETRICS & GYNECOLOGY

## 2023-08-16 PROCEDURE — 82948 REAGENT STRIP/BLOOD GLUCOSE: CPT

## 2023-08-16 PROCEDURE — 93005 ELECTROCARDIOGRAM TRACING: CPT

## 2023-08-16 PROCEDURE — 82805 BLOOD GASES W/O2 SATURATION: CPT

## 2023-08-16 PROCEDURE — 88307 TISSUE EXAM BY PATHOLOGIST: CPT | Performed by: PATHOLOGY

## 2023-08-16 PROCEDURE — NC001 PR NO CHARGE: Performed by: OBSTETRICS & GYNECOLOGY

## 2023-08-16 PROCEDURE — 3E033VJ INTRODUCTION OF OTHER HORMONE INTO PERIPHERAL VEIN, PERCUTANEOUS APPROACH: ICD-10-PCS | Performed by: OBSTETRICS & GYNECOLOGY

## 2023-08-16 PROCEDURE — 84484 ASSAY OF TROPONIN QUANT: CPT

## 2023-08-16 RX ORDER — DOCUSATE SODIUM 100 MG/1
100 CAPSULE, LIQUID FILLED ORAL 2 TIMES DAILY
Status: DISCONTINUED | OUTPATIENT
Start: 2023-08-16 | End: 2023-08-17 | Stop reason: HOSPADM

## 2023-08-16 RX ORDER — CALCIUM CARBONATE 500 MG/1
1000 TABLET, CHEWABLE ORAL DAILY PRN
Status: DISCONTINUED | OUTPATIENT
Start: 2023-08-16 | End: 2023-08-17 | Stop reason: HOSPADM

## 2023-08-16 RX ORDER — OXYTOCIN/RINGER'S LACTATE 30/500 ML
250 PLASTIC BAG, INJECTION (ML) INTRAVENOUS ONCE
Status: DISCONTINUED | OUTPATIENT
Start: 2023-08-16 | End: 2023-08-17 | Stop reason: HOSPADM

## 2023-08-16 RX ORDER — ISOPROPYL ALCOHOL 0.7 ML/ML
SWAB TOPICAL
Qty: 100 EACH | Refills: 1 | Status: SHIPPED | OUTPATIENT
Start: 2023-08-16

## 2023-08-16 RX ORDER — DIPHENHYDRAMINE HCL 25 MG
25 TABLET ORAL EVERY 6 HOURS PRN
Status: DISCONTINUED | OUTPATIENT
Start: 2023-08-16 | End: 2023-08-17 | Stop reason: HOSPADM

## 2023-08-16 RX ORDER — IBUPROFEN 600 MG/1
600 TABLET ORAL EVERY 6 HOURS
Status: DISCONTINUED | OUTPATIENT
Start: 2023-08-16 | End: 2023-08-17 | Stop reason: HOSPADM

## 2023-08-16 RX ORDER — INSULIN LISPRO 100 [IU]/ML
1-5 INJECTION, SOLUTION INTRAVENOUS; SUBCUTANEOUS
Status: DISCONTINUED | OUTPATIENT
Start: 2023-08-16 | End: 2023-08-17 | Stop reason: HOSPADM

## 2023-08-16 RX ORDER — ONDANSETRON 2 MG/ML
4 INJECTION INTRAMUSCULAR; INTRAVENOUS EVERY 8 HOURS PRN
Status: DISCONTINUED | OUTPATIENT
Start: 2023-08-16 | End: 2023-08-17 | Stop reason: HOSPADM

## 2023-08-16 RX ORDER — ROPIVACAINE HYDROCHLORIDE 2 MG/ML
INJECTION, SOLUTION EPIDURAL; INFILTRATION; PERINEURAL CONTINUOUS PRN
Status: DISCONTINUED | OUTPATIENT
Start: 2023-08-16 | End: 2023-08-16 | Stop reason: HOSPADM

## 2023-08-16 RX ORDER — ACETAMINOPHEN 325 MG/1
650 TABLET ORAL EVERY 4 HOURS PRN
Status: DISCONTINUED | OUTPATIENT
Start: 2023-08-16 | End: 2023-08-17 | Stop reason: HOSPADM

## 2023-08-16 RX ORDER — DIAPER,BRIEF,INFANT-TODD,DISP
1 EACH MISCELLANEOUS DAILY PRN
Status: DISCONTINUED | OUTPATIENT
Start: 2023-08-16 | End: 2023-08-17 | Stop reason: HOSPADM

## 2023-08-16 RX ORDER — INSULIN ASPART 100 [IU]/ML
10 INJECTION, SOLUTION INTRAVENOUS; SUBCUTANEOUS
Qty: 15 ML | Refills: 0 | Status: SHIPPED | OUTPATIENT
Start: 2023-08-16

## 2023-08-16 RX ORDER — LIDOCAINE HYDROCHLORIDE AND EPINEPHRINE 15; 5 MG/ML; UG/ML
INJECTION, SOLUTION EPIDURAL AS NEEDED
Status: DISCONTINUED | OUTPATIENT
Start: 2023-08-16 | End: 2023-08-16 | Stop reason: HOSPADM

## 2023-08-16 RX ADMIN — INSULIN DETEMIR 8 UNITS: 100 INJECTION, SOLUTION SUBCUTANEOUS at 22:12

## 2023-08-16 RX ADMIN — IBUPROFEN 600 MG: 600 TABLET ORAL at 23:28

## 2023-08-16 RX ADMIN — DEXTROSE AND SODIUM CHLORIDE 100 ML/HR: 5; .9 INJECTION, SOLUTION INTRAVENOUS at 00:15

## 2023-08-16 RX ADMIN — ROPIVACAINE HYDROCHLORIDE 3 ML: 2 INJECTION EPIDURAL; INFILTRATION; PERINEURAL at 03:10

## 2023-08-16 RX ADMIN — SODIUM CHLORIDE 2 UNITS/HR: 9 INJECTION, SOLUTION INTRAVENOUS at 00:16

## 2023-08-16 RX ADMIN — IBUPROFEN 600 MG: 600 TABLET ORAL at 11:23

## 2023-08-16 RX ADMIN — INSULIN DETEMIR 12 UNITS: 100 INJECTION, SOLUTION SUBCUTANEOUS at 10:51

## 2023-08-16 RX ADMIN — ROPIVACAINE HYDROCHLORIDE 10 ML/HR: 2 INJECTION EPIDURAL; INFILTRATION; PERINEURAL at 03:13

## 2023-08-16 RX ADMIN — SODIUM CHLORIDE 2.5 MILLION UNITS: 9 INJECTION, SOLUTION INTRAVENOUS at 02:23

## 2023-08-16 RX ADMIN — ROPIVACAINE HYDROCHLORIDE: 2 INJECTION, SOLUTION EPIDURAL; INFILTRATION at 03:18

## 2023-08-16 RX ADMIN — IBUPROFEN 600 MG: 600 TABLET ORAL at 17:59

## 2023-08-16 RX ADMIN — ACETAMINOPHEN 650 MG: 325 TABLET ORAL at 21:45

## 2023-08-16 RX ADMIN — LIDOCAINE HYDROCHLORIDE AND EPINEPHRINE 3 ML: 15; 5 INJECTION, SOLUTION EPIDURAL at 03:05

## 2023-08-16 RX ADMIN — DOCUSATE SODIUM 100 MG: 100 CAPSULE, LIQUID FILLED ORAL at 17:59

## 2023-08-16 RX ADMIN — SODIUM CHLORIDE 999 ML/HR: 0.9 INJECTION, SOLUTION INTRAVENOUS at 02:40

## 2023-08-16 NOTE — ANESTHESIA POSTPROCEDURE EVALUATION
Post-Op Assessment Note    CV Status:  Stable    Pain management: adequate     Mental Status:  Awake   Hydration Status:  Stable   PONV Controlled:  Controlled   Airway Patency:  Patent      Post Op Vitals Reviewed: Yes      Staff: Anesthesiologist     Post-op block assessment: catheter intact and no complications      No notable events documented.     BP      Temp      Pulse     Resp      SpO2      /66   Pulse 73   Temp 98.2 °F (36.8 °C) (Oral)   Resp 18   Ht 5' 5" (1.651 m)   Wt 73.5 kg (162 lb)   LMP 11/26/2022 (Exact Date)   SpO2 99%   BMI 26.96 kg/m²

## 2023-08-16 NOTE — ASSESSMENT & PLAN NOTE
Estimated fetal weight is greater than the 97th percentile with abdominal circumference greater than the 99th percentile and head circumference at the 22nd percentile  Patient was counseled on the risk of shoulder dystocia   Patient was told that she could elect for a repeat  and she indicates at this time she would like to proceed with TOLAC  We told her that we would closely monitor her labor curve and if there was any concern for arrest of dilation/descent, which could be secondary to her fetal macrosomia, we would readdress the possible indication for   We also told her that we would likely not offer her an operative vaginal delivery and that often the alterative is

## 2023-08-16 NOTE — OB LABOR/OXYTOCIN SAFETY PROGRESS
Oxytocin Safety Progress Check Note - Andrea Tam 34 y.o. female MRN: [id number]    Unit/Bed#: L&D 323-01 Encounter: [id number]    Dose (netta-units/min) Oxytocin: 4 netta-units/min  Contraction Frequency (minutes): 2-4  Contraction Quality: Mild  Tachysystole: No   Cervical Dilation: 3-4        Cervical Effacement: 90  Fetal Station: -2  Baseline Rate: 150 bpm     FHR Category: Category I               Vital Signs:   Vitals:    08/15/23 2259   BP: 109/56   Pulse: 74   Resp:    Temp:        Notes/comments:   Check deferred at this time. Continue pitocin titration. Also noted to have 2 elevated blood sugars. Half of her evening dose of Levemir was given however as this is a long-acting this is not anticipated to drastically bring down her sugars to within normal range. Therefore she started on insulin drip at this time for improved/more acute control of her blood sugars.         Kathi Singh MD 8/16/2023 12:26 AM

## 2023-08-16 NOTE — OB LABOR/OXYTOCIN SAFETY PROGRESS
Labor Progress Note - Bruno Mata 34 y.o. female MRN: [id number]    Unit/Bed#: L&D 323-01 Encounter: [id number]    Dose (netta-units/min) Oxytocin: 2 netta-units/min  Contraction Frequency (minutes): irritability  Contraction Quality: Mild  Tachysystole: No   Cervical Dilation: 4-5        Cervical Effacement: 90  Fetal Station: -2  Baseline Rate: 130 bpm     FHR Category: Category I               Vital Signs:   Vitals:    08/15/23 2259   BP: 109/56   Pulse: 74   Resp:    Temp:        Notes/comments:   SVE as above. Patient is currently comfortable with epidural.  AROM of small forebag for clear fluid. Continue expectant management.           Jaylen Eden MD 8/16/2023 12:20 AM

## 2023-08-16 NOTE — OB LABOR/OXYTOCIN SAFETY PROGRESS
Oxytocin Safety Progress Check Note - Candy Goodwin 34 y.o. female MRN: [id number]    Unit/Bed#: L&D 323-01 Encounter: [id number]    Dose (netta-units/min) Oxytocin: 4 netta-units/min  Contraction Frequency (minutes): 2-4  Contraction Quality: Mild  Tachysystole: No   Cervical Dilation: 4        Cervical Effacement: 80  Fetal Station: -2  Baseline Rate: 130 bpm     FHR Category: Category I               Vital Signs:   Vitals:    08/16/23 0015   BP: 120/74   Pulse: 56   Resp:    Temp:        Notes/comments:   SVE as above. Continue pitocin titration.             Oscar Lopez MD 8/16/2023 2:03 AM

## 2023-08-16 NOTE — ANESTHESIA PROCEDURE NOTES
Epidural Block    Start time: 8/16/2023 3:04 AM  Reason for block: primary anesthetic  Staffing  Performed by: Majo Vieira MD  Authorized by: Majo Vieira MD    Preanesthetic Checklist  Completed: patient identified, IV checked, site marked, risks and benefits discussed, surgical consent, monitors and equipment checked, pre-op evaluation and timeout performed  Epidural  Patient position: sitting  Prep: Betadine  Patient monitoring: frequent blood pressure checks, heart rate and cardiac monitor  Approach: midline  Location: lumbar  Injection technique: MANUEL saline  Needle  Needle type: Tuohy   Needle gauge: 18 G  Catheter type: side hole  Catheter size: 20 G  Catheter at skin depth: 12 cm  Catheter securement method: clear occlusive dressing  Test dose: negative  Assessment  Sensory level: T10  Number of attempts: 1negative aspiration for CSF, negative aspiration for heme and no paresthesia on injection  patient tolerated the procedure well with no immediate complications

## 2023-08-16 NOTE — CASE MANAGEMENT
Case Management Progress Note    Patient name Jassi Peck  Location L&D 307/L&D 926-24 MRN [id number]  : 1990 Date 2023       LOS (days): 1  Geometric Mean LOS (GMLOS) (days):   Days to GMLOS:        OBJECTIVE:        Current admission status: Inpatient  Preferred Pharmacy:   [address]  Phone: [phone] Fax: [phone]    [address] [facility] [address]  Phone: [phone] Fax: [phone]    Primary Care Provider: Steven Cunningham MD    Primary Insurance: [facility]  Secondary Insurance:     PROGRESS NOTE:    CM received consult for breast pump. MOB requesting Zomee z2 pump. CM placed order via [employer]. Order approved. CM delivered pump to MOB's room. Delivery ticket signed and placed in bin for DME liaison.

## 2023-08-16 NOTE — PROGRESS NOTES
Progress Note - OB/GYN  Candy Goodwin 34 y.o. female MRN: [id number]  Unit/Bed#: L&D 307-01 Encounter: [id number]    Assessment and Plan     Candy Goodwin is a patient of: [facility]. She is POD# 1 s/p  vaginal birth after  ()  Recovering well and is stable     Chest Pressure   Assessment:  Patient complaining of 1 day of worsening chest pressure, worse with inspiration and ambulation. Denies accompanying diaphoresis, palpitations, lightheadedness, dizziness, shortness of breath. Homans sign negative. Reproducible on palpation of anterior chest wall. Clinically suspect musculoskeletal cause, less likely cardiac concern or PE. Plan:  -EKG to rule out cardiac concern  -Troponin to rule out cardiac concern  -continue supportive management and motrin  -continue to closely monitor blood pressures, 2 elevated pressures to 883E systolic today after delivery      Diabetes mellitus type 1, controlled, without complications [facility])  Assessment & Plan  Lab Results   Component Value Date    HGBA1C 7.0 (H) 2023       POC BG       Results from last 7 days   Lab Units 23  1757 23  1246 23  1040 23  0707 23  0417 23  0315 23  0150   POC GLUCOSE mg/dl 80 148* 201* 67 80 79 111        Current pregnancy regimen:   -Levemir 24 units twice daily (7 to 8 AM and PM)  -NovoLog to 12 units at meals 1:6 carb ratio        Subjective/Objective     Chief Complaint: Chest pressure    Subjective:    Candy Goodwin is POD#1 s/p  spontaneous vaginal delivery. She has current complaints of chest pressure. Patient notes the pressure started this AM an has worsened. She states the pressure is mild and just "just enough to be annoying". She notices it the most when she gets up to walk around and when she takes deep breaths. Patient denies any accompanying shortness of breath, dizziness, lightheadedness, palpitations or diaphoresis.  Pressure unchanged by sitting up and leaning forward. Patient denies any personal history of asthma, hyptertension or cardiac concerns. Vitals:   /81 (BP Location: Right arm)   Pulse (!) 54   Temp 98.7 °F (37.1 °C) (Temporal)   Resp 16   Ht 5' 5" (1.651 m)   Wt 73.5 kg (162 lb)   LMP 11/26/2022 (Exact Date)   SpO2 99%   Breastfeeding Yes   BMI 26.96 kg/m²       Intake/Output Summary (Last 24 hours) at 8/16/2023 1834  Last data filed at 8/16/2023 1100  Gross per 24 hour   Intake 0 ml   Output 629 ml   Net -629 ml       Invasive Devices     Peripheral Intravenous Line  Duration           Peripheral IV 08/15/23 Right Arm <1 day                Physical Exam:   GEN: Elise Goyal appears well, alert and oriented x 3, pleasant and cooperative   CARDIO: Joe Shells to 55 on exam, regular rhythm. No murmurs or rubs, no carotid bruits. Chest pressure reproducible with palpation. Normal pulses. RESP:  CTAB, no wheezes or rales. Patient states chest pressure increases with inspiration during exam.   EXTREMITIES: non tender, no erythema. Homans sign negative.       Labs:     Hemoglobin   Date Value Ref Range Status   08/15/2023 11.5 11.5 - 15.4 g/dL Final   07/08/2023 12.8 11.5 - 15.4 g/dL Final     WBC   Date Value Ref Range Status   08/15/2023 9.28 4.31 - 10.16 Thousand/uL Final   07/08/2023 15.46 (H) 4.31 - 10.16 Thousand/uL Final     Platelets   Date Value Ref Range Status   08/15/2023 155 149 - 390 Thousands/uL Final   07/08/2023 180 149 - 390 Thousands/uL Final     Creatinine   Date Value Ref Range Status   07/09/2023 0.60 0.60 - 1.30 mg/dL Final     Comment:     Standardized to IDMS reference method   07/09/2023 0.69 0.60 - 1.30 mg/dL Final     Comment:     Standardized to IDMS reference method     AST   Date Value Ref Range Status   07/09/2023 13 13 - 39 U/L Final   07/09/2023 12 (L) 13 - 39 U/L Final     ALT   Date Value Ref Range Status   07/09/2023 15 7 - 52 U/L Final     Comment:     Specimen collection should occur prior to Sulfasalazine administration due to the potential for falsely depressed results. 07/09/2023 15 7 - 52 U/L Final     Comment:     Specimen collection should occur prior to Sulfasalazine administration due to the potential for falsely depressed results.            Kenzie Hernandez MD  8/16/2023  6:34 PM

## 2023-08-16 NOTE — PLAN OF CARE
Problem: Knowledge Deficit  Goal: Verbalizes understanding of labor plan  Description: Assess patient/family/caregiver's baseline knowledge level and ability to understand information. Provide education via patient/family/caregiver's preferred learning method at appropriate level of understanding. 1. Provide teaching at level of understanding. 2. Provide teaching via preferred learning method(s). Outcome: Completed     Problem: Labor & Delivery  Goal: Manages discomfort  Description: Assess and monitor for signs and symptoms of discomfort. Assess patient's pain level regularly and per hospital policy. Administer medications as ordered. Support use of nonpharmacological methods to help control pain such as distraction, imagery, relaxation, and application of heat and cold. Collaborate with interdisciplinary team and patient to determine appropriate pain management plan. 1. Include patient in decisions related to comfort. 2. Offer non-pharmacological pain management interventions. 3. Report ineffective pain management to physician. Outcome: Completed  Goal: Patient vital signs are stable  Description: 1. Assess vital signs - vaginal delivery.   Outcome: Completed No

## 2023-08-16 NOTE — ASSESSMENT & PLAN NOTE
Lochia WNL   Recovering well   Appropriate bowel and bladder function   Pain well controlled   Tolerating diet   Breastfeeding   Ambulating without issues   No lower extremity tenderness  GBS positive, treatment adequate    Rh positive

## 2023-08-16 NOTE — ASSESSMENT & PLAN NOTE
Lab Results   Component Value Date    HGBA1C 7.0 (H) 07/09/2023       Recent Labs     08/16/23  1040 08/16/23  1246 08/16/23  1757 08/16/23 2128   POCGLU 201* 148* 80 146*       Blood Sugar Average: Last 72 hrs:  (P) 133   Pregnancy regimen:   -Levemir 24 units twice daily (7 to 8 AM and PM)  -NovoLog to 12 units at meals 1:6 carb ratio

## 2023-08-16 NOTE — H&P
[address] [facility] 34 y.o. female MRN: [id number]  Unit/Bed#: L&D 323-01 Encounter: [id number]    Assessment: 34 y.o. [id number] at 44w1d admitted for induction of labor due to fetal macrosomia and type 1 diabetes. SVE: 3.5/80/-2  Clinical EFW: Greater than the 97th percentile with abdominal circumference greater than the 99th percentile and head circumference at the 22nd percentile; Cephalic confirmed by ultrasound  GBS status: Postive      Plan:   Positive GBS test  Assessment & Plan  Penicillin prophylaxis while in labor    Macrosomia affecting management of mother in third trimester  Assessment & Plan  Estimated fetal weight is greater than the 97th percentile with abdominal circumference greater than the 99th percentile and head circumference at the 22nd percentile  Patient was counseled on the risk of shoulder dystocia   Patient was told that she could elect for a repeat  and she indicates at this time she would like to proceed with TOLAC  We told her that we would closely monitor her labor curve and if there was any concern for arrest of dilation/descent, which could be secondary to her fetal macrosomia, we would readdress the possible indication for   We also told her that we would likely not offer her an operative vaginal delivery and that often the alterative is      37 weeks gestation of pregnancy  Assessment & Plan  Admit to OBGYN   Clear liquid diet   F/u T&S, CBC, RPR   IVF NS 125cc/hr   Continuous fetal monitoring and tocometry   Analgesia at maternal request   Vertex by TAUS  Induction plan:  Pitocin titration      Diabetes mellitus type 1, controlled, without complications ([facility])  Assessment & Plan  Lab Results   Component Value Date    HGBA1C 7.0 (H) 2023       No results for input(s): "POCGLU" in the last 72 hours.     Blood Sugar Average: Last 72 hrs:     Current pregnancy regimen:   -Levemir 24 units twice daily (7 to 8 AM and PM)  -NovoLog to 12 units at meals 1:6 carb ratio        Discussed case and plan w/ Dr. Anjel Dickinson      Chief Complaint: here for induction of labor    HPI: Estela Carmona is a 34 y.o. [id number] with an JOHN of 2023, by Last Menstrual Period at 44w1d who is being admitted for induction of labor. She endorses occasional contractions, has no LOF, and reports no VB. She states she has felt good FM. Patient Active Problem List   Diagnosis   • Diabetes mellitus type 1, controlled, without complications ([address])   • Vitamin D deficiency   • Cholesteatoma of middle ear   • Mixed conductive and sensorineural hearing loss   • CALLI (generalized anxiety disorder)   • Microalbuminuria due to type 1 diabetes mellitus (HCC)   • Pregnancy with history of  section, antepartum   • Pre-existing type 1 diabetes mellitus with hyperglycemia during pregnancy in third trimester ([address])   • 37 weeks gestation of pregnancy   • BMI 25.0-25.9,adult   • Hyperglycemia during pregnancy   • Headache   • Uterine contractions at greater than 20 weeks of gestation   • Macrosomia affecting management of mother in third trimester   • Positive GBS test   • Excessive fetal growth affecting management of pregnancy in third trimester   • Encounter for full-term uncomplicated delivery       Baby complications/comments: none    Review of Systems   Constitutional: Negative for chills and fever. Respiratory: Negative for cough, shortness of breath and wheezing. Cardiovascular: Negative for chest pain and leg swelling. Gastrointestinal: Negative for abdominal pain, diarrhea, nausea and vomiting. Genitourinary: Negative for pelvic pain, vaginal bleeding and vaginal discharge. Musculoskeletal: Negative for back pain. Neurological: Negative for weakness, light-headedness and headaches.        OB Hx:  OB History    Para Term  AB Living   5 2 2   2 2   SAB IAB Ectopic Multiple Live Births   1 1   0 2      # Outcome Date GA Lbr Star/2nd Weight Sex Delivery Anes PTL Lv   5 Current            4 Term 21 39w0d / 02:11 3515 g (7 lb 12 oz) F Vag-Spont EPI N PARI   3 SAB 10/2020              Birth Comments: 8 week loss   2 Term 19 38w3d  3290 g (7 lb 4.1 oz) M CS-LTranv EPI N PARI      Complications: Fetal Intolerance   1 IAB 10/2016              Birth Comments: 7 week      Obstetric Comments   : about        Past Medical Hx:  Past Medical History:   Diagnosis Date   • Abnormal Pap smear of cervix    • Anxiety    • Diabetes mellitus ([address])    • History of  delivery, antepartum 2021   • HPV (human papilloma virus) infection    • Microalbuminuria due to type 1 diabetes mellitus ([address]) 2021   • Varicella     vaccinated as child       Past Surgical hx:  Past Surgical History:   Procedure Laterality Date   • INNER EAR SURGERY Left    • AK  DELIVERY ONLY N/A 2019    Procedure:  SECTION (); Surgeon:  Russell Oneill MD;  Location[facility];  Service: Obstetrics   • WISDOM TOOTH EXTRACTION         Social Hx:  Alcohol use: denies  Tobacco use: denies  Other substance use: denies        No Known Allergies    Medications Prior to Admission   Medication   • acetaminophen (TYLENOL) 325 mg tablet   • acetone, urine, test strip   • Alcohol Swabs (Pharmacist Choice Alcohol) PADS   • Blood Glucose Monitoring Suppl (FreeStyle Lite) DON   • Cholecalciferol (Vitamin D-3) 25 MCG (1000 UT) CAPS   • Continuous Blood Gluc  (Dexcom G6 ) DON   • Continuous Blood Gluc Sensor (Dexcom G6 Sensor) MISC   • Continuous Blood Gluc Transmit (Dexcom G6 Transmitter) MISC   • FREESTYLE LITE test strip   • Injection Device for Insulin (InPen 100-Grey-Juan) DON   • Insulin Aspart (NovoLOG PenFill) 100 UNITS/ML cartridge for injection   • insulin detemir (Levemir FlexTouch) 100 Units/mL injection pen   • Insulin Pen Needle (BD Pen Needle Aide U/F) 32G X 4 MM MISC   • Lancets MISC   • Prenatal Vit-Fe Fumarate-FA (PRENATAL 1+1 PO)   • aspirin 81 mg chewable tablet       Objective:  Temp:  [98.2 °F (36.8 °C)] 98.2 °F (36.8 °C)  HR:  [72] 72  Resp:  [18] 18  BP: (123)/(76) 123/76  Body mass index is 26.96 kg/m². Physical Exam:  Physical Exam  Constitutional:       Appearance: Normal appearance. Cardiovascular:      Rate and Rhythm: Normal rate and regular rhythm. Pulmonary:      Effort: Pulmonary effort is normal. No respiratory distress. Abdominal:      Palpations: Abdomen is soft. Tenderness: There is no abdominal tenderness. Musculoskeletal:         General: No swelling or tenderness. Neurological:      General: No focal deficit present. Mental Status: She is alert and oriented to person, place, and time. Skin:     General: Skin is warm and dry. Vitals reviewed.             FHT:  Baseline Rate: 130 bpm  FHR Category: Category I    TOCO:   Contraction Frequency (minutes): irritability  Contraction Duration (seconds): na  Contraction Quality: Mild    Lab Results   Component Value Date    WBC 15.46 (H) 07/08/2023    HGB 12.8 07/08/2023    HCT 38.8 07/08/2023     07/08/2023     Lab Results   Component Value Date    K 3.5 07/09/2023     07/09/2023    CO2 22 07/09/2023    BUN 7 07/09/2023    CREATININE 0.60 07/09/2023    AST 13 07/09/2023    ALT 15 07/09/2023     Prenatal Labs: Reviewed      Blood type: O+  Antibody: Negative  GBS: Positive  HIV: Nonreactive  Rubella: Immune  Syphilis IgM/IgG: Nonreactive  HBsAg: Negative  HCAb: Negative  Chlamydia: Negative  Gonorrhea: Negative  Diabetes 1 hour screen: 181  3 hour glucose: Not indicated  Platelets: 711  Hgb: 12.8  >2 Midnights  INPATIENT     Signature/Title: Domenica Daniel MD  Date: 8/15/2023  Time: 9:56 PM

## 2023-08-16 NOTE — LACTATION NOTE
This note was copied from a baby's chart. CONSULT - LACTATION  Baby Girl Marguerite Landry 0 days female MRN: [id number]    [address] [place] [address] NURSERY Room / Bed: L&D 307(N)/L&D 307(N) Encounter: [id number]    Maternal Information     MOTHER:  Helio Xiao  Maternal Age: 34 y.o.   OB History: # 1 - Date: 10/2016, Sex: None, Weight: None, GA: None, Delivery: None, Apgar1: None, Apgar5: None, Living: None, Birth Comments: 7 week    # 2 - Date: 19, Sex: Male, Weight: 3290 g (7 lb 4.1 oz), GA: 38w3d, Delivery: , Low Transverse, Apgar1: 9, Apgar5: 9, Living: Living, Birth Comments: None    # 3 - Date: 10/2020, Sex: None, Weight: None, GA: None, Delivery: None, Apgar1: None, Apgar5: None, Living: None, Birth Comments: 8 week loss    # 4 - Date: 21, Sex: Female, Weight: 3515 g (7 lb 12 oz), GA: 39w0d, Delivery: Vaginal, Spontaneous, Apgar1: 8, Apgar5: 9, Living: Living, Birth Comments: None    # 5 - Date: 23, Sex: Female, Weight: 3975 g (8 lb 12.2 oz), GA: 37w4d, Delivery: Vaginal, Spontaneous, Apgar1: 7, Apgar5: 9, Living: Living, Birth Comments: None   Previouse breast reduction surgery? No    Lactation history:   Has patient previously breast fed: Yes   How long had patient previously breast fed: 15 months was longest breastfeeding relationship   Previous breast feeding complications:       Past Surgical History:   Procedure Laterality Date   • INNER EAR SURGERY Left    • [place]  DELIVERY ONLY N/A 2019    Procedure:  SECTION (); Surgeon:  Jaxon Hilton MD;  Location: [facility];  Service: Obstetrics   • WISDOM TOOTH EXTRACTION          Birth information:  YOB: 2021   Time of birth: 4:53 AM   Sex: female   Delivery type: Vaginal, Spontaneous   Birth Weight: 3975 g (8 lb 12.2 oz)   Percent of Weight Change: 0%     Gestational Age: 40w2d     Breasts/Nipples   Breastfeeding Status Yes   Breastfeeding Progress Breastfeeding well  (Per Ericka)   Breast Pump   Pump 3  (CM consult for Tana Wakefield)   Patient Follow-Up   Lactation Consult Status 2   Follow-Up Type Inpatient;Call as needed   Other OB Lactation Documentation    Additional Problem Noted Russell Angeles says Francisco Villanueva is breastfeeding well so far. Experienced breastfeeding mom. Acknowledges feeding frequency and waking techniques. (Declines RSB  and D/C booklets)     Feeding recommendations:  breast feed on demand     Encouraged family to call for an assessment of latch as needed/desired. Encouraged parents to call for assistance, questions, and concerns about breastfeeding. Extension provided.       Gamal Burnham RN 8/16/2023 11:14 AM

## 2023-08-16 NOTE — ANESTHESIA PREPROCEDURE EVALUATION
Procedure:  LABOR ANALGESIA    Relevant Problems   ENDO   (+) Diabetes mellitus type 1, controlled, without complications (HCC)   (+) Pre-existing type 1 diabetes mellitus with hyperglycemia during pregnancy in third trimester ([address])      /RENAL   (+) Microalbuminuria due to type 1 diabetes mellitus ([address])      GYN   (+) 37 weeks gestation of pregnancy      NEURO/PSYCH   (+) CALLI (generalized anxiety disorder)   (+) Headache      Endocrine   (+) Hyperglycemia during pregnancy      Other   (+) BMI 26.0-26.9,adult (Resolved)   (+) Excessive fetal growth affecting management of pregnancy in third trimester   (+) History of  delivery, antepartum (Resolved)   (+) Macrosomia affecting management of mother in third trimester   (+) Positive GBS test   (+) Pregnancy with history of  section, antepartum        Physical Exam    Airway    Mallampati score: II  TM Distance: >3 FB       Dental   No notable dental hx     Cardiovascular  Cardiovascular exam normal    Pulmonary  Pulmonary exam normal     Other Findings        Anesthesia Plan  ASA Score- 3     Anesthesia Type- epidural with ASA Monitors. Additional Monitors:   Airway Plan:           Plan Factors-Exercise tolerance (METS): >4 METS. Chart reviewed. Existing labs reviewed. Patient is not a current smoker. Induction-     Postoperative Plan-     Informed Consent- Anesthetic plan and risks discussed with patient.

## 2023-08-16 NOTE — OB LABOR/OXYTOCIN SAFETY PROGRESS
Oxytocin Safety Progress Check Note - Jassi Garcia 34 y.o. female MRN: [id number]    Unit/Bed#: L&D 323-01 Encounter: [id number]    Dose (netta-units/min) Oxytocin: 4 netta-units/min  Contraction Frequency (minutes): 2-4  Contraction Quality: Mild  Tachysystole: No   Cervical Dilation: 5        Cervical Effacement: 90  Fetal Station: -1  Baseline Rate: 130 bpm     FHR Category: Category I               Vital Signs:   Vitals:    08/16/23 0405   BP: 111/55   Pulse: 73   Resp:    Temp:    SpO2:        Notes/comments:   SVE as above. Patient is currently comfortable with epidural.   Continue pitocin titration.   Discussed with Dr. Frank Troncoso MD 8/16/2023 4:17 AM

## 2023-08-16 NOTE — DISCHARGE SUMMARY
Discharge Summary - Vanna Freeman 34 y.o. female MRN: [id number]    Unit/Bed#: L&D 307-01 Encounter: [id number]    Admission Date: 8/15/2023     Discharge Date: 2023    Patient Active Problem List   Diagnosis   • Diabetes mellitus type 1, controlled, without complications ([address])   • Vitamin D deficiency   • Cholesteatoma of middle ear   • Mixed conductive and sensorineural hearing loss   • CALLI (generalized anxiety disorder)   • Microalbuminuria due to type 1 diabetes mellitus ([address])   • Pregnancy with history of  section, antepartum   • Pre-existing type 1 diabetes mellitus with hyperglycemia during pregnancy in third trimester ([address])   •  (vaginal birth after )   • BMI 25.0-25.9,adult   • Hyperglycemia during pregnancy   • Headache   • Uterine contractions at greater than 20 weeks of gestation   • Macrosomia affecting management of mother in third trimester   • Positive GBS test   • Excessive fetal growth affecting management of pregnancy in third trimester   • Encounter for full-term uncomplicated delivery         OBGYN Practice: [address] [place] [address] Course:   Vanna Freeman is a 34 y.o. [id number] who was admitted at 37w4d for induction of labor secondary to type 1 diabetes. On initial cervical exam patient was noted to be 3-1/2/80/-2. She was started on a Pitocin titration. Early in her labor course she was noted to have multiple elevated blood sugars and was therefore started on an insulin drip for tighter blood glucose control. Received an epidural for analgesia and was noted to have spontaneous rupture of membranes for meconium stained fluid. She subsequently quickly progressed to complete cervical dilation. Delivery Findings:  Gerhardt Khaiaicha delivered a viable female  on 2023  4:53 AM  via Vaginal, Spontaneous  . The delivery was complicated by rapid progression leading to transverse presentation of fetal shoulders.     Baby's Weight: 6366 g (8 lb 12.2 oz) ; 140.21     Apgar scores: 7  and 9  at 1 and 5 minutes, respectively  Anesthesia: Epidural ,   QBL: Non-Surgical QBL (mL): 129         was transferred to  nursery. Patient tolerated the procedure well and was transferred to recovery in stable condition. Her post-partum course was uncomplicated. She had an episode of chest pain with normal cardiac workup, found to be musculoskeletal in nature  Her post-partum pain was well controlled with oral analgesics. On day of discharge she was ambulating, voiding spontaneously, tolerating oral intake and hemodynamically stable. Mom's blood type is O positive and  Rhogam was not given. She was discharged home on postpartum day #2 without complications. Patient was instructed to follow up with her OB as an outpatient and was given appropriate warnings to call doctor or provider if she develops signs of infection or uncontrolled pain. Disposition: Home    Planned Readmission: No    Discharge Medications:   Please see AVS    Discharge instructions :   -Do not place anything (no partner, tampons or douche) in your vagina for 6 weeks. -You may walk for exercise for the first 6 weeks then gradually return to your usual activities.   -Please do not drive for 1 week if you have no stitches and for 2 weeks if you have stitches or underwent a  delivery.    -You may take baths or shower per your preference.   -Please look at your bust (breasts) in the mirror daily and call your doctor for redness or tenderness or increased warmth. - If you have had a  section please look at your incision daily as well and call provider for increasing redness or steady drainage from the incision.   -Please call your doctor's office if temperature > 100.4*F or 38* C, worsening pain or a foul discharge.     Follow Up:  - Follow up in 3 weeks for postpartum visit    Ciarra Barbour MD  OB/GYN PGY-1

## 2023-08-16 NOTE — L&D DELIVERY NOTE
Delivery Summary - OB/GYN   Jacey Diss 34 y.o. female MRN: [id number]  Unit/Bed#: L&D 323-01 Encounter: [id number]    Pre-delivery Diagnosis:   1. 37w4d pregnancy  2. T1DM  3. Fetal macrosomia  4. History of prior c/s   5. History of prior   6. GBS positive    Post-delivery Diagnosis: same, delivered    Attending: Jeromy Galeano    Assistant(s): Eh    Procedure:     Anesthesia: epidural    Quantitated Blood Loss:  129 mL    Specimens:   1. Arterial and venous cord gases  2. Cord blood  3. Segment of umbilical cord  4. Placenta to pathology     Complications:  Shoulder dystocia     Findings:  1. Viable female  delivered on 23 at 0453 weighing 8lbs 12.2oz;  Apgar scores of 7 at one minute and 9 at five minutes. Recent Results (from the past 12 hour(s))   CORD, Blood gas, venous    Collection Time: 23  4:53 AM   Result Value Ref Range    pH, Cord Zach 7.357 7.190 - 7.490    pCO2, Cord Azch 40.1 27.0 - 43.0 mm HG    pO2, Cord Zach 30.1 15.0 - 45.0 mm HG    HCO3, Cord Zach 22.0 12.2 - 28.6 mmol/L    Base Exc, Cord Zach -3.2 (L) 1.0 - 9.0 mmol/L    O2 Cont, Cord Zach 18.2 mL/dL    O2 HGB,VENOUS CORD 70.7 %   CORD, Blood gas, arterial    Collection Time: 23  4:53 AM   Result Value Ref Range    pH, Cord Art 7.278 7.230 - 7.430    pCO2, Cord Art 56.4 30.0 - 60.0    pO2, Cord Art 18.8 5.0 - 25.0 mm HG    HCO3, Cord Art 25.8 17.3 - 27.3 mmol/L    Base Exc, Cord Art -2.3 (L) 3.0 - 11.0 mmol/L    O2 Content, Cord Art 9.5 ml/dl    O2 Hgb, Arterial Cord 38.2 %   2. Spontaneous delivery of placenta with centrally inserted 3-vessel cord  3. Intact perineum, vagina, and cervix       Disposition: Patient tolerated the procedure well and was recovering in labor and delivery room with family and  before being transferred to the post-partum floor.                  Procedure Details     Description of procedure    After pushing for 9 minutes, on 23 at [id number] patient delivered a viable female , weighing 8lbs 12.2oz, Apgars of 7 (1 min) and 9 (5 min). The fetal vertex delivered spontaneously. There was no nuchal cord. Secondary to rapid descent, the fetal shoulders were in transverse orientation. Delivery of the anterior-most shoulder (left) was attempted with maternal expulsive forces and the assistance of gentle downward/oblique traction, but was not immediately affected. Secondary to concern for macrosomia and inc risk of shoulder dystocia, patient was placed in Gabby positioning. With maternal expulsive forces, gentle downward/oblique traction was placed on the left shoulder, which affected delivery. The posterior-most shoulder delivered with maternal expulsive forces and the assistance of gentle upward traction. The remainder of the fetus delivered spontaneously. Upon delivery, the infant was placed on the mothers abdomen and the cord was clamped and cut immediately secondary to meconium fluid and concern for dystocia. The infant was noted to cry spontaneously, but was initially slow to move left arm. She was evaluated by awaiting NICU MD at infant warmer post delivery with some improvement in use reported. Arterial and venous cord blood gases and cord blood was collected for analysis. These were promptly sent to the lab. In the immediate post-partum, 30 units of IV pitocin was administered and the uterus was noted to contract down well with massage and pitocin. The placenta delivered spontaneously at 0455  and was noted to have a centrally inserted 3 vessel cord. The vagina, cervix, and perineum were inspected and there was noted to be intact without laceration. At the conclusion of the delivery, all needle, sponge, and instrument counts were noted to be correct. Patient tolerated the procedure well and was allowed to recover in labor and delivery room with family and  before being transferred to the post-partum floor.      I was present and participated in the entire delivery.

## 2023-08-17 VITALS
BODY MASS INDEX: 26.99 KG/M2 | SYSTOLIC BLOOD PRESSURE: 116 MMHG | HEIGHT: 65 IN | RESPIRATION RATE: 18 BRPM | HEART RATE: 60 BPM | TEMPERATURE: 98.1 F | WEIGHT: 162 LBS | DIASTOLIC BLOOD PRESSURE: 65 MMHG | OXYGEN SATURATION: 98 %

## 2023-08-17 LAB
ATRIAL RATE: 50 BPM
GLUCOSE SERPL-MCNC: 105 MG/DL (ref 65–140)
GLUCOSE SERPL-MCNC: 138 MG/DL (ref 65–140)
P AXIS: 65 DEGREES
PR INTERVAL: 154 MS
QRS AXIS: 62 DEGREES
QRSD INTERVAL: 82 MS
QT INTERVAL: 416 MS
QTC INTERVAL: 379 MS
T WAVE AXIS: 34 DEGREES
VENTRICULAR RATE: 50 BPM

## 2023-08-17 PROCEDURE — 99024 POSTOP FOLLOW-UP VISIT: CPT | Performed by: OBSTETRICS & GYNECOLOGY

## 2023-08-17 PROCEDURE — 82948 REAGENT STRIP/BLOOD GLUCOSE: CPT

## 2023-08-17 PROCEDURE — 93010 ELECTROCARDIOGRAM REPORT: CPT | Performed by: INTERNAL MEDICINE

## 2023-08-17 RX ORDER — IBUPROFEN 200 MG
600 TABLET ORAL EVERY 6 HOURS
Start: 2023-08-17

## 2023-08-17 RX ORDER — DIAPER,BRIEF,INFANT-TODD,DISP
1 EACH MISCELLANEOUS DAILY PRN
Refills: 0
Start: 2023-08-17

## 2023-08-17 RX ORDER — ONDANSETRON 2 MG/ML
4 INJECTION INTRAMUSCULAR; INTRAVENOUS EVERY 8 HOURS PRN
Refills: 0
Start: 2023-08-17

## 2023-08-17 RX ADMIN — IBUPROFEN 600 MG: 600 TABLET ORAL at 11:34

## 2023-08-17 RX ADMIN — IBUPROFEN 600 MG: 600 TABLET ORAL at 04:52

## 2023-08-17 RX ADMIN — INSULIN DETEMIR 12 UNITS: 100 INJECTION, SOLUTION SUBCUTANEOUS at 09:28

## 2023-08-17 NOTE — PLAN OF CARE
Problem: POSTPARTUM  Goal: Experiences normal postpartum course  Description: INTERVENTIONS:  - Monitor maternal vital signs  - Assess uterine involution and lochia  2023 1142 by Primo Gonzalez RN  Outcome: Completed  2023 104 by Primo Gonzalez RN  Outcome: Progressing  Goal: Appropriate maternal -  bonding  Description: INTERVENTIONS:  - Identify family support  - Assess for appropriate maternal/infant bonding   -Encourage maternal/infant bonding opportunities  - Referral to  or  as needed  2023 1142 by Primo Gonzalez RN  Outcome: Completed  2023 104 by Primo Gonzalez RN  Outcome: Progressing  Goal: Establishment of infant feeding pattern  Description: INTERVENTIONS:  - Assess breast/bottle feeding  - Refer to lactation as needed  2023 1142 by Primo Gonzalez RN  Outcome: Completed  2023 104 by Primo Gonzalez RN  Outcome: Progressing

## 2023-08-17 NOTE — NURSING NOTE
Discharge teaching reviewed with MOB. Post birth warning signs reviewed with MOB. Questions encouraged and answered. Routed to [employer]

## 2023-08-17 NOTE — LACTATION NOTE
This note was copied from a baby's chart. Mother verbalized breastfeeding is going well. Dies pain OR need for assistance. Reviewed  discharge breastfeeding booklet including the feeding log. Emphasized 8 or more (12) feedings in a 24 hour period, what to expect for the number of diapers per day of life and the progression of properties of the  stooling pattern. Reviewed breastfeeding and your lifestyle, storage and preparation of breast milk, how to keep you breast pump clean, the employed breastfeeding mother and paced bottle feeding handouts. Booklet included Breastfeeding Resources for after discharge including access to the number for the [employer]. Family support at bedside. Enc.to call for assistance as needed,phone # given.

## 2023-08-17 NOTE — PROGRESS NOTES
Progress Note - OB/GYN  Trish Hill 34 y.o. female MRN: [id number]  Unit/Bed#: L&D 307-01 Encounter: [id number]    Assessment and Plan     Trish Hill is a patient of: [facility]. She is PPD# 1 s/p  vaginal birth after  ()  Recovering well and is stable       Diabetes mellitus type 1, controlled, without complications [facility])  Assessment & Plan  Lab Results   Component Value Date    HGBA1C 7.0 (H) 2023       Recent Labs     23  1040 23  1246 23  1757 23  2128   POCGLU 201* 148* 80 146*       Blood Sugar Average: Last 72 hrs:  (P) 133   Pregnancy regimen:   -Levemir 24 units twice daily (7 to 8 AM and PM)  -NovoLog to 12 units at meals 1:6 carb ratio    *  (vaginal birth after )  Assessment & Plan  Lochia WNL   Recovering well   Appropriate bowel and bladder function   Pain well controlled   Tolerating diet   Breastfeeding   Ambulating without issues   No lower extremity tenderness  GBS positive, treatment adequate    Rh positive           Disposition    - Anticipate discharge home on PPD# 1      Subjective/Objective     Chief Complaint: Postpartum State     Subjective:    Trish Hill is PPD#1 s/p  vaginal birth after  (). She has no current complaints. Pain is well controlled. Patient is currently voiding. She is ambulating. Patient is currently passing flatus and has had bowel movement. She is tolerating PO, and denies nausea or vomitting. Patient denies fever, chills, chest pain, shortness of breath, or calf tenderness. Lochia is normal. She is  Breastfeeding. She is recovering well and is stable.        Vitals:   /75 (BP Location: Right arm)   Pulse 60   Temp 97.5 °F (36.4 °C) (Oral)   Resp 18   Ht 5' 5" (1.651 m)   Wt 73.5 kg (162 lb)   LMP 2022 (Exact Date)   SpO2 98%   Breastfeeding Yes   BMI 26.96 kg/m²       Intake/Output Summary (Last 24 hours) at 2023 0644  Last data filed at 8/16/2023 1100  Gross per 24 hour   Intake --   Output 350 ml   Net -350 ml       Invasive Devices     None                 Physical Exam:   GEN: Jennifer Kulkarni appears well, alert and oriented x 3, pleasant and cooperative   CARDIO: RRR, no murmurs or rubs  RESP:  CTAB, no wheezes or rales  ABDOMEN: soft, no tenderness, no distention, fundus @ U-2  EXTREMITIES: SCDs on, non tender, no erythema, b/l Xavier's sign negative      Labs:     Hemoglobin   Date Value Ref Range Status   08/15/2023 11.5 11.5 - 15.4 g/dL Final   07/08/2023 12.8 11.5 - 15.4 g/dL Final     WBC   Date Value Ref Range Status   08/15/2023 9.28 4.31 - 10.16 Thousand/uL Final   07/08/2023 15.46 (H) 4.31 - 10.16 Thousand/uL Final     Platelets   Date Value Ref Range Status   08/15/2023 155 149 - 390 Thousands/uL Final   07/08/2023 180 149 - 390 Thousands/uL Final     Creatinine   Date Value Ref Range Status   07/09/2023 0.60 0.60 - 1.30 mg/dL Final     Comment:     Standardized to IDMS reference method   07/09/2023 0.69 0.60 - 1.30 mg/dL Final     Comment:     Standardized to IDMS reference method     AST   Date Value Ref Range Status   07/09/2023 13 13 - 39 U/L Final   07/09/2023 12 (L) 13 - 39 U/L Final     ALT   Date Value Ref Range Status   07/09/2023 15 7 - 52 U/L Final     Comment:     Specimen collection should occur prior to Sulfasalazine administration due to the potential for falsely depressed results. 07/09/2023 15 7 - 52 U/L Final     Comment:     Specimen collection should occur prior to Sulfasalazine administration due to the potential for falsely depressed results.            Sallie Jackson MD  8/17/2023  6:44 AM

## 2023-08-20 PROCEDURE — 88307 TISSUE EXAM BY PATHOLOGIST: CPT | Performed by: PATHOLOGY

## 2023-09-05 DIAGNOSIS — O24.011 PRE-EXISTING TYPE 1 DIABETES MELLITUS WITH HYPERGLYCEMIA DURING PREGNANCY IN FIRST TRIMESTER (HCC): ICD-10-CM

## 2023-09-05 DIAGNOSIS — O24.012 PRE-EXISTING TYPE 1 DIABETES MELLITUS DURING PREGNANCY IN SECOND TRIMESTER: ICD-10-CM

## 2023-09-05 DIAGNOSIS — Z3A.10 10 WEEKS GESTATION OF PREGNANCY: ICD-10-CM

## 2023-09-05 DIAGNOSIS — E10.65 PRE-EXISTING TYPE 1 DIABETES MELLITUS WITH HYPERGLYCEMIA DURING PREGNANCY IN FIRST TRIMESTER (HCC): ICD-10-CM

## 2023-09-05 DIAGNOSIS — E55.9 VITAMIN D DEFICIENCY: ICD-10-CM

## 2023-09-05 RX ORDER — INSULIN ASPART 100 [IU]/ML
10 INJECTION, SOLUTION INTRAVENOUS; SUBCUTANEOUS
Qty: 15 ML | Refills: 0 | OUTPATIENT
Start: 2023-09-05

## 2023-09-05 RX ORDER — LANCETS 33 GAUGE
EACH MISCELLANEOUS
Qty: 100 EACH | Refills: 3 | Status: SHIPPED | OUTPATIENT
Start: 2023-09-05

## 2023-09-06 RX ORDER — INSULIN ASPART 100 [IU]/ML
10 INJECTION, SOLUTION INTRAVENOUS; SUBCUTANEOUS
Qty: 15 ML | Refills: 0 | OUTPATIENT
Start: 2023-09-06

## 2023-09-26 ENCOUNTER — POSTPARTUM VISIT (OUTPATIENT)
Dept: OBGYN CLINIC | Facility: MEDICAL CENTER | Age: 33
End: 2023-09-26

## 2023-09-26 VITALS
SYSTOLIC BLOOD PRESSURE: 102 MMHG | DIASTOLIC BLOOD PRESSURE: 80 MMHG | BODY MASS INDEX: 24.83 KG/M2 | HEIGHT: 65 IN | WEIGHT: 149 LBS

## 2023-09-26 DIAGNOSIS — E10.9 TYPE 1 DIABETES MELLITUS WITHOUT COMPLICATION (HCC): ICD-10-CM

## 2023-09-26 DIAGNOSIS — O34.219 VBAC (VAGINAL BIRTH AFTER CESAREAN): ICD-10-CM

## 2023-09-26 PROCEDURE — 99024 POSTOP FOLLOW-UP VISIT: CPT | Performed by: OBSTETRICS & GYNECOLOGY

## 2023-09-26 RX ORDER — INSULIN ASPART 100 [IU]/ML
INJECTION, SOLUTION INTRAVENOUS; SUBCUTANEOUS
Qty: 15 ML | Refills: 1 | Status: SHIPPED | OUTPATIENT
Start: 2023-09-26

## 2023-09-26 NOTE — PROGRESS NOTES
Assessment:  34 y.o. [id number] who presents POD#6wk from [address] [facility]. Plan:  Diagnoses and all orders for this visit:    Postpartum exam   (vaginal birth after )  - Routine pp care  - Anticipatory guidance given  - Partner planning vasectomy; discussed waiting for neg SA before relying on as primary contraceptive. Declines interval contraception  - Return for yearly    Type 1 diabetes mellitus without complication ([address])  -     HEMOGLOBIN A1C W/ EAG ESTIMATION; Future  -     CBC and Platelet; Future  -     Insulin Aspart (NovoLOG PenFill) 100 UNITS/ML cartridge for injection; Inject three times daily before meal, titrated to carb ratio  -     insulin detemir (LEVEMIR) 100 units/mL subcutaneous injection; Inject 10 Units under the skin every 12 (twelve) hours  - F/u with endocrinology as planned    Shoulder dystocia, delivered  - Discussed; not planning more children      __________________________________________________________________    Subjective   Binh Lehman is a 34 y.o. [id number] who presents POD#6wk from [address] [facility] of an 8lb 12oz baby girl. Pregnancy complicated by W5YJ and macrosomia. Delivery complicated by shoulder dystocia. Follows with [facility] for T1DM. Current regimen Levemir 20u (10u/10u AM/PM), Novolog carb ratio (1:15). Good readings at home without hyper/hypoglycemia. Has planned f/u with endocrine next month. Patient reports no acute concerns. Pain is resolved. Her bowel function is normal and she is having regular BM's. She has  returned to most of her normal activities. She has not yet returned to sexual activity. Partner planning vasectomy. Lochia is resolved, then started bleeding again. thinks its psb menses. She reports that she is doing well emotionally and denies depressive sx. She is breastfeeding and reports it is going well.          Objective  /80   Ht 5' 5" (1.651 m)   Wt 67.6 kg (149 lb)   LMP 2023 (Exact Date)   Breastfeeding Yes   BMI 24.79 kg/m² Physical Exam:  Physical Exam  Constitutional:       General: She is not in acute distress. Appearance: Normal appearance. She is not ill-appearing, toxic-appearing or diaphoretic. Eyes:      General: No scleral icterus. Right eye: No discharge. Left eye: No discharge. Conjunctiva/sclera: Conjunctivae normal.   Cardiovascular:      Rate and Rhythm: Normal rate. Pulmonary:      Effort: Pulmonary effort is normal. No respiratory distress. Abdominal:      General: There is no distension. Palpations: There is no mass. Tenderness: There is no abdominal tenderness. There is no guarding or rebound. Hernia: No hernia is present. Musculoskeletal:         General: No swelling. Skin:     General: Skin is warm and dry. Coloration: Skin is not jaundiced or pale. Findings: No bruising or erythema. Neurological:      Mental Status: She is alert. Psychiatric:         Mood and Affect: Mood normal.         Behavior: Behavior normal.         Thought Content:  Thought content normal.         Judgment: Judgment normal.

## 2023-11-08 PROBLEM — Z86.69 HISTORY OF CHOLESTEATOMA: Status: ACTIVE | Noted: 2023-11-08

## 2023-11-08 PROBLEM — H90.3 SENSORINEURAL HEARING LOSS (SNHL) OF BOTH EARS: Status: ACTIVE | Noted: 2023-11-08

## 2023-11-16 DIAGNOSIS — O24.012 PRE-EXISTING TYPE 1 DIABETES MELLITUS WITH HYPERGLYCEMIA DURING PREGNANCY IN SECOND TRIMESTER (HCC): ICD-10-CM

## 2023-11-16 DIAGNOSIS — E10.65 PRE-EXISTING TYPE 1 DIABETES MELLITUS WITH HYPERGLYCEMIA DURING PREGNANCY IN SECOND TRIMESTER (HCC): ICD-10-CM

## 2023-11-17 RX ORDER — ISOPROPYL ALCOHOL 0.7 ML/ML
SWAB TOPICAL
Qty: 100 EACH | Refills: 1 | Status: SHIPPED | OUTPATIENT
Start: 2023-11-17

## 2023-11-17 NOTE — TELEPHONE ENCOUNTER
"Subjective:   Danie Gong is a 8 y.o. male who presents for Cough (X 2 days Fever, cough, nasal congestion)      Patient presents with mom today.  Mom states that he spiked a fever yesterday, and potentially may have had a low-grade fever on Friday.  He did develop a cough and some nasal congestion.  She states that he did cough quite a bit last night, but he used his inhaler and a breathing treatment and is much better now.  Patient does have a history of asthma.  Patient has recently started , and has come down with a lot of viruses lately.  Appetite and activity level are normal.    Cough  This is a new problem. The current episode started yesterday. The problem occurs intermittently. The problem has been unchanged. Associated symptoms include congestion, coughing and a fever. Nothing aggravates the symptoms. He has tried NSAIDs and acetaminophen for the symptoms. The treatment provided mild relief.     Review of Systems   Constitutional:  Positive for fever.   HENT:  Positive for congestion.    Eyes: Negative.    Respiratory:  Positive for cough.    Cardiovascular: Negative.    Gastrointestinal: Negative.    Genitourinary: Negative.    Musculoskeletal: Negative.    Skin: Negative.    Neurological: Negative.      Medications, Allergies, and current problem list reviewed today in Epic.     Objective:     Pulse (!) 133   Temp 37.1 °C (98.8 °F) (Temporal)   Resp 30   Ht 1.27 m (4' 2\")   Wt 27.7 kg (61 lb)   SpO2 95%     Physical Exam  Constitutional:       Appearance: Normal appearance. He is well-developed.   HENT:      Head: Normocephalic and atraumatic.      Right Ear: Tympanic membrane, ear canal and external ear normal.      Left Ear: Tympanic membrane, ear canal and external ear normal.      Nose: Congestion and rhinorrhea present.      Mouth/Throat:      Mouth: Mucous membranes are moist.      Pharynx: Oropharynx is clear.   Eyes:      Extraocular Movements: Extraocular movements intact. " Rx approved      Conjunctiva/sclera: Conjunctivae normal.      Pupils: Pupils are equal, round, and reactive to light.   Cardiovascular:      Rate and Rhythm: Normal rate and regular rhythm.   Pulmonary:      Effort: Pulmonary effort is normal.      Breath sounds: Normal breath sounds.   Musculoskeletal:      Cervical back: Normal range of motion and neck supple.   Skin:     General: Skin is warm and dry.      Capillary Refill: Capillary refill takes less than 2 seconds.   Neurological:      General: No focal deficit present.      Mental Status: He is alert.   Psychiatric:         Mood and Affect: Mood normal.         Behavior: Behavior normal.       Assessment/Plan:     Diagnosis and associated orders:     1. Fever, unspecified fever cause  CoV-2, Flu A/B, And RSV by PCR (Cepheid)      2. Cough in pediatric patient  CoV-2, Flu A/B, And RSV by PCR (Cepheid)         Comments/MDM:     Recommend monitoring of fever every 4 hours and correct dosing of Tylenol and Ibuprofen products including Feverall suppositories. Discouraged cool baths, no alcohol rubs. Reviewed importance of pushing fluids to ensure good hydration. This includes all fluids but not just water as sodium and potassium are important as well. Chicken soup is a good food and easily taken by a sick child. Stressed rest and supervision during time of illness. Discussed expected course of viral illness and symptoms associated with complications such as pneumonia and dehydration and need for further FU. Discussed return to school or . Answered all questions and supported parent. RTC if any concerns or failure of child to improve.   Mom will be notified via [employer] of patient's PCR testing results.         Differential diagnosis, natural history, supportive care, and indications for immediate follow-up discussed.    Advised the patient to follow-up with the primary care physician for recheck, reevaluation, and consideration of further management.    Please note that  this dictation was created using voice recognition software. I have made a reasonable attempt to correct obvious errors, but I expect that there are errors of grammar and possibly content that I did not discover before finalizing the note.    This note was electronically signed by RACHEAL Turcios

## 2023-12-09 ENCOUNTER — APPOINTMENT (OUTPATIENT)
Dept: LAB | Facility: HOSPITAL | Age: 33
End: 2023-12-09
Payer: COMMERCIAL

## 2023-12-09 DIAGNOSIS — E10.9 CONTROLLED DIABETES MELLITUS TYPE 1 WITHOUT COMPLICATIONS (HCC): ICD-10-CM

## 2023-12-09 DIAGNOSIS — E10.9 TYPE 1 DIABETES MELLITUS WITHOUT COMPLICATION (HCC): ICD-10-CM

## 2023-12-09 LAB
ALBUMIN SERPL BCP-MCNC: 4.6 G/DL (ref 3.5–5)
ALP SERPL-CCNC: 73 U/L (ref 34–104)
ALT SERPL W P-5'-P-CCNC: 17 U/L (ref 7–52)
ANION GAP SERPL CALCULATED.3IONS-SCNC: 4 MMOL/L
AST SERPL W P-5'-P-CCNC: 12 U/L (ref 13–39)
BILIRUB SERPL-MCNC: 0.41 MG/DL (ref 0.2–1)
BUN SERPL-MCNC: 21 MG/DL (ref 5–25)
CALCIUM SERPL-MCNC: 9.5 MG/DL (ref 8.4–10.2)
CHLORIDE SERPL-SCNC: 107 MMOL/L (ref 96–108)
CHOLEST SERPL-MCNC: 183 MG/DL
CO2 SERPL-SCNC: 28 MMOL/L (ref 21–32)
CREAT SERPL-MCNC: 0.94 MG/DL (ref 0.6–1.3)
ERYTHROCYTE [DISTWIDTH] IN BLOOD BY AUTOMATED COUNT: 12.2 % (ref 11.6–15.1)
EST. AVERAGE GLUCOSE BLD GHB EST-MCNC: 143 MG/DL
GFR SERPL CREATININE-BSD FRML MDRD: 81 ML/MIN/1.73SQ M
GLUCOSE P FAST SERPL-MCNC: 112 MG/DL (ref 65–99)
HBA1C MFR BLD: 6.6 %
HCT VFR BLD AUTO: 42.7 % (ref 34.8–46.1)
HDLC SERPL-MCNC: 74 MG/DL
HGB BLD-MCNC: 14.5 G/DL (ref 11.5–15.4)
LDLC SERPL CALC-MCNC: 100 MG/DL (ref 0–100)
MCH RBC QN AUTO: 31 PG (ref 26.8–34.3)
MCHC RBC AUTO-ENTMCNC: 34 G/DL (ref 31.4–37.4)
MCV RBC AUTO: 91 FL (ref 82–98)
NONHDLC SERPL-MCNC: 109 MG/DL
PLATELET # BLD AUTO: 226 THOUSANDS/UL (ref 149–390)
PMV BLD AUTO: 12.1 FL (ref 8.9–12.7)
POTASSIUM SERPL-SCNC: 4.2 MMOL/L (ref 3.5–5.3)
PROT SERPL-MCNC: 7.1 G/DL (ref 6.4–8.4)
RBC # BLD AUTO: 4.67 MILLION/UL (ref 3.81–5.12)
SODIUM SERPL-SCNC: 139 MMOL/L (ref 135–147)
TRIGL SERPL-MCNC: 45 MG/DL
TSH SERPL DL<=0.05 MIU/L-ACNC: 0.78 UIU/ML (ref 0.45–4.5)
WBC # BLD AUTO: 6.58 THOUSAND/UL (ref 4.31–10.16)

## 2023-12-09 PROCEDURE — 36415 COLL VENOUS BLD VENIPUNCTURE: CPT

## 2023-12-09 PROCEDURE — 80061 LIPID PANEL: CPT

## 2023-12-09 PROCEDURE — 80053 COMPREHEN METABOLIC PANEL: CPT

## 2023-12-09 PROCEDURE — 83036 HEMOGLOBIN GLYCOSYLATED A1C: CPT

## 2023-12-09 PROCEDURE — 85027 COMPLETE CBC AUTOMATED: CPT

## 2023-12-09 PROCEDURE — 84443 ASSAY THYROID STIM HORMONE: CPT

## 2023-12-13 ENCOUNTER — TELEPHONE (OUTPATIENT)
Age: 33
End: 2023-12-13

## 2023-12-13 NOTE — TELEPHONE ENCOUNTER
I received a Care Request from patient to schedule for:    Where Does it Hurt? Right shoulder   Are you considering joint replacement? No   Are you seeking a second opinion? No  If yes, who is your doctor? I lvm to [facility] at [phone].

## 2024-03-12 ENCOUNTER — TELEPHONE (OUTPATIENT)
Dept: FAMILY MEDICINE CLINIC | Facility: CLINIC | Age: 34
End: 2024-03-12

## 2024-04-04 ENCOUNTER — OFFICE VISIT (OUTPATIENT)
Dept: OBGYN CLINIC | Facility: CLINIC | Age: 34
End: 2024-04-04
Payer: COMMERCIAL

## 2024-04-04 VITALS
WEIGHT: 149 LBS | HEIGHT: 65 IN | SYSTOLIC BLOOD PRESSURE: 102 MMHG | DIASTOLIC BLOOD PRESSURE: 80 MMHG | BODY MASS INDEX: 24.83 KG/M2

## 2024-04-04 DIAGNOSIS — S46.811A STRAIN OF RIGHT TRAPEZIUS MUSCLE, INITIAL ENCOUNTER: ICD-10-CM

## 2024-04-04 DIAGNOSIS — M25.511 RIGHT SHOULDER PAIN, UNSPECIFIED CHRONICITY: Primary | ICD-10-CM

## 2024-04-04 PROCEDURE — 99203 OFFICE O/P NEW LOW 30 MIN: CPT | Performed by: FAMILY MEDICINE

## 2024-04-04 RX ORDER — NAPROXEN 500 MG/1
500 TABLET ORAL 2 TIMES DAILY WITH MEALS
Qty: 14 TABLET | Refills: 0 | Status: SHIPPED | OUTPATIENT
Start: 2024-04-04

## 2024-04-04 NOTE — PATIENT INSTRUCTIONS
Shoulder Exercises   WHAT YOU NEED TO KNOW:   What do I need to know about rotator cuff injury exercises?  Exercises help improve shoulder movement and strength, and decrease pain. Your physical therapist or healthcare provider will tell you when to start doing the exercises. He or she will tell you how often to do them. You will need to start slowly to prevent more injury. You will move through several levels over time as you get stronger and more flexible.       What are some safety guidelines to follow?   Always warm up before you do the exercises.  Walk or ride a stationary bike for 5 to 10 minutes to help you warm up.    Do not put your arm over your head until directed.  You will need to wait until your injury has healed. The movement of some exercises could continue until your arm is over your head. You will need to stop the movement where directed.    Stop if you feel pain.  You may feel some tight or stiff areas when you start. This should get better as you continue the exercises. You should not feel pain. Pain means you are not ready to do the exercise yet. Stop and call your physical therapist or healthcare provider right away.    Always work both rotator cuffs.  Even if your injury is only on 1 side, it is important to do each exercise on both sides. This helps prevent injury and maintain balance in your shoulders and back.    Use correct posture.  Your physical therapist or healthcare provider will show you the proper posture for each exercise. You will be shown how to pull your shoulders back and down to engage the correct muscles. Remember not to let your shoulders shrug during an exercise unless it is part of the movement.    How should I do stretching exercises?  You will not feel every exercise in your shoulder area. You may feel some of the stretches in your back, side, or upper arm muscles. You need to work muscles in and around your rotator cuffs and down your arms. This helps stabilize your  shoulders. Your physical therapist or healthcare provider will tell you how long to hold each stretch. He or she will also tell you how many times to repeat each stretch during an exercise session. You may be told to do only certain exercises, or to do them in a specific order. The following are general directions to help you remember the exercises you are taught:  Pendulum swings:  Lean forward and rest your arm on a table. Do not round your back or lock your knees during the exercise. Let your other arm hang freely by your side. Gently swing your free arm forward and backward, side to side, and in circles.         Crossover arm stretch:  Relax your shoulders. Hold your upper arm with the opposite hand. Pull your arm across your chest until you feel a stretch. Hold the stretch. Return to the starting position.         Sleeper stretch:  Lie on your side on a firm, flat surface. Bend the elbow of your top arm 90°. Use your other hand to slowly push your arm down. Stop when you feel a stretch at the back of your shoulder. Hold the stretch. Slowly return to the starting position.         Shoulder movement, up and down:  This exercise may also be called shoulder extension. Sit in a chair that has a back but no armrests. Raise your arm like you are reaching for the wall in front of you. Continue to raise the arm until it is over your head, or as high as directed. Bring your arm back down to your side. Bring it back as far as possible behind your body. Return your arm to the starting position.         Shoulder movement, side to side:  These movements may be called flexion, internal rotation, and external rotation. Sit in a chair that has a back but no armrests. Raise your arm to the side and then up over your head as far as directed. Return your arm to your side. Bring your arm across the front of your body and reach for the opposite shoulder. Return your arm to the starting position.         Shoulder rolls:  Stand and  raise both shoulders toward your ears. Lower them to the starting position. Relax your shoulders. Pull your shoulders back. Then relax them again. Roll your shoulders in a smooth [place]. Then roll your shoulders in a smooth [place] in the other direction.         Wall reach exercise:  This may also be called a flexion stretch. Stand facing a wall. Slowly walk your fingers up the wall until you feel a stretch. Hold the stretch. Return to the starting position.         Arm reach exercise:  Lie on your back with your legs straight. Reach your arms like you are trying to touch the ceiling. Reach as high as you can so you feel a stretch in the back of your arms. Hold the stretch. Then lower your arms to your sides.         Elbow bends:  Stand with your arms down to your sides. Keep your palm facing forward. Bend your elbow and try to touch your shoulder with your fingertips. Return your arm to the starting position.         Up the back stretch:  Stand and put both arms behind your back. Put one hand under the other. Move the bottom hand and slowly push the upper hand up toward your head. You should feel a stretch in the front of your arm and shoulder. Be careful not to push too hard. Hold the stretch. Then return to the starting position.         Triceps stretch:  Stand and drop your forearm down your back so your hand is pointing to the ground behind you. Your elbow should be pointing at the ceiling. Take your other hand and place it on your elbow. Gently and slowly push on the elbow until you feel a stretch in the back of your arm. Hold the stretch. Let go of your elbow and relax your arm. You may be shown how to do this stretch with a towel. The towel can be pulled gently with a hand behind your back at waist level.       How should I do strengthening exercises?  Strengthening exercises may include handheld weights or resistance bands. Your physical therapist or healthcare provider will tell you how much weight or  resistance to use. The general guide is to use light weights or low resistance and to do a high number of repetitions. You may be told to do only certain exercises, or to do them in a specific order. The following are general directions to help you remember the exercises you are taught:  Scapular squeeze:  Stand with your arms at your sides. Squeeze your shoulder blades together and hold this position. Then relax the muscles. Keep your shoulder back during the entire exercise.         Wall pushups:  This exercise is similar to a pushup done on the ground. The goal is to use your back and shoulder muscles to bring your upper body toward and away from the wall. Stand facing a wall. Put your hands on the wall. Bend your elbows to bring your upper body toward the wall. Straighten your arms to return to the starting position. Keep your feet close enough to the wall that you do not take a step when you bend your elbows.         Standing row with exercise band:  Wrap the exercise band around a heavy, stable object at waist level. Make loop in the end of the band to create a handle, if needed. Hold the handle or loop and pull the band straight back toward your hip. Keep your shoulder down. Squeeze your shoulder blade. Hold this position. Then slowly return to the starting position.         External rotation with arm abducted 90 degrees:  Wrap the exercise band around a heavy, stable object at waist level. Make loop in the end of the band to create a handle, if needed. Stand and hold the handle or loop. Bend your elbow and raise your arm to shoulder height. Keep your arm in this position. Raise your hand like you are pointing at the ceiling. Slowly return to the starting position. You may also be shown how to do this exercise lying down and with a weight.         Shoulder abduction with weight:  Stand and hold a weight in your hand with your palm facing your body. Slowly raise your arm to the side with your thumb pointing  up. Then raise your arm as far as you can without pain. Hold this position. Then return to the starting position.         Shoulder abduction with exercise band:  Wrap the exercise band around a heavy, stable object near your foot. Grab the band. Keep your arm straight. Slowly raise your arm to the side with your thumb pointing up. Then, slowly pull the band as far as you can without pain. Slowly return to the starting position.         Shoulder adduction with weight:  Lie on your back on a firm surface. Hold a weight in your hand at your shoulder. Slowly raise your arm toward the ceiling and straighten your elbow. Hold this position. Then slowly return to the starting position.         Shoulder adduction with exercise band:  Wrap the exercise band around a heavy, stable object. Stand and face away from where the band is anchored. Hold each end of the band in both hands with your elbows bent. Your elbows should not be behind your body. Keep your arms parallel to the floor and slowly straighten your elbows. Hold this position. Slowly return to the starting position.       When should I call my doctor or physical therapist?   You have sharp or worsening pain during exercise or at rest.    You have questions or concerns about your rotator cuff injury exercises.    CARE AGREEMENT:   You have the right to help plan your care. Learn about your health condition and how it may be treated. Discuss treatment options with your healthcare providers to decide what care you want to receive. You always have the right to refuse treatment. The above information is an  only. It is not intended as medical advice for individual conditions or treatments. Talk to your doctor, nurse or pharmacist before following any medical regimen to see if it is safe and effective for you.  © Copyright [employer] 2023 Information is for End User's use only and may not be sold, redistributed or otherwise used for commercial purposes. Neck  Exercises   WHAT YOU NEED TO KNOW:   What do I need to know about neck exercises?  Neck exercises help reduce neck pain, and improve neck movement and strength. Neck exercises also help prevent long-term neck problems.  What do I need to know about neck exercise safety?   Move slowly, gently, and smoothly.  Avoid fast or jerky motions.    Stand and sit the way your healthcare provider shows you.  Good posture may reduce your neck pain. Check your posture often, even when you are not doing your neck exercises.    Follow the exercise program recommended by your healthcare provider.  He or she will tell you which exercises are best for your condition. He or she will also tell you how many repetitions to do and how often you should do the exercises.    How do I perform neck exercises safely?   Exercise position:  You may sit or stand while you do neck exercises. Face forward. Your shoulders should be straight and relaxed, with a good posture.         Head tilts, forward and back:  Gently bow your head and try to touch your chin to your chest. Your healthcare provider may tell you to push on the back of your neck to help bow your head. Raise your chin back to the starting position. Tilt your head back as far as possible so you are looking up at the ceiling. Your healthcare provider may tell you to lift your chin to help tilt your head back. Return your head to the starting position.         Head tilts, side to side:  Tilt your head, bringing your ear toward your shoulder. Then tilt your head toward the other shoulder.         Head turns:  Turn your head to look over your shoulder. Tilt your chin down and try to touch it to your shoulder. Do not raise your shoulder to your chin. Face forward again. Do the same on the other side.         Head rolls:  Slowly bring your chin toward your chest. Next, roll your head to the right. Your ear should be positioned over your shoulder. Hold this position for 5 seconds. Roll your head  back toward your chest and to the left into the same position. Hold for 5 seconds. Gently roll your head back and around in a clockwise [place] 3 times. Next, move your head in the reverse direction (counterclockwise) in a [place] 3 times. Do not shrug your shoulders upwards while you do this exercise.       When should I call my doctor?   Your pain does not get better, or gets worse.    You have questions or concerns about your condition, care, or exercise program.    CARE AGREEMENT:   You have the right to help plan your care. Learn about your health condition and how it may be treated. Discuss treatment options with your healthcare providers to decide what care you want to receive. You always have the right to refuse treatment. The above information is an  only. It is not intended as medical advice for individual conditions or treatments. Talk to your doctor, nurse or pharmacist before following any medical regimen to see if it is safe and effective for you.  © Copyright [employer] 2023 Information is for End User's use only and may not be sold, redistributed or otherwise used for commercial purposes.

## 2024-04-04 NOTE — PROGRESS NOTES
Assessment:     1. Right shoulder pain, unspecified chronicity  XR shoulder 2+ vw right        Orders Placed This Encounter   Procedures    XR shoulder 2+ vw right        Impression:   Right shoulder pain likely secondary to trapezius strain.      Pertinent past medical history  Type I diabetic on insulin therapy    Conservative Management   We discussed different treatment options:  No documentation to review at this time  Ice or Heat Therapy as needed 1-2 times daily for 10-20 minutes. As tolerated.   Over the counter Tylenol   as needed based off your Past Medical Hx. Please follow product label for dosing and maximum limits.    Naproxen 500 mg twice daily x 7 days.  Patient denies pregnancy or lactation at this time.  Trial of over the counter Topical Analgesics such as Lidocaine cream or Voltaren Gel, as tolerated. If skin becomes irritated, discontinue use.   Formal Handout provided on General Information of shoulder and neck exercises.  Initiate Formal Physical Therapy at any preferred location.  Prescription provided.        Imaging   All imaging from today was reviewed by myself and explained to the patient.   04/04/2024 right shoulder x-ray: No acute osseous abnormality    Procedure  Not appropriate at this time.     Shared decision making, patient agreeable to plan.      No follow-ups on file.    HPI:   Ericka Thompson is a right-hand-dominant  33 y.o. female  who presents for evaluation of   Chief Complaint   Patient presents with    Right Shoulder - Pain       Occupation: Teacher  Injury Related: No     Onset/Mechanism: Several years.  Worsening in the last 2 weeks.  Denies any trauma..  Location: [place].  Severity: Current severity: 5/10. Max severity: 8/10.  Pain described as: Dull ache  Radiation: Will radiate all the way down into the hand..  Provocative: Overhead.  Associated symptoms: Numbness and tingling for the past week..    Denies any hx of fracture of affected limb.    Denies any surgical history of affected limb.      Summary of treatment to-date:   Tylenol  Topical gels   Ice/heat therapy  Denies home exercise program  Denies formal physical therapy  Denies any corticosteroid injections.      Following History Reviewed and Updated     Past Medical History:   Diagnosis Date    Abnormal Pap smear of cervix     Anxiety     Diabetes mellitus (HCC)     History of  delivery, antepartum 2021    HPV (human papilloma virus) infection     Microalbuminuria due to type 1 diabetes mellitus  (HCC) 2021    Varicella     vaccinated as child     Past Surgical History:   Procedure Laterality Date    INNER EAR SURGERY Left     [place] N/A 2019    Procedure:  SECTION ();  Surgeon: Anoop Winkler MD;  Location: [facility];  Service: Obstetrics    WISDOM TOOTH EXTRACTION       Family History   Problem Relation Age of Onset    No Known Problems Mother     Heart disease Father     No Known Problems Sister     No Known Problems Daughter     No Known Problems Son     No Known Problems Maternal Grandmother     Lung cancer Maternal Grandfather     No Known Problems Paternal Grandmother     No Known Problems Paternal Grandfather     No Known Problems Half-Brother     Colon cancer Other     Breast cancer Neg Hx     Ovarian cancer Neg Hx        Social History     Substance and Sexual Activity   Alcohol Use Not Currently    Comment: occ.     Social History     Substance and Sexual Activity   Drug Use No     Social History     Tobacco Use   Smoking Status Never   Smokeless Tobacco Never       Social Determinants of Health     Tobacco Use: Low Risk  (2024)    Patient History     Smoking Tobacco Use: Never     Smokeless Tobacco Use: Never     Passive Exposure: Not on file   Alcohol Use: Not on file   Financial Resource Strain: Not on file   Food Insecurity: Not on file   Transportation Needs: Not on file   Physical Activity: Not on file   Stress:  "Not on file   Social Connections: Not on file   Intimate Partner Violence: Not on file   Depression: Not at risk (10/21/2022)    PHQ-2     PHQ-2 Score: 0   Postpartum Depression: Low Risk  (2023)    [place]  Depression Scale     Last EPDS Total Score: 4     Last EPDS Self Harm Result: Never   Housing Stability: Not on file   Utilities: Not on file        No Known Allergies    Review of Systems      Review of Systems     Review of Systems   Constitutional: Negative for chills and fever.   HENT: Negative for drooling and sneezing.    Eyes: Negative for redness.   Respiratory: Negative for cough and wheezing.    Gastrointestinal: Negative for vomiting.   Psychiatric/Behavioral: Negative for behavioral problems. The patient is not nervous/anxious.      All other systems negative.   Physical Exam   Physical Exam    Vitals and nursing note reviewed.  Constitutional:   Appearance. Normal Appearance.  /80   Ht 5' 5\" (1.651 m)   Wt 67.6 kg (149 lb)   BMI 24.79 kg/m²     Body mass index is 24.79 kg/m².   HENT:  Head: Atraumatic.  Nose: Nose normal  Eyes: Conjunctiva/sclera: Conjunctivae normal.  Cardiovascular:   Rate and Rhythm: Bilateral equal distal pulses  Pulmonary:   Effort: Pulmonary effort is normal  Skin:   General: Skin is warm and dry.  Neurological:   General: No focal deficit present.  Mental Status: Alert and oriented to person, place, and time.   Psychiatric:   Mood and Affect: mood normal.  Behavior: Behavior normal     Musculoskeletal Exam     Ortho Exam     Right shoulder:     INSPECTION:  Gross deformity   Negative     PALPATION/TENDERNESS:  Acromion Clavicle Scapula/spine of scapula AC joint   Negative Neg. Neg. Neg.     Subacromial bursa Long head of the biceps Coracoid process Trapezius/periscapular region   Neg. Neg. Neg. Reproducing chief complaint.     RANGE OF MOTION:  C-Spine Flexion C-Spine Extension C-Spine Sidebending C-Spine Rotation    intact intact, with discomfort " "intact, with discomfort intact     Internal rotation in 90 degrees Abduction External rotation in 90 degrees Abduction Internal rotation in Adduction External rotation in Adduction     Lumbar spine intact      Forward Flexion Abduction   intact intact     STRENGTH:  Okay Sign Finger abduction Thumb extension   Intact, bilaterally Intact, bilaterally Intact, bilaterally       ROTATOR CUFF:  Rotator cuff tear  Supraspinatus  Infraspinatus  Subscapularis    (Drop-Arm) (Empty can) (External rotation against resistance) (Belly press)   negative negative negative negative     IMPINGEMENT:  Neer's Bañuelos-Butch    negative     BICEPS TENDINOPATHY:  Resisted forward flexion  Resisted supination   (Speed's) (Yergason's):    Negative  N/a      AC JOINT:  Forced cross body adduction (Scarf cross-arm) Job's AC compression   Negative N/a      LABRUM:  Correia's: Labral Crank Test:     N/a      APPREHENSION  Apprehension Yanni's Relocation Maneuver:    N/A N/A     Special test:  [place]    Positive reproducing chief complaint         Distal Sensation  Radial Pulse   Intact Bilaterally  Present and Equal Bilaterally               Procedures       Portions of the record may have been created with voice recognition software. Occasional wrong word or \"sound alike\" substitutions may have occurred due to the inherent limitations of voice recognition software. Please review the chart carefully and recognize, using context, where substitutions/typographical errors may have occurred.   "

## 2024-06-05 PROBLEM — O99.810 HYPERGLYCEMIA DURING PREGNANCY: Status: RESOLVED | Noted: 2023-05-18 | Resolved: 2024-06-05

## 2024-06-05 PROBLEM — O47.9 UTERINE CONTRACTIONS AT GREATER THAN 20 WEEKS OF GESTATION: Status: RESOLVED | Noted: 2023-07-13 | Resolved: 2024-06-05

## 2024-06-05 PROBLEM — O24.013 PRE-EXISTING TYPE 1 DIABETES MELLITUS WITH HYPERGLYCEMIA DURING PREGNANCY IN THIRD TRIMESTER (HCC): Status: RESOLVED | Noted: 2023-02-10 | Resolved: 2024-06-05

## 2024-06-05 PROBLEM — O36.63X0 EXCESSIVE FETAL GROWTH AFFECTING MANAGEMENT OF PREGNANCY IN THIRD TRIMESTER: Status: RESOLVED | Noted: 2023-08-14 | Resolved: 2024-06-05

## 2024-06-05 PROBLEM — E10.65 PRE-EXISTING TYPE 1 DIABETES MELLITUS WITH HYPERGLYCEMIA DURING PREGNANCY IN THIRD TRIMESTER (HCC): Status: RESOLVED | Noted: 2023-02-10 | Resolved: 2024-06-05

## 2024-06-05 PROBLEM — O34.219 PREGNANCY WITH HISTORY OF CESAREAN SECTION, ANTEPARTUM: Status: RESOLVED | Noted: 2021-11-09 | Resolved: 2024-06-05

## 2024-06-05 PROBLEM — B95.1 POSITIVE GBS TEST: Status: RESOLVED | Noted: 2023-08-09 | Resolved: 2024-06-05

## 2024-06-05 PROBLEM — O36.63X0 MACROSOMIA AFFECTING MANAGEMENT OF MOTHER IN THIRD TRIMESTER: Status: RESOLVED | Noted: 2023-07-17 | Resolved: 2024-06-05

## 2024-06-05 PROBLEM — O34.219 VBAC (VAGINAL BIRTH AFTER CESAREAN): Status: RESOLVED | Noted: 2023-02-10 | Resolved: 2024-06-05

## 2024-06-05 NOTE — PROGRESS NOTES
[facility] [address], [place], [place]    ASSESSMENT/PLAN: Ericka Thompson is a 33 y.o.  who presents for annual gynecologic exam.    Encounter for routine gynecologic examination  - Routine well woman exam completed today.  - Cervical Cancer Screening: Current ASCCP Guidelines reviewed. Last Pap: 2023 normal. Next Pap Due:   - HPV Vaccination status: Not immunized  - STI screening offered including HIV testing: not indicated based on hx or requested at time of visit  - Contraceptive counseling discussed.  Current contraception: partner has vasectomy  - RTO 1 yr     Additional problems addressed during this visit:  1. Encounter for gynecological examination without abnormal finding  2. Candidiasis of vulva and vagina  -     fluconazole (DIFLUCAN) 150 mg tablet; Take 1 tablet (150 mg total) by mouth every 3 (three) days for 3 doses  -     clotrimazole-betamethasone (LOTRISONE) 1-0.05 % cream; Apply topically 2 (two) times a day      CC:  Annual Gynecologic Examination    HPI: Ericka Thompson is a 33 y.o.  who presents for annual gynecologic examination.  Ericka presents for gyn exam today. 2024 LMP. Menses is monthly, flow heavy first 2 days and then lightens.  Partner had vasectomy as birth control method. Happy with method. Last pap smear . History of abnormal pap smear- yes- had repeat. Sexually active- yes, with partner for 8 yrs. HPV vaccine - no. Does not desire STI testing. 6-7 hrs sleep per day. 2-3 servings of calcium rich food per day. Active daily- has young children. 1 servings of caffeine per day. Does not perform SBE monthly. Safe at home - yes . Wears seat belts. Concerns : was on antibiotic and now experiencing itching, white discharge, no odor          The following portions of the patient's history were reviewed and updated as appropriate: allergies, current medications, past family history, past medical history,  "obstetric history, gynecologic history, past social history, past surgical history and problem list.    Review of Systems   Constitutional:  Negative for chills, fatigue and fever.   Respiratory:  Negative for cough and shortness of breath.    Cardiovascular:  Negative for chest pain, palpitations and leg swelling.   Gastrointestinal:  Negative for abdominal pain, constipation and diarrhea.   Genitourinary:  Positive for vaginal discharge. Negative for difficulty urinating, dyspareunia, dysuria, frequency, menstrual problem, pelvic pain, urgency, vaginal bleeding and vaginal pain.   Neurological:  Negative for light-headedness and headaches.   Psychiatric/Behavioral:  Negative for self-injury. The patient is not nervous/anxious.          Objective:  /64   Ht 5' 5\" (1.651 m)   Wt 62.1 kg (137 lb)   LMP 05/23/2024 (Approximate)   BMI 22.80 kg/m²    Physical Exam  Vitals reviewed.   Constitutional:       Appearance: Normal appearance.   HENT:      Head: Normocephalic.   Neck:      Thyroid: No thyroid mass or thyroid tenderness.   Cardiovascular:      Rate and Rhythm: Normal rate and regular rhythm.      Heart sounds: Normal heart sounds.   Pulmonary:      Effort: Pulmonary effort is normal.      Breath sounds: Normal breath sounds.   Chest:   Breasts:     Right: No mass, nipple discharge, skin change or tenderness.      Left: No mass, nipple discharge, skin change or tenderness.   Abdominal:      General: There is no distension.      Palpations: There is no mass.      Tenderness: There is no abdominal tenderness. There is no guarding.   Genitourinary:     General: Normal vulva.      Exam position: Lithotomy position.      Labia:         Right: No rash, tenderness or lesion.         Left: No rash, tenderness or lesion.       Vagina: Vaginal discharge present. No erythema, tenderness, bleeding or lesions.      Cervix: No cervical motion tenderness, discharge, friability, lesion, erythema or cervical bleeding. "      Uterus: Normal. Not enlarged and not tender.       Adnexa:         Right: No mass, tenderness or fullness.          Left: No mass, tenderness or fullness.        Comments: Symptoms and hx consistent with yeast  Musculoskeletal:      Cervical back: Normal range of motion.   Lymphadenopathy:      Upper Body:      Right upper body: No axillary adenopathy.      Left upper body: No axillary adenopathy.   Skin:     General: Skin is warm and dry.   Neurological:      Mental Status: She is alert.   Psychiatric:         Mood and Affect: Mood normal.         Behavior: Behavior normal.         Judgment: Judgment normal.

## 2024-06-06 ENCOUNTER — ANNUAL EXAM (OUTPATIENT)
Dept: OBGYN CLINIC | Facility: MEDICAL CENTER | Age: 34
End: 2024-06-06
Payer: COMMERCIAL

## 2024-06-06 VITALS
DIASTOLIC BLOOD PRESSURE: 64 MMHG | SYSTOLIC BLOOD PRESSURE: 100 MMHG | WEIGHT: 137 LBS | BODY MASS INDEX: 22.82 KG/M2 | HEIGHT: 65 IN

## 2024-06-06 DIAGNOSIS — B37.31 CANDIDIASIS OF VULVA AND VAGINA: ICD-10-CM

## 2024-06-06 DIAGNOSIS — Z01.419 ENCOUNTER FOR GYNECOLOGICAL EXAMINATION WITHOUT ABNORMAL FINDING: Primary | ICD-10-CM

## 2024-06-06 PROCEDURE — S0612 ANNUAL GYNECOLOGICAL EXAMINA: HCPCS | Performed by: ADVANCED PRACTICE MIDWIFE

## 2024-06-06 RX ORDER — CLOTRIMAZOLE AND BETAMETHASONE DIPROPIONATE 10; .64 MG/G; MG/G
CREAM TOPICAL 2 TIMES DAILY
Qty: 45 G | Refills: 1 | Status: SHIPPED | OUTPATIENT
Start: 2024-06-06

## 2024-06-06 RX ORDER — INSULIN PMP CART,AUT,G6/7,CNTR
1 EACH SUBCUTANEOUS
COMMUNITY
Start: 2024-01-02 | End: 2024-12-27

## 2024-06-06 RX ORDER — FLUCONAZOLE 150 MG/1
150 TABLET ORAL
Qty: 3 TABLET | Refills: 1 | Status: SHIPPED | OUTPATIENT
Start: 2024-06-06 | End: 2024-06-13

## 2024-07-31 DIAGNOSIS — E10.65 PRE-EXISTING TYPE 1 DIABETES MELLITUS WITH HYPERGLYCEMIA DURING PREGNANCY IN FIRST TRIMESTER (HCC): ICD-10-CM

## 2024-07-31 DIAGNOSIS — Z3A.10 10 WEEKS GESTATION OF PREGNANCY: ICD-10-CM

## 2024-07-31 DIAGNOSIS — E55.9 VITAMIN D DEFICIENCY: ICD-10-CM

## 2024-07-31 DIAGNOSIS — O24.011 PRE-EXISTING TYPE 1 DIABETES MELLITUS WITH HYPERGLYCEMIA DURING PREGNANCY IN FIRST TRIMESTER (HCC): ICD-10-CM

## 2024-07-31 RX ORDER — LANCETS 33 GAUGE
EACH MISCELLANEOUS
Qty: 100 EACH | Refills: 1 | Status: SHIPPED | OUTPATIENT
Start: 2024-07-31

## 2024-10-15 ENCOUNTER — TELEPHONE (OUTPATIENT)
Age: 34
End: 2024-10-15

## 2024-10-15 NOTE — TELEPHONE ENCOUNTER
Please call her to schedule her routine follow-up for diabetes along with her annual physical with Dr. Murray.  She has not seen him since 2022.  Thank you

## 2024-12-26 ENCOUNTER — APPOINTMENT (OUTPATIENT)
Dept: LAB | Facility: MEDICAL CENTER | Age: 34
End: 2024-12-26
Payer: COMMERCIAL

## 2024-12-26 DIAGNOSIS — E10.9 CONTROLLED DIABETES MELLITUS TYPE 1 WITHOUT COMPLICATIONS (HCC): ICD-10-CM

## 2024-12-26 LAB
ALBUMIN SERPL BCG-MCNC: 4.7 G/DL (ref 3.5–5)
ALP SERPL-CCNC: 62 U/L (ref 34–104)
ALT SERPL W P-5'-P-CCNC: 11 U/L (ref 7–52)
ANION GAP SERPL CALCULATED.3IONS-SCNC: 10 MMOL/L (ref 4–13)
AST SERPL W P-5'-P-CCNC: 14 U/L (ref 13–39)
BILIRUB SERPL-MCNC: 0.43 MG/DL (ref 0.2–1)
BUN SERPL-MCNC: 11 MG/DL (ref 5–25)
CALCIUM SERPL-MCNC: 9.3 MG/DL (ref 8.4–10.2)
CHLORIDE SERPL-SCNC: 102 MMOL/L (ref 96–108)
CHOLEST SERPL-MCNC: 159 MG/DL (ref ?–200)
CO2 SERPL-SCNC: 27 MMOL/L (ref 21–32)
CREAT SERPL-MCNC: 0.83 MG/DL (ref 0.6–1.3)
CREAT UR-MCNC: 268.5 MG/DL
ERYTHROCYTE [DISTWIDTH] IN BLOOD BY AUTOMATED COUNT: 12.1 % (ref 11.6–15.1)
EST. AVERAGE GLUCOSE BLD GHB EST-MCNC: 157 MG/DL
GFR SERPL CREATININE-BSD FRML MDRD: 93 ML/MIN/1.73SQ M
GLUCOSE P FAST SERPL-MCNC: 140 MG/DL (ref 65–99)
HBA1C MFR BLD: 7.1 %
HCT VFR BLD AUTO: 43.7 % (ref 34.8–46.1)
HDLC SERPL-MCNC: 69 MG/DL
HGB BLD-MCNC: 14.2 G/DL (ref 11.5–15.4)
LDLC SERPL CALC-MCNC: 79 MG/DL (ref 0–100)
MCH RBC QN AUTO: 30.7 PG (ref 26.8–34.3)
MCHC RBC AUTO-ENTMCNC: 32.5 G/DL (ref 31.4–37.4)
MCV RBC AUTO: 94 FL (ref 82–98)
MICROALBUMIN UR-MCNC: 23.5 MG/L
MICROALBUMIN/CREAT 24H UR: 9 MG/G CREATININE (ref 0–30)
NONHDLC SERPL-MCNC: 90 MG/DL
PLATELET # BLD AUTO: 159 THOUSANDS/UL (ref 149–390)
PMV BLD AUTO: 13.3 FL (ref 8.9–12.7)
POTASSIUM SERPL-SCNC: 3.8 MMOL/L (ref 3.5–5.3)
PROT SERPL-MCNC: 7.2 G/DL (ref 6.4–8.4)
RBC # BLD AUTO: 4.63 MILLION/UL (ref 3.81–5.12)
SODIUM SERPL-SCNC: 139 MMOL/L (ref 135–147)
TRIGL SERPL-MCNC: 54 MG/DL (ref ?–150)
TSH SERPL DL<=0.05 MIU/L-ACNC: 2.17 UIU/ML (ref 0.45–4.5)
WBC # BLD AUTO: 5.51 THOUSAND/UL (ref 4.31–10.16)

## 2024-12-26 PROCEDURE — 36415 COLL VENOUS BLD VENIPUNCTURE: CPT

## 2024-12-26 PROCEDURE — 84443 ASSAY THYROID STIM HORMONE: CPT

## 2024-12-26 PROCEDURE — 80061 LIPID PANEL: CPT

## 2024-12-26 PROCEDURE — 85027 COMPLETE CBC AUTOMATED: CPT

## 2024-12-26 PROCEDURE — 82570 ASSAY OF URINE CREATININE: CPT

## 2024-12-26 PROCEDURE — 83036 HEMOGLOBIN GLYCOSYLATED A1C: CPT

## 2024-12-26 PROCEDURE — 80053 COMPREHEN METABOLIC PANEL: CPT

## 2024-12-26 PROCEDURE — 82043 UR ALBUMIN QUANTITATIVE: CPT

## 2024-12-26 PROCEDURE — 82985 ASSAY OF GLYCATED PROTEIN: CPT

## 2024-12-27 LAB — FRUCTOSAMINE SERPL-SCNC: 337 UMOL/L (ref 0–285)

## 2025-06-19 ENCOUNTER — ANNUAL EXAM (OUTPATIENT)
Dept: OBGYN CLINIC | Facility: MEDICAL CENTER | Age: 35
End: 2025-06-19
Payer: COMMERCIAL

## 2025-06-19 VITALS
BODY MASS INDEX: 22.99 KG/M2 | SYSTOLIC BLOOD PRESSURE: 102 MMHG | DIASTOLIC BLOOD PRESSURE: 64 MMHG | WEIGHT: 138 LBS | HEIGHT: 65 IN

## 2025-06-19 DIAGNOSIS — Z01.419 ENCOUNTER FOR GYNECOLOGICAL EXAMINATION WITHOUT ABNORMAL FINDING: Primary | ICD-10-CM

## 2025-06-19 PROCEDURE — S0612 ANNUAL GYNECOLOGICAL EXAMINA: HCPCS | Performed by: ADVANCED PRACTICE MIDWIFE

## 2025-06-19 RX ORDER — INSULIN PMP CART,AUT,G6/7,CNTR
EACH SUBCUTANEOUS
COMMUNITY
Start: 2025-06-16

## 2025-06-19 NOTE — PROGRESS NOTES
Name: Ericka Thompson      : 1990      MRN: [id number]  Encounter Provider: Dina Connell CNM  Encounter Date: 2025   Enc[facility]  :  Assessment & Plan  Encounter for gynecological examination without abnormal finding  - Routine well woman exam completed today.  - Cervical Cancer Screening: Current ASCCP Guidelines reviewed. Last Pap: 2023 . Next Pap Due:   - HPV Vaccination status: Not immunized  - STI screening offered including HIV testing: not indicated based on hx or requested at time of visit  - Contraceptive counseling discussed.  Current contraception: partner with vasectomy    - RTO 1 yr           History of Present Illness   Ericka presents for gyn exam today. 6/3/2025 LMP. Menses is regular, flow heavy first 2 days and lasts for 6 days.  Partner has vasectomy as birth control method. Last pap smear - normal. History of abnormal pap smear-needed repeat that was normal. Sexually active- yes, with partner for 9 yrs. HPV vaccine -no. Does not desire STI testing. 6-7 hrs sleep per day. 2-3 servings of calcium rich food per day. Active daily has 3 young children. 1 servings of caffeine per day. Breast changes: none. Safe at home yes. Concerns: none   Ericka Thompson is a 34 y.o. female who presents for annual exam  History obtained from: patient    Review of Systems   Constitutional:  Negative for chills, fatigue, fever and unexpected weight change.   Respiratory:  Negative for cough and shortness of breath.    Cardiovascular:  Negative for chest pain, palpitations and leg swelling.   Gastrointestinal:  Negative for abdominal pain, constipation and diarrhea.   Genitourinary:  Negative for difficulty urinating, dyspareunia, dysuria, frequency, menstrual problem, pelvic pain, urgency, vaginal bleeding, vaginal discharge and vaginal pain.   Musculoskeletal:  Negative for back pain and gait problem.   Neurological:   Negative for dizziness and headaches.   Psychiatric/Behavioral:  Negative for self-injury. The patient is nervous/anxious.    Medical History Reviewed by provider this encounter:     .  Medications Ordered Prior to Encounter[1]   Social History[2]     Objective   There were no vitals taken for this visit.     Physical Exam  Vitals reviewed.   Constitutional:       Appearance: Normal appearance.   Neck:      Thyroid: No thyroid mass or thyroid tenderness.     Cardiovascular:      Rate and Rhythm: Normal rate.   Pulmonary:      Effort: Pulmonary effort is normal.   Chest:   Breasts:     Right: No mass, nipple discharge, skin change or tenderness.      Left: No mass, nipple discharge, skin change or tenderness.   Abdominal:      Tenderness: There is no abdominal tenderness. There is no guarding or rebound.   Genitourinary:     General: Normal vulva.      Exam position: Lithotomy position.      Labia:         Right: No rash, tenderness or lesion.         Left: No rash, tenderness or lesion.       Urethra: No urethral pain, urethral swelling or urethral lesion.      Vagina: No vaginal discharge, erythema, tenderness, bleeding or lesions.      Cervix: No cervical motion tenderness, discharge, friability, lesion or erythema.      Uterus: Normal. Not enlarged and not tender.       Adnexa:         Right: No mass, tenderness or fullness.          Left: No mass, tenderness or fullness.     Lymphadenopathy:      Upper Body:      Right upper body: No axillary adenopathy.      Left upper body: No axillary adenopathy.     Neurological:      Mental Status: She is alert and oriented to person, place, and time.     Psychiatric:         Mood and Affect: Mood normal.         Behavior: Behavior normal.            [1]   Current Outpatient Medications on File Prior to Visit   Medication Sig Dispense Refill    Alcohol Swabs (Pharmacist Choice Alcohol) PADS USE AS DIRECTED 100 each 1    Comfort EZ Pen Needles 32G X 4 MM MISC USE AS DIRECTED  100 each 1    Continuous Blood Gluc  (Dexcom G6 ) DON       Continuous Blood Gluc Sensor (Dexcom G6 Sensor) MISC 1 box=1 month supply or 3 sensors, use 1 sensor every 10 days. 1 each 12    Continuous Blood Gluc Transmit (Dexcom G6 Transmitter) MISC Transmitter change every 90 days. 1 each 3    Insulin Aspart (NovoLOG PenFill) 100 UNITS/ML cartridge for injection INJECT 10 UNITS UNDER THE SKIN 3 (THREE) TIMES A DAY BEFORE MEALS VIA INPEN. TO BE TITRATED UP TO 50 UNITS A DAY. 15 mL 1    Insulin Disposable Pump (Omnipod 5 FceN3P2 Pods Gen 5) MISC       Lancets MISC Use      Cholecalciferol (Vitamin D-3) 25 MCG (1000 UT) CAPS Take 1 capsule by mouth (Patient not taking: Reported on 6/19/2025)      clotrimazole-betamethasone (LOTRISONE) 1-0.05 % cream Apply topically 2 (two) times a day (Patient not taking: Reported on 6/19/2025) 45 g 1     No current facility-administered medications on file prior to visit.   [2]   Social History  Tobacco Use    Smoking status: Never    Smokeless tobacco: Never   Vaping Use    Vaping status: Never Used   Substance and Sexual Activity    Alcohol use: Yes     Comment: occ.    Drug use: No    Sexual activity: Yes     Partners: Male     Birth control/protection: None, Male Sterilization     Comment: vastectomy

## 2025-06-20 ENCOUNTER — OFFICE VISIT (OUTPATIENT)
Dept: OBGYN CLINIC | Facility: CLINIC | Age: 35
End: 2025-06-20
Payer: COMMERCIAL

## 2025-06-20 DIAGNOSIS — M67.442 MUCOUS CYST OF DIGIT OF LEFT HAND: Primary | ICD-10-CM

## 2025-06-20 PROCEDURE — 99203 OFFICE O/P NEW LOW 30 MIN: CPT | Performed by: ORTHOPAEDIC SURGERY

## 2025-06-20 NOTE — PROGRESS NOTES
The HAND & UPPER EXTREMITY OFFICE VISIT   Referred By:  Byron Murray Md  [address]  [address]  [place] [address]      Chief Complaint:     Left middle finger mass    History of Present Illness:   34 y.o., female presents with a mass to the left middle finger. She reports the mass has been present for >1 year. She denies any injury or trauma prior to the onset of her symptoms. She denies any changes in the mass since it first appeared. The area sometimes becomes sore with activities such as braiding her daughter's hair. Overall the mass is not severely bothersome and does not generally affect her daily activities.       ADLs: Community ambulator  Smoke: denies ETOH: occasionally   Drugs:  denies        Past Medical History:  Past Medical History[1]  Past Surgical History[2]  Family History[3]  Social History     Socioeconomic History    Marital status: Single     Spouse name: Not on file    Number of children: Not on file    Years of education: Not on file    Highest education level: Not on file   Occupational History    Not on file   Tobacco Use    Smoking status: Never    Smokeless tobacco: Never   Vaping Use    Vaping status: Never Used   Substance and Sexual Activity    Alcohol use: Yes     Comment: occ.    Drug use: No    Sexual activity: Yes     Partners: Male     Birth control/protection: None, Male Sterilization     Comment: vastectomy   Other Topics Concern    Not on file   Social History Narrative    Not on file     Social Drivers of Health     Financial Resource Strain: Not on file   Food Insecurity: Not on file   Transportation Needs: Not on file   Physical Activity: Not on file   Stress: Not on file   Social Connections: Not on file   Intimate Partner Violence: Not on file   Housing Stability: Not on file     Scheduled Meds:  Continuous Infusions:No current facility-administered medications for this visit.    PRN Meds:.  Allergies[4]        Physical Examination:    LMP 06/03/2025  (Exact Date)     Gen: A&Ox3, NAD  Cardiac: regular rate  Chest: non labored breathing  Abdomen: Non-distended      Right Upper Extremity:  Skin CDI  No obvious deformity of the shoulder, arm, elbow, forearm, wrist, hand  Sensation intact to light touch in the axillary median, ulnar, and radial nerve distributions  Full digital ROM  Warm, well-perfused digits  Cap refill <2s    Left Upper Extremity:  Skin CDI  Small mass present on middle finger overlying dorsal middle phalanx, just proximal to the DIP joint. Soft, mobile, non-tender. Approximately 5 mm in diameter   Sensation intact to light touch in the axillary median, ulnar, and radial nerve distributions  Full composite fist  Some discomfort in area of the mass with claw fist formation  Warm, well-perfused digits  Cap refill <2s      Studies:  No imaging to review    Labs:  I personally reviewed the following labs,   Lab Results   Component Value Date    HGBA1C 7.1 (H) 12/26/2024    HGBA1C 6.3 (H) 07/01/2024    HGBA1C 6.6 (H) 12/09/2023         Assessment & Plan  Mucous cyst of digit of left hand  34 y.o. female presents with signs and symptoms consistent with the above diagnosis.  We discussed the natural history of this condition and its pathogenesis. We discussed operative and nonoperative treatment options including observation, compressive finger sleeve, aspiration, or surgical excision. We reviewed the risks and benefits of all treatment options. After discussing the options, she would like to proceed with observation for now as the mass is not very bothersome. Provided with compressive finger sleeve in the office today to wear as needed. It is recommended she return to the office as needed if the problem fails to improve, worsens, or recurs.       she expressed understanding of the plan and agreed. We encouraged them to contact our office with any questions or concerns.         Diallo Whitaker MD  Hand and Upper Extremity Surgery        *This note was  dictated using Dragon voice recognition software. Please excuse any word substitutions or errors.*      Scribe Attestation      I,:  Vidhi Madison PA-C am acting as a scribe while in the presence of the attending physician.:       I,:  Diallo Whitaker MD personally performed the services described in this documentation    as scribed in my presence.:                  [1]   Past Medical History:  Diagnosis Date    Abnormal Pap smear of cervix     Anxiety     Diabetes mellitus (HCC)     History of  delivery, antepartum 2021    HPV (human papilloma virus) infection     Microalbuminuria due to type 1 diabetes mellitus  (HCC) 2021    Varicella     vaccinated as child   [2]   Past Surgical History:  Procedure Laterality Date    INNER EAR SURGERY Left     [place]  DELIVERY ONLY N/A 2019    Procedure:  SECTION ();  Surgeon: Anoop Winkler MD;  Location: [facility];  Service: Obstetrics    WISDOM TOOTH EXTRACTION     [3]   Family History  Problem Relation Name Age of Onset    No Known Problems Mother      Heart disease Father      No Known Problems Sister      No Known Problems Daughter      No Known Problems Son      No Known Problems Maternal Grandmother      Lung cancer Maternal Grandfather      No Known Problems Paternal Grandmother      No Known Problems Paternal Grandfather      No Known Problems Half-Brother paternal     Colon cancer Other mat great gran     Breast cancer Neg Hx      Ovarian cancer Neg Hx     [4] No Known Allergies

## 2025-06-20 NOTE — LETTER
June 20, 2025     Byron Murray MD  [address] [facility]  [address] [place] [id number]    Patient: Ericka Thompson   YOB: 1990   Date of Visit: 6/20/2025       Dear Dr. Byron Murray MD:    Thank you for referring Ericka Thompson to me for evaluation. Below are my notes for this consultation.    If you have questions, please do not hesitate to call me. I look forward to following your patient along with you.         Sincerely,        Diallo Whitaker MD        CC: No Recipients    Diallo Whitaker MD  6/20/2025  4:08 PM  Sign when Signing Visit  The HAND & UPPER EXTREMITY OFFICE VISIT   Referred By:  Byron Murray Md  [address] [id number]      Chief Complaint:     Left middle finger mass    History of Present Illness:   34 y.o., female presents with a mass to the left middle finger. She reports the mass has been present for >1 year. She denies any injury or trauma prior to the onset of her symptoms. She denies any changes in the mass since it first appeared. The area sometimes becomes sore with activities such as braiding her daughter's hair. Overall the mass is not severely bothersome and does not generally affect her daily activities.       ADLs: Community ambulator  Smoke: denies ETOH: occasionally   Drugs:  denies        Past Medical History:  Past Medical History[1]  Past Surgical History[2]  Family History[3]  Social History     Socioeconomic History   • Marital status: Single     Spouse name: Not on file   • Number of children: Not on file   • Years of education: Not on file   • Highest education level: Not on file   Occupational History   • Not on file   Tobacco Use   • Smoking status: Never   • Smokeless tobacco: Never   Vaping Use   • Vaping status: Never Used   Substance and Sexual Activity   • Alcohol use: Yes     Comment: occ.   • Drug use: No   • Sexual activity: Yes     Partners: Male     Birth control/protection:  None, Male Sterilization     Comment: vastectomy   Other Topics Concern   • Not on file   Social History Narrative   • Not on file     Social Drivers of Health     Financial Resource Strain: Not on file   Food Insecurity: Not on file   Transportation Needs: Not on file   Physical Activity: Not on file   Stress: Not on file   Social Connections: Not on file   Intimate Partner Violence: Not on file   Housing Stability: Not on file     Scheduled Meds:  Continuous Infusions:No current facility-administered medications for this visit.    PRN Meds:.  Allergies[4]        Physical Examination:    LMP 06/03/2025 (Exact Date)     Gen: A&Ox3, NAD  Cardiac: regular rate  Chest: non labored breathing  Abdomen: Non-distended      Right Upper Extremity:  Skin CDI  No obvious deformity of the shoulder, arm, elbow, forearm, wrist, hand  Sensation intact to light touch in the axillary median, ulnar, and radial nerve distributions  Full digital ROM  Warm, well-perfused digits  Cap refill <2s    Left Upper Extremity:  Skin CDI  Small mass present on middle finger overlying dorsal middle phalanx, just proximal to the DIP joint. Soft, mobile, non-tender. Approximately 5 mm in diameter   Sensation intact to light touch in the axillary median, ulnar, and radial nerve distributions  Full composite fist  Some discomfort in area of the mass with claw fist formation  Warm, well-perfused digits  Cap refill <2s      Studies:  No imaging to review    Labs:  I personally reviewed the following labs,   Lab Results   Component Value Date    HGBA1C 7.1 (H) 12/26/2024    HGBA1C 6.3 (H) 07/01/2024    HGBA1C 6.6 (H) 12/09/2023         Assessment & Plan  Mucous cyst of digit of left hand  34 y.o. female presents with signs and symptoms consistent with the above diagnosis.  We discussed the natural history of this condition and its pathogenesis. We discussed operative and nonoperative treatment options including observation, compressive finger sleeve,  aspiration, or surgical excision. We reviewed the risks and benefits of all treatment options. After discussing the options, she would like to proceed with observation for now as the mass is not very bothersome. Provided with compressive finger sleeve in the office today to wear as needed. It is recommended she return to the office as needed if the problem fails to improve, worsens, or recurs.       she expressed understanding of the plan and agreed. We encouraged them to contact our office with any questions or concerns.         Diallo Whitaker MD  Hand and Upper Extremity Surgery        *This note was dictated using Dragon voice recognition software. Please excuse any word substitutions or errors.*      Scribe Attestation      I,:  Vidhi Madison PA-C am acting as a scribe while in the presence of the attending physician.:       I,:  Diallo Whitaker MD personally performed the services described in this documentation    as scribed in my presence.:                  [1]  Past Medical History:  Diagnosis Date   • Abnormal Pap smear of cervix    • Anxiety    • Diabetes mellitus (HCC)    • History of  delivery, antepartum 2021   • HPV (human papilloma virus) infection    • Microalbuminuria due to type 1 diabetes mellitus  (HCC) 2021   • Varicella     vaccinated as child   [2]  Past Surgical History:  Procedure Laterality Date   • INNER EAR SURGERY Left    • [place]  DELIVERY ONLY N/A 2019    Procedure:  SECTION ();  Surgeon: Anoop Winkler MD;  Location: [facility];  Service: Obstetrics   • WISDOM TOOTH EXTRACTION     [3]  Family History  Problem Relation Name Age of Onset   • No Known Problems Mother     • Heart disease Father     • No Known Problems Sister     • No Known Problems Daughter     • No Known Problems Son     • No Known Problems Maternal Grandmother     • Lung cancer Maternal Grandfather     • No Known Problems Paternal Grandmother     • No Known  Problems Paternal Grandfather     • No Known Problems Half-Brother paternal    • Colon cancer Other mat great gran    • Breast cancer Neg Hx     • Ovarian cancer Neg Hx     [4]  No Known Allergies

## (undated) DEVICE — CHLORAPREP HI-LITE 10.5ML ORANGE

## (undated) DEVICE — 3M™ STERI-STRIP™ REINFORCED ADHESIVE SKIN CLOSURES, R1547, 1/2 IN X 4 IN (12 MM X 100 MM), 6 STRIPS/ENVELOPE: Brand: 3M™ STERI-STRIP™

## (undated) DEVICE — 3M™ STERI-STRIP™ COMPOUND BENZOIN TINCTURE 40 BAGS/CARTON 4 CARTONS/CASE C1544: Brand: 3M™ STERI-STRIP™

## (undated) DEVICE — MEDI-VAC YANKAUER SUCTION HANDLE W/STRAIGHT TIP & CONTROL VENT: Brand: CARDINAL HEALTH

## (undated) DEVICE — 3M™ TEGADERM™ TRANSPARENT FILM DRESSING FRAME STYLE, 1627, 4 IN X 10 IN (10 CM X 25 CM), 20/CT 4CT/CASE: Brand: 3M™ TEGADERM™

## (undated) DEVICE — ADHESIVE SKIN HIGH VISCOSITY EXOFIN 1ML